# Patient Record
Sex: MALE | Race: WHITE | NOT HISPANIC OR LATINO | Employment: OTHER | URBAN - METROPOLITAN AREA
[De-identification: names, ages, dates, MRNs, and addresses within clinical notes are randomized per-mention and may not be internally consistent; named-entity substitution may affect disease eponyms.]

---

## 2018-04-09 ENCOUNTER — HOSPITAL ENCOUNTER (EMERGENCY)
Facility: HOSPITAL | Age: 63
Discharge: HOME | End: 2018-04-09
Attending: EMERGENCY MEDICINE
Payer: MEDICAID

## 2018-04-09 VITALS
DIASTOLIC BLOOD PRESSURE: 107 MMHG | HEIGHT: 70 IN | HEART RATE: 92 BPM | SYSTOLIC BLOOD PRESSURE: 162 MMHG | RESPIRATION RATE: 20 BRPM | WEIGHT: 132 LBS | OXYGEN SATURATION: 97 % | TEMPERATURE: 98 F | BODY MASS INDEX: 18.9 KG/M2

## 2018-04-09 DIAGNOSIS — I10 HYPERTENSION, UNSPECIFIED TYPE: Primary | ICD-10-CM

## 2018-04-09 PROCEDURE — 99283 EMERGENCY DEPT VISIT LOW MDM: CPT

## 2018-04-09 RX ORDER — NIFEDIPINE 10 MG/1
10 CAPSULE ORAL
Status: DISCONTINUED | OUTPATIENT
Start: 2018-04-09 | End: 2018-04-09 | Stop reason: HOSPADM

## 2018-04-09 NOTE — DISCHARGE INSTRUCTIONS
Follow up PCP as needed  Return to the ED if symptoms worsen Continue current medicines as perscribed

## 2018-04-09 NOTE — ED PROVIDER NOTES
Encounter Date: 4/9/2018       History     Chief Complaint   Patient presents with    Hypertension    Fatigue     Pt here for elavated Bp. Was seen last week x2 for the same thing by PCP and had med change. Still BP is up.           Review of patient's allergies indicates:   Allergen Reactions    Codeine      Past Medical History:   Diagnosis Date    Anxiety     Back pain     Cancer     COPD (chronic obstructive pulmonary disease)     Depression     Hypertension     MI (myocardial infarction)     Stroke     Tremor      Past Surgical History:   Procedure Laterality Date    ANGIOPLASTY      EYE SURGERY      FINGER SURGERY      POLYPECTOMY       History reviewed. No pertinent family history.  Social History   Substance Use Topics    Smoking status: Current Every Day Smoker    Smokeless tobacco: Not on file    Alcohol use Yes      Comment: occ     Review of Systems   Constitutional: Positive for fatigue.   All other systems reviewed and are negative.      Physical Exam     Initial Vitals [04/09/18 1253]   BP Pulse Resp Temp SpO2   (!) 162/107 92 20 98.3 °F (36.8 °C) 97 %      MAP       125.33         Physical Exam    Nursing note and vitals reviewed.  Constitutional: He appears well-developed and well-nourished.   HENT:   Head: Normocephalic.   Right Ear: External ear normal.   Left Ear: External ear normal.   Nose: Nose normal.   Mouth/Throat: Oropharynx is clear and moist.   Eyes: Conjunctivae and EOM are normal. Pupils are equal, round, and reactive to light.   Neck: Normal range of motion. Neck supple.   Cardiovascular: Normal rate, regular rhythm, normal heart sounds and intact distal pulses.   No murmur heard.  Pulmonary/Chest: Breath sounds normal. He has no wheezes. He has no rhonchi. He has no rales.   Abdominal: Soft. Bowel sounds are normal. He exhibits no mass. There is no tenderness.   Genitourinary: Penis normal.   Musculoskeletal: Normal range of motion.   Neurological: He is alert and  oriented to person, place, and time. He has normal strength and normal reflexes. He displays normal reflexes. No cranial nerve deficit.   Skin: Skin is warm and dry. Capillary refill takes less than 2 seconds.   Psychiatric: He has a normal mood and affect. His behavior is normal. Judgment and thought content normal.         ED Course   Procedures  Labs Reviewed - No data to display                               Clinical Impression:   The encounter diagnosis was Hypertension, unspecified type.                           Dane Carter MD  04/09/18 0144

## 2018-04-11 ENCOUNTER — OFFICE VISIT (OUTPATIENT)
Dept: SURGERY | Facility: CLINIC | Age: 63
End: 2018-04-11
Payer: MEDICAID

## 2018-04-11 VITALS
OXYGEN SATURATION: 96 % | SYSTOLIC BLOOD PRESSURE: 174 MMHG | WEIGHT: 132 LBS | TEMPERATURE: 99 F | HEART RATE: 79 BPM | BODY MASS INDEX: 18.9 KG/M2 | RESPIRATION RATE: 18 BRPM | HEIGHT: 70 IN | DIASTOLIC BLOOD PRESSURE: 104 MMHG

## 2018-04-11 DIAGNOSIS — D49.2 SKIN NEOPLASM: Primary | ICD-10-CM

## 2018-04-11 PROCEDURE — 99214 OFFICE O/P EST MOD 30 MIN: CPT | Mod: S$GLB,,, | Performed by: SURGERY

## 2018-04-11 RX ORDER — PRIMIDONE 50 MG/1
TABLET ORAL
COMMUNITY
End: 2018-05-02 | Stop reason: ALTCHOICE

## 2018-04-11 RX ORDER — CARVEDILOL 3.12 MG/1
TABLET ORAL
COMMUNITY
End: 2018-08-24 | Stop reason: SDUPTHER

## 2018-04-11 RX ORDER — SODIUM CHLORIDE 9 MG/ML
INJECTION, SOLUTION INTRAVENOUS CONTINUOUS
Status: CANCELLED | OUTPATIENT
Start: 2018-04-11

## 2018-04-11 NOTE — LETTER
April 11, 2018      Hiram Leija MD  [address] [place] [address]           [facility] - General Surgery  [address] [place] [address]  Phone: [phone]  Fax: [phone]          Patient: Ricky Cole   MR Number: [id number]   YOB: 1955   Date of Visit: 4/11/2018       Dear Dr. Hiram Leija:    Thank you for referring Ricky Cole to me for evaluation. Attached you will find relevant portions of my assessment and plan of care.    If you have questions, please do not hesitate to call me. I look forward to following Ricky Cole along with you.    Sincerely,    David Cintron MD    Enclosure  CC:  No Recipients    If you would like to receive this communication electronically, please contact [email or web address] or [phone] to request more information on [employer] Link access.    For providers and/or their staff who would like to refer a patient to [facility], please contact us through our one-stop-shop provider referral line, [facility], at [phone].    If you feel you have received this communication in error or would no longer like to receive these types of communications, please e-mail [email or web address]

## 2018-04-11 NOTE — H&P
[facility] Surgery H&P Note    Subjective:       Patient ID: Ricky Cole is a 63 y.o. male.    Chief Complaint: Follow-up (SKIN LESION)    HPI:  Ricky Cole is a 63 y.o. male with a past history of COPD, hypertension, MI, stroke, chronic pain, etc. he was previously on Plavix therapy for his heart now since stopped by Dr. Armstrong who represents today with a right upper extremity soft tissue/skin neoplasm.  Patient was previously seen by me a few weeks ago.  He was sent to Dr. Armstrong for preoperative cardiac clearance.  I personally spoke with Dr. Armstrong over the phone who cleared the patient for surgery.  This right upper extremity skin lesion has been growing for a number of weeks.  It is increasing in size.  It has rolled edges.  He wishes for wide local excision.    Past Medical History:   Diagnosis Date    Anxiety     Back pain     Cancer     COPD (chronic obstructive pulmonary disease)     Depression     Hypertension     MI (myocardial infarction)     Stroke     Tremor      Past Surgical History:   Procedure Laterality Date    ANGIOPLASTY      EYE SURGERY      FINGER SURGERY      POLYPECTOMY       History reviewed. No pertinent family history.  Social History     Social History    Marital status:      Spouse name: N/A    Number of children: N/A    Years of education: N/A     Social History Main Topics    Smoking status: Current Every Day Smoker     Types: Cigarettes    Smokeless tobacco: Never Used    Alcohol use Yes      Comment: occ    Drug use: No    Sexual activity: Not Asked     Other Topics Concern    None     Social History Narrative    None       Current Outpatient Prescriptions   Medication Sig Dispense Refill    carvedilol (COREG) 3.125 MG tablet carvedilol 3.125 mg tablet      primidone (MYSOLINE) 50 MG Tab primidone 50 mg tablet       No current facility-administered medications for this visit.      Review of patient's allergies indicates:   Allergen  "Reactions    Codeine        Review of Systems   Constitutional: Negative for appetite change, chills and fever.   HENT: Negative for congestion, dental problem and drooling.    Eyes: Negative for photophobia, discharge and itching.   Respiratory: Negative for apnea and chest tightness.    Cardiovascular: Negative for chest pain, palpitations and leg swelling.   Gastrointestinal: Negative for abdominal distention and abdominal pain.   Endocrine: Negative for cold intolerance and heat intolerance.   Genitourinary: Negative for difficulty urinating and dysuria.   Musculoskeletal: Positive for arthralgias and back pain.   Skin: Negative for color change and pallor.   Neurological: Negative for dizziness, facial asymmetry and headaches.   Hematological: Negative for adenopathy. Does not bruise/bleed easily.   Psychiatric/Behavioral: Negative for agitation, behavioral problems and confusion.       Objective:      Vitals:    04/11/18 1112   BP: (!) 174/104   BP Location: Right arm   Patient Position: Sitting   Pulse: 79   Resp: 18   Temp: 98.6 °F (37 °C)   TempSrc: Oral   SpO2: 96%   Weight: 59.9 kg (132 lb)   Height: 5' 10" (1.778 m)     Physical Exam   Constitutional: He is oriented to person, place, and time. He appears well-developed and well-nourished.   HENT:   Head: Normocephalic and atraumatic.   Eyes: EOM are normal. Pupils are equal, round, and reactive to light.   Neck: Normal range of motion. Neck supple. No thyromegaly present.   Cardiovascular: Normal rate and regular rhythm.    No murmur heard.  Pulmonary/Chest: Effort normal and breath sounds normal. No respiratory distress.   Abdominal: Soft. Bowel sounds are normal. He exhibits no distension. There is no tenderness.   Musculoskeletal: Normal range of motion. He exhibits no edema.   Neurological: He is alert and oriented to person, place, and time. No cranial nerve deficit.   Skin: Skin is warm. Capillary refill takes less than 2 seconds. No rash noted. " He is not diaphoretic. No erythema.        Psychiatric: He has a normal mood and affect.       Lab Review: We will obtain preoperatively  Diagnostics Review: No new     Assessment:       1. Skin neoplasm        Plan:   Skin neoplasm  -     Case Request Operating Room: EXCISION-WIDE LOCAL  -     Place in Outpatient; Standing  -     Vital signs; Standing  -     Insert peripheral IV; Standing  -     Verify beta-blocker dose taken within 24 hours if patient is prescribed beta-blocker; Standing  -     Height and weight; Standing  -     Intake and output; Standing  -     Verify discontinuation of antithrombotics; Standing  -     POCT glucose; Standing  -     Verify blood consent; Standing  -     Verify consent; Standing  -     Clip and Prep Right Brachial; Standing  -     Chlorhexidine (CHG) 2% Wipes; Standing  -     Hibiclens shower; Standing  -     Hibiclens shower; Standing  -     Notify Physician; Standing  -     Diet NPO; Standing  -     Early ambulation; Standing  -     Place KEVIN hose; Standing  -     Place sequential compression device; Standing  -     Basic metabolic panel; Future; Expected date: 04/11/2018  -     CBC auto differential; Future; Expected date: 04/11/2018  -     Pulse Oximetry Q4H; Standing  -     EKG 12-lead; Future    Other orders  -     0.9%  NaCl infusion; Inject into the vein continuous.  -     IP VTE LOW RISK PATIENT; Standing  -     ceFAZolin (ANCEF) 2 g in dextrose 5 % 50 mL IVPB; Inject 2 g into the vein On call Procedure (Surgery).        Medical Decision Making/Counseling:  Patient has what appears to be a squamous cell versus basal cell carcinoma of the right upper extremity near the deltoid region.  He needs wide local excision for excision as well as biopsy purposes.  This lesion has been increasing in size.  I discussed personally over the phone with the patient's cardiologist Dr. Aguirre's and he is granted the patient cardiac clearance.  Risks and benefits of the surgery were  discussed with the patient in depth today.  Counseling session approximately 20-30 minutes.  We'll proceed in the near future with wide local excision right upper extremity neoplastic skin lesion

## 2018-04-12 ENCOUNTER — HOSPITAL ENCOUNTER (OUTPATIENT)
Dept: CARDIOLOGY | Facility: HOSPITAL | Age: 63
Discharge: HOME | End: 2018-04-12
Attending: SURGERY
Payer: MEDICAID

## 2018-04-12 DIAGNOSIS — D49.2 SKIN NEOPLASM: ICD-10-CM

## 2018-04-16 ENCOUNTER — SURGERY (OUTPATIENT)
Age: 63
End: 2018-04-16

## 2018-04-16 ENCOUNTER — ANESTHESIA (OUTPATIENT)
Dept: SURGERY | Facility: HOSPITAL | Age: 63
End: 2018-04-16
Payer: MEDICAID

## 2018-04-16 ENCOUNTER — HOSPITAL ENCOUNTER (OUTPATIENT)
Facility: HOSPITAL | Age: 63
Discharge: HOME | End: 2018-04-16
Attending: SURGERY | Admitting: SURGERY
Payer: MEDICAID

## 2018-04-16 ENCOUNTER — ANESTHESIA EVENT (OUTPATIENT)
Dept: SURGERY | Facility: HOSPITAL | Age: 63
End: 2018-04-16
Payer: MEDICAID

## 2018-04-16 DIAGNOSIS — D49.2 SKIN NEOPLASM: Primary | ICD-10-CM

## 2018-04-16 PROCEDURE — 25000003 PHARM REV CODE 250: Performed by: SURGERY

## 2018-04-16 PROCEDURE — 71000015 HC POSTOP RECOV 1ST HR: Performed by: SURGERY

## 2018-04-16 PROCEDURE — 71000033 HC RECOVERY, INTIAL HOUR: Performed by: SURGERY

## 2018-04-16 PROCEDURE — 37000009 HC ANESTHESIA EA ADD 15 MINS: Performed by: SURGERY

## 2018-04-16 PROCEDURE — 63600175 PHARM REV CODE 636 W HCPCS: Performed by: NURSE ANESTHETIST, CERTIFIED REGISTERED

## 2018-04-16 PROCEDURE — 36000707: Performed by: SURGERY

## 2018-04-16 PROCEDURE — 63600175 PHARM REV CODE 636 W HCPCS: Performed by: ANESTHESIOLOGY

## 2018-04-16 PROCEDURE — D9220A PRA ANESTHESIA: Mod: ANES,,, | Performed by: ANESTHESIOLOGY

## 2018-04-16 PROCEDURE — 37000008 HC ANESTHESIA 1ST 15 MINUTES: Performed by: SURGERY

## 2018-04-16 PROCEDURE — 63600175 PHARM REV CODE 636 W HCPCS: Performed by: SURGERY

## 2018-04-16 PROCEDURE — 11606 EXC TR-EXT MAL+MARG >4 CM: CPT | Mod: ,,, | Performed by: SURGERY

## 2018-04-16 PROCEDURE — 88305 TISSUE EXAM BY PATHOLOGIST: CPT | Performed by: PATHOLOGY

## 2018-04-16 PROCEDURE — D9220A PRA ANESTHESIA: Mod: CRNA,,, | Performed by: NURSE ANESTHETIST, CERTIFIED REGISTERED

## 2018-04-16 PROCEDURE — 25000003 PHARM REV CODE 250: Performed by: ANESTHESIOLOGY

## 2018-04-16 PROCEDURE — 36000706: Performed by: SURGERY

## 2018-04-16 PROCEDURE — 88305 TISSUE EXAM BY PATHOLOGIST: CPT | Mod: 26,,, | Performed by: PATHOLOGY

## 2018-04-16 RX ORDER — CEFAZOLIN SODIUM 2 G/50ML
2 SOLUTION INTRAVENOUS
Status: COMPLETED | OUTPATIENT
Start: 2018-04-16 | End: 2018-04-16

## 2018-04-16 RX ORDER — DIPHENHYDRAMINE HYDROCHLORIDE 50 MG/ML
12.5 INJECTION INTRAMUSCULAR; INTRAVENOUS
Status: DISCONTINUED | OUTPATIENT
Start: 2018-04-16 | End: 2018-04-16 | Stop reason: HOSPADM

## 2018-04-16 RX ORDER — SODIUM CHLORIDE 9 MG/ML
INJECTION, SOLUTION INTRAVENOUS CONTINUOUS
Status: DISCONTINUED | OUTPATIENT
Start: 2018-04-16 | End: 2018-04-16

## 2018-04-16 RX ORDER — MEPERIDINE HYDROCHLORIDE 50 MG/ML
INJECTION INTRAMUSCULAR; INTRAVENOUS; SUBCUTANEOUS
Status: DISCONTINUED | OUTPATIENT
Start: 2018-04-16 | End: 2018-04-16

## 2018-04-16 RX ORDER — PROPOFOL 10 MG/ML
VIAL (ML) INTRAVENOUS
Status: DISCONTINUED | OUTPATIENT
Start: 2018-04-16 | End: 2018-04-16

## 2018-04-16 RX ORDER — OXYCODONE AND ACETAMINOPHEN 10; 325 MG/1; MG/1
1 TABLET ORAL EVERY 6 HOURS PRN
Qty: 30 TABLET | Refills: 0 | Status: SHIPPED | OUTPATIENT
Start: 2018-04-16 | End: 2018-04-26

## 2018-04-16 RX ORDER — MORPHINE SULFATE 4 MG/ML
2 INJECTION, SOLUTION INTRAMUSCULAR; INTRAVENOUS EVERY 5 MIN PRN
Status: DISCONTINUED | OUTPATIENT
Start: 2018-04-16 | End: 2018-04-16 | Stop reason: HOSPADM

## 2018-04-16 RX ORDER — FAMOTIDINE 20 MG/50ML
20 INJECTION, SOLUTION INTRAVENOUS ONCE
Status: DISCONTINUED | OUTPATIENT
Start: 2018-04-16 | End: 2018-04-16

## 2018-04-16 RX ORDER — BUPIVACAINE HYDROCHLORIDE AND EPINEPHRINE 5; 5 MG/ML; UG/ML
INJECTION, SOLUTION EPIDURAL; INTRACAUDAL; PERINEURAL
Status: DISCONTINUED | OUTPATIENT
Start: 2018-04-16 | End: 2018-04-16 | Stop reason: HOSPADM

## 2018-04-16 RX ORDER — MIDAZOLAM HYDROCHLORIDE 1 MG/ML
INJECTION, SOLUTION INTRAMUSCULAR; INTRAVENOUS
Status: DISCONTINUED | OUTPATIENT
Start: 2018-04-16 | End: 2018-04-16

## 2018-04-16 RX ORDER — KETOROLAC TROMETHAMINE 30 MG/ML
15 INJECTION, SOLUTION INTRAMUSCULAR; INTRAVENOUS ONCE
Status: DISCONTINUED | OUTPATIENT
Start: 2018-04-16 | End: 2018-04-16

## 2018-04-16 RX ORDER — SODIUM CHLORIDE, SODIUM LACTATE, POTASSIUM CHLORIDE, CALCIUM CHLORIDE 600; 310; 30; 20 MG/100ML; MG/100ML; MG/100ML; MG/100ML
INJECTION, SOLUTION INTRAVENOUS CONTINUOUS
Status: DISCONTINUED | OUTPATIENT
Start: 2018-04-16 | End: 2018-04-16

## 2018-04-16 RX ORDER — FAMOTIDINE 10 MG/ML
20 INJECTION INTRAVENOUS ONCE
Status: DISCONTINUED | OUTPATIENT
Start: 2018-04-16 | End: 2018-04-16

## 2018-04-16 RX ORDER — LIDOCAINE HYDROCHLORIDE 10 MG/ML
1 INJECTION, SOLUTION EPIDURAL; INFILTRATION; INTRACAUDAL; PERINEURAL ONCE
Status: DISCONTINUED | OUTPATIENT
Start: 2018-04-16 | End: 2018-04-16

## 2018-04-16 RX ORDER — SODIUM CHLORIDE, SODIUM LACTATE, POTASSIUM CHLORIDE, CALCIUM CHLORIDE 600; 310; 30; 20 MG/100ML; MG/100ML; MG/100ML; MG/100ML
125 INJECTION, SOLUTION INTRAVENOUS CONTINUOUS
Status: DISCONTINUED | OUTPATIENT
Start: 2018-04-16 | End: 2018-04-16 | Stop reason: HOSPADM

## 2018-04-16 RX ORDER — ONDANSETRON 2 MG/ML
4 INJECTION INTRAMUSCULAR; INTRAVENOUS DAILY PRN
Status: DISCONTINUED | OUTPATIENT
Start: 2018-04-16 | End: 2018-04-16 | Stop reason: HOSPADM

## 2018-04-16 RX ORDER — SODIUM CHLORIDE 9 MG/ML
INJECTION, SOLUTION INTRAVENOUS CONTINUOUS
Status: DISCONTINUED | OUTPATIENT
Start: 2018-04-16 | End: 2018-04-16 | Stop reason: HOSPADM

## 2018-04-16 RX ADMIN — MORPHINE SULFATE 2 MG: 4 INJECTION INTRAVENOUS at 09:04

## 2018-04-16 RX ADMIN — SODIUM CHLORIDE, POTASSIUM CHLORIDE, SODIUM LACTATE AND CALCIUM CHLORIDE 125 ML/HR: 600; 310; 30; 20 INJECTION, SOLUTION INTRAVENOUS at 09:04

## 2018-04-16 RX ADMIN — CEFAZOLIN SODIUM 2 G: 2 SOLUTION INTRAVENOUS at 07:04

## 2018-04-16 RX ADMIN — MEPERIDINE HYDROCHLORIDE 25 MG: 50 INJECTION INTRAMUSCULAR; INTRAVENOUS; SUBCUTANEOUS at 08:04

## 2018-04-16 RX ADMIN — PROPOFOL 130 MG: 10 INJECTION, EMULSION INTRAVENOUS at 07:04

## 2018-04-16 RX ADMIN — MIDAZOLAM HYDROCHLORIDE 2 MG: 1 INJECTION, SOLUTION INTRAMUSCULAR; INTRAVENOUS at 07:04

## 2018-04-16 RX ADMIN — SODIUM CHLORIDE: 9 INJECTION, SOLUTION INTRAVENOUS at 07:04

## 2018-04-16 RX ADMIN — BUPIVACAINE HYDROCHLORIDE AND EPINEPHRINE 20 ML: 5; 5 INJECTION, SOLUTION EPIDURAL; INTRACAUDAL; PERINEURAL at 08:04

## 2018-04-16 NOTE — TRANSFER OF CARE
"Anesthesia Transfer of Care Note    Patient: Ricky Cole    Procedure(s) Performed: Procedure(s) (LRB):  EXCISION-WIDE LOCAL (Right)    Patient location: PACU    Transport from OR: Transported from OR on room air with adequate spontaneous ventilation    Post pain: adequate analgesia    Post assessment: no apparent anesthetic complications and tolerated procedure well    Post vital signs: stable    Level of consciousness: awake, alert and oriented    Nausea/Vomiting: no nausea/vomiting    Complications: none    Transfer of care protocol was followed      Last vitals:   Visit Vitals  BP (!) 155/95 (BP Location: Left arm, Patient Position: Sitting)   Pulse 77   Temp 36.9 °C (98.4 °F) (Oral)   Resp 17   Ht 5' 10.5" (1.791 m)   Wt 60.8 kg (134 lb)   SpO2 100%   BMI 18.96 kg/m²     "

## 2018-04-16 NOTE — ANESTHESIA POSTPROCEDURE EVALUATION
"Anesthesia Post Evaluation    Patient: Ricky Cole    Procedure(s) Performed: Procedure(s) (LRB):  EXCISION-WIDE LOCAL NEOPLASM RIGHT SHOULDER (Right)    [facility] Anesthesia Post Op Evaluation    Visit Vitals  /75   Pulse 62   Temp 36.5 °C (97.7 °F) (Oral)   Resp 20   Ht 5' 10.5" (1.791 m)   Wt 60.8 kg (134 lb)   SpO2 99%   BMI 18.96 kg/m²       Pain/Raimn Score: Pain Assessment Performed: Yes (4/16/2018  9:08 AM)  Presence of Pain: denies (4/16/2018  8:55 AM)  Pain Rating Prior to Med Admin: 8 (4/16/2018  9:08 AM)  Pain Rating Post Med Admin: 6 (4/16/2018  9:15 AM)  Ramin Score: 10 (4/16/2018  9:15 AM)  Modified Ramin Score: 18 (4/16/2018  9:15 AM)      "

## 2018-04-16 NOTE — OR NURSING
Patient awake, alert, and oriented.  No complaints of pain or discomfort at present time.Abdomen soft and nontender; VSS, tolerating fluids without C/O N/V. Dressing to incision site clean, dry, intact. Stable for discharge home.

## 2018-04-16 NOTE — ANESTHESIA PREPROCEDURE EVALUATION
04/16/2018  Ricky Cole is a 63 y.o., male.    Anesthesia Evaluation    I have reviewed the Patient Summary Reports.    I have reviewed the Nursing Notes.   I have reviewed the Medications.     Review of Systems  Anesthesia Hx:  Neg history of prior surgery. Denies Family Hx of Anesthesia complications.   Denies Personal Hx of Anesthesia complications.   Cardiac Stent x2, last 8 years ago. No current cardiac systems.  Hx of CVA x2, years ago, no neurological resididual effects    Physical Exam  General:  Well nourished    Airway/Jaw/Neck:  AIRWAY FINDINGS: Normal      Eyes/Ears/Nose:  EYES/EARS/NOSE FINDINGS: Normal   Dental:  DENTAL FINDINGS: Normal   Chest/Lungs:  Chest/Lungs Clear    Heart/Vascular:  Heart Findings: Normal Heart murmur: negative Vascular Findings: Normal    Abdomen:  Abdomen Findings: Normal    Musculoskeletal:  Musculoskeletal Findings: Normal   Skin:  Skin Findings: Normal    Mental Status:  Mental Status Findings: Normal        Anesthesia Plan  Type of Anesthesia, risks & benefits discussed:  Anesthesia Type:  general  Patient's Preference:   Intra-op Monitoring Plan:   Intra-op Monitoring Plan Comments:   Post Op Pain Control Plan:   Post Op Pain Control Plan Comments:   Induction:   IV  Beta Blocker:  Patient is on a Beta-Blocker and has received one dose within the past 24 hours (No further documentation required).       Informed Consent: Patient understands risks and agrees with Anesthesia plan.  Questions answered. Anesthesia consent signed with patient.  ASA Score: 2     Day of Surgery Review of History & Physical:    H&P update referred to the provider.         Ready For Surgery From Anesthesia Perspective.

## 2018-04-16 NOTE — DISCHARGE INSTRUCTIONS

## 2018-04-16 NOTE — H&P (VIEW-ONLY)
[facility] Surgery H&P Note    Subjective:       Patient ID: Ricky Cole is a 63 y.o. male.    Chief Complaint: Follow-up (SKIN LESION)    HPI:  Ricky Cole is a 63 y.o. male with a past history of COPD, hypertension, MI, stroke, chronic pain, etc. he was previously on Plavix therapy for his heart now since stopped by Dr. Armstrong who represents today with a right upper extremity soft tissue/skin neoplasm.  Patient was previously seen by me a few weeks ago.  He was sent to Dr. Armstrong for preoperative cardiac clearance.  I personally spoke with Dr. Armstrong over the phone who cleared the patient for surgery.  This right upper extremity skin lesion has been growing for a number of weeks.  It is increasing in size.  It has rolled edges.  He wishes for wide local excision.    Past Medical History:   Diagnosis Date    Anxiety     Back pain     Cancer     COPD (chronic obstructive pulmonary disease)     Depression     Hypertension     MI (myocardial infarction)     Stroke     Tremor      Past Surgical History:   Procedure Laterality Date    ANGIOPLASTY      EYE SURGERY      FINGER SURGERY      POLYPECTOMY       History reviewed. No pertinent family history.  Social History     Social History    Marital status:      Spouse name: N/A    Number of children: N/A    Years of education: N/A     Social History Main Topics    Smoking status: Current Every Day Smoker     Types: Cigarettes    Smokeless tobacco: Never Used    Alcohol use Yes      Comment: occ    Drug use: No    Sexual activity: Not Asked     Other Topics Concern    None     Social History Narrative    None       Current Outpatient Prescriptions   Medication Sig Dispense Refill    carvedilol (COREG) 3.125 MG tablet carvedilol 3.125 mg tablet      primidone (MYSOLINE) 50 MG Tab primidone 50 mg tablet       No current facility-administered medications for this visit.      Review of patient's allergies indicates:   Allergen  "Reactions    Codeine        Review of Systems   Constitutional: Negative for appetite change, chills and fever.   HENT: Negative for congestion, dental problem and drooling.    Eyes: Negative for photophobia, discharge and itching.   Respiratory: Negative for apnea and chest tightness.    Cardiovascular: Negative for chest pain, palpitations and leg swelling.   Gastrointestinal: Negative for abdominal distention and abdominal pain.   Endocrine: Negative for cold intolerance and heat intolerance.   Genitourinary: Negative for difficulty urinating and dysuria.   Musculoskeletal: Positive for arthralgias and back pain.   Skin: Negative for color change and pallor.   Neurological: Negative for dizziness, facial asymmetry and headaches.   Hematological: Negative for adenopathy. Does not bruise/bleed easily.   Psychiatric/Behavioral: Negative for agitation, behavioral problems and confusion.       Objective:      Vitals:    04/11/18 1112   BP: (!) 174/104   BP Location: Right arm   Patient Position: Sitting   Pulse: 79   Resp: 18   Temp: 98.6 °F (37 °C)   TempSrc: Oral   SpO2: 96%   Weight: 59.9 kg (132 lb)   Height: 5' 10" (1.778 m)     Physical Exam   Constitutional: He is oriented to person, place, and time. He appears well-developed and well-nourished.   HENT:   Head: Normocephalic and atraumatic.   Eyes: EOM are normal. Pupils are equal, round, and reactive to light.   Neck: Normal range of motion. Neck supple. No thyromegaly present.   Cardiovascular: Normal rate and regular rhythm.    No murmur heard.  Pulmonary/Chest: Effort normal and breath sounds normal. No respiratory distress.   Abdominal: Soft. Bowel sounds are normal. He exhibits no distension. There is no tenderness.   Musculoskeletal: Normal range of motion. He exhibits no edema.   Neurological: He is alert and oriented to person, place, and time. No cranial nerve deficit.   Skin: Skin is warm. Capillary refill takes less than 2 seconds. No rash noted. " He is not diaphoretic. No erythema.        Psychiatric: He has a normal mood and affect.       Lab Review: We will obtain preoperatively  Diagnostics Review: No new     Assessment:       1. Skin neoplasm        Plan:   Skin neoplasm  -     Case Request Operating Room: EXCISION-WIDE LOCAL  -     Place in Outpatient; Standing  -     Vital signs; Standing  -     Insert peripheral IV; Standing  -     Verify beta-blocker dose taken within 24 hours if patient is prescribed beta-blocker; Standing  -     Height and weight; Standing  -     Intake and output; Standing  -     Verify discontinuation of antithrombotics; Standing  -     POCT glucose; Standing  -     Verify blood consent; Standing  -     Verify consent; Standing  -     Clip and Prep Right Brachial; Standing  -     Chlorhexidine (CHG) 2% Wipes; Standing  -     Hibiclens shower; Standing  -     Hibiclens shower; Standing  -     Notify Physician; Standing  -     Diet NPO; Standing  -     Early ambulation; Standing  -     Place KEVIN hose; Standing  -     Place sequential compression device; Standing  -     Basic metabolic panel; Future; Expected date: 04/11/2018  -     CBC auto differential; Future; Expected date: 04/11/2018  -     Pulse Oximetry Q4H; Standing  -     EKG 12-lead; Future    Other orders  -     0.9%  NaCl infusion; Inject into the vein continuous.  -     IP VTE LOW RISK PATIENT; Standing  -     ceFAZolin (ANCEF) 2 g in dextrose 5 % 50 mL IVPB; Inject 2 g into the vein On call Procedure (Surgery).        Medical Decision Making/Counseling:  Patient has what appears to be a squamous cell versus basal cell carcinoma of the right upper extremity near the deltoid region.  He needs wide local excision for excision as well as biopsy purposes.  This lesion has been increasing in size.  I discussed personally over the phone with the patient's cardiologist Dr. Aguirre's and he is granted the patient cardiac clearance.  Risks and benefits of the surgery were  discussed with the patient in depth today.  Counseling session approximately 20-30 minutes.  We'll proceed in the near future with wide local excision right upper extremity neoplastic skin lesion

## 2018-04-16 NOTE — DISCHARGE SUMMARY
Discharge Note        SUMMARY     Admit Date: 4/16/2018    Attending Physician: David Cintron MD     Discharge Physician: David Cintron MD    Discharge Date: 4/16/2018 8:42 AM      Hospital Course: Patient tolerated procedure well.     Disposition: Home or Self Care    Patient Instructions:   Current Discharge Medication List      START taking these medications    Details   oxyCODONE-acetaminophen (PERCOCET)  mg per tablet Take 1 tablet by mouth every 6 (six) hours as needed for Pain.  Qty: 30 tablet, Refills: 0         CONTINUE these medications which have NOT CHANGED    Details   carvedilol (COREG) 3.125 MG tablet carvedilol 3.125 mg tablet      primidone (MYSOLINE) 50 MG Tab primidone 50 mg tablet             Discharge Procedure Orders (must include Diet, Follow-up, Activity):    Discharge Procedure Orders (must include Diet, Follow-up, Activity)  Diet general     Activity as tolerated     Call MD for:  temperature >100.4     Call MD for:  persistent nausea and vomiting     Call MD for:  severe uncontrolled pain     Call MD for:  difficulty breathing, headache or visual disturbances     Call MD for:  redness, tenderness, or signs of infection (pain, swelling, redness, odor or green/yellow discharge around incision site)     Call MD for:  persistent dizziness or light-headedness     Remove dressing in 48 hours          Follow Up:  Follow up as scheduled.  Resume routine diet.  Activity as tolerated.    No driving day of procedure.

## 2018-04-16 NOTE — OP NOTE
[facility] - Periop Services  Operative Note     SUMMARY     Surgery Date: 4/16/2018     Surgeon(s) and Role:     * David Cintron MD - Primary    Assistant: Vince    Pre-op Diagnosis:  Skin neoplasm [D49.2]    Post-op Diagnosis:  Post-Op Diagnosis Codes:     * Skin neoplasm [D49.2]    Procedure(s) (LRB):  EXCISION-WIDE LOCAL (Right) upper extremity skin neoplasm.  Excision diameter 5cm wide by 12cm long.      Description of the findings of the procedure:  Skin neoplasm [D49.2]      Estimated Blood Loss: * No values recorded between 4/16/2018  8:07 AM and 4/16/2018  8:35 AM *         Specimens:   Specimen (12h ago through future)    Start     Ordered    04/16/18 0824  Specimen to Pathology - Surgery  Once     Comments:  Pre-op Diagnosis: Skin neoplasm [D49.2]Post-op Diagnosis: sameProcedure(s):EXCISION-WIDE LOCAL NEOPLASM RIGHT SHOULDERNumber of specimens: 1Name of specimens: 1.NEOPLASM RIGHT SHOULDER-SUTURE MARKS:SHORT SUPERIOR, LONG POSTERIOR      04/16/18 0828        Abx: Ancef 2 grams    Dictation #1  MRN:[id number]  CSN:[id number]    Report confirmation number: [id number]

## 2018-04-16 NOTE — OP NOTE
DATE OF PROCEDURE:  04/16/2018.    INDICATIONS FOR PROCEDURE:  Mr. Cole is a 63-year-old male who is a  primary care patient of Dr. Leija, who was sent to my clinic for evaluation  of right upper extremity over the deltoid muscle soft tissue neoplasm.  He  was seen preoperatively by Dr. Leo, her cardiologist for evaluation of  his cardiac comorbidities and cardiac clearance.  He obtained this Plavix  was withdrawn by Dr. Leo presented back to my clinic.  Risks and benefits  were discussed of a wide local excision of soft tissue/skin neoplasm.  The  patient wished to proceed.  They have signed informed consent.    PROCEDURE IN DETAIL:  The patient was seen in preoperative holding.  His  right upper extremity soft tissue/skin neoplasm of the deltoid was marked  by me.  He signed informed consent.  He proceeded back to the Operative  Room, placed on the table in the supine position.  General anesthesia was  introduced with the Anesthesia staff.  A timeout was conducted with all in  the Operative Room agreed to correct patient, correct procedure, correct  allergies, and correct antibiotics.  The patient was given 2 g of Ancef.   The patient's right arm was prepped and draped in the standard sterile  surgical fashion.    I then instilled 20 mL of 0.25% Marcaine with epinephrine in the patient's  operative field.  This was a local block.  I then measured the soft tissue  neoplasm or skin neoplasm and took a one cm margin on each side.  I then  made an elliptical incision around the skin neoplasm.  Skin incision was  carried down to fascia with Bovie electrocautery.  A skin neoplasm was  taken off the fascia of the deltoid muscle for a true wide local excision.   One stitch was placed superiorly.  This was a short stitch.  One stitch was  placed posteriorly.  This was a long stitch for marking.  The skin and soft  tissue was handed off as a specimen as wide local excision, skin neoplasm,  right upper extremity.  At  this point, I measured the wound cavity.  The  wound cavity was 12 cm long x 5 cm wide.  At this point, hemostasis was  achieved.  Wound was irrigated with  irrigant.  I then used 3-0 Vicryl  sutures in a simple subdermal fashion to close the subdermis back together.   I then used 3-0 nylon sutures to close the skin in a simple vertical  mattress fashion.  Dry dressings were placed over top.  The patient was  then reversed from anesthesia, transferred back to Postoperative Care Unit  in stable condition.  All counts were correct at the end of the procedure  including lap pads, instruments as well as needles.  Plan will be for  discharge home today with Percocet as the patient is allergic to codeine  and follow up with me in two weeks for pathology review.        ALEJANDRO dd: 04/16/2018 08:41:57 (CDT)   td: 04/16/2018 10:46:42 (CDT)  Doc ID #[id number]   Job ID #[id number]    CC:

## 2018-04-16 NOTE — INTERVAL H&P NOTE
The patient has been examined and the H&P has been reviewed:    I concur with the findings and no changes have occurred since H&P was written.    Surgery risks, benefits and alternative options discussed and understood by patient/family.          Active Hospital Problems    Diagnosis  POA    Skin neoplasm [D49.2]  Yes      Resolved Hospital Problems    Diagnosis Date Resolved POA   No resolved problems to display.

## 2018-04-16 NOTE — ANESTHESIA POSTPROCEDURE EVALUATION
"Anesthesia Post Evaluation    Patient: Ricky Cole    Procedure(s) Performed: Procedure(s) (LRB):  EXCISION-WIDE LOCAL NEOPLASM RIGHT SHOULDER (Right)    Final Anesthesia Type: general  Patient location during evaluation: PACU  Patient participation: Yes- Able to Participate  Level of consciousness: awake and alert  Post-procedure vital signs: reviewed and stable  Pain management: adequate  Airway patency: patent  PONV status at discharge: No PONV  Anesthetic complications: no      Cardiovascular status: blood pressure returned to baseline  Respiratory status: unassisted  Hydration status: euvolemic  Follow-up not needed.        Visit Vitals  /75   Pulse 62   Temp 36.5 °C (97.7 °F) (Oral)   Resp 20   Ht 5' 10.5" (1.791 m)   Wt 60.8 kg (134 lb)   SpO2 99%   BMI 18.96 kg/m²       Pain/Ramin Score: Pain Assessment Performed: Yes (4/16/2018  9:08 AM)  Presence of Pain: denies (4/16/2018  8:55 AM)  Pain Rating Prior to Med Admin: 8 (4/16/2018  9:08 AM)  Pain Rating Post Med Admin: 6 (4/16/2018  9:15 AM)  Ramin Score: 10 (4/16/2018  9:15 AM)  Modified Ramin Score: 18 (4/16/2018  9:15 AM)      "

## 2018-04-16 NOTE — OR NURSING
Patient awake, alert, and oriented.  No complaints of pain or discomfort at present time. NO C/O N/V. Stable for transfer to Phase II.

## 2018-04-16 NOTE — BRIEF OP NOTE
[facility] - Periop Services  Brief Operative Note     SUMMARY     Surgery Date: 4/16/2018     Surgeon(s) and Role:     * David Cintron MD - Primary    Assistant: Vince    Pre-op Diagnosis:  Skin neoplasm [D49.2]    Post-op Diagnosis:  Post-Op Diagnosis Codes:     * Skin neoplasm [D49.2]    Procedure(s) (LRB):  EXCISION-WIDE LOCAL (Right) upper extremity skin neoplasm.  Excision diameter 5cm wide by 12cm long.      Description of the findings of the procedure:  Skin neoplasm [D49.2]      Estimated Blood Loss: * No values recorded between 4/16/2018  8:07 AM and 4/16/2018  8:35 AM *         Specimens:   Specimen (12h ago through future)    Start     Ordered    04/16/18 0824  Specimen to Pathology - Surgery  Once     Comments:  Pre-op Diagnosis: Skin neoplasm [D49.2]Post-op Diagnosis: sameProcedure(s):EXCISION-WIDE LOCAL NEOPLASM RIGHT SHOULDERNumber of specimens: 1Name of specimens: 1.NEOPLASM RIGHT SHOULDER-SUTURE MARKS:SHORT SUPERIOR, LONG POSTERIOR      04/16/18 0871

## 2018-04-19 VITALS
HEART RATE: 62 BPM | TEMPERATURE: 98 F | HEIGHT: 71 IN | SYSTOLIC BLOOD PRESSURE: 115 MMHG | DIASTOLIC BLOOD PRESSURE: 75 MMHG | WEIGHT: 134 LBS | OXYGEN SATURATION: 99 % | BODY MASS INDEX: 18.76 KG/M2 | RESPIRATION RATE: 20 BRPM

## 2018-04-22 ENCOUNTER — HOSPITAL ENCOUNTER (EMERGENCY)
Facility: HOSPITAL | Age: 63
Discharge: HOME | End: 2018-04-22
Attending: INTERNAL MEDICINE
Payer: MEDICAID

## 2018-04-22 VITALS
DIASTOLIC BLOOD PRESSURE: 82 MMHG | HEIGHT: 70 IN | SYSTOLIC BLOOD PRESSURE: 146 MMHG | BODY MASS INDEX: 18.61 KG/M2 | RESPIRATION RATE: 15 BRPM | TEMPERATURE: 98 F | WEIGHT: 130 LBS | HEART RATE: 79 BPM | OXYGEN SATURATION: 95 %

## 2018-04-22 DIAGNOSIS — R25.1 TREMOR OF BOTH HANDS: ICD-10-CM

## 2018-04-22 DIAGNOSIS — J20.9 ACUTE BRONCHITIS DUE TO INFECTION: Primary | ICD-10-CM

## 2018-04-22 DIAGNOSIS — R09.1 PLEURISY: ICD-10-CM

## 2018-04-22 DIAGNOSIS — R06.02 SOB (SHORTNESS OF BREATH): ICD-10-CM

## 2018-04-22 LAB
ALBUMIN SERPL BCP-MCNC: 4.3 G/DL
ALP SERPL-CCNC: 62 U/L
ALT SERPL W/O P-5'-P-CCNC: 18 U/L
ANION GAP SERPL CALC-SCNC: 10 MMOL/L
AST SERPL-CCNC: 27 U/L
BASOPHILS # BLD AUTO: 0.05 K/UL
BASOPHILS NFR BLD: 0.6 %
BILIRUB SERPL-MCNC: 0.7 MG/DL
BUN SERPL-MCNC: 6 MG/DL
CALCIUM SERPL-MCNC: 9.5 MG/DL
CHLORIDE SERPL-SCNC: 92 MMOL/L
CO2 SERPL-SCNC: 28 MMOL/L
CREAT SERPL-MCNC: 0.6 MG/DL
DIFFERENTIAL METHOD: ABNORMAL
EOSINOPHIL # BLD AUTO: 0 K/UL
EOSINOPHIL NFR BLD: 0.4 %
ERYTHROCYTE [DISTWIDTH] IN BLOOD BY AUTOMATED COUNT: 11.5 %
EST. GFR  (AFRICAN AMERICAN): >60 ML/MIN/1.73 M^2
EST. GFR  (NON AFRICAN AMERICAN): >60 ML/MIN/1.73 M^2
GLUCOSE SERPL-MCNC: 111 MG/DL
HCT VFR BLD AUTO: 38.4 %
HGB BLD-MCNC: 14.1 G/DL
IMM GRANULOCYTES # BLD AUTO: 0.03 K/UL
IMM GRANULOCYTES NFR BLD AUTO: 0.4 %
LACTATE SERPL-SCNC: 1.1 MMOL/L
LYMPHOCYTES # BLD AUTO: 1 K/UL
LYMPHOCYTES NFR BLD: 12.6 %
MCH RBC QN AUTO: 35.2 PG
MCHC RBC AUTO-ENTMCNC: 36.7 G/DL
MCV RBC AUTO: 96 FL
MONOCYTES # BLD AUTO: 0.7 K/UL
MONOCYTES NFR BLD: 9.2 %
NEUTROPHILS # BLD AUTO: 6.2 K/UL
NEUTROPHILS NFR BLD: 76.8 %
NRBC BLD-RTO: 0 /100 WBC
PLATELET # BLD AUTO: 266 K/UL
PMV BLD AUTO: 9 FL
POTASSIUM SERPL-SCNC: 4.3 MMOL/L
PROT SERPL-MCNC: 8 G/DL
RBC # BLD AUTO: 4.01 M/UL
SODIUM SERPL-SCNC: 130 MMOL/L
WBC # BLD AUTO: 8.03 K/UL

## 2018-04-22 PROCEDURE — 99285 EMERGENCY DEPT VISIT HI MDM: CPT | Mod: 25

## 2018-04-22 PROCEDURE — 96375 TX/PRO/DX INJ NEW DRUG ADDON: CPT

## 2018-04-22 PROCEDURE — 85025 COMPLETE CBC W/AUTO DIFF WBC: CPT

## 2018-04-22 PROCEDURE — 25000003 PHARM REV CODE 250: Performed by: INTERNAL MEDICINE

## 2018-04-22 PROCEDURE — 96374 THER/PROPH/DIAG INJ IV PUSH: CPT

## 2018-04-22 PROCEDURE — 63600175 PHARM REV CODE 636 W HCPCS: Performed by: INTERNAL MEDICINE

## 2018-04-22 PROCEDURE — 83605 ASSAY OF LACTIC ACID: CPT

## 2018-04-22 PROCEDURE — 80053 COMPREHEN METABOLIC PANEL: CPT

## 2018-04-22 PROCEDURE — 25000242 PHARM REV CODE 250 ALT 637 W/ HCPCS: Performed by: INTERNAL MEDICINE

## 2018-04-22 PROCEDURE — 96365 THER/PROPH/DIAG IV INF INIT: CPT

## 2018-04-22 PROCEDURE — 94640 AIRWAY INHALATION TREATMENT: CPT

## 2018-04-22 PROCEDURE — 93005 ELECTROCARDIOGRAM TRACING: CPT

## 2018-04-22 PROCEDURE — 71046 X-RAY EXAM CHEST 2 VIEWS: CPT | Mod: 26,,, | Performed by: RADIOLOGY

## 2018-04-22 PROCEDURE — 87040 BLOOD CULTURE FOR BACTERIA: CPT

## 2018-04-22 PROCEDURE — 94760 N-INVAS EAR/PLS OXIMETRY 1: CPT

## 2018-04-22 PROCEDURE — 71046 X-RAY EXAM CHEST 2 VIEWS: CPT | Mod: TC,FY

## 2018-04-22 RX ORDER — AZITHROMYCIN 250 MG/1
500 TABLET, FILM COATED ORAL DAILY
Qty: 6 TABLET | Refills: 1 | Status: SHIPPED | OUTPATIENT
Start: 2018-04-22 | End: 2018-05-02 | Stop reason: ALTCHOICE

## 2018-04-22 RX ORDER — AZITHROMYCIN 250 MG/1
500 TABLET, FILM COATED ORAL
Status: COMPLETED | OUTPATIENT
Start: 2018-04-22 | End: 2018-04-22

## 2018-04-22 RX ORDER — METOPROLOL TARTRATE 25 MG/1
25 TABLET, FILM COATED ORAL
Status: COMPLETED | OUTPATIENT
Start: 2018-04-22 | End: 2018-04-22

## 2018-04-22 RX ORDER — METHYLPREDNISOLONE 4 MG/1
TABLET ORAL
Qty: 1 PACKAGE | Refills: 0 | Status: SHIPPED | OUTPATIENT
Start: 2018-04-22 | End: 2018-05-02 | Stop reason: ALTCHOICE

## 2018-04-22 RX ORDER — DEXAMETHASONE SODIUM PHOSPHATE 100 MG/10ML
10 INJECTION INTRAMUSCULAR; INTRAVENOUS
Status: COMPLETED | OUTPATIENT
Start: 2018-04-22 | End: 2018-04-22

## 2018-04-22 RX ORDER — IPRATROPIUM BROMIDE AND ALBUTEROL SULFATE 2.5; .5 MG/3ML; MG/3ML
3 SOLUTION RESPIRATORY (INHALATION)
Status: COMPLETED | OUTPATIENT
Start: 2018-04-22 | End: 2018-04-22

## 2018-04-22 RX ORDER — ATENOLOL 25 MG/1
25 TABLET ORAL DAILY
Qty: 30 TABLET | Refills: 0 | Status: SHIPPED | OUTPATIENT
Start: 2018-04-22 | End: 2019-07-24

## 2018-04-22 RX ADMIN — DEXAMETHASONE SODIUM PHOSPHATE 10 MG: 10 INJECTION INTRAMUSCULAR; INTRAVENOUS at 02:04

## 2018-04-22 RX ADMIN — IPRATROPIUM BROMIDE AND ALBUTEROL SULFATE 3 ML: .5; 3 SOLUTION RESPIRATORY (INHALATION) at 02:04

## 2018-04-22 RX ADMIN — AZITHROMYCIN 500 MG: 250 TABLET, FILM COATED ORAL at 01:04

## 2018-04-22 RX ADMIN — METOPROLOL TARTRATE 25 MG: 25 TABLET ORAL at 02:04

## 2018-04-22 RX ADMIN — Medication 1 G: at 01:04

## 2018-04-22 NOTE — ED PROVIDER NOTES
Encounter Date: 4/22/2018       History     Chief Complaint   Patient presents with    Chest Pain     Pt reports Chest pressure on L side that began after surgery to remove tumor from R shoulder on Monday     Complaining of respiratory cough and pleuritic chest pain starting one day after surgery for a skin cancer last Monday (6 days ago). Chills, fever, productive cough, and pleuritic right   Chest pain. No SOB. Hurts to take a deep breath. Phlegm is ggreen.           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, including shoulder     Cancer 2005    right eye    COPD (chronic obstructive pulmonary disease)     Depression     Hypertension     Inguinal hernia     left    Kidney stone     MI (myocardial infarction)     Stroke     last stroke 2015    Tremor     Ulcer      Past Surgical History:   Procedure Laterality Date    ANGIOPLASTY      cardiac stent    COLECTOMY      EYE SURGERY      FINGER SURGERY Left     POLYPECTOMY       Family History   Problem Relation Age of Onset    Heart disease Mother     Heart disease Father     Cancer Father     Dementia Brother     Heart disease Brother      Social History   Substance Use Topics    Smoking status: Current Every Day Smoker     Years: 15.00     Types: Cigarettes    Smokeless tobacco: Former User     Types: Chew    Alcohol use Yes      Comment: occasionally     Review of Systems   Constitutional: Negative for fever.   HENT: Negative for sore throat.    Respiratory: Positive for cough, shortness of breath and wheezing.    Cardiovascular: Negative for chest pain.   Gastrointestinal: Negative for nausea.   Genitourinary: Negative for dysuria.   Musculoskeletal: Negative for back pain.   Skin: Negative for rash.   Neurological: Positive for tremors (chronic). Negative for weakness.    Hematological: Does not bruise/bleed easily.   All other systems reviewed and are negative.      Physical Exam     Initial Vitals [04/22/18 1321]   BP Pulse Resp Temp SpO2   (!) 164/95 88 -- 98 °F (36.7 °C) 98 %      MAP       118         Physical Exam    Nursing note and vitals reviewed.  Constitutional: Vital signs are normal. He appears well-developed and well-nourished. He is active and cooperative.   HENT:   Head: Normocephalic and atraumatic.   Eyes: Conjunctivae and lids are normal. Lids are everted and swept, no foreign bodies found.   Neck: Trachea normal, normal range of motion and full passive range of motion without pain. Neck supple.   Cardiovascular: Normal rate, regular rhythm, S1 normal, S2 normal, normal heart sounds, intact distal pulses and normal pulses.  No extrasystoles are present.   Pulmonary/Chest: He has wheezes. He has rhonchi. He has rales.   Abdominal: Soft. Normal appearance and bowel sounds are normal.   Musculoskeletal: Normal range of motion.        Arms:  Neurological: He is alert. He has normal reflexes. GCS eye subscore is 4. GCS verbal subscore is 5. GCS motor subscore is 6.   Skin: Skin is warm, dry and intact. Capillary refill takes less than 2 seconds.   Psychiatric: He has a normal mood and affect. His speech is normal and behavior is normal. Cognition and memory are normal.         ED Course   Procedures  Labs Reviewed   COMPREHENSIVE METABOLIC PANEL - Abnormal; Notable for the following:        Result Value    Sodium 130 (*)     Chloride 92 (*)     Glucose 111 (*)     BUN, Bld 6 (*)     All other components within normal limits   CULTURE, BLOOD   CBC W/ AUTO DIFFERENTIAL   LACTIC ACID, PLASMA         Labs Reviewed   COMPREHENSIVE METABOLIC PANEL - Abnormal; Notable for the following:        Result Value    Sodium 130 (*)     Chloride 92 (*)     Glucose 111 (*)     BUN, Bld 6 (*)     All other components within normal limits   CULTURE, BLOOD   CBC W/ AUTO DIFFERENTIAL    LACTIC ACID, PLASMA       X-Rays:   Independently Interpreted Readings:   Chest X-Ray: Normal heart size. Normal vessels. There is an infiltrate in the RLL.                          Clinical Impression:   The primary encounter diagnosis was Acute bronchitis due to infection. Diagnoses of SOB (shortness of breath), Pleurisy, and Tremor of both hands were also pertinent to this visit.                           Doroteo Randall MD  04/22/18 1035

## 2018-04-27 LAB — BACTERIA BLD CULT: NORMAL

## 2018-05-01 RX ORDER — ALPRAZOLAM 1 MG/1
TABLET ORAL
COMMUNITY
Start: 2018-04-01 | End: 2018-05-01 | Stop reason: SDUPTHER

## 2018-05-01 RX ORDER — ALPRAZOLAM 1 MG/1
1 TABLET ORAL 3 TIMES DAILY PRN
Qty: 90 TABLET | Refills: 0 | Status: SHIPPED | OUTPATIENT
Start: 2018-05-01 | End: 2018-05-29 | Stop reason: SDUPTHER

## 2018-05-01 NOTE — TELEPHONE ENCOUNTER
----- Message from Tami Godwin sent at 5/1/2018  8:20 AM CDT -----  Contact: self  Type:  RX Refill Request    Who Called:  self  Refill or New Rx:  refill  RX Name and Strength:  Xanax 1 mg  How is the patient currently taking it? (ex. 1XDay):  3XDay  Is this a 30 day or 90 day RX:  30  Preferred Pharmacy with phone number:  [facility]  Local or Mail Order:[facility]  Ordering Provider:  Dr Heladio Reyes Call Back Number:  [phone]  Additional Information:  Please call patient when ready for .

## 2018-05-02 ENCOUNTER — OFFICE VISIT (OUTPATIENT)
Dept: SURGERY | Facility: CLINIC | Age: 63
End: 2018-05-02
Payer: MEDICAID

## 2018-05-02 VITALS
OXYGEN SATURATION: 96 % | DIASTOLIC BLOOD PRESSURE: 92 MMHG | RESPIRATION RATE: 18 BRPM | WEIGHT: 134 LBS | BODY MASS INDEX: 19.18 KG/M2 | TEMPERATURE: 99 F | HEIGHT: 70 IN | SYSTOLIC BLOOD PRESSURE: 146 MMHG | HEART RATE: 82 BPM

## 2018-05-02 DIAGNOSIS — K40.90 NON-RECURRENT UNILATERAL INGUINAL HERNIA WITHOUT OBSTRUCTION OR GANGRENE: ICD-10-CM

## 2018-05-02 DIAGNOSIS — Z09 POSTOP CHECK: Primary | ICD-10-CM

## 2018-05-02 PROCEDURE — 99024 POSTOP FOLLOW-UP VISIT: CPT | Mod: S$GLB,,, | Performed by: SURGERY

## 2018-05-02 RX ORDER — TIZANIDINE 4 MG/1
4 TABLET ORAL DAILY
COMMUNITY
Start: 2018-02-02 | End: 2018-11-05 | Stop reason: CLARIF

## 2018-05-02 NOTE — TELEPHONE ENCOUNTER
Spoke with pt. Advised once again that the [place] office will call him when the written prescription is ready for .  Sarai

## 2018-05-02 NOTE — TELEPHONE ENCOUNTER
----- Message from Desiree Van sent at 5/2/2018 10:02 AM CDT -----  Contact: PT  PT Ricky Cole calling to check the status of his medication. He has called in a few times and hasn't heard anything if his medicine for ALPRAZolam (XANAX) 1 MG tablet is ready. Please call back and advise.    Call back# [phone]  Thanks

## 2018-05-02 NOTE — PROGRESS NOTES
"General Surgery  [facility]  Follow-up    HPI/Follow-up exam:  Ricky Cole is a 63 y.o. 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  Since surgery the patient has done well. No signs or symptoms of infection.  Pain well controlled.    PHYSICAL EXAM:  BP (!) 146/92 (BP Location: Left arm, Patient Position: Sitting)   Pulse 82   Temp 98.5 °F (36.9 °C) (Oral)   Resp 18   Ht 5' 10" (1.778 m)   Wt 60.8 kg (134 lb)   SpO2 96%   BMI 19.23 kg/m²   Gen: Wd Wn male in NAD  Heent: Nc/At, MMM  Cv: RRR  Lung: Non-labored breathing, clear bilaterally  Abd: Soft, non-tender, non-distended, left reducible inguinal hernia  Ext: No cyanosis clubbing or edema    Pathology reviewed with patient and results given to patient. Squamous cell carcinoma negative surgical margins.    Assessment:  Ricky Cole is a 63 y.o. male s/p wide local excision right upper arm squamous cell carcinoma.    Plan/Medical Decision Making:  Patient's surgical site looks great.  Suture removal today.  Follow-up 4 weeks for evaluation of screening colonoscopy as well as evaluation for repair of the patient's left reducible inguinal hernia.    Follow-up in about 4 weeks (around 5/30/2018).        "

## 2018-05-14 ENCOUNTER — TELEPHONE (OUTPATIENT)
Dept: FAMILY MEDICINE | Facility: CLINIC | Age: 63
End: 2018-05-14

## 2018-05-14 NOTE — TELEPHONE ENCOUNTER
----- Message from Sophia Cook sent at 5/14/2018  2:38 PM CDT -----  Type:  Pharmacy Calling to Clarify an RX    Name of Caller:  Radha  Pharmacy Name:  [facility]  Prescription Name:  NA  What do they need to clarify?: Needs verbal on patient's allergies  Best Call Back Number:  [phone] extension [phone]  Additional Information:

## 2018-05-29 RX ORDER — ALPRAZOLAM 1 MG/1
1 TABLET ORAL 3 TIMES DAILY PRN
Qty: 90 TABLET | Refills: 0 | Status: SHIPPED | OUTPATIENT
Start: 2018-05-29 | End: 2018-06-14 | Stop reason: SDUPTHER

## 2018-05-29 NOTE — TELEPHONE ENCOUNTER
----- Message from Renee Haynes sent at 5/29/2018  9:56 AM CDT -----  Contact: patient   Patient calling to speak to the Nurse. He states that he is needing a refill on his ALPRAZolam (XANAX) 1 MG tablet. Please advise.   Call back   Thanks!

## 2018-05-30 ENCOUNTER — TELEPHONE (OUTPATIENT)
Dept: FAMILY MEDICINE | Facility: CLINIC | Age: 63
End: 2018-05-30

## 2018-05-30 NOTE — TELEPHONE ENCOUNTER
----- Message from Amarilis Stack sent at 5/30/2018 12:09 PM CDT -----  Contact: patient  Type:  RX Refill Request    Who Called:  Patient  Refill or New Rx:  Refill  RX Name and Strength:  ALPRAZolam (XANAX) 1 MG tablet  How is the patient currently taking it? (ex. 1XDay): Take 1 tablet (1 mg total) by mouth 3 (three) times daily as needed for Anxiety. - Oral  Is this a 30 day or 90 day RX: 90 day  Preferred Pharmacy with phone number:    [facility] - [place], [place] - [address]  [address] [facility] [address]  Phone: [phone] Fax: [phone]  Local or Mail Order: Local  Ordering Provider: Dr Hiram Leija  Best Call Back Number:   Additional Information: Calling to request a refill early before he runs out like he did last month. Please advise.

## 2018-05-30 NOTE — TELEPHONE ENCOUNTER
----- Message from Amarilis Stack sent at 5/30/2018 12:09 PM CDT -----  Contact: patient  Type:  RX Refill Request    Who Called:  Patient  Refill or New Rx:  Refill  RX Name and Strength:  ALPRAZolam (XANAX) 1 MG tablet  How is the patient currently taking it? (ex. 1XDay): Take 1 tablet (1 mg total) by mouth 3 (three) times daily as needed for Anxiety. - Oral  Is this a 30 day or 90 day RX: 90 day  Preferred Pharmacy with phone number:    [facility] - [place], [place] - [address]  [address] [facility] [address]  Phone: [phone] Fax: [phone]  Local or Mail Order: Local  Ordering Provider: Dr Hiram Leija  Best Call Back Number:   Additional Information: Calling to request a refill early before he runs out like he did last month. Please advise.

## 2018-06-14 ENCOUNTER — OFFICE VISIT (OUTPATIENT)
Dept: FAMILY MEDICINE | Facility: CLINIC | Age: 63
End: 2018-06-14
Payer: MEDICAID

## 2018-06-14 VITALS
WEIGHT: 133 LBS | DIASTOLIC BLOOD PRESSURE: 92 MMHG | HEART RATE: 96 BPM | BODY MASS INDEX: 19.04 KG/M2 | SYSTOLIC BLOOD PRESSURE: 175 MMHG | OXYGEN SATURATION: 98 % | RESPIRATION RATE: 20 BRPM | HEIGHT: 70 IN

## 2018-06-14 DIAGNOSIS — F41.1 GAD (GENERALIZED ANXIETY DISORDER): Primary | ICD-10-CM

## 2018-06-14 PROCEDURE — 99214 OFFICE O/P EST MOD 30 MIN: CPT | Mod: S$PBB,,, | Performed by: FAMILY MEDICINE

## 2018-06-14 PROCEDURE — 99213 OFFICE O/P EST LOW 20 MIN: CPT | Mod: PBBFAC,PN | Performed by: FAMILY MEDICINE

## 2018-06-14 PROCEDURE — 99999 PR PBB SHADOW E&M-EST. PATIENT-LVL III: CPT | Mod: PBBFAC,,, | Performed by: FAMILY MEDICINE

## 2018-06-14 RX ORDER — GABAPENTIN 600 MG/1
TABLET ORAL
COMMUNITY
Start: 2018-06-08 | End: 2018-08-24 | Stop reason: SDUPTHER

## 2018-06-14 RX ORDER — OXYCODONE AND ACETAMINOPHEN 10; 325 MG/1; MG/1
1 TABLET ORAL EVERY 8 HOURS PRN
Qty: 7 TABLET | Refills: 0 | Status: SHIPPED | OUTPATIENT
Start: 2018-06-14 | End: 2018-11-05 | Stop reason: CLARIF

## 2018-06-14 RX ORDER — ALPRAZOLAM 1 MG/1
1 TABLET ORAL 4 TIMES DAILY PRN
Qty: 120 TABLET | Refills: 1 | Status: SHIPPED | OUTPATIENT
Start: 2018-06-14 | End: 2018-07-26 | Stop reason: SDUPTHER

## 2018-06-14 RX ORDER — PRIMIDONE 50 MG/1
50 TABLET ORAL 4 TIMES DAILY
Qty: 120 TABLET | Refills: 11 | Status: SHIPPED | OUTPATIENT
Start: 2018-06-14 | End: 2018-07-26 | Stop reason: SDUPTHER

## 2018-06-14 RX ORDER — AMLODIPINE BESYLATE 2.5 MG/1
TABLET ORAL
COMMUNITY
Start: 2018-06-08 | End: 2018-11-05 | Stop reason: CLARIF

## 2018-06-14 RX ORDER — PRIMIDONE 50 MG/1
TABLET ORAL
COMMUNITY
Start: 2018-05-23 | End: 2018-06-14 | Stop reason: SDUPTHER

## 2018-06-14 NOTE — PROGRESS NOTES
"Subjective:       Patient ID: Ricky Cole is a 63 y.o. male.    Chief Complaint: Follow-up and Depression    CHANTELLE not completely controlled, requests increase in medicine.      Review of Systems   Constitutional: Negative for activity change, appetite change, fatigue and fever.   HENT: Negative for congestion, dental problem, ear discharge, hearing loss, postnasal drip, sinus pain, sneezing and trouble swallowing.    Eyes: Negative for photophobia and discharge.   Respiratory: Negative for apnea, cough and shortness of breath.    Cardiovascular: Negative for chest pain.   Gastrointestinal: Negative for abdominal distention, abdominal pain, blood in stool, diarrhea, nausea and vomiting.   Endocrine: Negative for cold intolerance.   Genitourinary: Negative for difficulty urinating, flank pain, frequency, hematuria and testicular pain.   Musculoskeletal: Positive for arthralgias and back pain. Negative for myalgias.   Skin: Negative for color change.   Allergic/Immunologic: Negative for environmental allergies, food allergies and immunocompromised state.   Neurological: Negative for dizziness, syncope, numbness and headaches.   Hematological: Negative for adenopathy. Does not bruise/bleed easily.   Psychiatric/Behavioral: Negative for agitation, confusion, decreased concentration, hallucinations, self-injury and sleep disturbance.   All other systems reviewed and are negative.        Reviewed family, medical, surgical, and social history.    Objective:      BP (!) 175/92 (BP Location: Left arm, Patient Position: Sitting, BP Method: Medium (Automatic))   Pulse 96   Resp 20   Ht 5' 10" (1.778 m)   Wt 60.3 kg (133 lb)   SpO2 98%   BMI 19.08 kg/m²   Physical Exam   Constitutional: He is oriented to person, place, and time. He appears well-developed and well-nourished. No distress.   HENT:   Head: Normocephalic and atraumatic.   Nose: Nose normal.   Mouth/Throat: Oropharynx is clear and moist. No oropharyngeal " exudate.   Eyes: Conjunctivae and EOM are normal. Pupils are equal, round, and reactive to light.   Neck: Normal range of motion. No thyromegaly present.   Cardiovascular: Normal rate, regular rhythm, normal heart sounds and intact distal pulses.  Exam reveals no gallop and no friction rub.    No murmur heard.  Pulmonary/Chest: Effort normal and breath sounds normal. No respiratory distress. He has no wheezes. He has no rales.   Abdominal: Soft. He exhibits no distension. There is no tenderness. There is no guarding.   Musculoskeletal: Normal range of motion. He exhibits tenderness and deformity. He exhibits no edema.   Neurological: He is alert and oriented to person, place, and time. He displays normal reflexes. No cranial nerve deficit or sensory deficit. He exhibits normal muscle tone. Coordination normal.   Skin: Skin is warm and dry. No rash noted. He is not diaphoretic. No erythema. No pallor.   Psychiatric: He has a normal mood and affect. His behavior is normal. Judgment and thought content normal.   Nursing note and vitals reviewed.      Assessment:       1. CHANTELLE (generalized anxiety disorder)        Plan:       CHANTELLE (generalized anxiety disorder)  -     ALPRAZolam (XANAX) 1 MG tablet; Take 1 tablet (1 mg total) by mouth 4 (four) times daily as needed for Anxiety.  Dispense: 120 tablet; Refill: 1    Other orders  -     primidone (MYSOLINE) 50 MG Tab; Take 1 tablet (50 mg total) by mouth 4 (four) times daily.  Dispense: 120 tablet; Refill: 11    Will increase alprazolam transiently, follow up with Dr. Leo for hypertension.        Risks, benefits, and side effects were discussed with the patient. All questions were answered to the fullest satisfaction of the patient, and pt verbalized understanding and agreement to treatment plan. Pt was to call with any new or worsening symptoms, or present to the ER.

## 2018-06-15 ENCOUNTER — HOSPITAL ENCOUNTER (EMERGENCY)
Facility: HOSPITAL | Age: 63
Discharge: HOME | End: 2018-06-15
Attending: EMERGENCY MEDICINE
Payer: MEDICAID

## 2018-06-15 VITALS
BODY MASS INDEX: 19.04 KG/M2 | DIASTOLIC BLOOD PRESSURE: 100 MMHG | HEIGHT: 70 IN | SYSTOLIC BLOOD PRESSURE: 158 MMHG | HEART RATE: 78 BPM | OXYGEN SATURATION: 97 % | RESPIRATION RATE: 16 BRPM | TEMPERATURE: 98 F | WEIGHT: 133 LBS
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
## 2018-06-15 PROCEDURE — 99283 EMERGENCY DEPT VISIT LOW MDM: CPT | Mod: 25

## 2018-06-15 PROCEDURE — 12002 RPR S/N/AX/GEN/TRNK2.6-7.5CM: CPT

## 2018-06-15 PROCEDURE — 25000003 PHARM REV CODE 250: Performed by: EMERGENCY MEDICINE

## 2018-06-15 RX ORDER — LIDOCAINE HYDROCHLORIDE 10 MG/ML
5 INJECTION, SOLUTION EPIDURAL; INFILTRATION; INTRACAUDAL; PERINEURAL
Status: COMPLETED | OUTPATIENT
Start: 2018-06-15 | End: 2018-06-15

## 2018-06-15 RX ORDER — AMOXICILLIN AND CLAVULANATE POTASSIUM 875; 125 MG/1; MG/1
1 TABLET, FILM COATED ORAL 2 TIMES DAILY
Qty: 14 TABLET | Refills: 0 | Status: SHIPPED | OUTPATIENT
Start: 2018-06-15 | End: 2019-06-10

## 2018-06-15 RX ORDER — AMOXICILLIN AND CLAVULANATE POTASSIUM 875; 125 MG/1; MG/1
1 TABLET, FILM COATED ORAL
Status: COMPLETED | OUTPATIENT
Start: 2018-06-15 | End: 2018-06-15

## 2018-06-15 RX ORDER — AMOXICILLIN AND CLAVULANATE POTASSIUM 875; 125 MG/1; MG/1
1 TABLET, FILM COATED ORAL 2 TIMES DAILY
Qty: 14 TABLET | Refills: 0 | Status: SHIPPED | OUTPATIENT
Start: 2018-06-15 | End: 2018-06-15

## 2018-06-15 RX ADMIN — AMOXICILLIN AND CLAVULANATE POTASSIUM 1 TABLET: 875; 125 TABLET, FILM COATED ORAL at 09:06

## 2018-06-15 RX ADMIN — LIDOCAINE HYDROCHLORIDE 50 MG: 10 INJECTION, SOLUTION EPIDURAL; INFILTRATION; INTRACAUDAL; PERINEURAL at 08:06

## 2018-06-16 NOTE — ED NOTES
Patient states approximately 30-45 minutes prior to arrival he received a dog bite on his left hand from his own dog. Left forefinger partially amputated many years ago. Patient reports the dog is up to date on all vaccinations and the patient himself has had a tetanus in the last 6 months. Talked with patient about policy regarding reporting dog bites to law enforcement and patient verbalizes understanding.

## 2018-06-16 NOTE — DISCHARGE INSTRUCTIONS
Sergey wound clean and dry.  Take the stitches out in 9 days.  Return to the emergency room with any satisfaction to include drainage, foul smell, swelling, worsening pain.

## 2018-06-16 NOTE — ED PROVIDER NOTES
Encounter Date: 6/15/2018       History     Chief Complaint   Patient presents with    Animal Bite     R hand     Well-developed 63-year-old male bit by his own personal dull on the left no up of his index finger.  The patient lost his finger many years ago an accident.  The dog bit down the finger.  Has a 3 cm laceration linearly across the index finger.  No deep structures involved.  The dog is well cared for.  Has its shots up-to-date.  Patient does not take blood thinners.  Typically healthy otherwise.          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, including shoulder     Cancer 2005    right eye    COPD (chronic obstructive pulmonary disease)     Depression     Hypertension     Inguinal hernia     left    Kidney stone     MI (myocardial infarction)     Stroke     last stroke 2015    Tremor     Ulcer      Past Surgical History:   Procedure Laterality Date    ANGIOPLASTY      cardiac stent    COLECTOMY      EYE SURGERY      FINGER SURGERY Left     POLYPECTOMY       Family History   Problem Relation Age of Onset    Heart disease Mother     Heart disease Father     Cancer Father     Dementia Brother     Heart disease Brother      Social History   Substance Use Topics    Smoking status: Former Smoker     Years: 15.00     Types: Cigarettes     Quit date: 6/8/2018    Smokeless tobacco: Current User     Types: Chew    Alcohol use Yes      Comment: occasionally     Review of Systems   Constitutional: Negative.    HENT: Negative.    Respiratory: Negative.    Cardiovascular: Negative.    Gastrointestinal: Negative.    Musculoskeletal: Negative.    All other systems reviewed and are negative.      Physical Exam     Initial Vitals [06/15/18 1939]   BP Pulse Resp Temp SpO2   (!) 158/100 78 16 98.4 °F (36.9 °C) 97 %      MAP        --         Physical Exam    Nursing note and vitals reviewed.  Constitutional: He appears well-developed and well-nourished.   HENT:   Head: Normocephalic.   Eyes: Pupils are equal, round, and reactive to light.   Cardiovascular: Normal rate, regular rhythm and normal heart sounds.   Pulmonary/Chest: Breath sounds normal.   Abdominal: Soft.   Neurological: He is alert and oriented to person, place, and time. He has normal strength.   Skin: Skin is warm. Capillary refill takes less than 2 seconds.   Psychiatric: He has a normal mood and affect. Thought content normal.         ED Course   Lac Repair  Date/Time: 6/15/2018 8:34 PM  Performed by: CHARLES WARNER.  Authorized by: CHARLES WARNER.   Body area: upper extremity  Location details: left hand    Anesthesia:  Local Anesthetic: lidocaine 1% without epinephrine  Patient sedated: no  Preparation: Patient was prepped and draped in the usual sterile fashion.  Irrigation solution: saline  Technique: simple  Approximation: loose  Approximation difficulty: simple  Comments: Patient tolerated procedure well 3 loosely approximated sutures considering this being a dull back with potential for infection.  Antibiotics will be initiated.        Labs Reviewed - No data to display       No orders to display        Medical Decision Making:   Differential Diagnosis:   Infection, deep structure injury,  ED Management:  Will be closed very loosely with 4 nylon.  Three interrupted sutures.  The patient will be started on Augmentin.  Follow up PCP p.r.n..  The patient has been informed on what to look for for signs of infection.                      Clinical Impression:   The encounter diagnosis was Dog bite, initial encounter.      Disposition:   Disposition: Discharged  Condition: Stable                        Charles Warner MD  06/15/18 6691

## 2018-06-16 NOTE — ED NOTES
Central dispatch called to report dog bite and spoke with Devi. She said that someone would be out to talk with the patient soon.

## 2018-07-26 DIAGNOSIS — F41.1 GAD (GENERALIZED ANXIETY DISORDER): ICD-10-CM

## 2018-07-26 RX ORDER — ALPRAZOLAM 1 MG/1
1 TABLET ORAL 4 TIMES DAILY PRN
Qty: 120 TABLET | Refills: 1 | Status: SHIPPED | OUTPATIENT
Start: 2018-07-26 | End: 2018-09-17 | Stop reason: SDUPTHER

## 2018-07-26 RX ORDER — PRIMIDONE 50 MG/1
50 TABLET ORAL 4 TIMES DAILY
Qty: 120 TABLET | Refills: 11 | Status: SHIPPED | OUTPATIENT
Start: 2018-07-26 | End: 2018-08-24 | Stop reason: SDUPTHER

## 2018-07-26 NOTE — TELEPHONE ENCOUNTER
Patient requesting refills on his medication. He just got his last refill on his xanax. Patient is scheduled 09/17/2018 for a 3 month follow up.

## 2018-08-13 ENCOUNTER — TELEPHONE (OUTPATIENT)
Dept: FAMILY MEDICINE | Facility: CLINIC | Age: 63
End: 2018-08-13

## 2018-08-13 NOTE — TELEPHONE ENCOUNTER
----- Message from Pat Kenny sent at 8/13/2018  1:43 PM CDT -----  Contact: Self  Patient needs to speak to the nurse about his ALPRAZolam (XANAX) 1 MG tablet    Please call [phone]

## 2018-08-13 NOTE — TELEPHONE ENCOUNTER
Returned pt's call.  He stated that he thought he did not have any refills left,  However, he called the pharmacy and he does.  Sarai

## 2018-08-25 RX ORDER — PRIMIDONE 50 MG/1
TABLET ORAL
Qty: 60 TABLET | Refills: 11 | Status: SHIPPED | OUTPATIENT
Start: 2018-08-25 | End: 2019-08-24 | Stop reason: SDUPTHER

## 2018-08-25 RX ORDER — CARVEDILOL 3.12 MG/1
TABLET ORAL
Qty: 180 TABLET | Refills: 11 | Status: SHIPPED | OUTPATIENT
Start: 2018-08-25 | End: 2019-01-31 | Stop reason: SDUPTHER

## 2018-08-25 RX ORDER — GABAPENTIN 600 MG/1
TABLET ORAL
Qty: 90 TABLET | Refills: 11 | Status: SHIPPED | OUTPATIENT
Start: 2018-08-25 | End: 2019-07-10

## 2018-09-17 ENCOUNTER — OFFICE VISIT (OUTPATIENT)
Dept: FAMILY MEDICINE | Facility: CLINIC | Age: 63
End: 2018-09-17
Payer: MEDICAID

## 2018-09-17 VITALS
RESPIRATION RATE: 20 BRPM | OXYGEN SATURATION: 99 % | WEIGHT: 132 LBS | DIASTOLIC BLOOD PRESSURE: 94 MMHG | BODY MASS INDEX: 18.48 KG/M2 | HEIGHT: 71 IN | SYSTOLIC BLOOD PRESSURE: 147 MMHG | HEART RATE: 80 BPM

## 2018-09-17 DIAGNOSIS — F41.1 GAD (GENERALIZED ANXIETY DISORDER): ICD-10-CM

## 2018-09-17 DIAGNOSIS — K21.9 GASTROESOPHAGEAL REFLUX DISEASE, ESOPHAGITIS PRESENCE NOT SPECIFIED: Primary | ICD-10-CM

## 2018-09-17 PROCEDURE — 99213 OFFICE O/P EST LOW 20 MIN: CPT | Mod: PBBFAC,PN | Performed by: FAMILY MEDICINE

## 2018-09-17 PROCEDURE — 99214 OFFICE O/P EST MOD 30 MIN: CPT | Mod: S$PBB,,, | Performed by: FAMILY MEDICINE

## 2018-09-17 PROCEDURE — 99999 PR PBB SHADOW E&M-EST. PATIENT-LVL III: CPT | Mod: PBBFAC,,, | Performed by: FAMILY MEDICINE

## 2018-09-17 RX ORDER — ALPRAZOLAM 1 MG/1
1 TABLET ORAL 4 TIMES DAILY PRN
Qty: 120 TABLET | Refills: 2 | Status: SHIPPED | OUTPATIENT
Start: 2018-09-17 | End: 2018-12-06 | Stop reason: SDUPTHER

## 2018-09-17 RX ORDER — PANTOPRAZOLE SODIUM 40 MG/1
40 TABLET, DELAYED RELEASE ORAL DAILY
Qty: 30 TABLET | Refills: 11 | Status: SHIPPED | OUTPATIENT
Start: 2018-09-17 | End: 2019-09-20 | Stop reason: SDUPTHER

## 2018-09-17 RX ORDER — PANTOPRAZOLE SODIUM 40 MG/1
40 TABLET, DELAYED RELEASE ORAL DAILY
Qty: 30 TABLET | Refills: 11 | Status: SHIPPED | OUTPATIENT
Start: 2018-09-17 | End: 2018-09-17 | Stop reason: SDUPTHER

## 2018-10-03 ENCOUNTER — TELEPHONE (OUTPATIENT)
Dept: FAMILY MEDICINE | Facility: CLINIC | Age: 63
End: 2018-10-03

## 2018-10-03 RX ORDER — MELOXICAM 15 MG/1
15 TABLET ORAL DAILY
Qty: 90 TABLET | Refills: 3 | Status: SHIPPED | OUTPATIENT
Start: 2018-10-03 | End: 2019-11-18 | Stop reason: SDUPTHER

## 2018-10-03 NOTE — TELEPHONE ENCOUNTER
Pt states he is having back pain, and thinks it is related to inflammation. Pt is requesting a new script for Mobic.  Please advise, thank you.     ----- Message from Aliza Hardwick sent at 10/3/2018 10:35 AM CDT -----  Call [phone] / has inflammation in his back / asking for a prescription Mobic 2 x day    [facility] - [place] - [address]  [address] [facility] [place] [address]  Phone: [phone] Fax: [phone]

## 2018-10-24 ENCOUNTER — OFFICE VISIT (OUTPATIENT)
Dept: SURGERY | Facility: CLINIC | Age: 63
End: 2018-10-24
Payer: MEDICAID

## 2018-10-24 VITALS
WEIGHT: 139 LBS | DIASTOLIC BLOOD PRESSURE: 99 MMHG | HEIGHT: 70 IN | OXYGEN SATURATION: 98 % | SYSTOLIC BLOOD PRESSURE: 176 MMHG | BODY MASS INDEX: 19.9 KG/M2 | TEMPERATURE: 99 F | HEART RATE: 72 BPM

## 2018-10-24 DIAGNOSIS — M70.31: Primary | ICD-10-CM

## 2018-10-24 PROCEDURE — 99212 OFFICE O/P EST SF 10 MIN: CPT | Mod: S$GLB,,, | Performed by: SURGERY

## 2018-10-25 DIAGNOSIS — M25.521 RIGHT ELBOW PAIN: Primary | ICD-10-CM

## 2018-10-25 NOTE — PROGRESS NOTES
"[facility]  Follow-up    Subjective:       Patient ID: Ricky Cole is a 63 y.o. male.    Chief Complaint: Consult and Lump      HPI:  Ricky Cole is a 63 y.o. male presents today as established patient for evaluation of new problem.  Patient stated that he has had a right elbow swelling for the past couple of weeks.  It has increased in size.  Associated with pain. Pain described as a 4 to 5/10.  Worse on motion and movement.  No fevers.  Patient presents today as an established patient for new problem.    Review of Systems   Constitutional: Negative for appetite change, chills and fever.   HENT: Negative for congestion, dental problem and drooling.    Eyes: Negative for photophobia, discharge and itching.   Respiratory: Negative for apnea and chest tightness.    Cardiovascular: Negative for chest pain, palpitations and leg swelling.   Gastrointestinal: Negative for abdominal distention and abdominal pain.   Endocrine: Negative for cold intolerance and heat intolerance.   Genitourinary: Negative for difficulty urinating and dysuria.   Musculoskeletal: Positive for joint swelling. Negative for arthralgias and back pain.   Skin: Negative for color change and pallor.   Neurological: Negative for dizziness, facial asymmetry and headaches.   Hematological: Negative for adenopathy. Does not bruise/bleed easily.   Psychiatric/Behavioral: Negative for agitation, behavioral problems and confusion.       Objective:      Vitals:    10/24/18 1114   BP: (!) 176/99   Pulse: 72   Temp: 98.5 °F (36.9 °C)   TempSrc: Oral   SpO2: 98%   Weight: 63 kg (139 lb)   Height: 5' 10" (1.778 m)     Physical Exam   Constitutional: He is oriented to person, place, and time. He appears well-developed and well-nourished.   HENT:   Head: Normocephalic and atraumatic.   Eyes: EOM are normal. Pupils are equal, round, and reactive to light.   Neck: Normal range of motion. Neck supple. No thyromegaly present. "   Cardiovascular: Normal rate and regular rhythm.   No murmur heard.  Pulmonary/Chest: Effort normal and breath sounds normal. No respiratory distress.   Abdominal: Soft. Bowel sounds are normal. He exhibits no distension. There is no tenderness.   Musculoskeletal: Normal range of motion. He exhibits no edema.        Arms:  Neurological: He is alert and oriented to person, place, and time. No cranial nerve deficit.   Skin: Skin is warm. Capillary refill takes less than 2 seconds. No rash noted. He is not diaphoretic. No erythema.   Psychiatric: He has a normal mood and affect.        Assessment:       1. Bicipitoradial bursitis of right elbow        Plan:   Bicipitoradial bursitis of right elbow    Other orders  -     Cancel: CBC auto differential; Future; Expected date: 10/23/2018  -     Cancel: EKG 12-lead; Future; Expected date: 01/23/2019  -     Cancel: Comprehensive metabolic panel; Future; Expected date: 10/23/2018        Medical Decision Making/Counseling:  Patient with right elbow bursitis.  Recommendations will be referral to orthopedic surgery, Dr. Cline for further management evaluation treatment.

## 2018-10-26 ENCOUNTER — HOSPITAL ENCOUNTER (OUTPATIENT)
Dept: RADIOLOGY | Facility: HOSPITAL | Age: 63
Discharge: HOME | End: 2018-10-26
Attending: ORTHOPAEDIC SURGERY
Payer: MEDICAID

## 2018-10-26 ENCOUNTER — OFFICE VISIT (OUTPATIENT)
Dept: ORTHOPEDICS | Facility: CLINIC | Age: 63
End: 2018-10-26
Payer: MEDICAID

## 2018-10-26 VITALS
SYSTOLIC BLOOD PRESSURE: 160 MMHG | HEIGHT: 70 IN | DIASTOLIC BLOOD PRESSURE: 89 MMHG | WEIGHT: 137.13 LBS | HEART RATE: 64 BPM | BODY MASS INDEX: 19.63 KG/M2

## 2018-10-26 DIAGNOSIS — M25.729 OLECRANON BONE SPUR: Primary | ICD-10-CM

## 2018-10-26 DIAGNOSIS — M70.21 OLECRANON BURSITIS OF RIGHT ELBOW: ICD-10-CM

## 2018-10-26 DIAGNOSIS — S52.021A OLECRANON FRACTURE, RIGHT, CLOSED, INITIAL ENCOUNTER: ICD-10-CM

## 2018-10-26 DIAGNOSIS — M70.21 OLECRANON BURSITIS, RIGHT ELBOW: ICD-10-CM

## 2018-10-26 DIAGNOSIS — M25.521 RIGHT ELBOW PAIN: ICD-10-CM

## 2018-10-26 DIAGNOSIS — Z01.818 PRE-OP EXAM: ICD-10-CM

## 2018-10-26 PROCEDURE — 99999 PR PBB SHADOW E&M-EST. PATIENT-LVL III: CPT | Mod: PBBFAC,,, | Performed by: ORTHOPAEDIC SURGERY

## 2018-10-26 PROCEDURE — 73070 X-RAY EXAM OF ELBOW: CPT | Mod: 26,RT,, | Performed by: RADIOLOGY

## 2018-10-26 PROCEDURE — 73070 X-RAY EXAM OF ELBOW: CPT | Mod: TC,PN,RT

## 2018-10-26 PROCEDURE — 99213 OFFICE O/P EST LOW 20 MIN: CPT | Mod: PBBFAC,25,PN | Performed by: ORTHOPAEDIC SURGERY

## 2018-10-26 PROCEDURE — 99204 OFFICE O/P NEW MOD 45 MIN: CPT | Mod: 57,S$PBB,, | Performed by: ORTHOPAEDIC SURGERY

## 2018-10-26 RX ORDER — MUPIROCIN 20 MG/G
OINTMENT TOPICAL
Status: CANCELLED | OUTPATIENT
Start: 2018-10-26

## 2018-10-26 RX ORDER — SODIUM CHLORIDE 9 MG/ML
INJECTION, SOLUTION INTRAVENOUS CONTINUOUS
Status: CANCELLED | OUTPATIENT
Start: 2018-10-26

## 2018-10-26 NOTE — H&P
Chief Complaint: Pain of the Right Elbow    Referring Provider: No referring provider defined for this encounter.    HPI:  Mr. Cole is a 63-year-old gentleman who presented today for evaluation of painful posterior swelling of his right elbow which has been present for approximately 2 weeks.  He stated, I can not stand rested on anything.  .  Resting elbow on a table or counter increases his pain.  He stated that nothing improves it. He has taken NSAIDs without help.  He has not worn a pad or splint.  He has not done physical therapy nor had injections. He denied fever or chills.    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      Past Surgical History:   Procedure Laterality Date    ANGIOPLASTY      cardiac stent    COLECTOMY      EXCISION-WIDE LOCAL NEOPLASM RIGHT SHOULDER Right 2018    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      *  *  *  *  * POLYPECTOMY  RIGHT INGUINAL HERNIA REPAIR  HEMORRHOIDECTOMY  CARDIAC CATHETERIZATION WITH STENT PLACEMENT  EGD  COLONOSCOPY         Review of patient's allergies indicates:   Allergen Reactions    Codeine Other (See Comments)     hyper       Social History     Occupational History    Retired      Tobacco Use    Smoking status: Former Smoker     Years: 15.00     Types: Cigarettes     Last attempt to quit: 2018     Years since quittin.3    Smokeless tobacco: Current User     Types: Chew    Substance and Sexual Activity    Alcohol use: Yes     Comment: occasionally    Drug use: No    Sexual activity: Not on file      Family History   Problem Relation Age of Onset    Heart disease Mother     Heart disease Father     Cancer Father     Dementia Brother     Heart disease Brother        Previous Hospitalizations:  Denied previous hospitalizations.    ROS:   Review of Systems   Constitution: Negative for decreased appetite.   HENT: Positive for hearing loss.    Eyes: Negative for blurred vision.   Cardiovascular: Negative for chest pain.   Respiratory: Negative for cough.    Endocrine: Negative for polydipsia.   Hematologic/Lymphatic: Does not bruise/bleed easily.   Skin: Negative for itching.   Musculoskeletal: Negative for stiffness.   Gastrointestinal: Negative for constipation and diarrhea.   Genitourinary: Negative for flank pain.   Neurological: Negative for dizziness and headaches.   Psychiatric/Behavioral: Positive for depression.   Allergic/Immunologic: Negative for environmental allergies.           Objective:      Physical Exam:   General: AAOx3.  No acute distress  HEENT: Normocephalic, PEARLA EOMI, Poor Dentition  Neck: Supple, No JVD  Chest: Symetric, equal excursion on inspiration  Abdomen: Soft NTND  Vascular:  Pulses intact and equal bilaterally.  Capillary refill less than 3 seconds and equal bilaterally  Neurologic:  Pinprick and soft touch intact and equal bilaterally  Integment:  No ecchymosis, no errythema  Extremity:  Elbow:  Pronation/supination equal bilaterally 90/80 degrees. Extension/flexion equal bilaterally 0/128 degrees. Mild tenderness with motion right elbow.  Stable with radial/ulna stressing both elbows.  Posterior swelling right elbow.  Tender with palpation posterior swelling right elbow.  Temperature equal with palpation posterior bilateral elbows.  Tender with palpation proximal olecranon right elbow.  Radiography:  Personally reviewed  x-rays of the right  elbow completed on 10/26/2018 which showed a fracture of an olecranon bone spur, elbow arthritis, and posterior soft tissue swelling.      Assessment:       Impression:      1. Olecranon bursitis, right elbow    2. Olecranon bone spur fracture, right elbow, closed, initial encounter          Plan:       1.  Discussed physical examination and radiographic findings with the patient. Ricky understands that he has olecranon bursitis and of bone spur which is fractured.  He understands he could be treated multiple ways with observation versus aspiration and steroid injection, or he could have it excised and the bone spur removed.  He stated that he wanted surgery to remove the bursa and bone spur.  2.  Possible complications of surgery to include bleeding, infection, scarring, nerve/blood vessel/tendon damage, need for further surgery, failed surgery, failure to improve, possible persistent pain, possible recurrence, possible arthrofibrosis, and possible recurrence were discussed with the patient. The patient was permitted to ask questions and all concerns were addressed to his satisfaction.  3.  Tentatively schedule patient for surgery for 11/06/2018.  4.  Recommend the patient purchase elbow pads to protect his elbows and decrease the tenderness.  5.  Precautions given to the patient to avoid leaning on his elbows.  6.  All postoperative medications will be given to the patient on the date of surgery.  7.  Will hold off on referral to occupational therapy in case it is needed for postoperative use.  8.  Home exercises to include elbow flexion extension exercises were discussed with the patient.  9.  Continue with NSAIDs as tolerated and allowed by PCM.  10.  Follow up approximately 10-12 days postoperatively.

## 2018-10-26 NOTE — PROGRESS NOTES
Subjective:      Patient ID: Ricky Cole is a 63 y.o. male.    Chief Complaint: Pain of the Right Elbow    Referring Provider: No referring provider defined for this encounter.    HPI:  Mr. Cole is a 63-year-old gentleman who presented today for evaluation of painful posterior swelling of his right elbow which has been present for approximately 2 weeks.  He stated, I can not stand rested on anything.  .  Resting elbow on a table or counter increases his pain.  He stated that nothing improves it. He has taken NSAIDs without help.  He has not worn a pad or splint.  He has not done physical therapy nor had injections. He denied fever or chills.    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      *  *  *  *  * POLYPECTOMY  RIGHT INGUINAL HERNIA REPAIR  HEMORRHOIDECTOMY  CARDIAC CATHETERIZATION WITH STENT PLACEMENT  EGD  COLONOSCOPY         Review of patient's allergies indicates:   Allergen Reactions    Codeine Other (See Comments)     hyper       Social History     Occupational History    Retired      Tobacco Use    Smoking status: Former Smoker     Years: 15.00     Types: Cigarettes     Last attempt to quit: 6/8/2018     Years since  quittin.3    Smokeless tobacco: Current User     Types: Chew   Substance and Sexual Activity    Alcohol use: Yes     Comment: occasionally    Drug use: No    Sexual activity: Not on file      Family History   Problem Relation Age of Onset    Heart disease Mother     Heart disease Father     Cancer Father     Dementia Brother     Heart disease Brother        Previous Hospitalizations:  Denied previous hospitalizations.    ROS:   Review of Systems   Constitution: Negative for decreased appetite.   HENT: Positive for hearing loss.    Eyes: Negative for blurred vision.   Cardiovascular: Negative for chest pain.   Respiratory: Negative for cough.    Endocrine: Negative for polydipsia.   Hematologic/Lymphatic: Does not bruise/bleed easily.   Skin: Negative for itching.   Musculoskeletal: Negative for stiffness.   Gastrointestinal: Negative for constipation and diarrhea.   Genitourinary: Negative for flank pain.   Neurological: Negative for dizziness and headaches.   Psychiatric/Behavioral: Positive for depression.   Allergic/Immunologic: Negative for environmental allergies.           Objective:      Physical Exam:   General: AAOx3.  No acute distress  HEENT: Normocephalic, PEARLA EOMI, Poor Dentition  Neck: Supple, No JVD  Chest: Symetric, equal excursion on inspiration  Abdomen: Soft NTND  Vascular:  Pulses intact and equal bilaterally.  Capillary refill less than 3 seconds and equal bilaterally  Neurologic:  Pinprick and soft touch intact and equal bilaterally  Integment:  No ecchymosis, no errythema  Extremity:  Elbow:  Pronation/supination equal bilaterally 90/80 degrees. Extension/flexion equal bilaterally 0/128 degrees. Mild tenderness with motion right elbow.  Stable with radial/ulna stressing both elbows.  Posterior swelling right elbow.  Tender with palpation posterior swelling right elbow.  Temperature equal with palpation posterior bilateral elbows.  Tender with palpation proximal olecranon right  elbow.  Radiography:  Personally reviewed  x-rays of the right elbow completed on 10/26/2018 which showed a fracture of an olecranon bone spur, elbow arthritis, and posterior soft tissue swelling.      Assessment:       Impression:      1. Olecranon bursitis, right elbow    2. Olecranon bone spur fracture, right elbow, closed, initial encounter          Plan:       1.  Discussed physical examination and radiographic findings with the patient. Ricky understands that he has olecranon bursitis and of bone spur which is fractured.  He understands he could be treated multiple ways with observation versus aspiration and steroid injection, or he could have it excised and the bone spur removed.  He stated that he wanted surgery to remove the bursa and bone spur.  2.  Possible complications of surgery to include bleeding, infection, scarring, nerve/blood vessel/tendon damage, need for further surgery, failed surgery, failure to improve, possible persistent pain, possible recurrence, possible arthrofibrosis, and possible recurrence were discussed with the patient. The patient was permitted to ask questions and all concerns were addressed to his satisfaction.  3.  Tentatively schedule patient for surgery for 11/06/2018.  4.  Recommend the patient purchase elbow pads to protect his elbows and decrease the tenderness.  5.  Precautions given to the patient to avoid leaning on his elbows.  6.  All postoperative medications will be given to the patient on the date of surgery.  7.  Will hold off on referral to occupational therapy in case it is needed for postoperative use.  8.  Home exercises to include elbow flexion extension exercises were discussed with the patient.  9.  Continue with NSAIDs as tolerated and allowed by PCM.  10.  Follow up approximately 10-12 days postoperatively.

## 2018-11-06 ENCOUNTER — ANESTHESIA EVENT (OUTPATIENT)
Dept: SURGERY | Facility: HOSPITAL | Age: 63
End: 2018-11-06
Payer: MEDICAID

## 2018-11-06 ENCOUNTER — HOSPITAL ENCOUNTER (OUTPATIENT)
Facility: HOSPITAL | Age: 63
Discharge: HOME | End: 2018-11-06
Attending: ORTHOPAEDIC SURGERY | Admitting: ORTHOPAEDIC SURGERY
Payer: MEDICAID

## 2018-11-06 ENCOUNTER — ANESTHESIA (OUTPATIENT)
Dept: SURGERY | Facility: HOSPITAL | Age: 63
End: 2018-11-06
Payer: MEDICAID

## 2018-11-06 VITALS
WEIGHT: 138 LBS | OXYGEN SATURATION: 96 % | RESPIRATION RATE: 12 BRPM | HEART RATE: 62 BPM | BODY MASS INDEX: 19.32 KG/M2 | TEMPERATURE: 98 F | HEIGHT: 71 IN | DIASTOLIC BLOOD PRESSURE: 90 MMHG | SYSTOLIC BLOOD PRESSURE: 147 MMHG

## 2018-11-06 DIAGNOSIS — M70.21 OLECRANON BURSITIS OF RIGHT ELBOW: Primary | ICD-10-CM

## 2018-11-06 DIAGNOSIS — Z01.818 PRE-OP EXAM: ICD-10-CM

## 2018-11-06 DIAGNOSIS — M25.729 OLECRANON BONE SPUR: ICD-10-CM

## 2018-11-06 DIAGNOSIS — Z01.812 PRE-PROCEDURE LAB EXAM: ICD-10-CM

## 2018-11-06 LAB
ANION GAP SERPL CALC-SCNC: 9 MMOL/L
BASOPHILS # BLD AUTO: 0.03 K/UL
BASOPHILS NFR BLD: 0.7 %
BUN SERPL-MCNC: 6 MG/DL
CALCIUM SERPL-MCNC: 8.5 MG/DL
CHLORIDE SERPL-SCNC: 93 MMOL/L
CO2 SERPL-SCNC: 27 MMOL/L
CREAT SERPL-MCNC: 0.6 MG/DL
DIFFERENTIAL METHOD: ABNORMAL
EOSINOPHIL # BLD AUTO: 0.2 K/UL
EOSINOPHIL NFR BLD: 5.6 %
ERYTHROCYTE [DISTWIDTH] IN BLOOD BY AUTOMATED COUNT: 11.7 %
EST. GFR  (AFRICAN AMERICAN): >60 ML/MIN/1.73 M^2
EST. GFR  (NON AFRICAN AMERICAN): >60 ML/MIN/1.73 M^2
GLUCOSE SERPL-MCNC: 95 MG/DL
HCT VFR BLD AUTO: 37.2 %
HGB BLD-MCNC: 13 G/DL
IMM GRANULOCYTES # BLD AUTO: 0.02 K/UL
IMM GRANULOCYTES NFR BLD AUTO: 0.5 %
INR PPP: 0.9
LYMPHOCYTES # BLD AUTO: 1 K/UL
LYMPHOCYTES NFR BLD: 24.6 %
MCH RBC QN AUTO: 33.7 PG
MCHC RBC AUTO-ENTMCNC: 34.9 G/DL
MCV RBC AUTO: 96 FL
MONOCYTES # BLD AUTO: 0.4 K/UL
MONOCYTES NFR BLD: 10.2 %
NEUTROPHILS # BLD AUTO: 2.4 K/UL
NEUTROPHILS NFR BLD: 58.4 %
NRBC BLD-RTO: 0 /100 WBC
PLATELET # BLD AUTO: 208 K/UL
PMV BLD AUTO: 9.1 FL
POTASSIUM SERPL-SCNC: 3.7 MMOL/L
PROTHROMBIN TIME: 10.7 SEC
RBC # BLD AUTO: 3.86 M/UL
SODIUM SERPL-SCNC: 129 MMOL/L
WBC # BLD AUTO: 4.11 K/UL

## 2018-11-06 PROCEDURE — 63600175 PHARM REV CODE 636 W HCPCS: Performed by: NURSE ANESTHETIST, CERTIFIED REGISTERED

## 2018-11-06 PROCEDURE — 25000003 PHARM REV CODE 250: Performed by: NURSE ANESTHETIST, CERTIFIED REGISTERED

## 2018-11-06 PROCEDURE — 36000708 HC OR TIME LEV III 1ST 15 MIN: Performed by: ORTHOPAEDIC SURGERY

## 2018-11-06 PROCEDURE — 85610 PROTHROMBIN TIME: CPT

## 2018-11-06 PROCEDURE — 71000015 HC POSTOP RECOV 1ST HR: Performed by: ORTHOPAEDIC SURGERY

## 2018-11-06 PROCEDURE — 88305 TISSUE EXAM BY PATHOLOGIST: CPT | Performed by: PATHOLOGY

## 2018-11-06 PROCEDURE — 24105 EXCISION OLECRANON BURSA: CPT | Mod: 51,RT,, | Performed by: ORTHOPAEDIC SURGERY

## 2018-11-06 PROCEDURE — 88305 TISSUE EXAM BY PATHOLOGIST: CPT | Mod: 26,,, | Performed by: PATHOLOGY

## 2018-11-06 PROCEDURE — 71000033 HC RECOVERY, INTIAL HOUR: Performed by: ORTHOPAEDIC SURGERY

## 2018-11-06 PROCEDURE — 63600175 PHARM REV CODE 636 W HCPCS: Performed by: ORTHOPAEDIC SURGERY

## 2018-11-06 PROCEDURE — 24120 EXC/CRTG B1 CST/B9 TUM RDS: CPT | Mod: 51,RT,, | Performed by: ORTHOPAEDIC SURGERY

## 2018-11-06 PROCEDURE — 63600175 PHARM REV CODE 636 W HCPCS: Performed by: ANESTHESIOLOGY

## 2018-11-06 PROCEDURE — 37000008 HC ANESTHESIA 1ST 15 MINUTES: Performed by: ORTHOPAEDIC SURGERY

## 2018-11-06 PROCEDURE — 36415 COLL VENOUS BLD VENIPUNCTURE: CPT

## 2018-11-06 PROCEDURE — 85025 COMPLETE CBC W/AUTO DIFF WBC: CPT

## 2018-11-06 PROCEDURE — 37000009 HC ANESTHESIA EA ADD 15 MINS: Performed by: ORTHOPAEDIC SURGERY

## 2018-11-06 PROCEDURE — 80048 BASIC METABOLIC PNL TOTAL CA: CPT

## 2018-11-06 PROCEDURE — C1713 ANCHOR/SCREW BN/BN,TIS/BN: HCPCS | Performed by: ORTHOPAEDIC SURGERY

## 2018-11-06 PROCEDURE — 93005 ELECTROCARDIOGRAM TRACING: CPT | Mod: 59

## 2018-11-06 PROCEDURE — D9220A PRA ANESTHESIA: Mod: CRNA,,, | Performed by: NURSE ANESTHETIST, CERTIFIED REGISTERED

## 2018-11-06 PROCEDURE — 25000003 PHARM REV CODE 250: Performed by: ORTHOPAEDIC SURGERY

## 2018-11-06 PROCEDURE — 25000003 PHARM REV CODE 250: Performed by: ANESTHESIOLOGY

## 2018-11-06 PROCEDURE — D9220A PRA ANESTHESIA: Mod: ANES,,, | Performed by: ANESTHESIOLOGY

## 2018-11-06 PROCEDURE — 36000709 HC OR TIME LEV III EA ADD 15 MIN: Performed by: ORTHOPAEDIC SURGERY

## 2018-11-06 PROCEDURE — 24342 REPAIR OF RUPTURED TENDON: CPT | Mod: RT,,, | Performed by: ORTHOPAEDIC SURGERY

## 2018-11-06 RX ORDER — SODIUM CHLORIDE 9 MG/ML
INJECTION, SOLUTION INTRAVENOUS CONTINUOUS
Status: DISCONTINUED | OUTPATIENT
Start: 2018-11-06 | End: 2018-11-06 | Stop reason: HOSPADM

## 2018-11-06 RX ORDER — ROCURONIUM BROMIDE 10 MG/ML
INJECTION, SOLUTION INTRAVENOUS
Status: DISCONTINUED | OUTPATIENT
Start: 2018-11-06 | End: 2018-11-06

## 2018-11-06 RX ORDER — ONDANSETRON 2 MG/ML
4 INJECTION INTRAMUSCULAR; INTRAVENOUS DAILY PRN
Status: DISCONTINUED | OUTPATIENT
Start: 2018-11-06 | End: 2018-11-06 | Stop reason: HOSPADM

## 2018-11-06 RX ORDER — MUPIROCIN 20 MG/G
OINTMENT TOPICAL
Status: DISCONTINUED | OUTPATIENT
Start: 2018-11-06 | End: 2018-11-06 | Stop reason: HOSPADM

## 2018-11-06 RX ORDER — LIDOCAINE HYDROCHLORIDE 10 MG/ML
1 INJECTION, SOLUTION EPIDURAL; INFILTRATION; INTRACAUDAL; PERINEURAL ONCE
Status: DISCONTINUED | OUTPATIENT
Start: 2018-11-06 | End: 2018-11-06

## 2018-11-06 RX ORDER — MIDAZOLAM HYDROCHLORIDE 1 MG/ML
INJECTION, SOLUTION INTRAMUSCULAR; INTRAVENOUS
Status: DISCONTINUED | OUTPATIENT
Start: 2018-11-06 | End: 2018-11-06

## 2018-11-06 RX ORDER — FAMOTIDINE 10 MG/ML
20 INJECTION INTRAVENOUS ONCE
Status: DISCONTINUED | OUTPATIENT
Start: 2018-11-06 | End: 2018-11-06 | Stop reason: HOSPADM

## 2018-11-06 RX ORDER — FAMOTIDINE 10 MG/ML
INJECTION INTRAVENOUS
Status: DISCONTINUED
Start: 2018-11-06 | End: 2018-11-06 | Stop reason: HOSPADM

## 2018-11-06 RX ORDER — PROPOFOL 10 MG/ML
VIAL (ML) INTRAVENOUS
Status: DISCONTINUED | OUTPATIENT
Start: 2018-11-06 | End: 2018-11-06

## 2018-11-06 RX ORDER — GLYCOPYRROLATE 0.2 MG/ML
INJECTION INTRAMUSCULAR; INTRAVENOUS
Status: DISCONTINUED | OUTPATIENT
Start: 2018-11-06 | End: 2018-11-06

## 2018-11-06 RX ORDER — CEFAZOLIN SODIUM 2 G/50ML
2 SOLUTION INTRAVENOUS
Status: COMPLETED | OUTPATIENT
Start: 2018-11-06 | End: 2018-11-06

## 2018-11-06 RX ORDER — MORPHINE SULFATE 2 MG/ML
2 INJECTION, SOLUTION INTRAMUSCULAR; INTRAVENOUS EVERY 5 MIN PRN
Status: DISCONTINUED | OUTPATIENT
Start: 2018-11-06 | End: 2018-11-06 | Stop reason: HOSPADM

## 2018-11-06 RX ORDER — SUCCINYLCHOLINE CHLORIDE 20 MG/ML
INJECTION INTRAMUSCULAR; INTRAVENOUS
Status: DISCONTINUED | OUTPATIENT
Start: 2018-11-06 | End: 2018-11-06

## 2018-11-06 RX ORDER — SODIUM CHLORIDE, SODIUM LACTATE, POTASSIUM CHLORIDE, CALCIUM CHLORIDE 600; 310; 30; 20 MG/100ML; MG/100ML; MG/100ML; MG/100ML
125 INJECTION, SOLUTION INTRAVENOUS CONTINUOUS
Status: DISCONTINUED | OUTPATIENT
Start: 2018-11-06 | End: 2018-11-06 | Stop reason: HOSPADM

## 2018-11-06 RX ORDER — SODIUM CHLORIDE, SODIUM LACTATE, POTASSIUM CHLORIDE, CALCIUM CHLORIDE 600; 310; 30; 20 MG/100ML; MG/100ML; MG/100ML; MG/100ML
INJECTION, SOLUTION INTRAVENOUS CONTINUOUS
Status: DISCONTINUED | OUTPATIENT
Start: 2018-11-06 | End: 2018-11-06 | Stop reason: HOSPADM

## 2018-11-06 RX ORDER — MEPERIDINE HYDROCHLORIDE 50 MG/ML
INJECTION INTRAMUSCULAR; INTRAVENOUS; SUBCUTANEOUS
Status: DISCONTINUED | OUTPATIENT
Start: 2018-11-06 | End: 2018-11-06

## 2018-11-06 RX ADMIN — GLYCOPYRROLATE 0.2 MG: 0.2 INJECTION INTRAMUSCULAR; INTRAVENOUS at 10:11

## 2018-11-06 RX ADMIN — MIDAZOLAM HYDROCHLORIDE 2 MG: 1 INJECTION, SOLUTION INTRAMUSCULAR; INTRAVENOUS at 10:11

## 2018-11-06 RX ADMIN — CEFAZOLIN SODIUM 2 G: 2 SOLUTION INTRAVENOUS at 10:11

## 2018-11-06 RX ADMIN — ROCURONIUM BROMIDE 20 MG: 10 INJECTION, SOLUTION INTRAVENOUS at 10:11

## 2018-11-06 RX ADMIN — PROPOFOL 200 MG: 10 INJECTION, EMULSION INTRAVENOUS at 10:11

## 2018-11-06 RX ADMIN — SODIUM CHLORIDE, POTASSIUM CHLORIDE, SODIUM LACTATE AND CALCIUM CHLORIDE: 600; 310; 30; 20 INJECTION, SOLUTION INTRAVENOUS at 10:11

## 2018-11-06 RX ADMIN — SUCCINYLCHOLINE CHLORIDE 100 MG: 20 INJECTION, SOLUTION INTRAMUSCULAR; INTRAVENOUS at 10:11

## 2018-11-06 RX ADMIN — MEPERIDINE HYDROCHLORIDE 50 MG: 50 INJECTION INTRAMUSCULAR; INTRAVENOUS; SUBCUTANEOUS at 10:11

## 2018-11-06 RX ADMIN — MUPIROCIN: 20 OINTMENT TOPICAL at 08:11

## 2018-11-06 RX ADMIN — SODIUM CHLORIDE, POTASSIUM CHLORIDE, SODIUM LACTATE AND CALCIUM CHLORIDE: 600; 310; 30; 20 INJECTION, SOLUTION INTRAVENOUS at 08:11

## 2018-11-06 RX ADMIN — MORPHINE SULFATE 2 MG: 2 INJECTION, SOLUTION INTRAMUSCULAR; INTRAVENOUS at 12:11

## 2018-11-06 NOTE — OP NOTE
[facility]  Orthopedic Surgery    11/6/2018    Ricky Cole  [id number]      PREOPERATIVE DIAGNOSIS:   1. Olecranon bone spur [M25.70]  2. Olecranon bursitis of right elbow [M70.21]    POSTOPERATIVE DIAGNOSIS:    1.  Chronic olecranon bursitis, right elbow.  2.  Olecranon bone spur fracture, right elbow.    PROCEDURE:  1.  Excision olecranon bursa, right elbow.  2.  Excision of olecranon bone spur, right elbow.  3.  Repair triceps tendon, right elbow, with one Mitek Gryphon suture anchor.  4.  Placement posterior mold plaster splint, right elbow.    SURGEON: Atul Cooper D.O.    ASSISTANT:  None.    ANESTHESIA:  General.    BLOOD LOSS:  Less than 10 cc.    TOURNIQUET:  41 min.    DRAINS: N/A.    PATHOLOGY:  Olecranon bursa and olecranon bone spur.    COMPLICATION:  None.    INDICATIONS FOR PROCEDURE:   Mr. Cole is a 63-year-old gentleman who has had painful posterior swelling of his right elbow for approximately 3 weeks.  He stated, I can not stand to rest it on anything.  .  Resting elbow on a table or counter increases his pain.  He stated that nothing improves it. He has taken NSAIDs without help.  He has not worn a pad or splint.  He has not done physical therapy nor had injections. He denied fever or chills.      He elected to proceed with surgery after complications to include bleeding, infection, scarring, nerve/blood vessel/tendon damage, need for further surgery, failed surgery, failure to improve, stiffness, skin slough, and possible amputation were discussed.  He   signed a consent.    PROCEDURE IN DETAIL:   the patient was brought to the operating room and transferred to the operating bed where all bony prominences were well padded. General anesthesia was administered by the Anesthesiology Department.  After general anesthesia was administered a tourniquet was applied to the upper part of the patient's right upper extremity.  The patient's right upper extremity was then prepped and  draped in the normal sterile fashion. After prepping and draping bony and soft tissue landmarks were palpated and incision site was drawn on the patient's elbow.  The patient's arm was then elevated, exsanguinated, and the tourniquet was inflated.       Sharp incision was made with a 15 blade followed by dissection in around and olecranon bursa.  The bursa was shelled out and freed from the investing tissue in toto and was sent to pathology for evaluation. After excision of the olecranon bursa attention was placed on a fractured olecranon bone spur.  It was freed from investing tissue with a 15 blade and then removed followed by contouring of the posterior olecranon with a rongeur and rasp.  After contouring the olecranon fluoroscopic views were taken to ensure the bone spur had been completely removed. The areas then copiously irrigated with irrigant solution. A drill was then utilized to place a drill hole in the olecranon and a Mitek Gryphon suture anchor was placed. The suture was then placed through the triceps tendon and the patient's elbow was extended and the triceps was reapproximated to the olecranon.  After reapproximation of the triceps tendon to the olecranon his elbow was placed through full motion and no gapping was noted. The area was then copiously irrigated with irrigant solution.       The tourniquet was then released and full hemostasis was ensured.  The patient's incision was then closed in layers with Vicryl suture with final plastic closure. His elbow was then dressed with Mastisol, Steri-Strips, Adaptic, sterile gauze, sterile cast padding, posterior mold plaster splint, and Ace wraps.  The patient was then awakened by anesthesia and transferred from the operating room to the recovery room in stable condition. The patient tolerated the procedure well without complication.

## 2018-11-06 NOTE — OR NURSING
1300- IV DC'D WITH JELCO INTACT. UP TO BEDSIDE TO DRESS. WITH ASSIST OF SON. MOVES FINGERS RT H AND. NAILBEDS PINK.. DISCHARGED PER W/C

## 2018-11-06 NOTE — ANESTHESIA PREPROCEDURE EVALUATION
11/06/2018  Ricky Cole is a 63 y.o., male.    Anesthesia Evaluation    I have reviewed the Patient Summary Reports.    I have reviewed the Nursing Notes.   I have reviewed the Medications.     Review of Systems  Social:  Smoker    Cardiovascular:   Hypertension Past MI CAD  CABG/stent     Pulmonary:   COPD    Neurological:   CVA   Peripheral Neuropathy    Psych:   anxiety depression          Physical Exam  General:  Well nourished    Airway/Jaw/Neck:  Airway Findings: Mouth Opening: Normal Tongue: Normal  General Airway Assessment: Adult  Mallampati: II  TM Distance: Normal, at least 6 cm  Jaw/Neck Findings:  Neck ROM: Normal ROM      Dental:  Dental Findings: Upper Dentures   Chest/Lungs:  Chest/Lungs Findings: Clear to auscultation, Normal Respiratory Rate     Heart/Vascular:  Heart Findings: Rate: Normal  Rhythm: Regular Rhythm        Mental Status:  Mental Status Findings:  Cooperative, Alert and Oriented         Anesthesia Plan  Type of Anesthesia, risks & benefits discussed:  Anesthesia Type:  general  Patient's Preference:   Intra-op Monitoring Plan: standard ASA monitors  Intra-op Monitoring Plan Comments:   Post Op Pain Control Plan: IV/PO Opioids PRN  Post Op Pain Control Plan Comments:   Induction:   IV  Beta Blocker:  Patient is not currently on a Beta-Blocker (No further documentation required).       Informed Consent: Patient understands risks and agrees with Anesthesia plan.  Questions answered. Anesthesia consent signed with patient.  ASA Score: 3     Day of Surgery Review of History & Physical: I have interviewed and examined the patient. I have reviewed the patient's H&P dated:            Ready For Surgery From Anesthesia Perspective.

## 2018-11-06 NOTE — INTERVAL H&P NOTE
The patient has been examined and the H&P has been reviewed:    I concur with the findings and no changes have occurred since H&P was written.    Anesthesia/Surgery risks, benefits and alternative options discussed and understood by patient/family.          Active Hospital Problems    Diagnosis  POA    Olecranon bursitis of right elbow [M70.21]  Yes      Resolved Hospital Problems   No resolved problems to display.

## 2018-11-06 NOTE — DISCHARGE SUMMARY
The patient was admitted through same-day surgery and brought to the operating room where an olecranon bursectomy and excision of a olecranon bone spur was completed.  For full account of surgery please see the op report.  Postoperatively the patient was transferred to recovery and after recovery he was discharged home in stable condition with instructions to follow up in 10-12 days.

## 2018-11-06 NOTE — OR NURSING
1215- SON AT SIDE. OUTPATIENT INSTRUCTIONS GIVEN AND VERBALIZED UNDERSTANDING. RX FOR NORCO AND KEFLEX GIVEN TO SON. TT/RN

## 2018-11-06 NOTE — ANESTHESIA POSTPROCEDURE EVALUATION
"Anesthesia Post Evaluation    Patient: Ricky Cole    Procedure(s) Performed: Procedure(s) (LRB):  BURSECTOMY, OLECRANON (Right)  EXCISION, BONE SPUR (Right)    Final Anesthesia Type: general  Patient location during evaluation: PACU  Patient participation: Yes- Able to Participate  Level of consciousness: awake and alert  Post-procedure vital signs: reviewed and stable  Pain management: adequate  Airway patency: patent  PONV status at discharge: No PONV  Anesthetic complications: no      Cardiovascular status: blood pressure returned to baseline  Respiratory status: unassisted  Hydration status: euvolemic  Follow-up not needed.        Visit Vitals  BP (!) 147/74   Pulse (!) 58   Temp 36.8 °C (98.2 °F)   Resp 12   Ht 5' 10.5" (1.791 m)   Wt 62.6 kg (138 lb)   SpO2 96%   BMI 19.52 kg/m²       Pain/Ramin Score: Pain Assessment Performed: Yes (11/6/2018 12:53 PM)  Presence of Pain: denies (11/6/2018 12:53 PM)  Pain Rating Prior to Med Admin: 7 (11/6/2018 12:38 PM)  Pain Rating Post Med Admin: 5 (PT STATES PAIN IS MUCH BETTER) (11/6/2018 12:53 PM)  Ramin Score: 10 (11/6/2018 12:53 PM)        "

## 2018-11-06 NOTE — OR NURSING
1200- IN PACU. ORAL AIRWAY IN . VSS.   1205-AWAKE. ORAL AIRWAY REMOVED. ELBOW ELEVATED. ICE PACK APPLIED. LT HAND W/D TO TOUCH. COLOR PINK. NAILBEDS PINK. MOVES FINGERS WITHOUT DIFFICULTY.

## 2018-11-06 NOTE — TRANSFER OF CARE
"Anesthesia Transfer of Care Note    Patient: Ricky Cole    Procedure(s) Performed: Procedure(s) (LRB):  BURSECTOMY, OLECRANON (Right)  EXCISION, BONE SPUR (Right)    Patient location: PACU    Anesthesia Type: general    Transport from OR: Transported from OR on room air with adequate spontaneous ventilation    Post pain: adequate analgesia    Post assessment: no apparent anesthetic complications and tolerated procedure well    Post vital signs: stable    Level of consciousness: awake, alert and oriented    Nausea/Vomiting: no nausea/vomiting    Complications: none    Transfer of care protocol was followed      Last vitals:   Visit Vitals  BP (!) 168/95 (BP Location: Left arm, Patient Position: Lying)   Pulse 62   Temp 36.8 °C (98.3 °F) (Oral)   Resp 18   Ht 5' 10.5" (1.791 m)   Wt 62.6 kg (138 lb)   SpO2 97%   BMI 19.52 kg/m²     "

## 2018-11-07 ENCOUNTER — TELEPHONE (OUTPATIENT)
Dept: ORTHOPEDICS | Facility: CLINIC | Age: 63
End: 2018-11-07

## 2018-11-07 NOTE — TELEPHONE ENCOUNTER
Called patient to see how he is doing post surgery on yesterday. Patient states that he is doing well. Post op appointment made and patient voiced understanding of appointment date, time, and location.

## 2018-11-16 ENCOUNTER — OFFICE VISIT (OUTPATIENT)
Dept: ORTHOPEDICS | Facility: CLINIC | Age: 63
End: 2018-11-16
Payer: MEDICAID

## 2018-11-16 VITALS
SYSTOLIC BLOOD PRESSURE: 167 MMHG | HEIGHT: 70 IN | WEIGHT: 138 LBS | DIASTOLIC BLOOD PRESSURE: 89 MMHG | HEART RATE: 77 BPM | BODY MASS INDEX: 19.76 KG/M2

## 2018-11-16 DIAGNOSIS — Z51.89 AFTERCARE: Primary | ICD-10-CM

## 2018-11-16 PROCEDURE — 99024 POSTOP FOLLOW-UP VISIT: CPT | Mod: ,,, | Performed by: ORTHOPAEDIC SURGERY

## 2018-11-16 PROCEDURE — 99999 PR PBB SHADOW E&M-EST. PATIENT-LVL III: CPT | Mod: PBBFAC,,, | Performed by: ORTHOPAEDIC SURGERY

## 2018-11-16 PROCEDURE — 99213 OFFICE O/P EST LOW 20 MIN: CPT | Mod: PBBFAC,PN | Performed by: ORTHOPAEDIC SURGERY

## 2018-11-16 NOTE — PROGRESS NOTES
Subjective:      Patient ID: Ricky Cole is a 63 y.o. male.    Chief Complaint: Elbow Pain (2 week post op bursectomy 11/6/18)    HPI: Mr. Cole returns today for his 1st t postop visit on in excision of olecranon bursa and an olecranon bone spur of his right elbow.  He stated he is doing well and his pain is well controlled.  His date of surgery 11/06/2018.    ROS:  No new diagnosis/surgery/prescriptions since last office visit on 10/26/2018.      Objective:      Physical Exam:   General: AAOx3.  No acute distress  Vascular:  Pulses intact and equal bilaterally.  Capillary refill less than 3 seconds and equal bilaterally  Neurologic:  Pinprick and soft touch intact and equal bilaterally  Integment:  Incision well approximated and healing well. Mild olecranon erythema.  Extremity:  Elbow:  Pronation/supination equal bilaterally 80/80 degrees. Extension/flexion equal bilaterally 0/128 degrees. Relatively nontender with elbow motion.  Mild swelling at the operative site.  Radiography:  No new x-ray done today.      Assessment:       Impression:  Excision olecranon bone spur/bursitis, right elbow.      Plan:       1.  Discussed physical examination, he appears to be doing well discussed with the patient and since he has some mild erythema and swelling would like him to take some antibiotics as prophylaxis against the potential infection.  He understands he does not appear to have an infection but do not want him to develop 1.  2.  Keflex 500 mg, 1 p.o. Q id, dispense 28, refill 0.  3.  Discontinue splint.  4..  May shower do not soak in a tub.  5.  In 2 weeks may return to full activities.  6.  Follow up in 1 month for final check.

## 2018-12-06 DIAGNOSIS — F41.1 GAD (GENERALIZED ANXIETY DISORDER): ICD-10-CM

## 2018-12-06 RX ORDER — ALPRAZOLAM 1 MG/1
1 TABLET ORAL 4 TIMES DAILY PRN
Qty: 120 TABLET | Refills: 2 | Status: SHIPPED | OUTPATIENT
Start: 2018-12-06 | End: 2018-12-31 | Stop reason: SDUPTHER

## 2018-12-06 NOTE — TELEPHONE ENCOUNTER
----- Message from Elba Licona sent at 12/6/2018 12:42 PM CST -----  Contact: [phone]  Patient requesting a refill on xanax.    Patient will be using   [facility] - [place], [place] - [address]  [address] [place] [address]  Phone: [phone] Fax: [phone]    Please call patient at [phone]. Thanks!

## 2018-12-31 DIAGNOSIS — F41.1 GAD (GENERALIZED ANXIETY DISORDER): ICD-10-CM

## 2018-12-31 RX ORDER — ALPRAZOLAM 1 MG/1
1 TABLET ORAL 4 TIMES DAILY PRN
Qty: 120 TABLET | Refills: 2 | Status: SHIPPED | OUTPATIENT
Start: 2018-12-31 | End: 2019-03-26 | Stop reason: SDUPTHER

## 2018-12-31 NOTE — TELEPHONE ENCOUNTER
----- Message from Jocelyne Diana sent at 12/31/2018  1:57 PM CST -----  Contact: self   patient need a refill on xanex please send to [facility], any questions please call back at [phone] (home)     [facility] - [place] - [address]  [address] [place] [address]  Phone: [phone] Fax: [phone]

## 2019-01-31 RX ORDER — CARVEDILOL 3.12 MG/1
3.12 TABLET ORAL 2 TIMES DAILY
Qty: 180 TABLET | Refills: 11 | Status: SHIPPED | OUTPATIENT
Start: 2019-01-31 | End: 2020-04-16

## 2019-02-01 ENCOUNTER — TELEPHONE (OUTPATIENT)
Dept: FAMILY MEDICINE | Facility: CLINIC | Age: 64
End: 2019-02-01

## 2019-02-01 RX ORDER — PROMETHAZINE HYDROCHLORIDE AND DEXTROMETHORPHAN HYDROBROMIDE 6.25; 15 MG/5ML; MG/5ML
5 SYRUP ORAL EVERY 6 HOURS PRN
Qty: 240 ML | Refills: 1 | Status: SHIPPED | OUTPATIENT
Start: 2019-02-01 | End: 2019-06-10

## 2019-02-01 NOTE — TELEPHONE ENCOUNTER
Attempted to reach pt to inform of medication being sent in.  I have received a message that the person is unavailable and no voicemail.  Sarai

## 2019-02-01 NOTE — TELEPHONE ENCOUNTER
Pt requesting meds sent to pharmacy for cough and chest congestion.   Pt has routine appt on Monday with you.   Please advise, thank you.       ----- Message from Amarilis Stack sent at 2/1/2019  9:20 AM CST -----  Contact: Patient  Type: Needs Medical Advice    Who Called:  Patient  Symptoms (please be specific): chest congestion/ cough  How long has patient had these symptoms:  Last week  Pharmacy name and phone #:    [facility] - [place], [place] - [address]  [address] [facility] [place] [address]  Phone: [phone] Fax: [phone]  Best Call Back Number:   Additional Information: Calling to speak with the Nurse to request something be called in for his cold. He is scheduled for an appt on Monday, 2/4/19. He didn't want to come in today. Please advise

## 2019-03-26 DIAGNOSIS — F41.1 GAD (GENERALIZED ANXIETY DISORDER): ICD-10-CM

## 2019-03-26 RX ORDER — ALPRAZOLAM 1 MG/1
1 TABLET ORAL 4 TIMES DAILY PRN
Qty: 120 TABLET | Refills: 2 | Status: SHIPPED | OUTPATIENT
Start: 2019-03-26 | End: 2019-06-20 | Stop reason: SDUPTHER

## 2019-03-26 NOTE — TELEPHONE ENCOUNTER
----- Message from Darren Bonner sent at 3/26/2019  1:11 PM CDT -----  Contact: Patient  Type:  RX Refill Request    Who Called:  Patient  Refill or New Rx:  Refill  RX Name and Strength:  ALPRAZolam (XANAX) 1 MG tablet  How is the patient currently taking it? (ex. 1XDay):  As ordered  Is this a 30 day or 90 day RX:  30  Preferred Pharmacy with phone number:    [facility] - [place], [place] - [address] [facility]  [address] [facility] [address]  Phone: [phone] Fax: [phone]  Local or Mail Order:  Local  Ordering Provider:  Dr. Heladio Reyes Call Back Number:  [phone]  Additional Information: NITA

## 2019-05-03 DIAGNOSIS — Z11.59 NEED FOR HEPATITIS C SCREENING TEST: ICD-10-CM

## 2019-06-05 ENCOUNTER — OFFICE VISIT (OUTPATIENT)
Dept: ORTHOPEDICS | Facility: CLINIC | Age: 64
End: 2019-06-05
Payer: MEDICARE

## 2019-06-05 VITALS — BODY MASS INDEX: 19.76 KG/M2 | HEIGHT: 70 IN | WEIGHT: 138 LBS

## 2019-06-05 DIAGNOSIS — M25.621 STIFFNESS OF RIGHT ELBOW JOINT: Primary | ICD-10-CM

## 2019-06-05 DIAGNOSIS — M70.21 OLECRANON BURSITIS, RIGHT ELBOW: Primary | ICD-10-CM

## 2019-06-05 PROCEDURE — 99212 OFFICE O/P EST SF 10 MIN: CPT | Mod: PBBFAC,PN | Performed by: ORTHOPAEDIC SURGERY

## 2019-06-05 PROCEDURE — 99999 PR PBB SHADOW E&M-EST. PATIENT-LVL II: ICD-10-PCS | Mod: PBBFAC,,, | Performed by: ORTHOPAEDIC SURGERY

## 2019-06-05 PROCEDURE — 99999 PR PBB SHADOW E&M-EST. PATIENT-LVL II: CPT | Mod: PBBFAC,,, | Performed by: ORTHOPAEDIC SURGERY

## 2019-06-05 PROCEDURE — 99213 PR OFFICE/OUTPT VISIT, EST, LEVL III, 20-29 MIN: ICD-10-PCS | Mod: S$PBB,,, | Performed by: ORTHOPAEDIC SURGERY

## 2019-06-05 PROCEDURE — 99213 OFFICE O/P EST LOW 20 MIN: CPT | Mod: S$PBB,,, | Performed by: ORTHOPAEDIC SURGERY

## 2019-06-05 RX ORDER — DICLOFENAC SODIUM 10 MG/G
2 GEL TOPICAL 2 TIMES DAILY
COMMUNITY
End: 2019-06-10

## 2019-06-05 NOTE — PROGRESS NOTES
Subjective:      Patient ID: Ricky Cole is a 64 y.o. male.    Chief Complaint: Elbow Pain (S/P RIGHT ELBOW BURSECTOMY, TRICEP TENDON REPAIR 11/6/18)      HPI:  returned today for re-evaluation of his right elbow.  He stated he was doing well until approximately 1 week ago when he had interruption at his elbow and then some suture came out. He has had a little bit of pain since that time. He denied fever chills.  Pushing increases his pain. He has not taken NSAIDs.  He had olecranon bursectomy completed on 11/06/2019.    ROS:  No new diagnosis/surgery/prescriptions since last office visit on 11/16/2019.    Constitution: Negative for decreased appetite.   HENT: Positive for hearing loss.    Eyes: Negative for blurred vision.   Cardiovascular: Negative for chest pain.   Respiratory: Negative for cough.    Endocrine: Negative for polydipsia.   Hematologic/Lymphatic: Does not bruise/bleed easily.   Skin: Negative for itching.   Musculoskeletal: Negative for stiffness.   Gastrointestinal: Negative for constipation and diarrhea.   Genitourinary: Negative for flank pain.   Neurological: Negative for dizziness and headaches.   Psychiatric/Behavioral: Positive for depression.   Allergic/Immunologic: Negative for environmental allergies.       Objective:      Physical Exam:   General: AAOx3.  No acute distress  Vascular:  Pulses intact and equal bilaterally.  Capillary refill less than 3 seconds and equal bilaterally  Neurologic:  Pinprick and soft touch intact and equal bilaterally  Integment:  No ecchymosis, mild olecranon erythema.  Extremity:  Elbow:  Extension/flexion equal bilaterally 0/130 degrees. Relatively no swelling of the patient's elbow.  Tender with palpation olecranon right elbow.  Good strength with resisted extension right elbow.  Pronation/supination equal bilaterally 90/90 degrees.  Radiography:  No new x-rays done today      Assessment:       Impression:  Olecranon bursectomy, right  elbow.      Plan:       1.  Discussed physical examination  findings with the patient. Ricky understands that he has had an olecranon bursectomy and appears that he is slightly reforming a new one.  He is also a little weak and a little stiff the could improve with some physical therapy.  2.  Refer to occupational therapy to start motion strengthening.  3.  Voltaren 1% gel, apply to affected area twice daily, dispense 100 g, refill 0.  4.  Take NSAIDs as tolerated allowed by PCM.  5.  Home exercises were discussed.  6.  [facility] portal was discussed with the patient and information was given.  The patient was encouraged to use the portal for future encounters.  7.  Follow up p.r.n..

## 2019-06-10 ENCOUNTER — TELEPHONE (OUTPATIENT)
Dept: FAMILY MEDICINE | Facility: CLINIC | Age: 64
End: 2019-06-10

## 2019-06-10 ENCOUNTER — OFFICE VISIT (OUTPATIENT)
Dept: FAMILY MEDICINE | Facility: CLINIC | Age: 64
End: 2019-06-10
Payer: MEDICAID

## 2019-06-10 VITALS
DIASTOLIC BLOOD PRESSURE: 90 MMHG | WEIGHT: 137 LBS | HEART RATE: 87 BPM | RESPIRATION RATE: 19 BRPM | HEIGHT: 70 IN | OXYGEN SATURATION: 98 % | BODY MASS INDEX: 19.61 KG/M2 | SYSTOLIC BLOOD PRESSURE: 139 MMHG | TEMPERATURE: 97 F

## 2019-06-10 DIAGNOSIS — M79.605 BILATERAL LEG PAIN: ICD-10-CM

## 2019-06-10 DIAGNOSIS — F41.1 GAD (GENERALIZED ANXIETY DISORDER): ICD-10-CM

## 2019-06-10 DIAGNOSIS — R41.3 SHORT-TERM MEMORY LOSS: Primary | ICD-10-CM

## 2019-06-10 DIAGNOSIS — Z81.8 FAMILY HISTORY OF DEMENTIA: ICD-10-CM

## 2019-06-10 DIAGNOSIS — R20.9 SENSATION OF COLD IN LOWER EXTREMITY: ICD-10-CM

## 2019-06-10 DIAGNOSIS — M79.604 BILATERAL LEG PAIN: ICD-10-CM

## 2019-06-10 PROCEDURE — 99213 OFFICE O/P EST LOW 20 MIN: CPT | Mod: PBBFAC,PN | Performed by: NURSE PRACTITIONER

## 2019-06-10 PROCEDURE — 99214 PR OFFICE/OUTPT VISIT, EST, LEVL IV, 30-39 MIN: ICD-10-PCS | Mod: S$PBB,,, | Performed by: NURSE PRACTITIONER

## 2019-06-10 PROCEDURE — 99214 OFFICE O/P EST MOD 30 MIN: CPT | Mod: S$PBB,,, | Performed by: NURSE PRACTITIONER

## 2019-06-10 PROCEDURE — 99999 PR PBB SHADOW E&M-EST. PATIENT-LVL III: ICD-10-PCS | Mod: PBBFAC,,, | Performed by: NURSE PRACTITIONER

## 2019-06-10 PROCEDURE — 99999 PR PBB SHADOW E&M-EST. PATIENT-LVL III: CPT | Mod: PBBFAC,,, | Performed by: NURSE PRACTITIONER

## 2019-06-10 RX ORDER — MELOXICAM 15 MG/1
15 TABLET ORAL DAILY
Qty: 90 TABLET | Refills: 3 | Status: CANCELLED | OUTPATIENT
Start: 2019-06-10

## 2019-06-10 RX ORDER — ALPRAZOLAM 1 MG/1
1 TABLET ORAL 4 TIMES DAILY PRN
Qty: 120 TABLET | Refills: 2 | Status: CANCELLED | OUTPATIENT
Start: 2019-06-10

## 2019-06-10 RX ORDER — MEMANTINE HYDROCHLORIDE 5 MG/1
5 TABLET ORAL 2 TIMES DAILY
Qty: 60 TABLET | Refills: 3 | Status: SHIPPED | OUTPATIENT
Start: 2019-06-10 | End: 2019-10-19 | Stop reason: SDUPTHER

## 2019-06-10 NOTE — PROGRESS NOTES
Subjective:       Patient ID: Ricky Cole is a 64 y.o. male.    Chief Complaint: Medication Refill; Memory Loss (short term); and Cold Extremity (both r and l leg)    Mr. Harvey Cole is a 64 year old male who presents to the clinic today for medication refill and concerns regarding short term memory loss and having cold bilateral lower extremities. Regarding medication refill, Mr. Gabriel reporst he takes xanax 1 mg 4x daily, with significant improvement in mood, anxiety, shaking, and nervousness. He reports he is compliant with medication and denies any concerns, worsening mood or anxiety and wanting to harm self or others.   Today, he reports he has been having trouble with short term memory loss. He reports his mother and father have history of dementia, and reports his brother is dying from it. Requesting evaluation by neurology. Denies long term memory issues. Able to function daily without concerns. Denies cp, sob, n/v/d.         Review of Systems   Constitutional: Negative for activity change, appetite change, fatigue and fever.   HENT: Negative for congestion, dental problem, ear discharge, hearing loss, postnasal drip, sinus pain, sneezing and trouble swallowing.    Eyes: Negative for photophobia and discharge.   Respiratory: Negative for apnea, cough and shortness of breath.    Cardiovascular: Negative for chest pain.   Gastrointestinal: Negative for abdominal distention, abdominal pain, blood in stool, diarrhea, nausea and vomiting.   Endocrine: Negative for cold intolerance.   Genitourinary: Negative for difficulty urinating, flank pain, frequency, hematuria and testicular pain.   Musculoskeletal: Positive for arthralgias and back pain. Negative for myalgias.   Skin: Negative for color change.   Allergic/Immunologic: Negative for environmental allergies, food allergies and immunocompromised state.   Neurological: Negative for dizziness, syncope, numbness and headaches.   Hematological: Negative for  "adenopathy. Does not bruise/bleed easily.   Psychiatric/Behavioral: Negative for agitation, confusion, decreased concentration, hallucinations, self-injury and sleep disturbance. The patient is nervous/anxious.    All other systems reviewed and are negative.        Reviewed family, medical, surgical, and social history.    Objective:      BP (!) 139/90 (BP Location: Left arm, Patient Position: Sitting, BP Method: Medium (Automatic))   Pulse 87   Temp 97.4 °F (36.3 °C) (Tympanic)   Resp 19   Ht 5' 10" (1.778 m)   Wt 62.1 kg (137 lb)   SpO2 98%   BMI 19.66 kg/m²   Physical Exam   Constitutional: He is oriented to person, place, and time. He appears well-developed. No distress.   HENT:   Head: Normocephalic.   Eyes: Pupils are equal, round, and reactive to light. Conjunctivae are normal.   Neck: Normal range of motion. No thyromegaly present.   Cardiovascular: Normal rate, regular rhythm, normal heart sounds and intact distal pulses. Exam reveals no gallop and no friction rub.   No murmur heard.  Pulmonary/Chest: Effort normal and breath sounds normal. No respiratory distress. He has no wheezes. He exhibits no tenderness.   Abdominal: Soft. He exhibits no distension. There is no guarding.   Lymphadenopathy:     He has no cervical adenopathy.   Neurological: He is alert and oriented to person, place, and time. He is not disoriented.   Skin: Skin is warm. Capillary refill takes less than 2 seconds. He is not diaphoretic.   Psychiatric: His speech is normal and behavior is normal. Judgment and thought content normal. His mood appears anxious. Cognition and memory are normal.       Assessment:       1. Short-term memory loss    2. CHANTELLE (generalized anxiety disorder)    3. Family history of dementia    4. Sensation of cold in lower extremity    5. Bilateral leg pain        Plan:       Short-term memory loss  -     memantine (NAMENDA) 5 MG Tab; Take 1 tablet (5 mg total) by mouth 2 (two) times daily.  Dispense: 60 " tablet; Refill: 3    CHANTELLE (generalized anxiety disorder)    Family history of dementia  -     memantine (NAMENDA) 5 MG Tab; Take 1 tablet (5 mg total) by mouth 2 (two) times daily.  Dispense: 60 tablet; Refill: 3    Sensation of cold in lower extremity  -     US Lower Extremity Veins Bilateral; Future; Expected date: 06/10/2019  -     US Lower Extremity Arteries Bilateral; Future; Expected date: 06/10/2019    Bilateral leg pain  -     US Lower Extremity Veins Bilateral; Future; Expected date: 06/10/2019  -     US Lower Extremity Arteries Bilateral; Future; Expected date: 06/10/2019          PLAN:  - Discussed with patient the plan of care  - US lower extremities to be completed  - Refer neurology evaluation   -  reviewed; Xanax cannot be refilled until 6/21/19  - Medications reviewed. Medication side effects discussed. Patient has no questions or concerns at this time. Informed patient to notify me regarding any concerns.   - Informed patient to please notify me with any questions or concerns at anytime  - Follow up ordered for 4 weeks      Risks, benefits, and side effects were discussed with the patient. All questions were answered to the fullest satisfaction of the patient, and pt verbalized understanding and agreement to treatment plan. Pt was to call with any new or worsening symptoms, or present to the ER.

## 2019-06-18 ENCOUNTER — TELEPHONE (OUTPATIENT)
Dept: FAMILY MEDICINE | Facility: CLINIC | Age: 64
End: 2019-06-18

## 2019-06-18 NOTE — TELEPHONE ENCOUNTER
----- Message from Mary Trevizo sent at 6/18/2019  3:32 PM CDT -----  Type:  RX Refill Request    Who Called: pt  Refill RX Name and Strength:   Xanax 1  mg  How is the patient currently taking it? (ex. 1XDay):4  Times  daily  Is this a 30 day or 90 day RX:90  days  Preferred Pharmacy with phone number:   [facility] - [place] - [address]  [address] [facility] [address]  Phone: [phone] Fax: [phone]  Best Call Back Number: [phone]  Additional Information:   Pt  Is out  Of   Med and   Needs them  Filled  asap  Pt  Has  Been  Asking  Several  times

## 2019-06-18 NOTE — TELEPHONE ENCOUNTER
----- Message from Juanita Israel sent at 6/18/2019  4:47 PM CDT -----  Contact: self  Patient requesting refill on ALPRAZolam (XANAX) 1 MG tablet     Patient states he has been out of above medication for 2 days per patient      Please call to advice [phone] (home) patient will like a call when refill is called in please        [facility] - [place] - [address]  [address]  [place] [address]  Phone: [phone] Fax: [phone]

## 2019-06-19 ENCOUNTER — HOSPITAL ENCOUNTER (EMERGENCY)
Facility: HOSPITAL | Age: 64
Discharge: HOME | End: 2019-06-19
Attending: EMERGENCY MEDICINE
Payer: MEDICARE

## 2019-06-19 ENCOUNTER — TELEPHONE (OUTPATIENT)
Dept: FAMILY MEDICINE | Facility: CLINIC | Age: 64
End: 2019-06-19

## 2019-06-19 VITALS
BODY MASS INDEX: 19.61 KG/M2 | TEMPERATURE: 100 F | WEIGHT: 137 LBS | HEART RATE: 80 BPM | SYSTOLIC BLOOD PRESSURE: 169 MMHG | OXYGEN SATURATION: 98 % | HEIGHT: 70 IN | DIASTOLIC BLOOD PRESSURE: 97 MMHG | RESPIRATION RATE: 16 BRPM

## 2019-06-19 DIAGNOSIS — R52 PAIN: ICD-10-CM

## 2019-06-19 DIAGNOSIS — S20.212A RIB CONTUSION, LEFT, INITIAL ENCOUNTER: ICD-10-CM

## 2019-06-19 DIAGNOSIS — M79.605 BILATERAL LOWER EXTREMITY PAIN: Primary | ICD-10-CM

## 2019-06-19 DIAGNOSIS — M79.604 BILATERAL LOWER EXTREMITY PAIN: Primary | ICD-10-CM

## 2019-06-19 PROCEDURE — 71101 X-RAY EXAM UNILAT RIBS/CHEST: CPT | Mod: 26,LT,, | Performed by: RADIOLOGY

## 2019-06-19 PROCEDURE — 99283 EMERGENCY DEPT VISIT LOW MDM: CPT | Mod: 25

## 2019-06-19 PROCEDURE — 25000003 PHARM REV CODE 250: Performed by: EMERGENCY MEDICINE

## 2019-06-19 PROCEDURE — 71101 XR RIBS MIN 3 VIEWS W/ PA CHEST LEFT: ICD-10-PCS | Mod: 26,LT,, | Performed by: RADIOLOGY

## 2019-06-19 RX ORDER — IBUPROFEN 400 MG/1
800 TABLET ORAL
Status: COMPLETED | OUTPATIENT
Start: 2019-06-19 | End: 2019-06-19

## 2019-06-19 RX ADMIN — IBUPROFEN 800 MG: 400 TABLET ORAL at 03:06

## 2019-06-19 NOTE — ED PROVIDER NOTES
"Encounter Date: 6/19/2019       History     Chief Complaint   Patient presents with    bilateral leg discomfort     Discussed with PCP and supposedly have US scheduled but pt says he has not been contacted.  Cold, numbing sensation of noman legs.     65yo male with pmh anxiety, chronic back pain with opiate dependence, COPD, CAD with MI, CVA 2017 presents to ED for evaluation of intermittent bilateral leg discomfort and numbness, for which he has been following with pcp and has ongoing workup for same. The pt states he has been discussing this issue with PCP and "supposedly has US scheduled but no one has called me and I just want to know what's going on." Additionally, he reports intermittent, aching, non-radiating left lateral rib pain - atraumatic. Denies erythema, swelling, trauma.         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, including shoulder     Cancer 2005    right eye    Colon polyps     COPD (chronic obstructive pulmonary disease)     Depression     Hypertension     Inguinal hernia     left    Kidney stone     MI (myocardial infarction)     Stroke     last stroke 2017    Tremor     Ulcer      Past Surgical History:   Procedure Laterality Date    ANGIOPLASTY      cardiac stent    BURSECTOMY, OLECRANON Right 11/6/2018    [facility]    COLECTOMY      EXCISION, BONE SPUR Right 11/6/2018    [facility]    EXCISION-WIDE LOCAL NEOPLASM RIGHT SHOULDER Right 4/16/2018    [facility]ron [facility] at [facility]    EYE SURGERY      FINGER SURGERY Left     left index finger    HERNIA REPAIR      POLYPECTOMY      REPAIR, TENDON, TRICEPS Right 11/6/2018    [facility]ul [facility]     Family History   Problem Relation Age of " Onset    Heart disease Mother     Heart disease Father     Cancer Father     Dementia Brother     Heart disease Brother      Social History     Tobacco Use    Smoking status: Current Some Day Smoker     Years: 15.00     Types: Cigarettes     Last attempt to quit: 2018     Years since quittin.0    Smokeless tobacco: Current User     Types: Chew   Substance Use Topics    Alcohol use: Yes     Comment: occasionally beer    Drug use: No     Review of Systems   Constitutional: Negative for appetite change, chills, diaphoresis, fatigue and fever.   HENT: Negative for congestion, ear pain, rhinorrhea, sinus pressure, sinus pain, sore throat and tinnitus.    Eyes: Negative for photophobia and visual disturbance.   Respiratory: Negative for cough, chest tightness, shortness of breath and wheezing.    Cardiovascular: Negative for chest pain, palpitations and leg swelling.   Gastrointestinal: Negative for abdominal pain, constipation, diarrhea, nausea and vomiting.   Endocrine: Negative for cold intolerance, heat intolerance, polydipsia, polyphagia and polyuria.   Genitourinary: Negative for decreased urine volume, difficulty urinating, dysuria, flank pain, frequency, hematuria and urgency.   Musculoskeletal: Positive for back pain. Negative for arthralgias, gait problem, joint swelling, myalgias, neck pain and neck stiffness.        Bilateral lower extremity pain   Skin: Negative for color change, pallor, rash and wound.   Allergic/Immunologic: Negative for immunocompromised state.   Neurological: Positive for numbness. Negative for dizziness, syncope, weakness, light-headedness and headaches.   Hematological: Negative for adenopathy. Does not bruise/bleed easily.   Psychiatric/Behavioral: Negative for decreased concentration, dysphoric mood and sleep disturbance. The patient is not nervous/anxious.    All other systems reviewed and are negative.      Physical Exam     Initial Vitals [19 1410]   BP Pulse  Resp Temp SpO2   (!) 169/97 80 16 99.5 °F (37.5 °C) 98 %      MAP       --         Physical Exam    Nursing note and vitals reviewed.  Constitutional: He appears well-developed and well-nourished. He is not diaphoretic. No distress.   HENT:   Head: Normocephalic and atraumatic.   Right Ear: External ear normal.   Left Ear: External ear normal.   Nose: Nose normal.   Mouth/Throat: Oropharynx is clear and moist.   Eyes: Conjunctivae are normal. Pupils are equal, round, and reactive to light. No scleral icterus.   Neck: Normal range of motion. Neck supple. No JVD present.   Cardiovascular: Normal rate, regular rhythm, normal heart sounds and intact distal pulses.   Pulses:       Femoral pulses are 2+ on the right side, and 2+ on the left side.       Popliteal pulses are 2+ on the right side, and 2+ on the left side.        Dorsalis pedis pulses are 2+ on the right side, and 2+ on the left side.        Posterior tibial pulses are 2+ on the right side, and 2+ on the left side.   Pulmonary/Chest: Breath sounds normal. No respiratory distress. He has no wheezes. He has no rhonchi. He has no rales. He exhibits no tenderness.   Abdominal: Soft. Bowel sounds are normal. He exhibits no distension. There is no tenderness. There is no rebound and no guarding.   Musculoskeletal: Normal range of motion. He exhibits no edema or tenderness.   Lymphadenopathy:     He has no cervical adenopathy.   Neurological: He is alert and oriented to person, place, and time. He has normal strength and normal reflexes. He displays normal reflexes. No cranial nerve deficit or sensory deficit. GCS score is 15. GCS eye subscore is 4. GCS verbal subscore is 5. GCS motor subscore is 6.   Skin: Skin is warm and dry. Capillary refill takes less than 2 seconds. No rash noted. No erythema. No pallor.   Psychiatric: He has a normal mood and affect. His behavior is normal. Judgment and thought content normal.         ED Course   Procedures  Labs Reviewed -  No data to display       Imaging Results          XR Ribs Min 3 views w/PA Chest Left (Final result)  Result time 06/19/19 15:50:45   Procedure changed from X-Ray Ribs 2 View Left     Final result by Francesca Schneider Jr., MD (06/19/19 15:50:45)                 Impression:      No acute abnormality demonstrated.      Electronically signed by: Francesca Schneider  Date:    06/19/2019  Time:    15:50             Narrative:    EXAMINATION:  XR RIBS MIN 3 VIEWS W/ PA CHEST LEFT    CLINICAL HISTORY:  pain;  Pain, unspecified    TECHNIQUE:  The study was done with multiple views for the left ribs.    COMPARISON:  Chest x-ray of 04/22/2018.    FINDINGS:  No evidence of fracture or other significant rib abnormality identified.  The underlying lung is completely expanded.                              X-Rays:   Independently Interpreted Readings:   Other Readings:  Left rib series - no acute fracture    Medical Decision Making:   Differential Diagnosis:   Fracture, contusion, electrolyte imbalance, PAD, PVD  ED Management:  Sp[facility] with [facility], outpatient ultrasound scheduled via their office for tomorrow at 1115 venous and 1315 arterial studies. Pt has been informed of same and has been advised to please keep the scheduled appointment by pcp.                       Clinical Impression:       ICD-10-CM ICD-9-CM   1. Bilateral lower extremity pain M79.604 729.5    M79.605    2. Pain R52 780.96   3. Rib contusion, left, initial encounter S20.212A 922.1         Disposition:   Disposition: Discharged  Condition: Stable                        Britni Saunders MD  06/24/19 5213

## 2019-06-19 NOTE — TELEPHONE ENCOUNTER
----- Message from Wesly Gentile MA sent at 6/18/2019  5:20 PM CDT -----  Contact: Pt  Pt would like to be called back regarding his med's. Please call after 12:00 noon.    Pt can be reached at [phone].      Thanks

## 2019-06-20 ENCOUNTER — HOSPITAL ENCOUNTER (OUTPATIENT)
Dept: RADIOLOGY | Facility: HOSPITAL | Age: 64
Discharge: HOME | End: 2019-06-20
Attending: NURSE PRACTITIONER
Payer: MEDICARE

## 2019-06-20 ENCOUNTER — TELEPHONE (OUTPATIENT)
Dept: FAMILY MEDICINE | Facility: CLINIC | Age: 64
End: 2019-06-20

## 2019-06-20 DIAGNOSIS — M79.605 BILATERAL LEG PAIN: ICD-10-CM

## 2019-06-20 DIAGNOSIS — M79.604 BILATERAL LEG PAIN: ICD-10-CM

## 2019-06-20 DIAGNOSIS — F41.1 GAD (GENERALIZED ANXIETY DISORDER): ICD-10-CM

## 2019-06-20 DIAGNOSIS — R20.9 SENSATION OF COLD IN LOWER EXTREMITY: ICD-10-CM

## 2019-06-20 PROCEDURE — 93970 EXTREMITY STUDY: CPT | Mod: 26,,, | Performed by: RADIOLOGY

## 2019-06-20 PROCEDURE — 93970 EXTREMITY STUDY: CPT | Mod: TC

## 2019-06-20 PROCEDURE — 93925 LOWER EXTREMITY STUDY: CPT | Mod: TC

## 2019-06-20 PROCEDURE — 93970 US LOWER EXTREMITY VEINS BILATERAL: ICD-10-PCS | Mod: 26,,, | Performed by: RADIOLOGY

## 2019-06-20 PROCEDURE — 93925 LOWER EXTREMITY STUDY: CPT | Mod: 26,,, | Performed by: RADIOLOGY

## 2019-06-20 PROCEDURE — 93925 US LOWER EXTREMITY ARTERIES BILATERAL: ICD-10-PCS | Mod: 26,,, | Performed by: RADIOLOGY

## 2019-06-20 RX ORDER — ALPRAZOLAM 1 MG/1
1 TABLET ORAL 4 TIMES DAILY PRN
Qty: 120 TABLET | Refills: 0 | Status: SHIPPED | OUTPATIENT
Start: 2019-06-20 | End: 2019-07-19 | Stop reason: SDUPTHER

## 2019-06-20 NOTE — TELEPHONE ENCOUNTER
----- Message from Violeta Gomez NP sent at 6/20/2019  4:55 PM CDT -----  Please let patient know no signs of blood clot. Thank you

## 2019-06-21 ENCOUNTER — TELEPHONE (OUTPATIENT)
Dept: FAMILY MEDICINE | Facility: CLINIC | Age: 64
End: 2019-06-21

## 2019-06-21 DIAGNOSIS — R93.6 ABNORMAL FINDINGS ON DIAGNOSTIC IMAGING OF LIMBS: ICD-10-CM

## 2019-06-21 DIAGNOSIS — I73.9 PERIPHERAL ARTERIAL DISEASE: Primary | ICD-10-CM

## 2019-06-21 DIAGNOSIS — I70.203 ATHEROSCLEROSIS OF NATIVE ARTERY OF BOTH LOWER EXTREMITIES: ICD-10-CM

## 2019-06-21 DIAGNOSIS — M79.605 PAIN IN BOTH LOWER EXTREMITIES: ICD-10-CM

## 2019-06-21 DIAGNOSIS — Z79.899 OTHER LONG TERM (CURRENT) DRUG THERAPY: ICD-10-CM

## 2019-06-21 DIAGNOSIS — R93.89 ABNORMAL FINDINGS ON DIAGNOSTIC IMAGING OF OTHER SPECIFIED BODY STRUCTURES: ICD-10-CM

## 2019-06-21 DIAGNOSIS — M79.604 PAIN IN BOTH LOWER EXTREMITIES: ICD-10-CM

## 2019-06-21 NOTE — PROGRESS NOTES
I would encourage him to stop taking mobic at this time until discussed with DR Margoth Santamaria placed

## 2019-06-21 NOTE — TELEPHONE ENCOUNTER
----- Message from Violeta Gomez NP sent at 6/21/2019  8:12 AM CDT -----  Please let Mr. Gabriel know he does not have blood clot in his leg, however.... He does have stenosis in his lower legs, meaning small blockages in his leg arteries. I am referring him to Dr Galloway because he needs cardiac evaluation regarding increased risk of coronary artery disease.  I encourage patient to stop smoking, exercise, and I have placed labs for him to have completed, fasting because his cholesterol may be elevated which will indicate medication. I would encourage 81 mg of aspirin daily until seen and evaluated by Dr Galloway. Thank you

## 2019-06-21 NOTE — PROGRESS NOTES
Please let Mr. Gabriel know he does not have blood clot in his leg, however.... He does have stenosis in his lower legs, meaning small blockages in his leg arteries. I am referring him to Dr Galloway because he needs cardiac evaluation regarding increased risk of coronary artery disease.  I encourage patient to stop smoking, exercise, and I have placed labs for him to have completed, fasting because his cholesterol may be elevated which will indicate medication. I would encourage 81 mg of aspirin daily until seen and evaluated by Dr Galloway. Thank you

## 2019-06-25 ENCOUNTER — PATIENT OUTREACH (OUTPATIENT)
Dept: ADMINISTRATIVE | Facility: HOSPITAL | Age: 64
End: 2019-06-25

## 2019-06-25 NOTE — LETTER
June 25, 2019    Ricky Cole  [address] [place] [address]             [facility]  [address]  [facility] [address]  Phone: [phone] Dear [facility] is committed to your overall health and would like to ensure that you are up to date on your recommended test and/or procedures.   Hiram Leija MD  has found that your chart shows you may be due for the following:    ANNUAL LABS  ABDOMINAL AORTA ANEURYSM SCREENING      If you have had any of the above done at another facility, please let us know so that we may obtain copies from that facility.  If you have a copy of these records, please provide a copy for us to scan into your chart.  You are welcome to request that the report be faxed to us at  ([phone]).     Otherwise, please schedule these appointments at your earliest convenience by calling [phone] or going to [email or web address].    If you have an upcoming scheduled appointment for the item above, please disregard this letter.    Sincerely,  Your [facility]  MD Luz Piedra L.P.N. Clinical Care Coordinator  [address] [facility], [place] [id number] [phone]

## 2019-07-10 ENCOUNTER — OFFICE VISIT (OUTPATIENT)
Dept: FAMILY MEDICINE | Facility: CLINIC | Age: 64
End: 2019-07-10
Payer: MEDICARE

## 2019-07-10 ENCOUNTER — TELEPHONE (OUTPATIENT)
Dept: FAMILY MEDICINE | Facility: CLINIC | Age: 64
End: 2019-07-10

## 2019-07-10 VITALS
HEIGHT: 70 IN | WEIGHT: 140 LBS | BODY MASS INDEX: 20.04 KG/M2 | OXYGEN SATURATION: 97 % | TEMPERATURE: 98 F | RESPIRATION RATE: 20 BRPM | SYSTOLIC BLOOD PRESSURE: 181 MMHG | HEART RATE: 83 BPM | DIASTOLIC BLOOD PRESSURE: 90 MMHG

## 2019-07-10 DIAGNOSIS — I77.1 ARTERIAL STENOSIS: ICD-10-CM

## 2019-07-10 DIAGNOSIS — Z79.899 CHRONIC PRESCRIPTION BENZODIAZEPINE USE: ICD-10-CM

## 2019-07-10 DIAGNOSIS — Z13.6 SCREENING FOR AAA (ABDOMINAL AORTIC ANEURYSM): Primary | ICD-10-CM

## 2019-07-10 PROCEDURE — 99213 OFFICE O/P EST LOW 20 MIN: CPT | Mod: S$PBB,,, | Performed by: NURSE PRACTITIONER

## 2019-07-10 PROCEDURE — 99214 OFFICE O/P EST MOD 30 MIN: CPT | Mod: PBBFAC,PN | Performed by: NURSE PRACTITIONER

## 2019-07-10 PROCEDURE — 99999 PR PBB SHADOW E&M-EST. PATIENT-LVL IV: CPT | Mod: PBBFAC,,, | Performed by: NURSE PRACTITIONER

## 2019-07-10 PROCEDURE — 80307 DRUG TEST PRSMV CHEM ANLYZR: CPT

## 2019-07-10 PROCEDURE — 99213 PR OFFICE/OUTPT VISIT, EST, LEVL III, 20-29 MIN: ICD-10-PCS | Mod: S$PBB,,, | Performed by: NURSE PRACTITIONER

## 2019-07-10 PROCEDURE — 99999 PR PBB SHADOW E&M-EST. PATIENT-LVL IV: ICD-10-PCS | Mod: PBBFAC,,, | Performed by: NURSE PRACTITIONER

## 2019-07-10 NOTE — PROGRESS NOTES
Subjective:       Patient ID: Ricky Cole is a 64 y.o. male.    Chief Complaint: Results and Follow-up    Mr. Harvey Cole is a 64 year old male who presents to the clinic today for follow up exam. In regards to his Xanax, he reports he takes 1 mg QID, which he reports is effective for his anxiety and tremors. He reports he is compliant, and denies any side effects. Today, he reports he is very stressed. He believes his son is on drugs, bringing multiple women to his house, and has called the  multiple times to have his son removed, which did not happen. He reports his stress is through the roof. He reports it is causing him extreme stress.   Today, he wants to discuss the results of his recent ultrasound of his legs. He does have appt with Dr Galloway upcoming.   In regards to memory, he does feel that the namenda has helped and improving. Denies any side effects, and compliant.       Review of Systems   Constitutional: Negative for activity change, appetite change, fatigue and fever.   HENT: Negative for congestion, dental problem, ear discharge, hearing loss, postnasal drip, sinus pain, sneezing and trouble swallowing.    Eyes: Negative for photophobia and discharge.   Respiratory: Negative for apnea, cough and shortness of breath.    Cardiovascular: Negative for chest pain.   Gastrointestinal: Negative for abdominal distention, abdominal pain, blood in stool, diarrhea, nausea and vomiting.   Endocrine: Negative for cold intolerance.   Genitourinary: Negative for difficulty urinating, flank pain, frequency, hematuria and testicular pain.   Musculoskeletal: Positive for arthralgias and back pain. Negative for myalgias.   Skin: Negative for color change.   Allergic/Immunologic: Negative for environmental allergies, food allergies and immunocompromised state.   Neurological: Positive for tremors. Negative for dizziness, syncope, numbness and headaches.   Hematological: Negative for adenopathy. Does not  "bruise/bleed easily.   Psychiatric/Behavioral: Negative for agitation, confusion, decreased concentration, hallucinations, self-injury and sleep disturbance. The patient is nervous/anxious.    All other systems reviewed and are negative.        Reviewed family, medical, surgical, and social history.    Objective:      BP (!) 181/90 (BP Location: Right arm, Patient Position: Sitting, BP Method: Medium (Automatic))   Pulse 83   Temp 97.6 °F (36.4 °C) (Tympanic)   Resp 20   Ht 5' 10" (1.778 m)   Wt 63.5 kg (140 lb)   SpO2 97%   BMI 20.09 kg/m²   Physical Exam   Constitutional: He is oriented to person, place, and time. He appears well-developed. No distress.   HENT:   Head: Normocephalic.   Eyes: Pupils are equal, round, and reactive to light. Conjunctivae are normal.   Neck: Normal range of motion. No thyromegaly present.   Cardiovascular: Normal rate, regular rhythm, normal heart sounds and intact distal pulses. Exam reveals no gallop and no friction rub.   No murmur heard.  Pulmonary/Chest: Effort normal and breath sounds normal. No respiratory distress. He has no wheezes. He exhibits no tenderness.   Abdominal: Soft. He exhibits no distension. There is no guarding.   Lymphadenopathy:     He has no cervical adenopathy.   Neurological: He is alert and oriented to person, place, and time. He is not disoriented.   Skin: Skin is warm. Capillary refill takes less than 2 seconds. He is not diaphoretic.   Psychiatric: His speech is normal and behavior is normal. Judgment and thought content normal. His mood appears anxious. Cognition and memory are normal.       Assessment:       1. Screening for AAA (abdominal aortic aneurysm)    2. Arterial stenosis    3. Chronic prescription benzodiazepine use        Plan:       Screening for AAA (abdominal aortic aneurysm)  -     US Abdominal Aorta; Future; Expected date: 12/26/2019    Arterial stenosis  -     Ambulatory referral to Vascular Surgery    Chronic prescription " benzodiazepine use  -     POCT Urine Drug Screen Pain Management  -     Pain Clinic Drug Screen          PLAN:  - Discussed with patient the plan of care  - Referrals discussed with patient  - Urine drug screen completed   - Medications reviewed. Medicaation side effects discussed. Patient has no questions or concerns at this time. Informed patient to notify me regarding any concerns.   - Continue monitoring BP and documenting in log book   - Informed patient to please notify me with any questions or concerns at anytime  - Follow up ordered for 2-4 weeks for BP         Risks, benefits, and side effects were discussed with the patient. All questions were answered to the fullest satisfaction of the patient, and pt verbalized understanding and agreement to treatment plan. Pt was to call with any new or worsening symptoms, or present to the ER.

## 2019-07-10 NOTE — TELEPHONE ENCOUNTER
----- Message from Violeta Gomez NP sent at 7/10/2019  4:39 PM CDT -----  I saw him today. I would like him to f/u in 2 weeks regarding BP and I was unsure if I stated that  Thank you

## 2019-07-14 LAB
6MAM UR QL: NOT DETECTED
7AMINOCLONAZEPAM UR QL: NOT DETECTED
A-OH ALPRAZ UR QL: PRESENT
ALPRAZ UR QL: NOT DETECTED
AMPHET UR QL SCN: NOT DETECTED
BARBITURATES UR QL: PRESENT
BUPRENORPHINE UR QL: NOT DETECTED
BZE UR QL: NOT DETECTED
CARBOXYTHC UR QL: NOT DETECTED
CARISOPRODOL UR QL: NOT DETECTED
CLONAZEPAM UR QL: NOT DETECTED
CODEINE UR QL: NOT DETECTED
CREAT UR-MCNC: 78.9 MG/DL (ref 20–400)
DIAZEPAM UR QL: NOT DETECTED
ETHYL GLUCURONIDE UR QL: PRESENT
FENTANYL UR QL: NOT DETECTED
HYDROCODONE UR QL: NOT DETECTED
HYDROMORPHONE UR QL: NOT DETECTED
LORAZEPAM UR QL: NOT DETECTED
MDA UR QL: NOT DETECTED
MDEA UR QL: NOT DETECTED
MDMA UR QL: NOT DETECTED
ME-PHENIDATE UR QL: NOT DETECTED
MEPERIDINE UR QL: NOT DETECTED
METHADONE UR QL: NOT DETECTED
METHAMPHET UR QL: NOT DETECTED
MIDAZOLAM UR QL SCN: NOT DETECTED
MORPHINE UR QL: NOT DETECTED
NORBUPRENORPHINE UR QL CFM: NOT DETECTED
NORDIAZEPAM UR QL: NOT DETECTED
NORFENTANYL UR QL: NOT DETECTED
NORHYDROCODONE UR QL CFM: NOT DETECTED
NOROXYCODONE UR QL CFM: NOT DETECTED
NOROXYMORPHONE: NOT DETECTED
OXAZEPAM UR QL: NOT DETECTED
OXYCODONE UR QL: NOT DETECTED
OXYMORPHONE UR QL: NOT DETECTED
PATHOLOGY STUDY: NORMAL
PCP UR QL: NOT DETECTED
PHENTERMINE UR QL: NOT DETECTED
PROPOXYPH UR QL: NOT DETECTED
SERVICE CMNT-IMP: NORMAL
TAPENTADOL UR QL SCN: NOT DETECTED
TAPENTADOL-O-SULF: NOT DETECTED
TEMAZEPAM UR QL: NOT DETECTED
TRAMADOL UR QL: NOT DETECTED
ZOLPIDEM UR QL: NOT DETECTED

## 2019-07-15 ENCOUNTER — TELEPHONE (OUTPATIENT)
Dept: FAMILY MEDICINE | Facility: CLINIC | Age: 64
End: 2019-07-15

## 2019-07-15 DIAGNOSIS — M25.521 RIGHT ELBOW PAIN: Primary | ICD-10-CM

## 2019-07-15 NOTE — PROGRESS NOTES
"Dr. eLija,  This is a patient of yours, who has seen me the last few visits. He is on xanax. His drug screen shows barbiturates, which when we reviewed prior to his drug screen, he stated " I do not take anything but what is prescribed." The primidone would not cause this would it?"

## 2019-07-15 NOTE — TELEPHONE ENCOUNTER
Returned pts call, no VM set up.  (Attempting ot notify patient of normal UDS results.)      ----- Message from Sophia Macdonald sent at 7/15/2019  2:36 PM CDT -----  Type:  Patient Returning Call    Who Called:  Patient   Who Left Message for Patient:  Vero  Does the patient know what this is regarding?: lab results  Best Call Back Number:  [phone]  Additional Information:

## 2019-07-15 NOTE — TELEPHONE ENCOUNTER
The primidone can cause this to be positive, and I would consider this a consistent urine drug screen.

## 2019-07-15 NOTE — TELEPHONE ENCOUNTER
"----- Message from Violeta Gomez NP sent at 7/15/2019  8:48 AM CDT -----  Dr. Leija,  This is a patient of yours, who has seen me the last few visits. He is on xanax. His drug screen shows barbiturates, which when we reviewed prior to his drug screen, he stated " I do not take anything but what is prescribed." The primidone would not cause this would it?  "

## 2019-07-15 NOTE — TELEPHONE ENCOUNTER
Called pt-no VM set up at this time to inform of normal UDS results.           ----- Message from Violeta Gomez NP sent at 7/15/2019  9:52 AM CDT -----  Please let Mr. Gabriel know his drug screen was consistent and f/u as ordered

## 2019-07-19 DIAGNOSIS — F41.1 GAD (GENERALIZED ANXIETY DISORDER): ICD-10-CM

## 2019-07-19 NOTE — TELEPHONE ENCOUNTER
----- Message from Sergey Quinonez sent at 7/19/2019 11:09 AM CDT -----  Type:  RX Refill Request    Who Called:  Patient  Refill or New Rx:  Refill  RX Name and Strength: ALPRAZolam (XANAX) 1 MG tablet       How is the patient currently taking it? (ex. 1XDay):  4XDay  Is this a 30 day or 90 day RX:  30  Preferred Pharmacy with phone number:    [facility] - [place], [place] - [address] [address]  Phone: [phone] Fax: [phone]      Local or Mail Order:  Local  Ordering Provider:  Patricia Reyes Call Back Number:  [phone]  Additional Information:  Patient states that he will be out by Sunday

## 2019-07-22 ENCOUNTER — PATIENT OUTREACH (OUTPATIENT)
Dept: ADMINISTRATIVE | Facility: HOSPITAL | Age: 64
End: 2019-07-22

## 2019-07-22 RX ORDER — ALPRAZOLAM 1 MG/1
1 TABLET ORAL 4 TIMES DAILY PRN
Qty: 120 TABLET | Refills: 0 | Status: SHIPPED | OUTPATIENT
Start: 2019-07-22 | End: 2019-08-05 | Stop reason: SDUPTHER

## 2019-07-24 ENCOUNTER — OFFICE VISIT (OUTPATIENT)
Dept: CARDIOLOGY | Facility: CLINIC | Age: 64
End: 2019-07-24
Payer: MEDICARE

## 2019-07-24 VITALS
BODY MASS INDEX: 19.6 KG/M2 | TEMPERATURE: 98 F | DIASTOLIC BLOOD PRESSURE: 72 MMHG | WEIGHT: 140 LBS | RESPIRATION RATE: 16 BRPM | OXYGEN SATURATION: 95 % | HEIGHT: 71 IN | HEART RATE: 82 BPM | SYSTOLIC BLOOD PRESSURE: 119 MMHG

## 2019-07-24 DIAGNOSIS — Z95.5 HISTORY OF HEART ARTERY STENT: ICD-10-CM

## 2019-07-24 DIAGNOSIS — Z86.73 HISTORY OF STROKE: ICD-10-CM

## 2019-07-24 DIAGNOSIS — I25.2 HISTORY OF MI (MYOCARDIAL INFARCTION): ICD-10-CM

## 2019-07-24 DIAGNOSIS — F17.200 SMOKER: ICD-10-CM

## 2019-07-24 DIAGNOSIS — M79.604 PAIN IN BOTH LOWER EXTREMITIES: Primary | ICD-10-CM

## 2019-07-24 DIAGNOSIS — M79.605 PAIN IN BOTH LOWER EXTREMITIES: Primary | ICD-10-CM

## 2019-07-24 DIAGNOSIS — I73.9 PAD (PERIPHERAL ARTERY DISEASE): ICD-10-CM

## 2019-07-24 DIAGNOSIS — R07.89 ATYPICAL CHEST PAIN: ICD-10-CM

## 2019-07-24 PROCEDURE — 99999 PR PBB SHADOW E&M-EST. PATIENT-LVL III: CPT | Mod: PBBFAC,,, | Performed by: INTERNAL MEDICINE

## 2019-07-24 PROCEDURE — 99999 PR PBB SHADOW E&M-EST. PATIENT-LVL III: ICD-10-PCS | Mod: PBBFAC,,, | Performed by: INTERNAL MEDICINE

## 2019-07-24 PROCEDURE — 99205 PR OFFICE/OUTPT VISIT, NEW, LEVL V, 60-74 MIN: ICD-10-PCS | Mod: S$PBB,,, | Performed by: INTERNAL MEDICINE

## 2019-07-24 PROCEDURE — 99213 OFFICE O/P EST LOW 20 MIN: CPT | Mod: PBBFAC | Performed by: INTERNAL MEDICINE

## 2019-07-24 PROCEDURE — 99205 OFFICE O/P NEW HI 60 MIN: CPT | Mod: S$PBB,,, | Performed by: INTERNAL MEDICINE

## 2019-07-24 RX ORDER — ASPIRIN 325 MG
325 TABLET ORAL DAILY
Status: ON HOLD | COMMUNITY
End: 2019-12-24 | Stop reason: SDUPTHER

## 2019-07-24 NOTE — LETTER
July 24, 2019      Violeta Gomez, NP  [address]  [place] [address]           [facility]  [address] [place] [address] [place] [address]  Phone: [phone]  Fax: [phone]          Patient: Ricky Cole   MR Number: [id number]   YOB: 1955   Date of Visit: 7/24/2019       Dear Violeta Gomez:    Thank you for referring Ricky Cole to me for evaluation. Attached you will find relevant portions of my assessment and plan of care.    If you have questions, please do not hesitate to call me. I look forward to following Ricky Cole along with you.    Sincerely,    Dale Galloway MD    Enclosure  CC:  No Recipients    If you would like to receive this communication electronically, please contact [email or web address] or [phone] to request more information on [employer] Link access.    For providers and/or their staff who would like to refer a patient to [facility], please contact us through our one-stop-shop provider referral line, [facility], at [phone].    If you feel you have received this communication in error or would no longer like to receive these types of communications, please e-mail [email or web address]

## 2019-07-24 NOTE — PATIENT INSTRUCTIONS
Recommended Mediterranean dietEating Heart-Healthy Food: Using the DASH Plan  Eating for your heart doesnt have to be hard or boring. You just need to know how to make healthier choices. The DASH eating plan has been developed to help you do just that. DASH stands for Dietary Approaches to Stop Hypertension. It is a plan that has been proven to be healthier for your heart and to lower your risk for high blood pressure. It can also help lower your risk for cancer, heart disease, osteoporosis, and diabetes.  Choosing from Each Food Group  Choose foods from each of the food groups below each day. Try to get the recommended number of servings for each food group. The serving numbers are based on a diet of 2,000 calories a day. Talk to your doctor if youre unsure about your calorie needs.  Grains   Servings: 7-8 a day  A serving is:  · 1 slice bread  · 1 ounce dry cereal  · half a cup cooked rice or pasta  Best choices: Whole grains and any grains high in fiber.  Vegetables   Servings: 4-5 a day  A serving is:  · 1 cup raw leafy vegetable  · Half a cup cooked vegetable  · Three-quarter cup vegetable juice  Best choices: Fresh or frozen vegetable prepared without too much added salt or fat.    Fruits   Servings: 4-5 a day  A serving is:  · Three-quarter cup fruit juice  · 1 medium fruit  · One-quarter cup dried fruit  · One-half cup fresh, frozen, or canned fruit  Best choices: A variety of fresh fruits of different colors. Whole fruits are a much better choice than fruit juices.  Low-fat or Fat Free Dairy   Servings: 2-3 a day  A serving is:  · 8 ounces milk  · 1 cup yogurt  · One and a half ounces cheese  Best choices: Skim or 1% milk, low-fat or fat free yogurt or buttermilk, and low-fat cheeses.       Meat, Poultry, Fish   Servings: 2 or fewer a day  A serving is:  · 3 ounces cooked meat, poultry, or fish  Best choices: Lean meats and fish. Trim away visible fat. Broil, roast, or boil instead of frying. Remove skin  from poultry before eating.  Nuts, Seeds, Beans   Servings: 4-5 a week  A serving is:  · One third cup nuts (or one and a half ounces)  · 2 tablespoons sunflower seeds  · Half a cup cooked beans  Best choices: Dry roasted nuts with no salt added, lentils, kidney beans, garbanzo beans, and whole mathew beans.    Fats and Oils   Servings: 2 a day  A serving is:  · 1 teaspoon vegetable oil  · 1 teaspoon soft margarine  · 1 tablespoon low-fat mayonnaise  · 1 teaspoon regular mayonnaise  · 2 tablespoons light salad dressing  · 1 tablespoon regular salad dressing  Best choices: Monounsaturated and polyunsaturated fats such as olive, canola, or safflower oil.  Sweets   Servings: 5 a week or fewer  A serving is:  · 1 tablespoon sugar, maple syrup, or honey  · 1 tablespoon jam or jelly  · 1 half-ounce jelly beans (about 15)  · 8 ounces lemonade  Best choices: Dried fruit can be a satisfying sweet. Choose low-fat sweets when possible. And watch your serving sizes!       Aerobic Exercise for a Healthy Heart  Exercise is a lot more than an energy booster and a stress reliever. It also strengthens your heart muscle, lowers your blood pressure and blood cholesterol, and burns calories.      Remember, some activity is better than none.     Choose an Aerobic Activity  Choose a nonstop activity that makes your heart and lungs work harder than they do when you rest or walk normally. This aerobic exercise can improve the way your heart and other muscles use oxygen. Make it fun by exercising with a friend and choosing an activity you enjoy. Here are some ideas:  · Walking  · Swimming  · Bicycling  · Stair climbing  · Dancing  · Jogging  Exercise Regularly  If you havent been exercising regularly,  get your doctors okay first. Then start slowly.  Here are some tips:  · Begin exercising 3 times a week for 5-10 minutes at a time.  · When you feel comfortable, add a few minutes each week.  · Slowly build up to exercising 3-4 times each  week for 20-40 minutes. Aim for a total of 150 or more minutes a week.  · Be sure to carry your nitroglycerin with you when you exercise.  · If you get angina when youre exercising, stop what youre doing, take your nitroglycerin, and call your doctor.  © 0867-0979 [employer], [address] [place] [address], [place] [address]. All rights reserved. This information is not intended as a substitute for professional medical care. Always follow your healthcare professional's instructions.

## 2019-07-24 NOTE — PROGRESS NOTES
Patient ID:  Ricky Cole is a 64 y.o. male who presents to [facility] for bilateral leg pains and weakness over last 3 months, severe ASCVD not on statin and lipid panel pending, atypical CP, smoker   PCP and referred by  Violeta Gomez NP with Dr. Leija  Prior cardiologist: Dr. Leo. Last seen late , planning to follow up with him  Lives alone, now son at house, Michael, non-smoker  Disabled due to COPD and CLBP, , prior     Health literacy: Low  Activities: ADL's (1.5 mile) daily/ bicycling (1) mile, no problem except constant legs pain improve with walking  Nicotine: Some day smoker, none in last week / previous 1 ppd for 4 years.   Alcohol: average 1 weekly  Illicit drugs: none  Cardiac symptoms: none  Home BP: Yes daily AVG - 145/90's   Medication compliance: Yes  Diet: regular   Caffeine: 1 cup coffee daily/ occasional Sprite   Labs: 2018: No LDL Troponin, LDL, TSH, A1C;  Sodium 129L: K+ 3.7: GFR >60; Glucose 95: WBC 4.11: Hemaglobin 13.0L  Last Echo: Never  Last stress test: Never  Cardiovascular angiogram:   EC2018 NSR, LAE, rate 63  Fundoscopic exam: 2018, no retinopathy    WM send for PAD evaluation, AAA screening pending. Have significant cardiac history, continue to smoke some and no statin use. Have very atypical symptoms with constant pains in both legs over the past 3 months, which improve with walking. Also report episode of chest pain with SOB, sharp and occurred at rest and lasted 3 days, felt some help with SL NTG but not resolved.     US arterial legs - Right lower extremity arterial system: The ankle brachial index is 1.03.   Left lower extremity arterial system ankle-brachial index is 1.08.  There are some atherosclerotic changes with probable around 50% stenosis of the right common femoral artery and right deep femoral artery.      US venous legs - No evidence of deep venous thrombosis in either lower extremity.     Violeta BISHOP  KAYLA Gomez noted       Review of Systems   Constitution: Positive for night sweats. Negative for diaphoresis, fever, malaise/fatigue and weight gain.        Neck 12.5; Waist 30.5; Hips 32   HENT: Positive for hearing loss. Negative for nosebleeds and tinnitus.         [place], deaf in left & 60% right   Eyes: Negative for visual disturbance.        Last eye exam 07/2018, no retinopathy noted  Wears corrective lens   Cardiovascular: Positive for chest pain. Negative for claudication, cyanosis, dyspnea on exertion, irregular heartbeat, leg swelling, near-syncope, orthopnea, palpitations and paroxysmal nocturnal dyspnea.        Chest pain x4 days, relieved by nitro   Respiratory: Negative.  Negative for cough, shortness of breath, sleep disturbances due to breathing, snoring and wheezing.         Ep(worth = 4  DX w/COPD in 2011  Growth in left lung in 2011, but resolved itself 4 years ago   Endocrine: Negative.  Negative for polydipsia and polyuria.   Hematologic/Lymphatic: Bruises/bleeds easily.   Skin: Positive for color change, dry skin, itching, poor wound healing and suspicious lesions. Negative for flushing and nail changes.        Rash to upper limbs, improved with MVI w/iron   Musculoskeletal: Positive for arthritis, back pain, joint pain, joint swelling, muscle cramps and stiffness. Negative for falls, gout, muscle weakness and myalgias.        Arthritis in Back & ankles  Muscle cramps in feet 2-3 x/day  Lower extremities are cold to the touch   Gastrointestinal: Positive for flatus and heartburn. Negative for hematemesis, hematochezia, melena and nausea.        1994 esophageal ulcer removal   Genitourinary: Negative.    Neurological: Positive for difficulty with concentration. Negative for disturbances in coordination, excessive daytime sleepiness, dizziness, focal weakness, headaches, light-headedness, loss of balance, numbness, vertigo and weakness.        Currently taking Namenda for short term memory loss  "  Psychiatric/Behavioral: Positive for memory loss. Negative for depression and substance abuse. The patient does not have insomnia and is not nervous/anxious.         Pt taking Namenda        Objective:    Physical Exam   Constitutional: He is oriented to person, place, and time. He appears well-developed and well-nourished.   HENT:   Head: Normocephalic.   Eyes: Pupils are equal, round, and reactive to light. Conjunctivae and EOM are normal.   Neck: Normal range of motion. Neck supple. No JVD present. No thyromegaly present.   Cardiovascular: Normal rate, regular rhythm and intact distal pulses. Exam reveals distant heart sounds. Exam reveals no gallop and no friction rub.   No murmur heard.  Pulses:       Carotid pulses are 2+ on the right side, and 2+ on the left side.       Radial pulses are 2+ on the right side, and 2+ on the left side.        Femoral pulses are 2+ on the right side, and 2+ on the left side.       Popliteal pulses are 2+ on the right side, and 2+ on the left side.        Dorsalis pedis pulses are 2+ on the right side, and 2+ on the left side.        Posterior tibial pulses are 2+ on the right side, and 2+ on the left side.   Pulmonary/Chest: Effort normal and breath sounds normal. He has no rales. He exhibits no tenderness.   Diminished breath sounds and prolong expiration.   Abdominal: Soft. Bowel sounds are normal. There is no tenderness.   Waist 30.5"   Musculoskeletal: Normal range of motion. He exhibits no edema.   Lymphadenopathy:     He has no cervical adenopathy.   Neurological: He is alert and oriented to person, place, and time.   Tremors of both UE   Skin: Skin is warm and dry. No rash noted.         Assessment:       1. Pain in both lower extremities, onset 4/2019    2. Smoker, trying to quit again, 1 cig last a week, started age 17, quit for 42 years, restarted 2018    3. History of MI (myocardial infarction) x2, 1987 and 2015    4. History of stroke x2, 2015, 2017    5. Atypical " chest pain    6. History of heart artery stent, one in 2009    7. PAD (peripheral artery disease), mnoderate         Plan:         Pain in both lower extremities, onset 4/2019  -     Ambulatory consult to Physical Medicine Rehab    Smoker, trying to quit again, 1 cig last a week, started age 17, quit for 42 years, restarted 2018    History of MI (myocardial infarction) x2, 1987 and 2015    History of stroke x2, 2015, 2017    Atypical chest pain    History of heart artery stent, one in 2009    PAD (peripheral artery disease), mnoderate    - All medical issues reviewed, continue current Rx.  - CV status stable, continue current Rx, need to consider high-intensity statin Rx, all medications reviewed, patient acknowledge good understanding.  - Recommend healthy living: stop nicotine, moderate alcohol, healthy diet and regular exercise aiming for fitness, and weight control   - Instruction for Mediterranean diet and heart healthy exercise given.  - Check home blood pressure, 2 days weekly, do 2 readings within 5 minutes in AM and PM, keep log for review.  - Highly recommend 30 minutes of exercise / activities daily, can have Sunday off, with 2-3 sessions of muscle strengthening weekly. A  would be very helpful.  - Patient will follow up with Dr. Leo    Total face-to-face time with the patient was 35 minutes and greater than 50% was spent in counseling and coordination of care. The above assessment and plan have been discussed at length. Referring physician's note reviewed. Labs and procedure over the last 6 months reviewed. Problem List updated. Asked to bring in all active medications / pills bottles with next visit.

## 2019-08-05 ENCOUNTER — OFFICE VISIT (OUTPATIENT)
Dept: FAMILY MEDICINE | Facility: CLINIC | Age: 64
End: 2019-08-05
Payer: MEDICARE

## 2019-08-05 VITALS
SYSTOLIC BLOOD PRESSURE: 130 MMHG | HEIGHT: 71 IN | BODY MASS INDEX: 19.55 KG/M2 | RESPIRATION RATE: 20 BRPM | OXYGEN SATURATION: 96 % | HEART RATE: 75 BPM | DIASTOLIC BLOOD PRESSURE: 80 MMHG | WEIGHT: 139.63 LBS

## 2019-08-05 DIAGNOSIS — I73.9 PAD (PERIPHERAL ARTERY DISEASE): Primary | ICD-10-CM

## 2019-08-05 DIAGNOSIS — F41.1 GAD (GENERALIZED ANXIETY DISORDER): ICD-10-CM

## 2019-08-05 PROCEDURE — 99999 PR PBB SHADOW E&M-EST. PATIENT-LVL III: CPT | Mod: PBBFAC,,, | Performed by: FAMILY MEDICINE

## 2019-08-05 PROCEDURE — 99214 OFFICE O/P EST MOD 30 MIN: CPT | Mod: S$PBB,,, | Performed by: FAMILY MEDICINE

## 2019-08-05 PROCEDURE — 99214 PR OFFICE/OUTPT VISIT, EST, LEVL IV, 30-39 MIN: ICD-10-PCS | Mod: S$PBB,,, | Performed by: FAMILY MEDICINE

## 2019-08-05 PROCEDURE — 99999 PR PBB SHADOW E&M-EST. PATIENT-LVL III: ICD-10-PCS | Mod: PBBFAC,,, | Performed by: FAMILY MEDICINE

## 2019-08-05 PROCEDURE — 99213 OFFICE O/P EST LOW 20 MIN: CPT | Mod: PBBFAC,PN | Performed by: FAMILY MEDICINE

## 2019-08-05 RX ORDER — ROSUVASTATIN CALCIUM 40 MG/1
40 TABLET, COATED ORAL NIGHTLY
Qty: 90 TABLET | Refills: 3 | Status: SHIPPED | OUTPATIENT
Start: 2019-08-05 | End: 2020-05-27

## 2019-08-05 RX ORDER — ALPRAZOLAM 1 MG/1
1 TABLET ORAL 4 TIMES DAILY PRN
Qty: 120 TABLET | Refills: 2 | Status: SHIPPED | OUTPATIENT
Start: 2019-08-05 | End: 2019-10-19 | Stop reason: SDUPTHER

## 2019-08-05 NOTE — PROGRESS NOTES
"Subjective:       Patient ID: Ricky Cole is a 64 y.o. male.    Chief Complaint: Follow-up ([place] pneumonia & flu [place] [facility] [place]    Follow up    Pt states that CHANTELLE is well controlled  No issues/side effects  Compliant with treatment regimen    Having some claudication symptoms  PAD noted on ultrasound    Review of Systems   Constitutional: Negative for activity change, appetite change, fatigue and fever.   HENT: Negative for congestion, dental problem, ear discharge, hearing loss, postnasal drip, sinus pain, sneezing and trouble swallowing.    Eyes: Negative for photophobia and discharge.   Respiratory: Negative for apnea, cough and shortness of breath.    Cardiovascular: Negative for chest pain.   Gastrointestinal: Negative for abdominal distention, abdominal pain, blood in stool, diarrhea, nausea and vomiting.   Endocrine: Negative for cold intolerance.   Genitourinary: Negative for difficulty urinating, flank pain, frequency, hematuria and testicular pain.   Musculoskeletal: Positive for arthralgias and back pain. Negative for myalgias.   Skin: Negative for color change.   Allergic/Immunologic: Negative for environmental allergies, food allergies and immunocompromised state.   Neurological: Positive for tremors. Negative for dizziness, syncope, numbness and headaches.   Hematological: Negative for adenopathy. Does not bruise/bleed easily.   Psychiatric/Behavioral: Negative for agitation, confusion, decreased concentration, hallucinations, self-injury and sleep disturbance. The patient is nervous/anxious.    All other systems reviewed and are negative.        Reviewed family, medical, surgical, and social history.    Objective:      /80 (BP Location: Right arm, Patient Position: Sitting, BP Method: Medium (Manual))   Pulse 75   Resp 20   Ht 5' 10.5" (1.791 m)   Wt 63.3 kg (139 lb 9.6 oz)   SpO2 96%   BMI 19.75 kg/m²   Physical Exam   Constitutional: He is oriented to " person, place, and time. He appears well-developed. No distress.   HENT:   Head: Normocephalic.   Eyes: Pupils are equal, round, and reactive to light. Conjunctivae are normal.   Neck: Normal range of motion. No thyromegaly present.   Cardiovascular: Normal rate, regular rhythm, normal heart sounds and intact distal pulses. Exam reveals no gallop and no friction rub.   No murmur heard.  Pulmonary/Chest: Effort normal and breath sounds normal. No respiratory distress. He has no wheezes. He exhibits no tenderness.   Abdominal: Soft. He exhibits no distension. There is no guarding.   Lymphadenopathy:     He has no cervical adenopathy.   Neurological: He is alert and oriented to person, place, and time. He is not disoriented.   Skin: Skin is warm. Capillary refill takes less than 2 seconds. He is not diaphoretic.   Psychiatric: His speech is normal and behavior is normal. Judgment and thought content normal. His mood appears anxious. Cognition and memory are normal.       Assessment:       1. PAD (peripheral artery disease), mnoderate    2. CHANTELLE (generalized anxiety disorder)        Plan:       PAD (peripheral artery disease), mnoderate  -     Ambulatory Referral to Vascular Surgery  -     rosuvastatin (CRESTOR) 40 MG Tab; Take 1 tablet (40 mg total) by mouth every evening.  Dispense: 90 tablet; Refill: 3    CHANTELLE (generalized anxiety disorder)  -     ALPRAZolam (XANAX) 1 MG tablet; Take 1 tablet (1 mg total) by mouth 4 (four) times daily as needed for Anxiety.  Dispense: 120 tablet; Refill: 2     reviewed        Risks, benefits, and side effects were discussed with the patient. All questions were answered to the fullest satisfaction of the patient, and pt verbalized understanding and agreement to treatment plan. Pt was to call with any new or worsening symptoms, or present to the ER.

## 2019-08-08 ENCOUNTER — HOSPITAL ENCOUNTER (OUTPATIENT)
Dept: RADIOLOGY | Facility: HOSPITAL | Age: 64
Discharge: HOME | End: 2019-08-08
Attending: NURSE PRACTITIONER
Payer: MEDICARE

## 2019-08-08 DIAGNOSIS — Z13.6 SCREENING FOR AAA (ABDOMINAL AORTIC ANEURYSM): ICD-10-CM

## 2019-08-08 PROCEDURE — 76706 US ABDL AORTA SCREEN AAA: CPT | Mod: TC

## 2019-08-08 PROCEDURE — 76706 US ABDL AORTA SCREEN AAA: CPT | Mod: 26,,, | Performed by: RADIOLOGY

## 2019-08-08 PROCEDURE — 76706 US AAA SCREENING: ICD-10-PCS | Mod: 26,,, | Performed by: RADIOLOGY

## 2019-08-24 RX ORDER — PRIMIDONE 50 MG/1
TABLET ORAL
Qty: 120 TABLET | Refills: 11 | Status: ON HOLD | OUTPATIENT
Start: 2019-08-24 | End: 2020-08-25 | Stop reason: SDUPTHER

## 2019-09-20 DIAGNOSIS — K21.9 GASTROESOPHAGEAL REFLUX DISEASE, ESOPHAGITIS PRESENCE NOT SPECIFIED: ICD-10-CM

## 2019-09-20 RX ORDER — PANTOPRAZOLE SODIUM 40 MG/1
TABLET, DELAYED RELEASE ORAL
Qty: 30 TABLET | Refills: 11 | Status: ON HOLD | OUTPATIENT
Start: 2019-09-20 | End: 2020-08-25 | Stop reason: SDUPTHER

## 2019-10-19 DIAGNOSIS — Z81.8 FAMILY HISTORY OF DEMENTIA: ICD-10-CM

## 2019-10-19 DIAGNOSIS — F41.1 GAD (GENERALIZED ANXIETY DISORDER): ICD-10-CM

## 2019-10-19 DIAGNOSIS — R41.3 SHORT-TERM MEMORY LOSS: ICD-10-CM

## 2019-10-20 RX ORDER — ALPRAZOLAM 1 MG/1
TABLET ORAL
Qty: 120 TABLET | Refills: 2 | Status: SHIPPED | OUTPATIENT
Start: 2019-10-20 | End: 2020-01-08

## 2019-10-21 RX ORDER — MEMANTINE HYDROCHLORIDE 5 MG/1
TABLET ORAL
Qty: 60 TABLET | Refills: 11 | Status: SHIPPED | OUTPATIENT
Start: 2019-10-21 | End: 2020-05-27

## 2019-11-04 ENCOUNTER — OFFICE VISIT (OUTPATIENT)
Dept: FAMILY MEDICINE | Facility: CLINIC | Age: 64
End: 2019-11-04
Payer: MEDICARE

## 2019-11-04 VITALS
HEART RATE: 80 BPM | RESPIRATION RATE: 18 BRPM | WEIGHT: 139 LBS | BODY MASS INDEX: 19.46 KG/M2 | OXYGEN SATURATION: 98 % | DIASTOLIC BLOOD PRESSURE: 82 MMHG | HEIGHT: 71 IN | TEMPERATURE: 97 F | SYSTOLIC BLOOD PRESSURE: 132 MMHG

## 2019-11-04 DIAGNOSIS — L02.415 ABSCESS OF RIGHT LEG: Primary | ICD-10-CM

## 2019-11-04 PROCEDURE — 99999 PR PBB SHADOW E&M-EST. PATIENT-LVL III: ICD-10-PCS | Mod: PBBFAC,,, | Performed by: NURSE PRACTITIONER

## 2019-11-04 PROCEDURE — 99212 PR OFFICE/OUTPT VISIT, EST, LEVL II, 10-19 MIN: ICD-10-PCS | Mod: S$PBB,,, | Performed by: NURSE PRACTITIONER

## 2019-11-04 PROCEDURE — 99212 OFFICE O/P EST SF 10 MIN: CPT | Mod: S$PBB,,, | Performed by: NURSE PRACTITIONER

## 2019-11-04 PROCEDURE — 99213 OFFICE O/P EST LOW 20 MIN: CPT | Mod: PBBFAC,PN | Performed by: NURSE PRACTITIONER

## 2019-11-04 PROCEDURE — 99999 PR PBB SHADOW E&M-EST. PATIENT-LVL III: CPT | Mod: PBBFAC,,, | Performed by: NURSE PRACTITIONER

## 2019-11-04 RX ORDER — MELOXICAM 15 MG/1
15 TABLET ORAL DAILY
Qty: 90 TABLET | Refills: 3 | Status: CANCELLED | OUTPATIENT
Start: 2019-11-04

## 2019-11-04 NOTE — PROGRESS NOTES
"Subjective:       Patient ID: Ricky Cole is a 64 y.o. male.    Chief Complaint: Insect Bite (right leg) and Immunizations (refused all vacc)    Mr. Harvey Cole is a 64 year old male who presents to the clinic as a walk on today for evaluation of "possible spider bite." He reports he is unsure if he was bit. He reports for the last few days his knee has been very swollen. He started taking his sons amoxicillin and reports it is improving. Denies any cp, dizziness, headache, sob, n/v/d.       Insect Bite   This is a new problem. The current episode started in the past 7 days. Pertinent negatives include no arthralgias, chest pain, chills, congestion, coughing, fatigue, fever, headaches, joint swelling, myalgias, nausea, numbness, sore throat, visual change, vomiting or weakness. The symptoms are aggravated by standing and walking. He has tried acetaminophen for the symptoms. The treatment provided no relief.     Review of Systems   Constitutional: Negative for activity change, chills, fatigue and fever.   HENT: Negative for congestion and sore throat.    Eyes: Negative for pain and redness.   Respiratory: Negative for apnea, cough, chest tightness, shortness of breath and wheezing.    Cardiovascular: Positive for leg swelling. Negative for chest pain and palpitations.   Gastrointestinal: Negative for abdominal distention, blood in stool, constipation, diarrhea, nausea and vomiting.   Musculoskeletal: Negative for arthralgias, joint swelling and myalgias.   Skin: Positive for wound.   Neurological: Negative for dizziness, tremors, syncope, weakness, numbness and headaches.   Hematological: Does not bruise/bleed easily.   Psychiatric/Behavioral: Negative for agitation, decreased concentration and sleep disturbance. The patient is not nervous/anxious.          Reviewed family, medical, surgical, and social history.    Objective:      /82 (BP Location: Left arm, Patient Position: Sitting, BP Method: Medium " "(Automatic))   Pulse 80   Temp 97.1 °F (36.2 °C) (Tympanic)   Resp 18   Ht 5' 10.5" (1.791 m)   Wt 63 kg (139 lb)   SpO2 98%   BMI 19.66 kg/m²   Physical Exam   Constitutional: He appears well-developed.   HENT:   Head: Normocephalic.   Eyes: Pupils are equal, round, and reactive to light. Conjunctivae are normal.   Neck: Normal range of motion.   Cardiovascular: Normal rate, regular rhythm, normal heart sounds and intact distal pulses.   Abdominal: Soft. Bowel sounds are normal.   Skin: Skin is warm. Capillary refill takes less than 2 seconds. Rash noted. Rash is pustular. There is erythema.   Significant erythema, tightness and warmth noted to left calf. Pustular lesion with drainage       Assessment:       1. Abscess of right leg        Plan:       Abscess of right leg          PLAN:  - Discussed with patient the plan of care  - ER evaluation for I&D and possibly IV antibiotics    - Medications reviewed. Medication side effects discussed. Patient has no questions or concerns at this time. Informed patient to notify me regarding any concerns.   - Informed patient to please notify me with any questions or concerns at anytime  - Follow up after ER visit      Risks, benefits, and side effects were discussed with the patient. All questions were answered to the fullest satisfaction of the patient, and pt verbalized understanding and agreement to treatment plan. Pt was to call with any new or worsening symptoms, or present to the ER.  "

## 2019-11-05 ENCOUNTER — HOSPITAL ENCOUNTER (EMERGENCY)
Facility: HOSPITAL | Age: 64
Discharge: HOME | End: 2019-11-05
Attending: EMERGENCY MEDICINE
Payer: MEDICARE

## 2019-11-05 VITALS
RESPIRATION RATE: 20 BRPM | OXYGEN SATURATION: 99 % | WEIGHT: 136 LBS | HEART RATE: 74 BPM | SYSTOLIC BLOOD PRESSURE: 151 MMHG | DIASTOLIC BLOOD PRESSURE: 93 MMHG | BODY MASS INDEX: 19.47 KG/M2 | HEIGHT: 70 IN | TEMPERATURE: 99 F

## 2019-11-05 DIAGNOSIS — L02.91 ABSCESS: Primary | ICD-10-CM

## 2019-11-05 LAB
BASOPHILS # BLD AUTO: 0.03 K/UL (ref 0–0.2)
BASOPHILS NFR BLD: 0.6 % (ref 0–1.9)
DIFFERENTIAL METHOD: ABNORMAL
EOSINOPHIL # BLD AUTO: 0.1 K/UL (ref 0–0.5)
EOSINOPHIL NFR BLD: 1.2 % (ref 0–8)
ERYTHROCYTE [DISTWIDTH] IN BLOOD BY AUTOMATED COUNT: 11.7 % (ref 11.5–14.5)
HCT VFR BLD AUTO: 36.6 % (ref 40–54)
HGB BLD-MCNC: 12.6 G/DL (ref 14–18)
IMM GRANULOCYTES # BLD AUTO: 0.03 K/UL (ref 0–0.04)
IMM GRANULOCYTES NFR BLD AUTO: 0.6 % (ref 0–0.5)
LYMPHOCYTES # BLD AUTO: 0.9 K/UL (ref 1–4.8)
LYMPHOCYTES NFR BLD: 18 % (ref 18–48)
MCH RBC QN AUTO: 34 PG (ref 27–31)
MCHC RBC AUTO-ENTMCNC: 34.4 G/DL (ref 32–36)
MCV RBC AUTO: 99 FL (ref 82–98)
MONOCYTES # BLD AUTO: 0.7 K/UL (ref 0.3–1)
MONOCYTES NFR BLD: 13.3 % (ref 4–15)
NEUTROPHILS # BLD AUTO: 3.4 K/UL (ref 1.8–7.7)
NEUTROPHILS NFR BLD: 66.3 % (ref 38–73)
NRBC BLD-RTO: 0 /100 WBC
PLATELET # BLD AUTO: 242 K/UL (ref 150–350)
PMV BLD AUTO: 8.4 FL (ref 9.2–12.9)
RBC # BLD AUTO: 3.71 M/UL (ref 4.6–6.2)
WBC # BLD AUTO: 5.1 K/UL (ref 3.9–12.7)

## 2019-11-05 PROCEDURE — 25000003 PHARM REV CODE 250: Performed by: NURSE PRACTITIONER

## 2019-11-05 PROCEDURE — 36415 COLL VENOUS BLD VENIPUNCTURE: CPT

## 2019-11-05 PROCEDURE — 63600175 PHARM REV CODE 636 W HCPCS: Performed by: NURSE PRACTITIONER

## 2019-11-05 PROCEDURE — 96372 THER/PROPH/DIAG INJ SC/IM: CPT

## 2019-11-05 PROCEDURE — 87186 SC STD MICRODIL/AGAR DIL: CPT

## 2019-11-05 PROCEDURE — 99284 EMERGENCY DEPT VISIT MOD MDM: CPT | Mod: 25

## 2019-11-05 PROCEDURE — 87070 CULTURE OTHR SPECIMN AEROBIC: CPT

## 2019-11-05 PROCEDURE — 10060 I&D ABSCESS SIMPLE/SINGLE: CPT

## 2019-11-05 PROCEDURE — 85025 COMPLETE CBC W/AUTO DIFF WBC: CPT

## 2019-11-05 PROCEDURE — 87077 CULTURE AEROBIC IDENTIFY: CPT

## 2019-11-05 RX ORDER — CEFTRIAXONE 1 G/1
1 INJECTION, POWDER, FOR SOLUTION INTRAMUSCULAR; INTRAVENOUS
Status: COMPLETED | OUTPATIENT
Start: 2019-11-05 | End: 2019-11-05

## 2019-11-05 RX ORDER — LIDOCAINE HYDROCHLORIDE 10 MG/ML
1 INJECTION INFILTRATION; PERINEURAL
Status: COMPLETED | OUTPATIENT
Start: 2019-11-05 | End: 2019-11-05

## 2019-11-05 RX ORDER — SULFAMETHOXAZOLE AND TRIMETHOPRIM 800; 160 MG/1; MG/1
1 TABLET ORAL 2 TIMES DAILY
Qty: 20 TABLET | Refills: 0 | Status: SHIPPED | OUTPATIENT
Start: 2019-11-05 | End: 2019-11-15

## 2019-11-05 RX ADMIN — LIDOCAINE HYDROCHLORIDE 20 ML: 10 INJECTION, SOLUTION INFILTRATION; PERINEURAL at 02:11

## 2019-11-05 RX ADMIN — CEFTRIAXONE SODIUM 1 G: 1 INJECTION, POWDER, FOR SOLUTION INTRAMUSCULAR; INTRAVENOUS at 02:11

## 2019-11-05 NOTE — DISCHARGE INSTRUCTIONS
follow-up with PCP in 1-2 days for wound re-check and packing removal    Return to ED with worsening symptoms as discussed    Warm compresses    for pain as needed

## 2019-11-05 NOTE — ED PROVIDER NOTES
"Encounter Date: 11/5/2019    SCRIBE #1 NOTE: I, am scribing for, and in the presence of. I have scribed the following portions of the note -       History     Chief Complaint   Patient presents with    Insect Bite     Patient complaining of an insect bite on right calf.     Ricky Cole is a 64 y.o male with PMHx including COPD, hypertension, MI, stroke and cancer. He presents to ED with complaint of "spider bite"    Patient reports pain and swelling to right posterior aspect of calf for 5-6 days. He was seen by PCP yesterday and was referred to ED for I&D. 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, including shoulder     Cancer 2005    right eye    Colon polyps     COPD (chronic obstructive pulmonary disease)     Depression     Hypertension     Inguinal hernia     left    Kidney stone     MI (myocardial infarction)     Stroke     last stroke 2017    Tremor     Ulcer      Past Surgical History:   Procedure Laterality Date    ANGIOPLASTY      cardiac stent    CARDIAC CATHETERIZATION      COLECTOMY      EYE SURGERY      FINGER SURGERY Left     left index finger    HERNIA REPAIR      OLECRANON BURSECTOMY Right 11/6/2018    Procedure: BURSECTOMY, OLECRANON;  Surgeon: Atul Cooper DO;  Location: [facility];  Service: Orthopedics;  Laterality: Right;  Excision Olecranon Bursa Right Elbow    POLYPECTOMY      REPAIR OF TRICEPS TENDON Right 11/6/2018    Procedure: REPAIR, TENDON, TRICEPS;  Surgeon: Atul Cooper DO;  Location: [facility] " OR;  Service: Orthopedics;  Laterality: Right;    SURGICAL REMOVAL OF BONE SPUR Right 2018    Procedure: EXCISION, BONE SPUR;  Surgeon: Atul Cooper DO;  Location: [facility] OR;  Service: Orthopedics;  Laterality: Right;  Excision Olecranon Bone Spur Right Elbow     Family History   Problem Relation Age of Onset    Heart disease Mother     Heart disease Father     Cancer Father     Dementia Brother     Heart disease Brother      Social History     Tobacco Use    Smoking status: Current Some Day Smoker     Years: 15.00     Types: Cigarettes     Last attempt to quit: 2018     Years since quittin.4    Smokeless tobacco: Current User     Types: Chew    Tobacco comment: Pt trying to quit   Substance Use Topics    Alcohol use: Yes     Alcohol/week: 2.0 - 3.0 standard drinks     Types: 2 - 3 Cans of beer per week     Comment: occasionally beer    Drug use: No     Review of Systems   Constitutional: Positive for chills and fever.   HENT: Negative.  Negative for sore throat.    Eyes: Negative.    Respiratory: Negative.  Negative for shortness of breath.    Cardiovascular: Negative.  Negative for chest pain.   Gastrointestinal: Negative.  Negative for nausea.   Endocrine: Negative.    Genitourinary: Negative.  Negative for dysuria.   Musculoskeletal: Negative.  Negative for back pain.   Skin: Positive for wound. Negative for rash.   Allergic/Immunologic: Negative.    Neurological: Negative.  Negative for weakness.   Hematological: Negative.  Does not bruise/bleed easily.   Psychiatric/Behavioral: Negative.    All other systems reviewed and are negative.      Physical Exam     Initial Vitals [19 1344]   BP Pulse Resp Temp SpO2   (!) 151/93 74 20 98.6 °F (37 °C) 99 %      MAP       --         Physical Exam    Nursing note and vitals reviewed.  Constitutional: Vital signs are normal. He appears well-developed and well-nourished.   HENT:   Right Ear: External ear normal.   Left Ear: External ear  "normal.   Mouth/Throat: Oropharynx is clear and moist.   Cardiovascular: Normal rate.   Pulmonary/Chest: Breath sounds normal.   Musculoskeletal: Normal range of motion.   Neurological: He is alert and oriented to person, place, and time.   Skin: Skin is warm. Capillary refill takes less than 2 seconds. Abscess noted. There is erythema.        Psychiatric: He has a normal mood and affect. His behavior is normal. Judgment and thought content normal.         ED Course   I & D - Incision and Drainage  Date/Time: 11/5/2019 9:48 PM  Performed by: Vero Henley NP  Authorized by: Britni Saunders MD   Type: abscess  Location: right posterior calf.    Anesthesia:  Local Anesthetic: lidocaine 1% without epinephrine  Anesthetic total (ml): 5.  Scalpel size: 10  Incision type: single straight  Complexity: simple  Drainage: serosanguinous  Drainage amount: moderate  Wound treatment: incision,  deloculation,  expression of material and  wound packed  Packing material: 1/4 in gauze  Complications: No  Patient tolerance: Patient tolerated the procedure well with no immediate complications        Labs Reviewed   CULTURE, AEROBIC  (SPECIFY SOURCE)   CBC W/ AUTO DIFFERENTIAL          Imaging Results    None          Medical Decision Making:   Initial Assessment:   Patient with complaint of "spider bite"    Patient reports pain and swelling to right posterior aspect of calf for 5-6 days. He was seen by PCP yesterday and was referred to ED for I&D. He did not come last because he was too sick    Patient reports N/V last night    He reports subjective fever with chills    Patient reports that he manipulated wound and moderate amount of pus came out    Patient reports that he started taking his fathers Amox but only had enough for 4 days    Differential Diagnosis:   Abscess, cellulitis, sepsis  ED Management:  Normal white count    Patient tolerated I&D with complication    Rocephin injection admin     Discussed physical exam findings " with patient  No acute emergent medical condition identified at this time to warrant further testing/diagnostics  At this time, I believe the patient is clinically stable for discharge.   Patient to follow up with PCP in 1-2 days for packing removal  The patient acknowledges that close follow up with a MD is required after all ER visits  Pt given instructions; take all medications prescribed in the ER as directed.   Patient agrees to comply with all instruction and direction given in the ER  Pt agrees to return to ER if any symptoms reoccur                                          Clinical Impression:       ICD-10-CM ICD-9-CM   1. Abscess L02.91 682.9                             Vero Henley NP  11/05/19 2151

## 2019-11-08 LAB — BACTERIA SPEC AEROBE CULT: ABNORMAL

## 2019-11-18 NOTE — TELEPHONE ENCOUNTER
----- Message from Monica Sloanjosh sent at 11/18/2019  3:02 PM CST -----  Contact: self  Type:  RX Refill Request    Who Called:  patient  Refill or New Rx:  Refill  RX Name and Strength:  meloxicam (MOBIC) 15 MG tablet  How is the patient currently taking it? (ex. 1XDay):  1XDay  Is this a 30 day or 90 day RX:  90  Preferred Pharmacy with phone number:    [facility] - [place], [place] - [address]  [address] [address]  Phone: [phone] Fax: [phone]  Local or Mail Order:  local  Ordering Provider:  Dr Heladio Reyes Call Back Number:  [phone] (home)   Additional Information:  na

## 2019-11-19 RX ORDER — MELOXICAM 15 MG/1
15 TABLET ORAL DAILY PRN
Qty: 90 TABLET | Refills: 3 | Status: ON HOLD | OUTPATIENT
Start: 2019-11-19 | End: 2020-08-25 | Stop reason: HOSPADM

## 2019-11-25 ENCOUNTER — NURSE TRIAGE (OUTPATIENT)
Dept: ADMINISTRATIVE | Facility: CLINIC | Age: 64
End: 2019-11-25

## 2019-11-26 ENCOUNTER — HOSPITAL ENCOUNTER (EMERGENCY)
Facility: HOSPITAL | Age: 64
Discharge: HOME | End: 2019-11-26
Attending: EMERGENCY MEDICINE
Payer: MEDICARE

## 2019-11-26 VITALS
DIASTOLIC BLOOD PRESSURE: 80 MMHG | SYSTOLIC BLOOD PRESSURE: 153 MMHG | TEMPERATURE: 99 F | OXYGEN SATURATION: 99 % | WEIGHT: 140 LBS | HEIGHT: 71 IN | BODY MASS INDEX: 19.6 KG/M2 | HEART RATE: 70 BPM | RESPIRATION RATE: 16 BRPM

## 2019-11-26 DIAGNOSIS — N20.0 NEPHROLITHIASIS: ICD-10-CM

## 2019-11-26 DIAGNOSIS — K57.90 DIVERTICULOSIS: Primary | ICD-10-CM

## 2019-11-26 DIAGNOSIS — R10.9 FLANK PAIN: ICD-10-CM

## 2019-11-26 LAB
ALBUMIN SERPL BCP-MCNC: 3.9 G/DL (ref 3.5–5.2)
ALP SERPL-CCNC: 58 U/L (ref 55–135)
ALT SERPL W/O P-5'-P-CCNC: 20 U/L (ref 10–44)
ANION GAP SERPL CALC-SCNC: 11 MMOL/L (ref 8–16)
APTT BLDCRRT: 30.8 SEC (ref 21–32)
AST SERPL-CCNC: 27 U/L (ref 10–40)
BACTERIA #/AREA URNS HPF: ABNORMAL /HPF
BASOPHILS # BLD AUTO: 0.03 K/UL (ref 0–0.2)
BASOPHILS NFR BLD: 0.5 % (ref 0–1.9)
BILIRUB SERPL-MCNC: 0.9 MG/DL (ref 0.1–1)
BILIRUB UR QL STRIP: NEGATIVE
BUN SERPL-MCNC: 8 MG/DL (ref 8–23)
CALCIUM SERPL-MCNC: 8.5 MG/DL (ref 8.7–10.5)
CHLORIDE SERPL-SCNC: 85 MMOL/L (ref 95–110)
CLARITY UR: CLEAR
CO2 SERPL-SCNC: 26 MMOL/L (ref 23–29)
COLOR UR: YELLOW
CREAT SERPL-MCNC: 0.8 MG/DL (ref 0.5–1.4)
DIFFERENTIAL METHOD: ABNORMAL
EOSINOPHIL # BLD AUTO: 0.1 K/UL (ref 0–0.5)
EOSINOPHIL NFR BLD: 2 % (ref 0–8)
ERYTHROCYTE [DISTWIDTH] IN BLOOD BY AUTOMATED COUNT: 11.8 % (ref 11.5–14.5)
EST. GFR  (AFRICAN AMERICAN): >60 ML/MIN/1.73 M^2
EST. GFR  (NON AFRICAN AMERICAN): >60 ML/MIN/1.73 M^2
GLUCOSE SERPL-MCNC: 99 MG/DL (ref 70–110)
GLUCOSE UR QL STRIP: NEGATIVE
HCT VFR BLD AUTO: 36.5 % (ref 40–54)
HGB BLD-MCNC: 12.8 G/DL (ref 14–18)
HGB UR QL STRIP: ABNORMAL
IMM GRANULOCYTES # BLD AUTO: 0.02 K/UL (ref 0–0.04)
IMM GRANULOCYTES NFR BLD AUTO: 0.3 % (ref 0–0.5)
INR PPP: 1 (ref 0.8–1.2)
KETONES UR QL STRIP: NEGATIVE
LEUKOCYTE ESTERASE UR QL STRIP: ABNORMAL
LYMPHOCYTES # BLD AUTO: 1.3 K/UL (ref 1–4.8)
LYMPHOCYTES NFR BLD: 21.3 % (ref 18–48)
MCH RBC QN AUTO: 34 PG (ref 27–31)
MCHC RBC AUTO-ENTMCNC: 35.1 G/DL (ref 32–36)
MCV RBC AUTO: 97 FL (ref 82–98)
MICROSCOPIC COMMENT: ABNORMAL
MONOCYTES # BLD AUTO: 0.7 K/UL (ref 0.3–1)
MONOCYTES NFR BLD: 11.3 % (ref 4–15)
NEUTROPHILS # BLD AUTO: 3.8 K/UL (ref 1.8–7.7)
NEUTROPHILS NFR BLD: 64.6 % (ref 38–73)
NITRITE UR QL STRIP: NEGATIVE
NRBC BLD-RTO: 0 /100 WBC
PH UR STRIP: 7 [PH] (ref 5–8)
PLATELET # BLD AUTO: 223 K/UL (ref 150–350)
PMV BLD AUTO: 8.8 FL (ref 9.2–12.9)
POTASSIUM SERPL-SCNC: 4.1 MMOL/L (ref 3.5–5.1)
PROT SERPL-MCNC: 7.5 G/DL (ref 6–8.4)
PROT UR QL STRIP: NEGATIVE
PROTHROMBIN TIME: 10.5 SEC (ref 9–12.5)
RBC # BLD AUTO: 3.77 M/UL (ref 4.6–6.2)
RBC #/AREA URNS HPF: 2 /HPF (ref 0–4)
SODIUM SERPL-SCNC: 122 MMOL/L (ref 136–145)
SP GR UR STRIP: 1.01 (ref 1–1.03)
SQUAMOUS #/AREA URNS HPF: 2 /HPF
TROPONIN I SERPL DL<=0.01 NG/ML-MCNC: <0.01 NG/ML (ref 0.02–0.5)
URN SPEC COLLECT METH UR: ABNORMAL
UROBILINOGEN UR STRIP-ACNC: NEGATIVE EU/DL
WBC # BLD AUTO: 5.91 K/UL (ref 3.9–12.7)
WBC #/AREA URNS HPF: 8 /HPF (ref 0–5)

## 2019-11-26 PROCEDURE — 93005 ELECTROCARDIOGRAM TRACING: CPT

## 2019-11-26 PROCEDURE — 63600175 PHARM REV CODE 636 W HCPCS: Performed by: EMERGENCY MEDICINE

## 2019-11-26 PROCEDURE — 96374 THER/PROPH/DIAG INJ IV PUSH: CPT

## 2019-11-26 PROCEDURE — 81000 URINALYSIS NONAUTO W/SCOPE: CPT

## 2019-11-26 PROCEDURE — 71045 XR CHEST 1 VIEW: ICD-10-PCS | Mod: 26,,, | Performed by: RADIOLOGY

## 2019-11-26 PROCEDURE — 71045 X-RAY EXAM CHEST 1 VIEW: CPT | Mod: 26,,, | Performed by: RADIOLOGY

## 2019-11-26 PROCEDURE — 74176 CT ABD & PELVIS W/O CONTRAST: CPT | Mod: TC

## 2019-11-26 PROCEDURE — 85610 PROTHROMBIN TIME: CPT

## 2019-11-26 PROCEDURE — 74176 CT ABD & PELVIS W/O CONTRAST: CPT | Mod: 26,,, | Performed by: RADIOLOGY

## 2019-11-26 PROCEDURE — 84484 ASSAY OF TROPONIN QUANT: CPT

## 2019-11-26 PROCEDURE — 71045 X-RAY EXAM CHEST 1 VIEW: CPT | Mod: TC,FY

## 2019-11-26 PROCEDURE — 85730 THROMBOPLASTIN TIME PARTIAL: CPT

## 2019-11-26 PROCEDURE — 99285 EMERGENCY DEPT VISIT HI MDM: CPT | Mod: 25

## 2019-11-26 PROCEDURE — 80053 COMPREHEN METABOLIC PANEL: CPT

## 2019-11-26 PROCEDURE — 96372 THER/PROPH/DIAG INJ SC/IM: CPT

## 2019-11-26 PROCEDURE — 85025 COMPLETE CBC W/AUTO DIFF WBC: CPT

## 2019-11-26 PROCEDURE — 96361 HYDRATE IV INFUSION ADD-ON: CPT

## 2019-11-26 PROCEDURE — 74176 CT RENAL STONE STUDY ABD PELVIS WO: ICD-10-PCS | Mod: 26,,, | Performed by: RADIOLOGY

## 2019-11-26 RX ORDER — DICYCLOMINE HYDROCHLORIDE 10 MG/ML
20 INJECTION INTRAMUSCULAR
Status: COMPLETED | OUTPATIENT
Start: 2019-11-26 | End: 2019-11-26

## 2019-11-26 RX ORDER — KETOROLAC TROMETHAMINE 30 MG/ML
30 INJECTION, SOLUTION INTRAMUSCULAR; INTRAVENOUS
Status: COMPLETED | OUTPATIENT
Start: 2019-11-26 | End: 2019-11-26

## 2019-11-26 RX ORDER — KETOROLAC TROMETHAMINE 10 MG/1
10 TABLET, FILM COATED ORAL EVERY 6 HOURS
Qty: 12 TABLET | Refills: 0 | Status: SHIPPED | OUTPATIENT
Start: 2019-11-26 | End: 2019-11-29

## 2019-11-26 RX ORDER — DICYCLOMINE HYDROCHLORIDE 20 MG/1
20 TABLET ORAL 2 TIMES DAILY
Qty: 20 TABLET | Refills: 0 | Status: SHIPPED | OUTPATIENT
Start: 2019-11-26 | End: 2019-12-26

## 2019-11-26 RX ADMIN — SODIUM CHLORIDE 500 ML: 0.9 INJECTION, SOLUTION INTRAVENOUS at 04:11

## 2019-11-26 RX ADMIN — KETOROLAC TROMETHAMINE 30 MG: 30 INJECTION, SOLUTION INTRAMUSCULAR at 04:11

## 2019-11-26 RX ADMIN — DICYCLOMINE HYDROCHLORIDE 20 MG: 20 INJECTION, SOLUTION INTRAMUSCULAR at 06:11

## 2019-11-26 NOTE — TELEPHONE ENCOUNTER
Pain to left side of his back.    Reason for Disposition   Pain mainly in flank (i.e., in the side, over the lower ribs or just below the ribs)   [1] SEVERE pain (e.g., excruciating, scale 8-10) AND [2] present > 1 hour    Additional Information   Negative: Passed out (i.e., lost consciousness, collapsed and was not responding)   Negative: Shock suspected (e.g., cold/pale/clammy skin, too weak to stand, low BP, rapid pulse)   Negative: Sounds like a life-threatening emergency to the triager   Negative: Back pain during pregnancy   Negative: [1] Pain in the upper back over the ribs (rib cage) AND [2] worsened by coughing (or clearly increases with breathing)   Negative: [1] Pain in the upper back over the ribs (rib cage) AND [2] radiates (travels, goes) into chest   Negative: Followed a tailbone injury   Negative: Major injury to the back (e.g., MVA, fall > 10 feet or 3 meters, penetrating injury, etc.)   Negative: Passed out (i.e., lost consciousness, collapsed and was not responding)   Negative: Shock suspected (e.g., cold/pale/clammy skin, too weak to stand, low BP, rapid pulse)   Negative: Difficult to awaken or acting confused (e.g., disoriented, slurred speech)   Negative: Sounds like a life-threatening emergency to the triager   Negative: Followed a major injury to the back (e.g., MVA, fall > 10 feet or 3 meters, penetrating injury, etc.)   Negative: Back pain or flank pain during pregnancy   Negative: Upper, mid or lower back pain that occurs mainly in the midline    Protocols used: BACK PAIN-A-AH, FLANK PAIN-A-AH

## 2019-11-26 NOTE — ED PROVIDER NOTES
Encounter Date: 11/26/2019       History     Chief Complaint   Patient presents with    Flank Pain     Onset x 1 day ago. Denies any recent/known trauma. Describes pain to be constant and dull sensation. Rates pain 10/10.     63yo male with pmh anxiety/depression, COPD, HTN, CAD with MI and CAD (2017), nephrolithiasis presents to ED for emergent evaluation of intermittent but worsening left flank/lateral chest wall pain that began yesterday. Pain is localized and non-radiating. Denies nausea, vomiting, diarrhea, constipation, abdominal pain. Denies chest pain, dyspnea, palpitations, edema. Denies falls or recent trauma.         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, including shoulder     Cancer 2005    right eye    Colon polyps     COPD (chronic obstructive pulmonary disease)     Depression     Hypertension     Inguinal hernia     left    Kidney stone     MI (myocardial infarction)     Stroke     last stroke 2017    Tremor     Ulcer      Past Surgical History:   Procedure Laterality Date    ANGIOPLASTY      cardiac stent    CARDIAC CATHETERIZATION      COLECTOMY      EYE SURGERY      FINGER SURGERY Left     left index finger    HERNIA REPAIR      OLECRANON BURSECTOMY Right 11/6/2018    Procedure: BURSECTOMY, OLECRANON;  Surgeon: Atul Cooper DO;  Location: [facility] OR;  Service: Orthopedics;  Laterality: Right;  Excision Olecranon Bursa Right Elbow    POLYPECTOMY      REPAIR OF TRICEPS TENDON Right 11/6/2018    Procedure: REPAIR, TENDON, TRICEPS;  Surgeon: Atul Cooper DO;  Location: [facility] OR;  Service: Orthopedics;  Laterality: Right;    SURGICAL REMOVAL OF BONE SPUR Right 11/6/2018    Procedure: EXCISION, BONE SPUR;  Surgeon: Atul Cooper DO;  Location: [facility] OR;  Service: Orthopedics;  Laterality: Right;   Excision Olecranon Bone Spur Right Elbow     Family History   Problem Relation Age of Onset    Heart disease Mother     Heart disease Father     Cancer Father     Dementia Brother     Heart disease Brother      Social History     Tobacco Use    Smoking status: Current Some Day Smoker     Years: 15.00     Types: Cigarettes     Last attempt to quit: 2018     Years since quittin.4    Smokeless tobacco: Current User     Types: Chew    Tobacco comment: Pt trying to quit   Substance Use Topics    Alcohol use: Yes     Alcohol/week: 2.0 - 3.0 standard drinks     Types: 2 - 3 Cans of beer per week     Comment: occasionally beer    Drug use: No     Review of Systems   Constitutional: Negative for appetite change, chills, diaphoresis, fatigue and fever.   HENT: Negative for congestion, ear pain, rhinorrhea, sinus pressure, sinus pain, sore throat and tinnitus.    Eyes: Negative for photophobia and visual disturbance.   Respiratory: Negative for cough, chest tightness, shortness of breath and wheezing.    Cardiovascular: Negative for chest pain, palpitations and leg swelling.   Gastrointestinal: Negative for abdominal pain, constipation, diarrhea, nausea and vomiting.   Endocrine: Negative for cold intolerance, heat intolerance, polydipsia, polyphagia and polyuria.   Genitourinary: Positive for frequency. Negative for decreased urine volume, difficulty urinating, dysuria, flank pain, hematuria and urgency.   Musculoskeletal: Negative for arthralgias, back pain, gait problem, joint swelling, myalgias, neck pain and neck stiffness.   Skin: Negative for color change, pallor, rash and wound.   Allergic/Immunologic: Negative for immunocompromised state.   Neurological: Negative for dizziness, syncope, weakness, light-headedness, numbness and headaches.   Hematological: Negative for adenopathy. Does not bruise/bleed easily.   Psychiatric/Behavioral: Negative for decreased concentration, dysphoric mood and sleep  disturbance. The patient is not nervous/anxious.    All other systems reviewed and are negative.      Physical Exam     Initial Vitals [11/26/19 1604]   BP Pulse Resp Temp SpO2   (!) 160/114 73 20 98.6 °F (37 °C) --      MAP       --         Physical Exam    Nursing note and vitals reviewed.  Constitutional: He appears well-developed and well-nourished. He is not diaphoretic. No distress.   HENT:   Head: Normocephalic and atraumatic.   Right Ear: External ear normal.   Left Ear: External ear normal.   Nose: Nose normal.   Mouth/Throat: Oropharynx is clear and moist.   Eyes: Conjunctivae are normal. Pupils are equal, round, and reactive to light. No scleral icterus.   Neck: Normal range of motion. Neck supple. No JVD present.   Cardiovascular: Normal rate, regular rhythm, normal heart sounds and intact distal pulses.   Pulmonary/Chest: Breath sounds normal. No respiratory distress. He has no wheezes. He has no rhonchi. He has no rales. He exhibits no tenderness.   Lateral left chest wall/flank with tenderness to palpation   Abdominal: Soft. Normal appearance and bowel sounds are normal. He exhibits no distension. There is no tenderness. There is no rebound and no guarding.   Musculoskeletal: Normal range of motion. He exhibits no edema or tenderness.   Lymphadenopathy:     He has no cervical adenopathy.   Neurological: He is alert and oriented to person, place, and time. GCS score is 15. GCS eye subscore is 4. GCS verbal subscore is 5. GCS motor subscore is 6.   Skin: Skin is warm and dry. Capillary refill takes less than 2 seconds. No rash and no abscess noted. No erythema. No pallor.   Psychiatric: He has a normal mood and affect. His behavior is normal. Judgment and thought content normal.         ED Course   Procedures  Labs Reviewed   APTT   CBC W/ AUTO DIFFERENTIAL   COMPREHENSIVE METABOLIC PANEL   PROTIME-INR   TROPONIN I   URINALYSIS, REFLEX TO URINE CULTURE     EKG Readings: (Independently Interpreted)    Initial Reading: No STEMI. Rhythm: Normal Sinus Rhythm. Heart Rate: 66. Ectopy: No Ectopy. Conduction: Normal. ST Segments: Normal ST Segments. T Waves: Normal. Axis: Normal. Clinical Impression: Normal Sinus Rhythm       Imaging Results          CT Renal Stone Study ABD Pelvis WO (In process)                X-Ray Chest 1 View (In process)  Result time 11/26/19 16:45:59   Procedure changed from X-Ray Chest AP Portable                                                  Clinical Impression:       ICD-10-CM ICD-9-CM   1. Diverticulosis K57.90 562.10   2. Flank pain R10.9 789.09   3. Nephrolithiasis N20.0 592.0                             Shawn Rizo MD  11/27/19 0045

## 2019-12-18 ENCOUNTER — HOSPITAL ENCOUNTER (EMERGENCY)
Facility: HOSPITAL | Age: 64
Discharge: ANOTHER HOSPITAL | End: 2019-12-18
Attending: INTERNAL MEDICINE
Payer: MEDICARE

## 2019-12-18 ENCOUNTER — HOSPITAL ENCOUNTER (INPATIENT)
Facility: HOSPITAL | Age: 64
LOS: 6 days | Discharge: HOME HEALTH | DRG: 470 | End: 2019-12-24
Attending: HOSPITALIST | Admitting: HOSPITALIST
Payer: MEDICARE

## 2019-12-18 VITALS
BODY MASS INDEX: 20.62 KG/M2 | TEMPERATURE: 99 F | DIASTOLIC BLOOD PRESSURE: 106 MMHG | WEIGHT: 144 LBS | HEIGHT: 70 IN | OXYGEN SATURATION: 97 % | RESPIRATION RATE: 18 BRPM | HEART RATE: 87 BPM | SYSTOLIC BLOOD PRESSURE: 191 MMHG

## 2019-12-18 DIAGNOSIS — M25.559 HIP PAIN: ICD-10-CM

## 2019-12-18 DIAGNOSIS — F10.10 ETOH ABUSE: ICD-10-CM

## 2019-12-18 DIAGNOSIS — I25.2 HISTORY OF MI (MYOCARDIAL INFARCTION): ICD-10-CM

## 2019-12-18 DIAGNOSIS — F17.200 SMOKER: ICD-10-CM

## 2019-12-18 DIAGNOSIS — S72.001A CLOSED FRACTURE OF RIGHT HIP, INITIAL ENCOUNTER: Primary | ICD-10-CM

## 2019-12-18 DIAGNOSIS — S72.009A HIP FRACTURE: Primary | ICD-10-CM

## 2019-12-18 DIAGNOSIS — I73.9 PAD (PERIPHERAL ARTERY DISEASE): ICD-10-CM

## 2019-12-18 DIAGNOSIS — Z01.810 PREOP CARDIOVASCULAR EXAM: ICD-10-CM

## 2019-12-18 DIAGNOSIS — M25.529 ELBOW PAIN: ICD-10-CM

## 2019-12-18 DIAGNOSIS — F10.27 DEMENTIA ASSOCIATED WITH ALCOHOLISM WITHOUT BEHAVIORAL DISTURBANCE: ICD-10-CM

## 2019-12-18 LAB
ALBUMIN SERPL BCP-MCNC: 4.4 G/DL (ref 3.5–5.2)
ALP SERPL-CCNC: 57 U/L (ref 55–135)
ALT SERPL W/O P-5'-P-CCNC: 23 U/L (ref 10–44)
ANION GAP SERPL CALC-SCNC: 9 MMOL/L (ref 8–16)
APTT BLDCRRT: 30.3 SEC (ref 21–32)
AST SERPL-CCNC: 30 U/L (ref 10–40)
BASOPHILS # BLD AUTO: 0.03 K/UL (ref 0–0.2)
BASOPHILS NFR BLD: 0.6 % (ref 0–1.9)
BILIRUB SERPL-MCNC: 0.6 MG/DL (ref 0.1–1)
BUN SERPL-MCNC: 6 MG/DL (ref 8–23)
CALCIUM SERPL-MCNC: 8.6 MG/DL (ref 8.7–10.5)
CHLORIDE SERPL-SCNC: 89 MMOL/L (ref 95–110)
CO2 SERPL-SCNC: 29 MMOL/L (ref 23–29)
CREAT SERPL-MCNC: 0.7 MG/DL (ref 0.5–1.4)
DIFFERENTIAL METHOD: ABNORMAL
EOSINOPHIL # BLD AUTO: 0.2 K/UL (ref 0–0.5)
EOSINOPHIL NFR BLD: 3.2 % (ref 0–8)
ERYTHROCYTE [DISTWIDTH] IN BLOOD BY AUTOMATED COUNT: 11.7 % (ref 11.5–14.5)
EST. GFR  (AFRICAN AMERICAN): >60 ML/MIN/1.73 M^2
EST. GFR  (NON AFRICAN AMERICAN): >60 ML/MIN/1.73 M^2
ETHANOL SERPL-MCNC: 109 MG/DL
GLUCOSE SERPL-MCNC: 107 MG/DL (ref 70–110)
HCT VFR BLD AUTO: 38.7 % (ref 40–54)
HGB BLD-MCNC: 13.4 G/DL (ref 14–18)
IMM GRANULOCYTES # BLD AUTO: 0.02 K/UL (ref 0–0.04)
IMM GRANULOCYTES NFR BLD AUTO: 0.4 % (ref 0–0.5)
INR PPP: 0.9 (ref 0.8–1.2)
LYMPHOCYTES # BLD AUTO: 1.7 K/UL (ref 1–4.8)
LYMPHOCYTES NFR BLD: 35.2 % (ref 18–48)
MCH RBC QN AUTO: 33.9 PG (ref 27–31)
MCHC RBC AUTO-ENTMCNC: 34.6 G/DL (ref 32–36)
MCV RBC AUTO: 98 FL (ref 82–98)
MONOCYTES # BLD AUTO: 0.5 K/UL (ref 0.3–1)
MONOCYTES NFR BLD: 9.5 % (ref 4–15)
NEUTROPHILS # BLD AUTO: 2.4 K/UL (ref 1.8–7.7)
NEUTROPHILS NFR BLD: 51.1 % (ref 38–73)
NRBC BLD-RTO: 0 /100 WBC
PLATELET # BLD AUTO: 241 K/UL (ref 150–350)
PMV BLD AUTO: 9 FL (ref 9.2–12.9)
POTASSIUM SERPL-SCNC: 3.9 MMOL/L (ref 3.5–5.1)
PROT SERPL-MCNC: 8.2 G/DL (ref 6–8.4)
PROTHROMBIN TIME: 10.1 SEC (ref 9–12.5)
RBC # BLD AUTO: 3.95 M/UL (ref 4.6–6.2)
SODIUM SERPL-SCNC: 127 MMOL/L (ref 136–145)
WBC # BLD AUTO: 4.72 K/UL (ref 3.9–12.7)

## 2019-12-18 PROCEDURE — 73080 X-RAY EXAM OF ELBOW: CPT | Mod: 26,RT,, | Performed by: RADIOLOGY

## 2019-12-18 PROCEDURE — 96374 THER/PROPH/DIAG INJ IV PUSH: CPT

## 2019-12-18 PROCEDURE — 93005 ELECTROCARDIOGRAM TRACING: CPT

## 2019-12-18 PROCEDURE — 73502 X-RAY EXAM HIP UNI 2-3 VIEWS: CPT | Mod: TC,FY,RT

## 2019-12-18 PROCEDURE — 71045 X-RAY EXAM CHEST 1 VIEW: CPT | Mod: TC,FY

## 2019-12-18 PROCEDURE — 73502 XR PELVIS 3 VIEW INC HIP 2 VIEW RIGHT: ICD-10-PCS | Mod: 26,RT,, | Performed by: RADIOLOGY

## 2019-12-18 PROCEDURE — 85730 THROMBOPLASTIN TIME PARTIAL: CPT

## 2019-12-18 PROCEDURE — 96376 TX/PRO/DX INJ SAME DRUG ADON: CPT

## 2019-12-18 PROCEDURE — 12000002 HC ACUTE/MED SURGE SEMI-PRIVATE ROOM

## 2019-12-18 PROCEDURE — 73560 X-RAY EXAM OF KNEE 1 OR 2: CPT | Mod: 26,RT,, | Performed by: RADIOLOGY

## 2019-12-18 PROCEDURE — 63600175 PHARM REV CODE 636 W HCPCS: Performed by: INTERNAL MEDICINE

## 2019-12-18 PROCEDURE — 73080 X-RAY EXAM OF ELBOW: CPT | Mod: TC,FY,RT

## 2019-12-18 PROCEDURE — 25000003 PHARM REV CODE 250: Performed by: HOSPITALIST

## 2019-12-18 PROCEDURE — 73560 X-RAY EXAM OF KNEE 1 OR 2: CPT | Mod: TC,FY,RT

## 2019-12-18 PROCEDURE — 99285 EMERGENCY DEPT VISIT HI MDM: CPT | Mod: 25

## 2019-12-18 PROCEDURE — 73560 XR KNEE 1 OR 2 VIEW RIGHT: ICD-10-PCS | Mod: 26,RT,, | Performed by: RADIOLOGY

## 2019-12-18 PROCEDURE — 71045 X-RAY EXAM CHEST 1 VIEW: CPT | Mod: 26,,, | Performed by: RADIOLOGY

## 2019-12-18 PROCEDURE — 73502 X-RAY EXAM HIP UNI 2-3 VIEWS: CPT | Mod: 26,RT,, | Performed by: RADIOLOGY

## 2019-12-18 PROCEDURE — 63600175 PHARM REV CODE 636 W HCPCS: Performed by: HOSPITALIST

## 2019-12-18 PROCEDURE — 80320 DRUG SCREEN QUANTALCOHOLS: CPT

## 2019-12-18 PROCEDURE — 94761 N-INVAS EAR/PLS OXIMETRY MLT: CPT

## 2019-12-18 PROCEDURE — 27000221 HC OXYGEN, UP TO 24 HOURS

## 2019-12-18 PROCEDURE — 25000003 PHARM REV CODE 250: Performed by: INTERNAL MEDICINE

## 2019-12-18 PROCEDURE — 85025 COMPLETE CBC W/AUTO DIFF WBC: CPT

## 2019-12-18 PROCEDURE — 73080 XR ELBOW COMPLETE 3 VIEW RIGHT: ICD-10-PCS | Mod: 26,RT,, | Performed by: RADIOLOGY

## 2019-12-18 PROCEDURE — 71045 XR CHEST AP PORTABLE: ICD-10-PCS | Mod: 26,,, | Performed by: RADIOLOGY

## 2019-12-18 PROCEDURE — 85610 PROTHROMBIN TIME: CPT

## 2019-12-18 PROCEDURE — 80053 COMPREHEN METABOLIC PANEL: CPT

## 2019-12-18 RX ORDER — HYDROCODONE BITARTRATE AND ACETAMINOPHEN 10; 325 MG/1; MG/1
1 TABLET ORAL EVERY 6 HOURS PRN
Status: DISCONTINUED | OUTPATIENT
Start: 2019-12-18 | End: 2019-12-19

## 2019-12-18 RX ORDER — GLUCAGON 1 MG
1 KIT INJECTION
Status: DISCONTINUED | OUTPATIENT
Start: 2019-12-18 | End: 2019-12-24 | Stop reason: HOSPADM

## 2019-12-18 RX ORDER — TALC
9 POWDER (GRAM) TOPICAL NIGHTLY PRN
Status: DISCONTINUED | OUTPATIENT
Start: 2019-12-18 | End: 2019-12-24 | Stop reason: HOSPADM

## 2019-12-18 RX ORDER — FOLIC ACID 1 MG/1
1 TABLET ORAL DAILY
Status: DISCONTINUED | OUTPATIENT
Start: 2019-12-18 | End: 2019-12-24 | Stop reason: HOSPADM

## 2019-12-18 RX ORDER — CELECOXIB 100 MG/1
200 CAPSULE ORAL 2 TIMES DAILY
Status: DISCONTINUED | OUTPATIENT
Start: 2019-12-18 | End: 2019-12-23

## 2019-12-18 RX ORDER — ROSUVASTATIN CALCIUM 20 MG/1
40 TABLET, COATED ORAL NIGHTLY
Status: DISCONTINUED | OUTPATIENT
Start: 2019-12-18 | End: 2019-12-24 | Stop reason: HOSPADM

## 2019-12-18 RX ORDER — HYDROCODONE BITARTRATE AND ACETAMINOPHEN 5; 325 MG/1; MG/1
1 TABLET ORAL EVERY 6 HOURS PRN
Status: DISCONTINUED | OUTPATIENT
Start: 2019-12-18 | End: 2019-12-19

## 2019-12-18 RX ORDER — PANTOPRAZOLE SODIUM 40 MG/1
40 TABLET, DELAYED RELEASE ORAL DAILY
Status: DISCONTINUED | OUTPATIENT
Start: 2019-12-18 | End: 2019-12-24 | Stop reason: HOSPADM

## 2019-12-18 RX ORDER — CARVEDILOL 3.12 MG/1
3.12 TABLET ORAL 2 TIMES DAILY
Status: DISCONTINUED | OUTPATIENT
Start: 2019-12-18 | End: 2019-12-22

## 2019-12-18 RX ORDER — THIAMINE HCL 100 MG
100 TABLET ORAL DAILY
Status: DISCONTINUED | OUTPATIENT
Start: 2019-12-18 | End: 2019-12-24 | Stop reason: HOSPADM

## 2019-12-18 RX ORDER — LORAZEPAM 1 MG/1
2 TABLET ORAL EVERY 4 HOURS PRN
Status: DISCONTINUED | OUTPATIENT
Start: 2019-12-18 | End: 2019-12-22

## 2019-12-18 RX ORDER — IBUPROFEN 200 MG
24 TABLET ORAL
Status: DISCONTINUED | OUTPATIENT
Start: 2019-12-18 | End: 2019-12-24 | Stop reason: HOSPADM

## 2019-12-18 RX ORDER — HYDROMORPHONE HYDROCHLORIDE 2 MG/ML
2 INJECTION, SOLUTION INTRAMUSCULAR; INTRAVENOUS; SUBCUTANEOUS
Status: COMPLETED | OUTPATIENT
Start: 2019-12-18 | End: 2019-12-18

## 2019-12-18 RX ORDER — ENOXAPARIN SODIUM 100 MG/ML
40 INJECTION SUBCUTANEOUS EVERY 24 HOURS
Status: DISCONTINUED | OUTPATIENT
Start: 2019-12-18 | End: 2019-12-24 | Stop reason: HOSPADM

## 2019-12-18 RX ORDER — ASPIRIN 325 MG
325 TABLET ORAL DAILY
Status: DISCONTINUED | OUTPATIENT
Start: 2019-12-18 | End: 2019-12-24 | Stop reason: HOSPADM

## 2019-12-18 RX ORDER — CARVEDILOL 3.12 MG/1
3.12 TABLET ORAL 2 TIMES DAILY
Status: DISCONTINUED | OUTPATIENT
Start: 2019-12-18 | End: 2019-12-18 | Stop reason: HOSPADM

## 2019-12-18 RX ORDER — HYDROMORPHONE HYDROCHLORIDE 2 MG/ML
1 INJECTION, SOLUTION INTRAMUSCULAR; INTRAVENOUS; SUBCUTANEOUS
Status: COMPLETED | OUTPATIENT
Start: 2019-12-18 | End: 2019-12-18

## 2019-12-18 RX ORDER — LANOLIN ALCOHOL/MO/W.PET/CERES
800 CREAM (GRAM) TOPICAL
Status: DISCONTINUED | OUTPATIENT
Start: 2019-12-18 | End: 2019-12-24 | Stop reason: HOSPADM

## 2019-12-18 RX ORDER — AMOXICILLIN 250 MG
1 CAPSULE ORAL 2 TIMES DAILY
Status: DISCONTINUED | OUTPATIENT
Start: 2019-12-18 | End: 2019-12-24 | Stop reason: HOSPADM

## 2019-12-18 RX ORDER — ALPRAZOLAM 1 MG/1
1 TABLET ORAL 4 TIMES DAILY PRN
Status: DISCONTINUED | OUTPATIENT
Start: 2019-12-18 | End: 2019-12-21

## 2019-12-18 RX ORDER — MEMANTINE HYDROCHLORIDE 5 MG/1
5 TABLET ORAL 2 TIMES DAILY
Status: DISCONTINUED | OUTPATIENT
Start: 2019-12-18 | End: 2019-12-22

## 2019-12-18 RX ORDER — SODIUM CHLORIDE 9 MG/ML
INJECTION, SOLUTION INTRAVENOUS CONTINUOUS
Status: DISCONTINUED | OUTPATIENT
Start: 2019-12-18 | End: 2019-12-20

## 2019-12-18 RX ORDER — ONDANSETRON 2 MG/ML
8 INJECTION INTRAMUSCULAR; INTRAVENOUS EVERY 8 HOURS PRN
Status: DISCONTINUED | OUTPATIENT
Start: 2019-12-18 | End: 2019-12-24 | Stop reason: HOSPADM

## 2019-12-18 RX ORDER — ACETAMINOPHEN 500 MG
1000 TABLET ORAL EVERY 6 HOURS PRN
Status: DISCONTINUED | OUTPATIENT
Start: 2019-12-18 | End: 2019-12-24 | Stop reason: HOSPADM

## 2019-12-18 RX ORDER — DICYCLOMINE HYDROCHLORIDE 20 MG/1
20 TABLET ORAL 2 TIMES DAILY
Status: DISCONTINUED | OUTPATIENT
Start: 2019-12-18 | End: 2019-12-22

## 2019-12-18 RX ORDER — POTASSIUM CHLORIDE 20 MEQ/15ML
60 SOLUTION ORAL
Status: DISCONTINUED | OUTPATIENT
Start: 2019-12-18 | End: 2019-12-24 | Stop reason: HOSPADM

## 2019-12-18 RX ORDER — IBUPROFEN 200 MG
16 TABLET ORAL
Status: DISCONTINUED | OUTPATIENT
Start: 2019-12-18 | End: 2019-12-24 | Stop reason: HOSPADM

## 2019-12-18 RX ORDER — SODIUM CHLORIDE 0.9 % (FLUSH) 0.9 %
10 SYRINGE (ML) INJECTION
Status: DISCONTINUED | OUTPATIENT
Start: 2019-12-18 | End: 2019-12-24 | Stop reason: HOSPADM

## 2019-12-18 RX ORDER — POTASSIUM CHLORIDE 20 MEQ/15ML
40 SOLUTION ORAL
Status: DISCONTINUED | OUTPATIENT
Start: 2019-12-18 | End: 2019-12-24 | Stop reason: HOSPADM

## 2019-12-18 RX ORDER — MELOXICAM 7.5 MG/1
15 TABLET ORAL DAILY PRN
Status: DISCONTINUED | OUTPATIENT
Start: 2019-12-18 | End: 2019-12-18

## 2019-12-18 RX ADMIN — THERA TABS 1 TABLET: TAB at 02:12

## 2019-12-18 RX ADMIN — SODIUM CHLORIDE: 0.9 INJECTION, SOLUTION INTRAVENOUS at 02:12

## 2019-12-18 RX ADMIN — ALPRAZOLAM 1 MG: 1 TABLET ORAL at 02:12

## 2019-12-18 RX ADMIN — MEMANTINE HYDROCHLORIDE 5 MG: 5 TABLET ORAL at 09:12

## 2019-12-18 RX ADMIN — ENOXAPARIN SODIUM 40 MG: 100 INJECTION SUBCUTANEOUS at 04:12

## 2019-12-18 RX ADMIN — HYDROCODONE BITARTRATE AND ACETAMINOPHEN 1 TABLET: 10; 325 TABLET ORAL at 04:12

## 2019-12-18 RX ADMIN — CELECOXIB 200 MG: 100 CAPSULE ORAL at 04:12

## 2019-12-18 RX ADMIN — PANTOPRAZOLE SODIUM 40 MG: 40 TABLET, DELAYED RELEASE ORAL at 02:12

## 2019-12-18 RX ADMIN — ROSUVASTATIN CALCIUM 40 MG: 20 TABLET, FILM COATED ORAL at 09:12

## 2019-12-18 RX ADMIN — HYDROCODONE BITARTRATE AND ACETAMINOPHEN 1 TABLET: 10; 325 TABLET ORAL at 10:12

## 2019-12-18 RX ADMIN — HYDROMORPHONE HYDROCHLORIDE 2 MG: 2 INJECTION, SOLUTION INTRAMUSCULAR; INTRAVENOUS; SUBCUTANEOUS at 07:12

## 2019-12-18 RX ADMIN — CARVEDILOL 3.12 MG: 3.12 TABLET, FILM COATED ORAL at 09:12

## 2019-12-18 RX ADMIN — CARVEDILOL 3.12 MG: 3.12 TABLET, FILM COATED ORAL at 10:12

## 2019-12-18 RX ADMIN — SENNOSIDES AND DOCUSATE SODIUM 1 TABLET: 8.6; 5 TABLET ORAL at 09:12

## 2019-12-18 RX ADMIN — HYDROMORPHONE HYDROCHLORIDE 1 MG: 2 INJECTION, SOLUTION INTRAMUSCULAR; INTRAVENOUS; SUBCUTANEOUS at 06:12

## 2019-12-18 RX ADMIN — DICYCLOMINE HYDROCHLORIDE 20 MG: 20 TABLET ORAL at 09:12

## 2019-12-18 RX ADMIN — Medication 100 MG: at 02:12

## 2019-12-18 RX ADMIN — HYDROMORPHONE HYDROCHLORIDE 1 MG: 2 INJECTION, SOLUTION INTRAMUSCULAR; INTRAVENOUS; SUBCUTANEOUS at 11:12

## 2019-12-18 RX ADMIN — ONDANSETRON 8 MG: 2 INJECTION, SOLUTION INTRAMUSCULAR; INTRAVENOUS at 02:12

## 2019-12-18 RX ADMIN — FOLIC ACID 1 MG: 1 TABLET ORAL at 02:12

## 2019-12-18 NOTE — ED PROVIDER NOTES
Encounter Date: 12/18/2019       History     Chief Complaint   Patient presents with    Hip Pain     Patient comes to the ED after a fall at home, complaining of pain in his right hip causing him to be unable stand, pain in his right elbow but with full range of motion.  And pain in his right knee from the fall.  His pants complaint is lateral aspect of the right hip.  On palpation of the hip is no tenderness anteriorly only over the trochanter.  His elbow is severely a brace and contused but has full range of motion. Knees unable to be examined due to pain in the hip.  Does have abrasions on the lateral aspect of the right knee.  He has no pain in any other area did not his chest or his head.    Patient has a history of alcoholism admits to having his last drink around 12 midnight.        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, including shoulder     Cancer 2005    right eye    Colon polyps     COPD (chronic obstructive pulmonary disease)     Depression     Hypertension     Inguinal hernia     left    Kidney stone     MI (myocardial infarction)     Stroke     last stroke 2017    Tremor     Ulcer      Past Surgical History:   Procedure Laterality Date    ANGIOPLASTY      cardiac stent    CARDIAC CATHETERIZATION      COLECTOMY      EYE SURGERY      FINGER SURGERY Left     left index finger    HERNIA REPAIR      OLECRANON BURSECTOMY Right 11/6/2018    Procedure: BURSECTOMY, OLECRANON;  Surgeon: Atul Cooper DO;  Location: [facility] OR;  Service: Orthopedics;  Laterality: Right;  Excision Olecranon Bursa Right Elbow    POLYPECTOMY      REPAIR OF TRICEPS TENDON Right 11/6/2018    Procedure: REPAIR, TENDON, TRICEPS;  Surgeon: Atul Cooper DO;  Location: [facility] OR;  Service: Orthopedics;  Laterality: Right;    SURGICAL  REMOVAL OF BONE SPUR Right 2018    Procedure: EXCISION, BONE SPUR;  Surgeon: Atul Cooper DO;  Location: [facility] OR;  Service: Orthopedics;  Laterality: Right;  Excision Olecranon Bone Spur Right Elbow     Family History   Problem Relation Age of Onset    Heart disease Mother     Heart disease Father     Cancer Father     Dementia Brother     Heart disease Brother      Social History     Tobacco Use    Smoking status: Current Some Day Smoker     Years: 15.00     Types: Cigarettes     Last attempt to quit: 2018     Years since quittin.5    Smokeless tobacco: Current User     Types: Chew    Tobacco comment: Pt trying to quit   Substance Use Topics    Alcohol use: Yes     Alcohol/week: 2.0 - 3.0 standard drinks     Types: 2 - 3 Cans of beer per week     Comment: occasionally beer    Drug use: No     Review of Systems   Constitutional: Negative for fever.   HENT: Negative for sore throat.    Respiratory: Negative for shortness of breath.    Cardiovascular: Negative for chest pain.   Gastrointestinal: Negative for nausea.   Genitourinary: Negative for dysuria.   Musculoskeletal: Negative for back pain.   Skin: Negative for rash.   Neurological: Negative for weakness.   Hematological: Does not bruise/bleed easily.   All other systems reviewed and are negative.      Physical Exam     Initial Vitals   BP Pulse Resp Temp SpO2   19 0626 19 0626 19 0556 19 0556 19 0626   (!) 165/93 82 17 98.2 °F (36.8 °C) 95 %      MAP       --                Physical Exam    Nursing note and vitals reviewed.  Constitutional: Vital signs are normal. He appears well-developed and well-nourished. He is active and cooperative.   HENT:   Head: Normocephalic and atraumatic.   Eyes: Conjunctivae and lids are normal. Lids are everted and swept, no foreign bodies found.   Neck: Trachea normal, normal range of motion and full passive range of motion without pain. Neck supple.   Cardiovascular:  Normal rate, regular rhythm, S1 normal, S2 normal, normal heart sounds, intact distal pulses and normal pulses.  No extrasystoles are present.    Abdominal: Soft. Normal appearance and bowel sounds are normal.   Musculoskeletal: Normal range of motion.   Deformity noted of the right hip area over the trochanter with an abrasion, no tenderness of the anterior aspect of the hip.  Abrasions over the right knee and the right elbow with full range of motion of the elbow.   Neurological: He is alert. He has normal reflexes. GCS eye subscore is 4. GCS verbal subscore is 5. GCS motor subscore is 6.   Skin: Skin is warm, dry and intact. Capillary refill takes less than 2 seconds.   Psychiatric: He has a normal mood and affect. His speech is normal and behavior is normal. Cognition and memory are normal.         ED Course   Procedures  Labs Reviewed   CBC W/ AUTO DIFFERENTIAL - Abnormal; Notable for the following components:       Result Value    RBC 3.95 (*)     Hemoglobin 13.4 (*)     Hematocrit 38.7 (*)     Mean Corpuscular Hemoglobin 33.9 (*)     MPV 9.0 (*)     All other components within normal limits   COMPREHENSIVE METABOLIC PANEL - Abnormal; Notable for the following components:    Sodium 127 (*)     Chloride 89 (*)     BUN, Bld 6 (*)     Calcium 8.6 (*)     All other components within normal limits   APTT   PROTIME-INR     EKG Readings: (Independently Interpreted)   Initial Reading: No STEMI. Rhythm: Normal Sinus Rhythm. Ectopy: No Ectopy. Conduction: Normal. ST Segments: Normal ST Segments. T Waves: Normal. Axis: Normal. Clinical Impression: Normal Sinus Rhythm       Imaging Results          X-Ray Chest AP Portable (Final result)  Result time 12/18/19 08:04:12    Final result by Westley Brooke MD (12/18/19 08:04:12)                 Impression:      No acute abnormality.      Electronically signed by: Westley Brooke  Date:    12/18/2019  Time:    08:04             Narrative:    EXAMINATION:  XR CHEST AP  PORTABLE    CLINICAL HISTORY:  Preop;.    TECHNIQUE:  Single frontal portable view of the chest was performed.    COMPARISON:  11/26/2019.    FINDINGS:  Support devices: None    The lungs are clear, with normal appearance of pulmonary vasculature and no pleural effusion or pneumothorax.    The cardiac silhouette is normal in size. The hilar and mediastinal contours are unremarkable.    Bones are intact.                               X-Ray Elbow Complete Right (Final result)  Result time 12/18/19 08:03:51    Final result by Westley Brooke MD (12/18/19 08:03:51)                 Impression:      1. Mild degenerative osteoarthrosis.  2. Findings suspicious for a mild olecranon bursitis.      Electronically signed by: Westley Brooke  Date:    12/18/2019  Time:    08:03             Narrative:    EXAMINATION:  XR ELBOW COMPLETE 3 VIEW RIGHT    CLINICAL HISTORY:  . Pain in unspecified elbow    TECHNIQUE:  AP, lateral, and oblique views of the right elbow were performed.    COMPARISON:  10/26/2018.    FINDINGS:  No acute fracture or dislocation.  No significant soft tissue swelling.    The joint spaces are preserved.  Mild degenerative osteoarthrosis with small osteophyte formation.  Persistent prominent osteophyte of the lateral epicondyle.    No significant joint effusion.  Mild soft tissue swelling overlying the olecranon may represent an olecranon bursitis.                               X-Ray Knee 1 or 2 View Right (Final result)  Result time 12/18/19 08:21:16    Final result by Kar Bonner MD (12/18/19 08:21:16)                 Narrative:    EXAMINATION:  XR KNEE 1 OR 2 VIEW RIGHT    CLINICAL HISTORY:  Knee pain;    TECHNIQUE:  AP and lateral views of the right knee were performed.    COMPARISON:  None    FINDINGS:  There is no evidence of fracture subluxation about the right knee.  Vascular calcifications are noted.  The AP view is somewhat obliqued.      Electronically signed by: Kar Bonner  MD  Date:    12/18/2019  Time:    08:21                              X-Ray Pelvis 3 view inc Hip 2 view Right (Final result)  Result time 12/18/19 08:00:39   Procedure changed from X-Ray Hip 2 View Right     Final result by Westley Brooke MD (12/18/19 08:00:39)                 Impression:      Mildly displaced mildly impacted oblique subcapital fracture of the right femoral neck.    This report was flagged in Epic as abnormal.      Electronically signed by: Westley Brooke  Date:    12/18/2019  Time:    08:00             Narrative:    EXAMINATION:  XR PELVIS 3 VIEW INC HIP 2 VIEW RIGHT    CLINICAL HISTORY:  pain ;  Pain in unspecified hip    TECHNIQUE:  AP pelvis and two views of the right hip.    COMPARISON:  CT 11/26/2019.    FINDINGS:  There is a mildly displaced mildly impacted oblique subcapital fracture of the right femoral neck.  There is mild foreshortening of the right femoral neck.  There is mild adjacent soft tissue swelling.    Femoral head is normally positioned within the native acetabulum.  Mild femoroacetabular degenerative osteoarthrosis with mild joint space narrowing and small osteophyte formation.    Pubic symphysis is intact.  SI joints are intact.  Bilateral pubic rami are intact.                                 Medical Decision Making:   Clinical Tests:   Lab Tests: Ordered and Reviewed  The following lab test(s) were unremarkable: CBC, CMP, PT and PTT       <> Summary of Lab: Labs unremarkable other than a sodium of 127.  Mild anemia.  Nothing acute  Radiological Study: Ordered and Reviewed  Medical Tests: Ordered and Reviewed  ED Management:  X-ray shows a neck fracture of the right hip.  EKG is normal.    Patient's pain was controlled with Dilaudid.  Arrangements are made to transfer the patient for surgery to [facility]                                 Clinical Impression:       ICD-10-CM ICD-9-CM   1. Closed fracture of right hip, initial encounter S72.001A 820.8   2. Hip pain  M25.559 719.45   3. Elbow pain M25.529 719.42   4. Preop cardiovascular exam Z01.810 V72.81                             Doroteo Radnall MD  12/18/19 1049

## 2019-12-18 NOTE — ED TRIAGE NOTES
Patient to ED via POV by family member. ED staff to parking lot to assist patient onto stretcher, patient unable to bear weight or assist in transferring out of car. Brought into ED on stretcher through ambulance bay.    Patient reports he walked outside because he thought someone was standing in the yard when he tripped and fell, hurting his right hip and right elbow. Unable to bear weight or move right hip without excruciating pain. Pain to right elbow, able to complete full range of motion with skin tear noted.    Patient AAOx4. Denies loss of consciousness, denies hitting his head.

## 2019-12-18 NOTE — PLAN OF CARE
Outside Transfer Acceptance Note     Patients name:      Transferring Physician or Mid-Level provider/Clinic giving report: Dr Doroteo Randall at [facility] ED       Accepting Physician for admission to hospital: Taran Infante MD        Date of acceptance:  12/18/2019       Reason for transfer:  Orthopedic Surgery     Report from Physician/Mid-Level Provider:    HPI:  64-year-old male with ongoing alcohol abuse presents to [facility] ED after a fall during the night while intoxicated resulting in right femoral neck fracture.  He is a habitual daily drinker but referring physician received patient as a handoff and does not know how much he normally imbibes.  He has since sobered up in the emergency department but demonstrates no signs of acute withdrawal at this time.  There is no orthopedic surgery coverage at [facility] so transfer was requested to [facility] for this service, and case was discussed with Dr. Patel who will see this patient upon arrival.    VS: P 85, R 18, /84, 95% RA    Labs: Na 127, EtOH 109    Radiographs: XR pelvis w/ subcapital R femoral neck fx    To Do List upon arrival:   consult Dr. Patel for hemiarthroplasty, preop cardiovascular risk stratification, monitor for alcohol withdrawal and treat per CIWA protocol, IV fluids for hyponatremia likely secondary to alcohol intoxication, continue beta blocker perioperatively and ensure patient gets dose prior to surgery if he did not get a.m. dose already (requested ED physician give this)

## 2019-12-19 ENCOUNTER — ANESTHESIA EVENT (OUTPATIENT)
Dept: SURGERY | Facility: HOSPITAL | Age: 64
DRG: 470 | End: 2019-12-19
Payer: MEDICARE

## 2019-12-19 ENCOUNTER — TELEPHONE (OUTPATIENT)
Dept: ORTHOPEDICS | Facility: CLINIC | Age: 64
End: 2019-12-19

## 2019-12-19 LAB
ALBUMIN SERPL BCP-MCNC: 3.4 G/DL (ref 3.5–5.2)
ALP SERPL-CCNC: 62 U/L (ref 55–135)
ALT SERPL W/O P-5'-P-CCNC: 18 U/L (ref 10–44)
ANION GAP SERPL CALC-SCNC: 8 MMOL/L (ref 8–16)
AST SERPL-CCNC: 21 U/L (ref 10–40)
BASOPHILS # BLD AUTO: 0.02 K/UL (ref 0–0.2)
BASOPHILS NFR BLD: 0.3 % (ref 0–1.9)
BILIRUB SERPL-MCNC: 0.9 MG/DL (ref 0.1–1)
BUN SERPL-MCNC: 7 MG/DL (ref 8–23)
CALCIUM SERPL-MCNC: 8.6 MG/DL (ref 8.7–10.5)
CHLORIDE SERPL-SCNC: 94 MMOL/L (ref 95–110)
CO2 SERPL-SCNC: 28 MMOL/L (ref 23–29)
CREAT SERPL-MCNC: 0.8 MG/DL (ref 0.5–1.4)
DIFFERENTIAL METHOD: ABNORMAL
EOSINOPHIL # BLD AUTO: 0.1 K/UL (ref 0–0.5)
EOSINOPHIL NFR BLD: 1.3 % (ref 0–8)
ERYTHROCYTE [DISTWIDTH] IN BLOOD BY AUTOMATED COUNT: 11.8 % (ref 11.5–14.5)
EST. GFR  (AFRICAN AMERICAN): >60 ML/MIN/1.73 M^2
EST. GFR  (NON AFRICAN AMERICAN): >60 ML/MIN/1.73 M^2
GLUCOSE SERPL-MCNC: 121 MG/DL (ref 70–110)
HCT VFR BLD AUTO: 37.4 % (ref 40–54)
HGB BLD-MCNC: 12.8 G/DL (ref 14–18)
IMM GRANULOCYTES # BLD AUTO: 0.02 K/UL (ref 0–0.04)
LYMPHOCYTES # BLD AUTO: 0.6 K/UL (ref 1–4.8)
LYMPHOCYTES NFR BLD: 10.2 % (ref 18–48)
MAGNESIUM SERPL-MCNC: 1.8 MG/DL (ref 1.6–2.6)
MCH RBC QN AUTO: 33.8 PG (ref 27–31)
MCHC RBC AUTO-ENTMCNC: 34.2 G/DL (ref 32–36)
MCV RBC AUTO: 99 FL (ref 82–98)
MONOCYTES # BLD AUTO: 0.5 K/UL (ref 0.3–1)
MONOCYTES NFR BLD: 8.4 % (ref 4–15)
NEUTROPHILS # BLD AUTO: 5 K/UL (ref 1.8–7.7)
NEUTROPHILS NFR BLD: 79.5 % (ref 38–73)
NRBC BLD-RTO: 0 /100 WBC
PHOSPHATE SERPL-MCNC: 3.2 MG/DL (ref 2.7–4.5)
PLATELET # BLD AUTO: 180 K/UL (ref 150–350)
PMV BLD AUTO: 8.9 FL (ref 9.2–12.9)
POTASSIUM SERPL-SCNC: 4.2 MMOL/L (ref 3.5–5.1)
PROT SERPL-MCNC: 7 G/DL (ref 6–8.4)
RBC # BLD AUTO: 3.79 M/UL (ref 4.6–6.2)
SODIUM SERPL-SCNC: 130 MMOL/L (ref 136–145)
WBC # BLD AUTO: 6.28 K/UL (ref 3.9–12.7)

## 2019-12-19 PROCEDURE — 85025 COMPLETE CBC W/AUTO DIFF WBC: CPT

## 2019-12-19 PROCEDURE — 12000002 HC ACUTE/MED SURGE SEMI-PRIVATE ROOM

## 2019-12-19 PROCEDURE — 84100 ASSAY OF PHOSPHORUS: CPT

## 2019-12-19 PROCEDURE — 27000221 HC OXYGEN, UP TO 24 HOURS

## 2019-12-19 PROCEDURE — 83735 ASSAY OF MAGNESIUM: CPT

## 2019-12-19 PROCEDURE — 63600175 PHARM REV CODE 636 W HCPCS: Performed by: HOSPITALIST

## 2019-12-19 PROCEDURE — 80053 COMPREHEN METABOLIC PANEL: CPT

## 2019-12-19 PROCEDURE — 94761 N-INVAS EAR/PLS OXIMETRY MLT: CPT

## 2019-12-19 PROCEDURE — 25000003 PHARM REV CODE 250: Performed by: INTERNAL MEDICINE

## 2019-12-19 PROCEDURE — 25000003 PHARM REV CODE 250: Performed by: HOSPITALIST

## 2019-12-19 PROCEDURE — 36415 COLL VENOUS BLD VENIPUNCTURE: CPT

## 2019-12-19 RX ORDER — CHLORDIAZEPOXIDE HYDROCHLORIDE 5 MG/1
5 CAPSULE, GELATIN COATED ORAL EVERY 8 HOURS
Status: DISCONTINUED | OUTPATIENT
Start: 2019-12-19 | End: 2019-12-22

## 2019-12-19 RX ORDER — HYDROCODONE BITARTRATE AND ACETAMINOPHEN 10; 325 MG/1; MG/1
1 TABLET ORAL EVERY 4 HOURS PRN
Status: DISCONTINUED | OUTPATIENT
Start: 2019-12-19 | End: 2019-12-23

## 2019-12-19 RX ORDER — HYDROCODONE BITARTRATE AND ACETAMINOPHEN 5; 325 MG/1; MG/1
1 TABLET ORAL EVERY 4 HOURS PRN
Status: DISCONTINUED | OUTPATIENT
Start: 2019-12-19 | End: 2019-12-23

## 2019-12-19 RX ADMIN — FOLIC ACID 1 MG: 1 TABLET ORAL at 08:12

## 2019-12-19 RX ADMIN — HYDROCODONE BITARTRATE AND ACETAMINOPHEN 1 TABLET: 10; 325 TABLET ORAL at 05:12

## 2019-12-19 RX ADMIN — HYDROCODONE BITARTRATE AND ACETAMINOPHEN 1 TABLET: 10; 325 TABLET ORAL at 10:12

## 2019-12-19 RX ADMIN — CHLORDIAZEPOXIDE HYDROCHLORIDE 5 MG: 5 CAPSULE ORAL at 02:12

## 2019-12-19 RX ADMIN — ENOXAPARIN SODIUM 40 MG: 100 INJECTION SUBCUTANEOUS at 05:12

## 2019-12-19 RX ADMIN — HYDROCODONE BITARTRATE AND ACETAMINOPHEN 1 TABLET: 10; 325 TABLET ORAL at 06:12

## 2019-12-19 RX ADMIN — THERA TABS 1 TABLET: TAB at 08:12

## 2019-12-19 RX ADMIN — ALPRAZOLAM 1 MG: 1 TABLET ORAL at 08:12

## 2019-12-19 RX ADMIN — CARVEDILOL 3.12 MG: 3.12 TABLET, FILM COATED ORAL at 08:12

## 2019-12-19 RX ADMIN — ALPRAZOLAM 1 MG: 1 TABLET ORAL at 09:12

## 2019-12-19 RX ADMIN — ROSUVASTATIN CALCIUM 40 MG: 20 TABLET, FILM COATED ORAL at 09:12

## 2019-12-19 RX ADMIN — DICYCLOMINE HYDROCHLORIDE 20 MG: 20 TABLET ORAL at 08:12

## 2019-12-19 RX ADMIN — SODIUM CHLORIDE: 0.9 INJECTION, SOLUTION INTRAVENOUS at 09:12

## 2019-12-19 RX ADMIN — DICYCLOMINE HYDROCHLORIDE 20 MG: 20 TABLET ORAL at 09:12

## 2019-12-19 RX ADMIN — MEMANTINE HYDROCHLORIDE 5 MG: 5 TABLET ORAL at 08:12

## 2019-12-19 RX ADMIN — CARVEDILOL 3.12 MG: 3.12 TABLET, FILM COATED ORAL at 09:12

## 2019-12-19 RX ADMIN — SENNOSIDES AND DOCUSATE SODIUM 1 TABLET: 8.6; 5 TABLET ORAL at 08:12

## 2019-12-19 RX ADMIN — HYDROCODONE BITARTRATE AND ACETAMINOPHEN 1 TABLET: 10; 325 TABLET ORAL at 02:12

## 2019-12-19 RX ADMIN — CELECOXIB 200 MG: 100 CAPSULE ORAL at 09:12

## 2019-12-19 RX ADMIN — PANTOPRAZOLE SODIUM 40 MG: 40 TABLET, DELAYED RELEASE ORAL at 08:12

## 2019-12-19 RX ADMIN — CHLORDIAZEPOXIDE HYDROCHLORIDE 5 MG: 5 CAPSULE ORAL at 09:12

## 2019-12-19 RX ADMIN — Medication 100 MG: at 08:12

## 2019-12-19 RX ADMIN — SENNOSIDES AND DOCUSATE SODIUM 1 TABLET: 8.6; 5 TABLET ORAL at 09:12

## 2019-12-19 RX ADMIN — MEMANTINE HYDROCHLORIDE 5 MG: 5 TABLET ORAL at 09:12

## 2019-12-19 RX ADMIN — CELECOXIB 200 MG: 100 CAPSULE ORAL at 08:12

## 2019-12-19 NOTE — PLAN OF CARE
12/19/19 0821   Patient Assessment/Suction   Level of Consciousness (AVPU) alert   Respiratory Effort Normal;Unlabored   PRE-TX-O2   O2 Device (Oxygen Therapy) nasal cannula   $ Is the patient on Low Flow Oxygen? Yes   Flow (L/min) 4  (decreased Pt to 2lpm will re-check sats)   SpO2 95 %   Pulse Oximetry Type Intermittent   $ Pulse Oximetry - Multiple Charge Pulse Oximetry - Multiple

## 2019-12-19 NOTE — NURSING
Pt remains NPO at this time for possible surgical repair. Placed call to Dr. Patel and left message with his answer service this am requesting a return call to clarify surgical scheduling date. Awaiting return call.

## 2019-12-19 NOTE — SUBJECTIVE & OBJECTIVE
Interval History:  Patient is scheduled for right femoral neck fracture repair surgery tomorrow.  Complaining of hip pain at the fracture site.  Appears anxious and somewhat tremulous.  Patient denies any chest pain, shortness of breath or any fever.    Review of Systems   Constitutional: Positive for activity change and fatigue. Negative for fever.   HENT: Negative for ear discharge, facial swelling and sore throat.    Eyes: Negative for photophobia, pain and visual disturbance.   Respiratory: Negative for apnea, cough and shortness of breath.    Cardiovascular: Negative for chest pain and leg swelling.   Gastrointestinal: Negative for abdominal pain and blood in stool.   Endocrine: Negative for cold intolerance and heat intolerance.   Musculoskeletal: Positive for gait problem and joint swelling. Negative for back pain.   Skin: Negative for pallor and rash.   Neurological: Negative for speech difficulty and headaches.   Psychiatric/Behavioral: Positive for dysphoric mood. Negative for confusion, hallucinations and suicidal ideas.   All other systems reviewed and are negative.    Objective:     Vital Signs (Most Recent):  Temp: 98.9 °F (37.2 °C) (12/19/19 0732)  Pulse: 75 (12/19/19 0732)  Resp: 20 (12/19/19 0732)  BP: (!) 175/85 (12/19/19 0732)  SpO2: (!) 92 % (12/19/19 0838) Vital Signs (24h Range):  Temp:  [97.4 °F (36.3 °C)-98.9 °F (37.2 °C)] 98.9 °F (37.2 °C)  Pulse:  [72-87] 75  Resp:  [16-20] 20  SpO2:  [87 %-99 %] 92 %  BP: (150-199)/() 175/85     Weight: 63.8 kg (140 lb 10.5 oz)  Body mass index is 20.18 kg/m².    Intake/Output Summary (Last 24 hours) at 12/19/2019 1108  Last data filed at 12/19/2019 0800  Gross per 24 hour   Intake 1696.25 ml   Output 250 ml   Net 1446.25 ml      Physical Exam   Constitutional: He is oriented to person, place, and time. No distress.   Thin, chronically ill-appearing  male who appears older than stated age.  Anxious and tremulous   HENT:   Head:  Normocephalic.   Mouth/Throat: Oropharynx is clear and moist.   Eyes: Conjunctivae are normal. Right eye exhibits no discharge. Left eye exhibits no discharge. No scleral icterus.   Pupils pinpoint   Neck: No JVD present.   Cardiovascular: Normal rate, regular rhythm, normal heart sounds and intact distal pulses. Exam reveals no friction rub.   No murmur heard.  Pulmonary/Chest: Effort normal and breath sounds normal. No respiratory distress. He has no wheezes.   Abdominal: Soft. Bowel sounds are normal. He exhibits no distension. There is no tenderness.   Musculoskeletal: Normal range of motion. He exhibits no edema.   Noted right lower extremity with obvious deformity and tenderness to palpation over the hip area.   Lymphadenopathy:     He has no cervical adenopathy.   Neurological: He is alert and oriented to person, place, and time. He has normal reflexes.   Skin: No rash noted. He is not diaphoretic. No erythema.   Psychiatric: He has a normal mood and affect. His behavior is normal.   Nursing note and vitals reviewed.      Significant Labs:   CBC:   Recent Labs   Lab 12/18/19  0618 12/19/19  0453   WBC 4.72 6.28   HGB 13.4* 12.8*   HCT 38.7* 37.4*    180     CMP:   Recent Labs   Lab 12/18/19  0618 12/19/19  0453   * 130*   K 3.9 4.2   CL 89* 94*   CO2 29 28    121*   BUN 6* 7*   CREATININE 0.7 0.8   CALCIUM 8.6* 8.6*   PROT 8.2 7.0   ALBUMIN 4.4 3.4*   BILITOT 0.6 0.9   ALKPHOS 57 62   AST 30 21   ALT 23 18   ANIONGAP 9 8   EGFRNONAA >60.0 >60       Significant Imaging:  Right hip x-ray:   Right femoral neck fracture with mild impaction.  Lumbar spine degenerative disc disease noted

## 2019-12-19 NOTE — CARE UPDATE
12/19/19 0837   PRE-TX-O2   O2 Device (Oxygen Therapy) nasal cannula   $ Is the patient on Low Flow Oxygen? Yes   Flow (L/min) 2  (increased to 3lpm with sats increasing to 92%)   SpO2 (!) 90 %   Pulse Oximetry Type Intermittent   $ Pulse Oximetry - Multiple Charge Pulse Oximetry - Multiple

## 2019-12-19 NOTE — PLAN OF CARE
Plan of care reviewed with pt,verbalized understanding. NPO per order. Pain was managed by PRN medication and repositioning. Hourly and Q2h rounds completed on this pt throughout shift. Call light kept within reach, bed in locked and lowest position, side rails up x2.

## 2019-12-19 NOTE — ASSESSMENT & PLAN NOTE
Patient with right-sided femoral neck fracture.  Orthopedic surgery has been consulted and will undergo open reduction/internal fixation as soon as possible.  Patient is medically cleared for this procedure.  Will consult with Physical therapy and Occupational therapy postoperatively.

## 2019-12-19 NOTE — PLAN OF CARE
Aware of patient status  Will assume orthopedic care  Awaiting medical optimization/clearance for surgery  Will proceed to the OR for RIGHT TYRONE  Provisionally scheduled for tomorrow  Will examine and consent patient today  NPO at midnight  Pain control per primary team  Place joseph   Bed rest    [facility]

## 2019-12-19 NOTE — PLAN OF CARE
Pt is a [place] resident and states he quit smoking 2 months ago.  Pt educated on smoking cessation.  Pt shows interest in continued support.  Information about the [place] smoking cessation program left with Pt.

## 2019-12-19 NOTE — CONSULTS
[facility]  Orthopedics  Consult Note    Patient Name: Ricky Cole  MRN: [id number]  Admission Date: 12/18/2019  Hospital Length of Stay: 1 days  Attending Provider: Zahira Cortes MD  Primary Care Provider: Hiram Leija MD    Patient information was obtained from patient, past medical records and ER records.     Inpatient consult to Orthopedics  Consult performed by: Kody Patel MD  Consult ordered by: Mele Mario MD        Subjective:     Principal Problem:Hip fracture    Chief Complaint: No chief complaint on file.       HPI: Patient presents with right hip pain s/p fall from standing height.  Experienced immediate inability to bear weight on the extremity.  Transported to the ED where workup demonstrated a right hip fracture.  Admitted for medical optimization and orthopedic intervention.  This is a closed injury.        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, including shoulder     Cancer 2005    right eye    Colon polyps     COPD (chronic obstructive pulmonary disease)     Depression     Hypertension     Inguinal hernia     left    Kidney stone     MI (myocardial infarction)     Stroke     last stroke 2017    Tremor     Ulcer        Past Surgical History:   Procedure Laterality Date    ANGIOPLASTY      cardiac stent    CARDIAC CATHETERIZATION      COLECTOMY      EYE SURGERY      FINGER SURGERY Left     left index finger    HERNIA REPAIR      OLECRANON BURSECTOMY Right 11/6/2018    Procedure: BURSECTOMY, OLECRANON;  Surgeon: Atul Cooper DO;  Location: [facility] OR;  Service: Orthopedics;  Laterality: Right;  Excision Olecranon Bursa Right Elbow    POLYPECTOMY      REPAIR OF TRICEPS TENDON Right 11/6/2018    Procedure: REPAIR, TENDON, TRICEPS;  Surgeon: Atul Cooper DO;  Location: [facility] OR;  Service: Orthopedics;  Laterality: Right;    SURGICAL REMOVAL OF BONE SPUR Right 11/6/2018     Procedure: EXCISION, BONE SPUR;  Surgeon: Atul Cooper DO;  Location: [facility] OR;  Service: Orthopedics;  Laterality: Right;  Excision Olecranon Bone Spur Right Elbow       Review of patient's allergies indicates:   Allergen Reactions    Codeine Other (See Comments)     hyper    Nsaids (non-steroidal anti-inflammatory drug) Other (See Comments)     Says eats holes in his stomach       Current Facility-Administered Medications   Medication    0.9%  NaCl infusion    acetaminophen tablet 1,000 mg    ALPRAZolam tablet 1 mg    aspirin tablet 325 mg    carvedilol tablet 3.125 mg    celecoxib capsule 200 mg    dextrose 50% injection 12.5 g    dextrose 50% injection 25 g    dicyclomine tablet 20 mg    enoxaparin injection 40 mg    folic acid tablet 1 mg    glucagon (human recombinant) injection 1 mg    glucose chewable tablet 16 g    glucose chewable tablet 24 g    HYDROcodone-acetaminophen  mg per tablet 1 tablet    HYDROcodone-acetaminophen 5-325 mg per tablet 1 tablet    LORazepam tablet 2 mg    magnesium oxide tablet 800 mg    magnesium oxide tablet 800 mg    melatonin tablet 9 mg    memantine tablet 5 mg    multivitamin tablet    ondansetron injection 8 mg    pantoprazole EC tablet 40 mg    potassium chloride 10% oral solution 40 mEq    potassium chloride 10% oral solution 60 mEq    rosuvastatin tablet 40 mg    senna-docusate 8.6-50 mg per tablet 1 tablet    sodium chloride 0.9% flush 10 mL    thiamine tablet 100 mg     Family History     Problem Relation (Age of Onset)    Cancer Father    Dementia Brother    Heart disease Mother, Father, Brother        Tobacco Use    Smoking status: Current Some Day Smoker     Years: 15.00     Types: Cigarettes     Last attempt to quit: 2018     Years since quittin.5    Smokeless tobacco: Current User     Types: Chew    Tobacco comment: Pt trying to quit   Substance and Sexual Activity    Alcohol use: Yes     Alcohol/week: 2.0 -  "3.0 standard drinks     Types: 2 - 3 Cans of beer per week     Comment: occasionally beer    Drug use: No    Sexual activity: Not Currently     ROS  Objective:     Vital Signs (Most Recent):  Temp: 98.4 °F (36.9 °C) (12/19/19 1155)  Pulse: 71 (12/19/19 1155)  Resp: 17 (12/19/19 1155)  BP: (!) 149/83 (12/19/19 1155)  SpO2: 95 % (12/19/19 1155) Vital Signs (24h Range):  Temp:  [97.8 °F (36.6 °C)-98.9 °F (37.2 °C)] 98.4 °F (36.9 °C)  Pulse:  [71-80] 71  Resp:  [17-20] 17  SpO2:  [87 %-99 %] 95 %  BP: (149-199)/(80-93) 149/83     Weight: 63.8 kg (140 lb 10.5 oz)  Height: 5' 10" (177.8 cm)  Body mass index is 20.18 kg/m².      Intake/Output Summary (Last 24 hours) at 12/19/2019 1337  Last data filed at 12/19/2019 0800  Gross per 24 hour   Intake 1696.25 ml   Output 250 ml   Net 1446.25 ml       Ortho/SPM Exam    Significant Labs: All pertinent labs within the past 24 hours have been reviewed.    Significant Imaging: I have reviewed all pertinent imaging results/findings.      Radiographs of the Right hip  demonstrate displaced femoral neck fracture    Assessment/Plan:     Active Diagnoses:    Diagnosis Date Noted POA    PRINCIPAL PROBLEM:  Hip fracture [S72.009A] 12/18/2019 Yes    ETOH abuse [F10.10] 12/18/2019 Yes    Dementia associated with alcoholism without behavioral disturbance [F10.27] 12/18/2019 Yes    History of MI (myocardial infarction) x2, 1987 and 2015 [I25.2] 07/24/2019 Not Applicable    History of heart artery stent, one in 2009 [Z95.5] 07/24/2019 Not Applicable    PAD (peripheral artery disease), mnoderate [I73.9] 07/24/2019 Yes    Smoker, trying to quit again, 1 cig last a week, started age 17, quit for 42 years, restarted 2018 [F17.200] 07/24/2019 Yes      Problems Resolved During this Admission:       Will assume orthopedic care  Awaiting medical optimization/clearance for surgery  Will proceed to the OR for RIGHT TYRONE  Provisionally scheduled for tomorrow @ 2pm  Consent procured from " patient  NPO at midnight  Pain control per primary team  Bed rest    [facility]

## 2019-12-19 NOTE — ASSESSMENT & PLAN NOTE
Patient states that he drinks approximately 3-5 beers on the day of presentation.  Denies daily drinking.  States that he drank because he was depressed , and that this does not happen every day.  Continue monitoring of CIWA scores for withdrawal and Ativan as needed.  Start Librium 5 mg every 8 hr.

## 2019-12-19 NOTE — ASSESSMENT & PLAN NOTE
Smoking cessation counseling performed. Dangers of cigarette smoking were reviewed with patient in detail and patient was encouraged to quit. Nicotine replacement options were discussed for > 3 minutes.

## 2019-12-19 NOTE — TELEPHONE ENCOUNTER
Called the hospital floor. Spoke with nurse and they noted that a case request had been placed for surgery tomorrow. They will notify the patient of expected surgery. No further issues were discussed.

## 2019-12-19 NOTE — H&P
[facility] Medicine  History & Physical    Patient Name: Ricky Cole  MRN: [id number]  Admission Date: 12/18/2019  Attending Physician: Mele Mario MD  Primary Care Provider: Hiram Leija MD         Patient information was obtained from patient and past medical records.     Subjective:     Principal Problem:Hip fracture    Chief Complaint: No chief complaint on file.       HPI: Patient is a 64-year-old  male with a past medical history significant for binge drinking of alcohol, COPD, hypertension, stroke with alcohol/vascular dementia and depression who presents the hospital after becoming intoxicated and falling.  Subsequent to the patient's fall, he developed a femoral neck fracture and presented to the emergency department outside hospital.  He was found to be mildly intoxicated, hyponatremic and was transferred to [facility] for orthopedics consult and hip fracture repair.  Patient denies any chest pain, shortness of breath, nausea, vomiting, diarrhea, fevers.  He appears to have pain in the affected extremity, but otherwise denies pain or neurologic symptoms.    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, including shoulder     Cancer 2005    right eye    Colon polyps     COPD (chronic obstructive pulmonary disease)     Depression     Hypertension     Inguinal hernia     left    Kidney stone     MI (myocardial infarction)     Stroke     last stroke 2017    Tremor     Ulcer        Past Surgical History:   Procedure Laterality Date    ANGIOPLASTY      cardiac stent    CARDIAC CATHETERIZATION      COLECTOMY      EYE SURGERY      FINGER SURGERY Left     left index finger    HERNIA REPAIR      OLECRANON BURSECTOMY Right 11/6/2018    Procedure: BURSECTOMY, OLECRANON;  Surgeon: Atul Cooper DO;  Location: [facility] OR;  Service: Orthopedics;  Laterality: Right;  Excision Olecranon  Bursa Right Elbow    POLYPECTOMY      REPAIR OF TRICEPS TENDON Right 11/6/2018    Procedure: REPAIR, TENDON, TRICEPS;  Surgeon: Atul Cooper DO;  Location: [facility] OR;  Service: Orthopedics;  Laterality: Right;    SURGICAL REMOVAL OF BONE SPUR Right 11/6/2018    Procedure: EXCISION, BONE SPUR;  Surgeon: Atul Cooper DO;  Location: [facility] OR;  Service: Orthopedics;  Laterality: Right;  Excision Olecranon Bone Spur Right Elbow       Review of patient's allergies indicates:   Allergen Reactions    Codeine Other (See Comments)     hyper       Current Facility-Administered Medications on File Prior to Encounter   Medication    [COMPLETED] hydromorphone (PF) injection 1 mg    [COMPLETED] hydromorphone (PF) injection 2 mg    [COMPLETED] hydromorphone (PF) injection 2 mg    [DISCONTINUED] carvedilol tablet 3.125 mg     Current Outpatient Medications on File Prior to Encounter   Medication Sig    ALPRAZolam (XANAX) 1 MG tablet TAKE 1 TABLET 4 TIMES A DAY AS NEEDED FOR ANXIETY    aspirin 325 MG tablet Take 325 mg by mouth once daily.    carvedilol (COREG) 3.125 MG tablet Take 1 tablet (3.125 mg total) by mouth 2 (two) times daily.    dicyclomine (BENTYL) 20 mg tablet Take 1 tablet (20 mg total) by mouth 2 (two) times daily.    meloxicam (MOBIC) 15 MG tablet Take 1 tablet (15 mg total) by mouth daily as needed.    memantine (NAMENDA) 5 MG Tab TAKE 1 TABLET 2 TIMES A DAY (WITH MORNING AND EVENING MEAL) FOR MEMORY    pantoprazole (PROTONIX) 40 MG tablet TAKE 1 TABLET IN THE MORNING (30 MINUTES BEFORE BREAKFAST) FOR STOMACH    primidone (MYSOLINE) 50 MG Tab TAKE 1 TABLET 4 TIMES A DAY AS DIRECTED    rosuvastatin (CRESTOR) 40 MG Tab Take 1 tablet (40 mg total) by mouth every evening.     Family History     Problem Relation (Age of Onset)    Cancer Father    Dementia Brother    Heart disease Mother, Father, Brother        Tobacco Use    Smoking status: Current Some Day Smoker     Years: 15.00     Types:  Cigarettes     Last attempt to quit: 2018     Years since quittin.5    Smokeless tobacco: Current User     Types: Chew    Tobacco comment: Pt trying to quit   Substance and Sexual Activity    Alcohol use: Yes     Alcohol/week: 2.0 - 3.0 standard drinks     Types: 2 - 3 Cans of beer per week     Comment: occasionally beer    Drug use: No    Sexual activity: Not Currently     Review of Systems   Constitutional: Positive for activity change and fatigue. Negative for fever.   HENT: Negative for ear discharge, facial swelling and sore throat.    Eyes: Negative for photophobia, pain and visual disturbance.   Respiratory: Negative for apnea, cough and shortness of breath.    Cardiovascular: Negative for chest pain and leg swelling.   Gastrointestinal: Negative for abdominal pain and blood in stool.   Endocrine: Negative for cold intolerance and heat intolerance.   Musculoskeletal: Positive for gait problem and joint swelling. Negative for back pain.   Skin: Negative for pallor and rash.   Neurological: Negative for speech difficulty and headaches.   Psychiatric/Behavioral: Positive for dysphoric mood. Negative for confusion, hallucinations and suicidal ideas.   All other systems reviewed and are negative.    Objective:     Vital Signs (Most Recent):  Temp: 98.3 °F (36.8 °C) (19)  Pulse: 72 (19)  Resp: 17 (19)  BP: (!) 150/84 (19)  SpO2: 95 % (19) Vital Signs (24h Range):  Temp:  [97.4 °F (36.3 °C)-98.7 °F (37.1 °C)] 98.3 °F (36.8 °C)  Pulse:  [72-94] 72  Resp:  [16-20] 17  SpO2:  [87 %-98 %] 95 %  BP: (150-194)/() 150/84     Weight: 63.8 kg (140 lb 10.5 oz)  Body mass index is 20.18 kg/m².    Physical Exam   Constitutional: He is oriented to person, place, and time. No distress.   Thin, chronically ill-appearing  male who appears older than stated age.   HENT:   Head: Normocephalic.   Mouth/Throat: Oropharynx is clear and moist.   Eyes:  Conjunctivae are normal. Right eye exhibits no discharge. Left eye exhibits no discharge. No scleral icterus.   Pupils pinpoint   Neck: No JVD present.   Cardiovascular: Normal rate, regular rhythm, normal heart sounds and intact distal pulses. Exam reveals no friction rub.   No murmur heard.  Pulmonary/Chest: Effort normal and breath sounds normal. No respiratory distress. He has no wheezes.   Abdominal: Soft. Bowel sounds are normal. He exhibits no distension. There is no tenderness.   Musculoskeletal: Normal range of motion. He exhibits no edema.   Noted right lower extremity with obvious deformity and tenderness to palpation over the hip area.   Lymphadenopathy:     He has no cervical adenopathy.   Neurological: He is alert and oriented to person, place, and time. He has normal reflexes.   Skin: No rash noted. He is not diaphoretic. No erythema.   Psychiatric: He has a normal mood and affect. His behavior is normal.   Nursing note and vitals reviewed.          Significant Labs: All pertinent labs within the past 24 hours have been reviewed.    Significant Imaging: I have reviewed all pertinent imaging results/findings within the past 24 hours.    Assessment/Plan:     * Hip fracture  Patient with right-sided femoral neck fracture.  Orthopedic surgery has been consulted and will undergo open reduction/internal fixation as soon as possible.  Patient is medically cleared for this procedure.  Will consult with Physical therapy and Occupational therapy postoperatively.      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 Continue home dementia meds and non-pharmacologic interventions to prevent delirium (No VS between 11PM-5AM, activity during day, opening blinds, providing glasses/hearing aids, and up in chair during daytime). Use PRN anti-psychotics to prevent behavior of self harm during sundowning, and avoid narcotics and benzos unless absolutely necessary. PRN  anti-psychotics prescribed to avoid self harm behaviors.        ETOH abuse  Patient states that he drinks approximately 3-5 beers yesterday.  Denies daily drinking.  States that he drank because he was depressed , and that this does not happen every day.  Continue monitoring of CIWA scores for withdrawal and Ativan as needed.      PAD (peripheral artery disease), mnoderate  Continue treatment for CAD.  Patient high risk surgical candidate.      History of heart artery stent, one in 2009  Treatment for CAD noted above      History of MI (myocardial infarction) x2, 1987 and 2015  Patient with known CAD s/p stent placement, which is controlled Will continue ASA and Statin and monitor for S/Sx of angina/ACS. Continue to monitor on telemetry.         Smoker, trying to quit again, 1 cig last a week, started age 17, quit for 42 years, restarted 2018  Dangers of cigarette smoking were reviewed with patient in detail for 10 minutes and patient was encouraged to quit. Nicotine replacement options were discussed.          VTE Risk Mitigation (From admission, onward)         Ordered     enoxaparin injection 40 mg  Daily      12/18/19 1351     IP VTE HIGH RISK PATIENT  Once      12/18/19 1351                   Mele Mario MD  Department of Hospital Medicine   [facility]

## 2019-12-19 NOTE — ASSESSMENT & PLAN NOTE
Patient states that he drinks approximately 3-5 beers yesterday.  Denies daily drinking.  States that he drank because he was depressed , and that this does not happen every day.  Continue monitoring of CIWA scores for withdrawal and Ativan as needed.

## 2019-12-19 NOTE — PLAN OF CARE
12/19/19 0838   PRE-TX-O2   O2 Device (Oxygen Therapy) nasal cannula   $ Is the patient on Low Flow Oxygen? Yes   Flow (L/min) 3   SpO2 (!) 92 %   Pulse Oximetry Type Intermittent   $ Pulse Oximetry - Multiple Charge Pulse Oximetry - Multiple

## 2019-12-19 NOTE — HPI
Per Dr. Mario:    Patient is a 64-year-old  male with a past medical history significant for binge drinking of alcohol, COPD, hypertension, stroke with alcohol/vascular dementia and depression who presents the hospital after becoming intoxicated and falling.  Subsequent to the patient's fall, he developed a femoral neck fracture and presented to the emergency department outside hospital.  He was found to be mildly intoxicated, hyponatremic and was transferred to [facility] for orthopedics consult and hip fracture repair.  Patient denies any chest pain, shortness of breath, nausea, vomiting, diarrhea, fevers.  He appears to have pain in the affected extremity, but otherwise denies pain or neurologic symptoms.

## 2019-12-19 NOTE — PROGRESS NOTES
[facility] Medicine  Progress Note    Patient Name: Ricky Cole  MRN: [id number]  Patient Class: IP- Inpatient   Admission Date: 12/18/2019  Length of Stay: 1 days  Attending Physician: Zahira Cortes MD  Primary Care Provider: Hiram Leija MD        Subjective:     Principal Problem:Hip fracture        HPI:  Patient is a 64-year-old  male with a past medical history significant for binge drinking of alcohol, COPD, hypertension, stroke with alcohol/vascular dementia and depression who presents the hospital after becoming intoxicated and falling.  Subsequent to the patient's fall, he developed a femoral neck fracture and presented to the emergency department outside hospital.  He was found to be mildly intoxicated, hyponatremic and was transferred to [facility] for orthopedics consult and hip fracture repair.  Patient denies any chest pain, shortness of breath, nausea, vomiting, diarrhea, fevers.  He appears to have pain in the affected extremity, but otherwise denies pain or neurologic symptoms.    Overview/Hospital Course:  No notes on file    Interval History:  Patient is scheduled for right femoral neck fracture repair surgery tomorrow.  Complaining of hip pain at the fracture site.  Appears anxious and somewhat tremulous.  Patient denies any chest pain, shortness of breath or any fever.    Review of Systems   Constitutional: Positive for activity change and fatigue. Negative for fever.   HENT: Negative for ear discharge, facial swelling and sore throat.    Eyes: Negative for photophobia, pain and visual disturbance.   Respiratory: Negative for apnea, cough and shortness of breath.    Cardiovascular: Negative for chest pain and leg swelling.   Gastrointestinal: Negative for abdominal pain and blood in stool.   Endocrine: Negative for cold intolerance and heat intolerance.   Musculoskeletal: Positive for gait problem and joint swelling. Negative for back pain.    Skin: Negative for pallor and rash.   Neurological: Negative for speech difficulty and headaches.   Psychiatric/Behavioral: Positive for dysphoric mood. Negative for confusion, hallucinations and suicidal ideas.   All other systems reviewed and are negative.    Objective:     Vital Signs (Most Recent):  Temp: 98.9 °F (37.2 °C) (12/19/19 0732)  Pulse: 75 (12/19/19 0732)  Resp: 20 (12/19/19 0732)  BP: (!) 175/85 (12/19/19 0732)  SpO2: (!) 92 % (12/19/19 0838) Vital Signs (24h Range):  Temp:  [97.4 °F (36.3 °C)-98.9 °F (37.2 °C)] 98.9 °F (37.2 °C)  Pulse:  [72-87] 75  Resp:  [16-20] 20  SpO2:  [87 %-99 %] 92 %  BP: (150-199)/() 175/85     Weight: 63.8 kg (140 lb 10.5 oz)  Body mass index is 20.18 kg/m².    Intake/Output Summary (Last 24 hours) at 12/19/2019 1108  Last data filed at 12/19/2019 0800  Gross per 24 hour   Intake 1696.25 ml   Output 250 ml   Net 1446.25 ml      Physical Exam   Constitutional: He is oriented to person, place, and time. No distress.   Thin, chronically ill-appearing  male who appears older than stated age.  Anxious and tremulous   HENT:   Head: Normocephalic.   Mouth/Throat: Oropharynx is clear and moist.   Eyes: Conjunctivae are normal. Right eye exhibits no discharge. Left eye exhibits no discharge. No scleral icterus.   Pupils pinpoint   Neck: No JVD present.   Cardiovascular: Normal rate, regular rhythm, normal heart sounds and intact distal pulses. Exam reveals no friction rub.   No murmur heard.  Pulmonary/Chest: Effort normal and breath sounds normal. No respiratory distress. He has no wheezes.   Abdominal: Soft. Bowel sounds are normal. He exhibits no distension. There is no tenderness.   Musculoskeletal: Normal range of motion. He exhibits no edema.   Noted right lower extremity with obvious deformity and tenderness to palpation over the hip area.   Lymphadenopathy:     He has no cervical adenopathy.   Neurological: He is alert and oriented to person, place, and  time. He has normal reflexes.   Skin: No rash noted. He is not diaphoretic. No erythema.   Psychiatric: He has a normal mood and affect. His behavior is normal.   Nursing note and vitals reviewed.      Significant Labs:   CBC:   Recent Labs   Lab 12/18/19 0618 12/19/19 0453   WBC 4.72 6.28   HGB 13.4* 12.8*   HCT 38.7* 37.4*    180     CMP:   Recent Labs   Lab 12/18/19 0618 12/19/19 0453   * 130*   K 3.9 4.2   CL 89* 94*   CO2 29 28    121*   BUN 6* 7*   CREATININE 0.7 0.8   CALCIUM 8.6* 8.6*   PROT 8.2 7.0   ALBUMIN 4.4 3.4*   BILITOT 0.6 0.9   ALKPHOS 57 62   AST 30 21   ALT 23 18   ANIONGAP 9 8   EGFRNONAA >60.0 >60       Significant Imaging:  Right hip x-ray:   Right femoral neck fracture with mild impaction.  Lumbar spine degenerative disc disease noted      Assessment/Plan:      * Hip fracture  Patient with right-sided femoral neck fracture.  Patient is awaiting hip surgery fracture repair surgery, follow Orthopedic recommendations.  Surgery is planned for tomorrow.  Patient will be NPO after midnight.    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 Continue home dementia meds and non-pharmacologic interventions to prevent delirium (No VS between 11PM-5AM, activity during day, opening blinds, providing glasses/hearing aids, and up in chair during daytime). Use PRN anti-psychotics to prevent behavior of self harm during sundowning, and avoid narcotics and benzos unless absolutely necessary. PRN anti-psychotics prescribed to avoid self harm behaviors.        ETOH abuse  Patient states that he drinks approximately 3-5 beers on the day of presentation.  Denies daily drinking.  States that he drank because he was depressed , and that this does not happen every day.  Continue monitoring of CIWA scores for withdrawal and Ativan as needed.  Start Librium 5 mg every 8 hr.      PAD (peripheral artery disease), mnoderate  Continue  treatment for CAD.  Patient high risk surgical candidate.      History of heart artery stent, one in 2009  Treatment for CAD noted above      History of MI (myocardial infarction) x2, 1987 and 2015  Patient with known CAD s/p stent placement, which is controlled Will continue ASA and Statin and monitor for S/Sx of angina/ACS. Continue to monitor on telemetry.         Smoker, trying to quit again, 1 cig last a week, started age 17, quit for 42 years, restarted 2018  Smoking cessation counseling performed. Dangers of cigarette smoking were reviewed with patient in detail and patient was encouraged to quit. Nicotine replacement options were discussed for > 3 minutes.          VTE Risk Mitigation (From admission, onward)         Ordered     enoxaparin injection 40 mg  Daily      12/18/19 1351     IP VTE HIGH RISK PATIENT  Once      12/18/19 1351                      Zahira Cortes MD  Department of Hospital Medicine   [facility]

## 2019-12-19 NOTE — SUBJECTIVE & OBJECTIVE
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, including shoulder     Cancer 2005    right eye    Colon polyps     COPD (chronic obstructive pulmonary disease)     Depression     Hypertension     Inguinal hernia     left    Kidney stone     MI (myocardial infarction)     Stroke     last stroke 2017    Tremor     Ulcer        Past Surgical History:   Procedure Laterality Date    ANGIOPLASTY      cardiac stent    CARDIAC CATHETERIZATION      COLECTOMY      EYE SURGERY      FINGER SURGERY Left     left index finger    HERNIA REPAIR      OLECRANON BURSECTOMY Right 11/6/2018    Procedure: BURSECTOMY, OLECRANON;  Surgeon: Atul Cooper DO;  Location: [facility] OR;  Service: Orthopedics;  Laterality: Right;  Excision Olecranon Bursa Right Elbow    POLYPECTOMY      REPAIR OF TRICEPS TENDON Right 11/6/2018    Procedure: REPAIR, TENDON, TRICEPS;  Surgeon: Atul Cooper DO;  Location: [facility] OR;  Service: Orthopedics;  Laterality: Right;    SURGICAL REMOVAL OF BONE SPUR Right 11/6/2018    Procedure: EXCISION, BONE SPUR;  Surgeon: Atul Cooper DO;  Location: [facility] OR;  Service: Orthopedics;  Laterality: Right;  Excision Olecranon Bone Spur Right Elbow       Review of patient's allergies indicates:   Allergen Reactions    Codeine Other (See Comments)     hyper       Current Facility-Administered Medications on File Prior to Encounter   Medication    [COMPLETED] hydromorphone (PF) injection 1 mg    [COMPLETED] hydromorphone (PF) injection 2 mg    [COMPLETED] hydromorphone (PF) injection 2 mg    [DISCONTINUED] carvedilol tablet 3.125 mg     Current Outpatient Medications on File Prior to Encounter   Medication Sig    ALPRAZolam (XANAX) 1 MG tablet TAKE 1 TABLET 4 TIMES A DAY AS NEEDED FOR ANXIETY    aspirin 325 MG tablet Take 325 mg by mouth once daily.    carvedilol (COREG) 3.125 MG tablet Take 1 tablet (3.125 mg total) by mouth 2 (two)  times daily.    dicyclomine (BENTYL) 20 mg tablet Take 1 tablet (20 mg total) by mouth 2 (two) times daily.    meloxicam (MOBIC) 15 MG tablet Take 1 tablet (15 mg total) by mouth daily as needed.    memantine (NAMENDA) 5 MG Tab TAKE 1 TABLET 2 TIMES A DAY (WITH MORNING AND EVENING MEAL) FOR MEMORY    pantoprazole (PROTONIX) 40 MG tablet TAKE 1 TABLET IN THE MORNING (30 MINUTES BEFORE BREAKFAST) FOR STOMACH    primidone (MYSOLINE) 50 MG Tab TAKE 1 TABLET 4 TIMES A DAY AS DIRECTED    rosuvastatin (CRESTOR) 40 MG Tab Take 1 tablet (40 mg total) by mouth every evening.     Family History     Problem Relation (Age of Onset)    Cancer Father    Dementia Brother    Heart disease Mother, Father, Brother        Tobacco Use    Smoking status: Current Some Day Smoker     Years: 15.00     Types: Cigarettes     Last attempt to quit: 2018     Years since quittin.5    Smokeless tobacco: Current User     Types: Chew    Tobacco comment: Pt trying to quit   Substance and Sexual Activity    Alcohol use: Yes     Alcohol/week: 2.0 - 3.0 standard drinks     Types: 2 - 3 Cans of beer per week     Comment: occasionally beer    Drug use: No    Sexual activity: Not Currently     Review of Systems   Constitutional: Positive for activity change and fatigue. Negative for fever.   HENT: Negative for ear discharge, facial swelling and sore throat.    Eyes: Negative for photophobia, pain and visual disturbance.   Respiratory: Negative for apnea, cough and shortness of breath.    Cardiovascular: Negative for chest pain and leg swelling.   Gastrointestinal: Negative for abdominal pain and blood in stool.   Endocrine: Negative for cold intolerance and heat intolerance.   Musculoskeletal: Positive for gait problem and joint swelling. Negative for back pain.   Skin: Negative for pallor and rash.   Neurological: Negative for speech difficulty and headaches.   Psychiatric/Behavioral: Positive for dysphoric mood. Negative for  confusion, hallucinations and suicidal ideas.   All other systems reviewed and are negative.    Objective:     Vital Signs (Most Recent):  Temp: 98.3 °F (36.8 °C) (12/18/19 1941)  Pulse: 72 (12/18/19 1941)  Resp: 17 (12/18/19 1941)  BP: (!) 150/84 (12/18/19 1941)  SpO2: 95 % (12/18/19 1941) Vital Signs (24h Range):  Temp:  [97.4 °F (36.3 °C)-98.7 °F (37.1 °C)] 98.3 °F (36.8 °C)  Pulse:  [72-94] 72  Resp:  [16-20] 17  SpO2:  [87 %-98 %] 95 %  BP: (150-194)/() 150/84     Weight: 63.8 kg (140 lb 10.5 oz)  Body mass index is 20.18 kg/m².    Physical Exam   Constitutional: He is oriented to person, place, and time. No distress.   Thin, chronically ill-appearing  male who appears older than stated age.   HENT:   Head: Normocephalic.   Mouth/Throat: Oropharynx is clear and moist.   Eyes: Conjunctivae are normal. Right eye exhibits no discharge. Left eye exhibits no discharge. No scleral icterus.   Pupils pinpoint   Neck: No JVD present.   Cardiovascular: Normal rate, regular rhythm, normal heart sounds and intact distal pulses. Exam reveals no friction rub.   No murmur heard.  Pulmonary/Chest: Effort normal and breath sounds normal. No respiratory distress. He has no wheezes.   Abdominal: Soft. Bowel sounds are normal. He exhibits no distension. There is no tenderness.   Musculoskeletal: Normal range of motion. He exhibits no edema.   Noted right lower extremity with obvious deformity and tenderness to palpation over the hip area.   Lymphadenopathy:     He has no cervical adenopathy.   Neurological: He is alert and oriented to person, place, and time. He has normal reflexes.   Skin: No rash noted. He is not diaphoretic. No erythema.   Psychiatric: He has a normal mood and affect. His behavior is normal.   Nursing note and vitals reviewed.          Significant Labs: All pertinent labs within the past 24 hours have been reviewed.    Significant Imaging: I have reviewed all pertinent imaging results/findings  within the past 24 hours.

## 2019-12-19 NOTE — PLAN OF CARE
Cm completed the assessment at pt's bedside. Pt informed me that his son lives with him.  He's independent in care.  PCP is Dr. Leija.  Insurance verified as Medicare and [place] Medicaid.  Pt denies diabetes, dialysis and coumadin. Disposition:  TBD.  Pt will have surgery on Friday. Pending PT/OT evaluation.  Pt can possibly benefit with Rehab or HH on discharge.        12/19/19 1045   Discharge Assessment   Assessment Type Discharge Planning Assessment   Confirmed/corrected address and phone number on facesheet? Yes   Assessment information obtained from? Patient   Prior to hospitilization cognitive status: Alert/Oriented   Prior to hospitalization functional status: Independent   Current cognitive status: Alert/Oriented   Current Functional Status: Needs Assistance;Assistive Equipment   Facility Arrived From: home   Lives With child(jackson), adult  (son stays with pt)   Able to Return to Prior Arrangements no  (TBD)   Is patient able to care for self after discharge? No   Who are your caregiver(s) and their phone number(s)? son-Micheal - [phone]/ relative - Kaitlin Rebolledo - [phone]   Patient's perception of discharge disposition home or selfcare   Readmission Within the Last 30 Days no previous admission in last 30 days   Patient currently being followed by outpatient case management? No   Patient currently receives any other outside agency services? No   Equipment Currently Used at Home none   Do you have any problems affording any of your prescribed medications? No   Is the patient taking medications as prescribed? yes   Does the patient have transportation home? Yes   Transportation Anticipated family or friend will provide   Dialysis Name and Scheduled days na   Does the patient receive services at the Coumadin Clinic? No   Discharge Plan A Rehab   Discharge Plan B Home Health;Home with family   Patient/Family in Agreement with Plan yes

## 2019-12-19 NOTE — TELEPHONE ENCOUNTER
----- Message from Ruma Hager sent at 12/19/2019  8:34 AM CST -----  Contact: Amy, RN - [facility]  Need to speak to Dr Patel       Contact info  [phone]

## 2019-12-19 NOTE — ASSESSMENT & PLAN NOTE
Patient with known CAD s/p stent placement, which is controlled Will continue ASA and Statin and monitor for S/Sx of angina/ACS. Continue to monitor on telemetry.

## 2019-12-19 NOTE — ASSESSMENT & PLAN NOTE
Patient with right-sided femoral neck fracture.  Patient is awaiting hip surgery fracture repair surgery, follow Orthopedic recommendations.  Surgery is planned for tomorrow.  Patient will be NPO after midnight.

## 2019-12-20 ENCOUNTER — ANESTHESIA (OUTPATIENT)
Dept: SURGERY | Facility: HOSPITAL | Age: 64
DRG: 470 | End: 2019-12-20
Payer: MEDICARE

## 2019-12-20 LAB
ALBUMIN SERPL BCP-MCNC: 3.1 G/DL (ref 3.5–5.2)
ALP SERPL-CCNC: 53 U/L (ref 55–135)
ALT SERPL W/O P-5'-P-CCNC: 15 U/L (ref 10–44)
ANION GAP SERPL CALC-SCNC: 7 MMOL/L (ref 8–16)
AST SERPL-CCNC: 18 U/L (ref 10–40)
BASOPHILS # BLD AUTO: 0.02 K/UL (ref 0–0.2)
BASOPHILS NFR BLD: 0.4 % (ref 0–1.9)
BILIRUB SERPL-MCNC: 0.7 MG/DL (ref 0.1–1)
BUN SERPL-MCNC: 6 MG/DL (ref 8–23)
CALCIUM SERPL-MCNC: 8.2 MG/DL (ref 8.7–10.5)
CHLORIDE SERPL-SCNC: 96 MMOL/L (ref 95–110)
CO2 SERPL-SCNC: 28 MMOL/L (ref 23–29)
CREAT SERPL-MCNC: 0.7 MG/DL (ref 0.5–1.4)
DIFFERENTIAL METHOD: ABNORMAL
EOSINOPHIL # BLD AUTO: 0.2 K/UL (ref 0–0.5)
EOSINOPHIL NFR BLD: 3 % (ref 0–8)
ERYTHROCYTE [DISTWIDTH] IN BLOOD BY AUTOMATED COUNT: 11.8 % (ref 11.5–14.5)
EST. GFR  (AFRICAN AMERICAN): >60 ML/MIN/1.73 M^2
EST. GFR  (NON AFRICAN AMERICAN): >60 ML/MIN/1.73 M^2
GLUCOSE SERPL-MCNC: 98 MG/DL (ref 70–110)
HCT VFR BLD AUTO: 35 % (ref 40–54)
HGB BLD-MCNC: 11.8 G/DL (ref 14–18)
IMM GRANULOCYTES # BLD AUTO: 0.01 K/UL (ref 0–0.04)
LYMPHOCYTES # BLD AUTO: 1 K/UL (ref 1–4.8)
LYMPHOCYTES NFR BLD: 18 % (ref 18–48)
MAGNESIUM SERPL-MCNC: 1.9 MG/DL (ref 1.6–2.6)
MCH RBC QN AUTO: 33.9 PG (ref 27–31)
MCHC RBC AUTO-ENTMCNC: 33.7 G/DL (ref 32–36)
MCV RBC AUTO: 101 FL (ref 82–98)
MONOCYTES # BLD AUTO: 0.7 K/UL (ref 0.3–1)
MONOCYTES NFR BLD: 12.2 % (ref 4–15)
NEUTROPHILS # BLD AUTO: 3.5 K/UL (ref 1.8–7.7)
NEUTROPHILS NFR BLD: 66.2 % (ref 38–73)
NRBC BLD-RTO: 0 /100 WBC
PHOSPHATE SERPL-MCNC: 2.7 MG/DL (ref 2.7–4.5)
PLATELET # BLD AUTO: 164 K/UL (ref 150–350)
PMV BLD AUTO: 9.3 FL (ref 9.2–12.9)
POTASSIUM SERPL-SCNC: 4 MMOL/L (ref 3.5–5.1)
PROT SERPL-MCNC: 6.5 G/DL (ref 6–8.4)
RBC # BLD AUTO: 3.48 M/UL (ref 4.6–6.2)
SODIUM SERPL-SCNC: 131 MMOL/L (ref 136–145)
WBC # BLD AUTO: 5.34 K/UL (ref 3.9–12.7)

## 2019-12-20 PROCEDURE — D9220A PRA ANESTHESIA: Mod: CRNA,,, | Performed by: NURSE ANESTHETIST, CERTIFIED REGISTERED

## 2019-12-20 PROCEDURE — 36415 COLL VENOUS BLD VENIPUNCTURE: CPT

## 2019-12-20 PROCEDURE — 64450 NJX AA&/STRD OTHER PN/BRANCH: CPT | Mod: 59,RT,, | Performed by: ANESTHESIOLOGY

## 2019-12-20 PROCEDURE — 80053 COMPREHEN METABOLIC PANEL: CPT

## 2019-12-20 PROCEDURE — 25000003 PHARM REV CODE 250: Performed by: ANESTHESIOLOGY

## 2019-12-20 PROCEDURE — 63600175 PHARM REV CODE 636 W HCPCS: Performed by: NURSE ANESTHETIST, CERTIFIED REGISTERED

## 2019-12-20 PROCEDURE — 37000008 HC ANESTHESIA 1ST 15 MINUTES: Performed by: ORTHOPAEDIC SURGERY

## 2019-12-20 PROCEDURE — 83735 ASSAY OF MAGNESIUM: CPT

## 2019-12-20 PROCEDURE — 63600175 PHARM REV CODE 636 W HCPCS: Performed by: HOSPITALIST

## 2019-12-20 PROCEDURE — 71000039 HC RECOVERY, EACH ADD'L HOUR: Performed by: ORTHOPAEDIC SURGERY

## 2019-12-20 PROCEDURE — D9220A PRA ANESTHESIA: ICD-10-PCS | Mod: ANES,,, | Performed by: ANESTHESIOLOGY

## 2019-12-20 PROCEDURE — 25000003 PHARM REV CODE 250: Performed by: INTERNAL MEDICINE

## 2019-12-20 PROCEDURE — D9220A PRA ANESTHESIA: Mod: ANES,,, | Performed by: ANESTHESIOLOGY

## 2019-12-20 PROCEDURE — 64450 PR NERVE BLOCK INJ, ANES/STEROID, OTHER PERIPHERAL: ICD-10-PCS | Mod: 59,RT,, | Performed by: ANESTHESIOLOGY

## 2019-12-20 PROCEDURE — C1776 JOINT DEVICE (IMPLANTABLE): HCPCS | Performed by: ORTHOPAEDIC SURGERY

## 2019-12-20 PROCEDURE — 25000003 PHARM REV CODE 250: Performed by: NURSE ANESTHETIST, CERTIFIED REGISTERED

## 2019-12-20 PROCEDURE — 99900103 DSU ONLY-NO CHARGE-INITIAL HR (STAT): Performed by: ORTHOPAEDIC SURGERY

## 2019-12-20 PROCEDURE — 36000710: Performed by: ORTHOPAEDIC SURGERY

## 2019-12-20 PROCEDURE — 63600175 PHARM REV CODE 636 W HCPCS: Performed by: ORTHOPAEDIC SURGERY

## 2019-12-20 PROCEDURE — 27000221 HC OXYGEN, UP TO 24 HOURS

## 2019-12-20 PROCEDURE — 36000711: Performed by: ORTHOPAEDIC SURGERY

## 2019-12-20 PROCEDURE — 71000033 HC RECOVERY, INTIAL HOUR: Performed by: ORTHOPAEDIC SURGERY

## 2019-12-20 PROCEDURE — C1713 ANCHOR/SCREW BN/BN,TIS/BN: HCPCS | Performed by: ORTHOPAEDIC SURGERY

## 2019-12-20 PROCEDURE — D9220A PRA ANESTHESIA: ICD-10-PCS | Mod: CRNA,,, | Performed by: NURSE ANESTHETIST, CERTIFIED REGISTERED

## 2019-12-20 PROCEDURE — 76942 ECHO GUIDE FOR BIOPSY: CPT | Mod: 26,,, | Performed by: ANESTHESIOLOGY

## 2019-12-20 PROCEDURE — 64447 NJX AA&/STRD FEMORAL NRV IMG: CPT | Performed by: ANESTHESIOLOGY

## 2019-12-20 PROCEDURE — 27200750 HC INSULATED NEEDLE/ STIMUPLEX: Performed by: ANESTHESIOLOGY

## 2019-12-20 PROCEDURE — 25000003 PHARM REV CODE 250: Performed by: ORTHOPAEDIC SURGERY

## 2019-12-20 PROCEDURE — 84100 ASSAY OF PHOSPHORUS: CPT

## 2019-12-20 PROCEDURE — 76942 PR U/S GUIDANCE FOR NEEDLE GUIDANCE: ICD-10-PCS | Mod: 26,,, | Performed by: ANESTHESIOLOGY

## 2019-12-20 PROCEDURE — 94761 N-INVAS EAR/PLS OXIMETRY MLT: CPT

## 2019-12-20 PROCEDURE — 37000009 HC ANESTHESIA EA ADD 15 MINS: Performed by: ORTHOPAEDIC SURGERY

## 2019-12-20 PROCEDURE — 63600175 PHARM REV CODE 636 W HCPCS

## 2019-12-20 PROCEDURE — 85025 COMPLETE CBC W/AUTO DIFF WBC: CPT

## 2019-12-20 PROCEDURE — 12000002 HC ACUTE/MED SURGE SEMI-PRIVATE ROOM

## 2019-12-20 PROCEDURE — 27201423 OPTIME MED/SURG SUP & DEVICES STERILE SUPPLY: Performed by: ORTHOPAEDIC SURGERY

## 2019-12-20 PROCEDURE — 25000003 PHARM REV CODE 250: Performed by: HOSPITALIST

## 2019-12-20 DEVICE — LINER ACET SZ E 42MM COCR: Type: IMPLANTABLE DEVICE | Site: HIP | Status: FUNCTIONAL

## 2019-12-20 DEVICE — SCREW TRIDENT II LP HEX 6.5X25: Type: IMPLANTABLE DEVICE | Site: HIP | Status: FUNCTIONAL

## 2019-12-20 DEVICE — STEM FEM SZ6 132DEG 35MM: Type: IMPLANTABLE DEVICE | Site: HIP | Status: FUNCTIONAL

## 2019-12-20 DEVICE — INSERT ADM X3 28MM CUP OD 48MM: Type: IMPLANTABLE DEVICE | Site: HIP | Status: FUNCTIONAL

## 2019-12-20 DEVICE — IMPLANTABLE DEVICE: Type: IMPLANTABLE DEVICE | Site: HIP | Status: FUNCTIONAL

## 2019-12-20 DEVICE — SHELL TRIDENT II ACET E 54MM: Type: IMPLANTABLE DEVICE | Site: HIP | Status: FUNCTIONAL

## 2019-12-20 RX ORDER — NEOSTIGMINE METHYLSULFATE 1 MG/ML
INJECTION, SOLUTION INTRAVENOUS
Status: DISCONTINUED | OUTPATIENT
Start: 2019-12-20 | End: 2019-12-20

## 2019-12-20 RX ORDER — TRANEXAMIC ACID 100 MG/ML
INJECTION, SOLUTION INTRAVENOUS
Status: DISCONTINUED | OUTPATIENT
Start: 2019-12-20 | End: 2019-12-20 | Stop reason: HOSPADM

## 2019-12-20 RX ORDER — ACETAMINOPHEN 10 MG/ML
1000 INJECTION, SOLUTION INTRAVENOUS EVERY 8 HOURS
Status: COMPLETED | OUTPATIENT
Start: 2019-12-20 | End: 2019-12-21

## 2019-12-20 RX ORDER — SODIUM CHLORIDE, SODIUM LACTATE, POTASSIUM CHLORIDE, CALCIUM CHLORIDE 600; 310; 30; 20 MG/100ML; MG/100ML; MG/100ML; MG/100ML
125 INJECTION, SOLUTION INTRAVENOUS CONTINUOUS
Status: DISCONTINUED | OUTPATIENT
Start: 2019-12-20 | End: 2019-12-20

## 2019-12-20 RX ORDER — TRANEXAMIC ACID 100 MG/ML
INJECTION, SOLUTION INTRAVENOUS
Status: DISCONTINUED | OUTPATIENT
Start: 2019-12-20 | End: 2019-12-20

## 2019-12-20 RX ORDER — SODIUM CHLORIDE 9 MG/ML
INJECTION, SOLUTION INTRAVENOUS CONTINUOUS
Status: DISCONTINUED | OUTPATIENT
Start: 2019-12-20 | End: 2019-12-20

## 2019-12-20 RX ORDER — SODIUM CHLORIDE 0.9 % (FLUSH) 0.9 %
5 SYRINGE (ML) INJECTION
Status: DISCONTINUED | OUTPATIENT
Start: 2019-12-20 | End: 2019-12-24 | Stop reason: HOSPADM

## 2019-12-20 RX ORDER — LIDOCAINE HYDROCHLORIDE 10 MG/ML
1 INJECTION, SOLUTION EPIDURAL; INFILTRATION; INTRACAUDAL; PERINEURAL ONCE
Status: DISCONTINUED | OUTPATIENT
Start: 2019-12-20 | End: 2019-12-20 | Stop reason: HOSPADM

## 2019-12-20 RX ORDER — SODIUM CHLORIDE 9 MG/ML
INJECTION, SOLUTION INTRAVENOUS CONTINUOUS
Status: DISCONTINUED | OUTPATIENT
Start: 2019-12-20 | End: 2019-12-22

## 2019-12-20 RX ORDER — DEXAMETHASONE SODIUM PHOSPHATE 4 MG/ML
INJECTION, SOLUTION INTRA-ARTICULAR; INTRALESIONAL; INTRAMUSCULAR; INTRAVENOUS; SOFT TISSUE
Status: DISCONTINUED | OUTPATIENT
Start: 2019-12-20 | End: 2019-12-20

## 2019-12-20 RX ORDER — BUPIVACAINE HYDROCHLORIDE AND EPINEPHRINE 2.5; 5 MG/ML; UG/ML
INJECTION, SOLUTION EPIDURAL; INFILTRATION; INTRACAUDAL; PERINEURAL
Status: DISCONTINUED | OUTPATIENT
Start: 2019-12-20 | End: 2019-12-20

## 2019-12-20 RX ORDER — FENTANYL CITRATE 50 UG/ML
INJECTION, SOLUTION INTRAMUSCULAR; INTRAVENOUS
Status: DISCONTINUED | OUTPATIENT
Start: 2019-12-20 | End: 2019-12-20

## 2019-12-20 RX ORDER — HYDROMORPHONE HYDROCHLORIDE 2 MG/ML
0.2 INJECTION, SOLUTION INTRAMUSCULAR; INTRAVENOUS; SUBCUTANEOUS EVERY 5 MIN PRN
Status: DISCONTINUED | OUTPATIENT
Start: 2019-12-20 | End: 2019-12-20 | Stop reason: HOSPADM

## 2019-12-20 RX ORDER — GLYCOPYRROLATE 0.2 MG/ML
INJECTION INTRAMUSCULAR; INTRAVENOUS
Status: DISCONTINUED | OUTPATIENT
Start: 2019-12-20 | End: 2019-12-20

## 2019-12-20 RX ORDER — PROPOFOL 10 MG/ML
VIAL (ML) INTRAVENOUS
Status: DISCONTINUED | OUTPATIENT
Start: 2019-12-20 | End: 2019-12-20

## 2019-12-20 RX ORDER — CEFAZOLIN SODIUM 2 G/50ML
2 SOLUTION INTRAVENOUS
Status: DISCONTINUED | OUTPATIENT
Start: 2019-12-20 | End: 2019-12-20 | Stop reason: ALTCHOICE

## 2019-12-20 RX ORDER — SUCCINYLCHOLINE CHLORIDE 20 MG/ML
INJECTION INTRAMUSCULAR; INTRAVENOUS
Status: DISCONTINUED | OUTPATIENT
Start: 2019-12-20 | End: 2019-12-20

## 2019-12-20 RX ORDER — LIDOCAINE HCL/PF 100 MG/5ML
SYRINGE (ML) INTRAVENOUS
Status: DISCONTINUED | OUTPATIENT
Start: 2019-12-20 | End: 2019-12-20

## 2019-12-20 RX ORDER — MIDAZOLAM HYDROCHLORIDE 1 MG/ML
INJECTION, SOLUTION INTRAMUSCULAR; INTRAVENOUS
Status: DISCONTINUED | OUTPATIENT
Start: 2019-12-20 | End: 2019-12-20

## 2019-12-20 RX ORDER — PHENYLEPHRINE HYDROCHLORIDE 10 MG/ML
INJECTION INTRAVENOUS
Status: DISCONTINUED | OUTPATIENT
Start: 2019-12-20 | End: 2019-12-20

## 2019-12-20 RX ORDER — ACETAMINOPHEN 10 MG/ML
INJECTION, SOLUTION INTRAVENOUS
Status: DISCONTINUED | OUTPATIENT
Start: 2019-12-20 | End: 2019-12-20

## 2019-12-20 RX ORDER — CEFAZOLIN SODIUM 1 G/3ML
INJECTION, POWDER, FOR SOLUTION INTRAMUSCULAR; INTRAVENOUS
Status: DISCONTINUED | OUTPATIENT
Start: 2019-12-20 | End: 2019-12-20

## 2019-12-20 RX ORDER — CEFAZOLIN SODIUM 2 G/50ML
2 SOLUTION INTRAVENOUS
Status: DISCONTINUED | OUTPATIENT
Start: 2019-12-20 | End: 2019-12-21

## 2019-12-20 RX ORDER — ROCURONIUM BROMIDE 10 MG/ML
INJECTION, SOLUTION INTRAVENOUS
Status: DISCONTINUED | OUTPATIENT
Start: 2019-12-20 | End: 2019-12-20

## 2019-12-20 RX ORDER — ONDANSETRON 2 MG/ML
4 INJECTION INTRAMUSCULAR; INTRAVENOUS ONCE AS NEEDED
Status: DISCONTINUED | OUTPATIENT
Start: 2019-12-20 | End: 2019-12-20 | Stop reason: HOSPADM

## 2019-12-20 RX ORDER — OXYCODONE HYDROCHLORIDE 5 MG/1
5 TABLET ORAL ONCE AS NEEDED
Status: DISCONTINUED | OUTPATIENT
Start: 2019-12-20 | End: 2019-12-20 | Stop reason: HOSPADM

## 2019-12-20 RX ADMIN — ONDANSETRON 4 MG: 2 INJECTION, SOLUTION INTRAMUSCULAR; INTRAVENOUS at 02:12

## 2019-12-20 RX ADMIN — DICYCLOMINE HYDROCHLORIDE 20 MG: 20 TABLET ORAL at 10:12

## 2019-12-20 RX ADMIN — CHLORDIAZEPOXIDE HYDROCHLORIDE 5 MG: 5 CAPSULE ORAL at 01:12

## 2019-12-20 RX ADMIN — SODIUM CHLORIDE, SODIUM GLUCONATE, SODIUM ACETATE, POTASSIUM CHLORIDE, MAGNESIUM CHLORIDE, SODIUM PHOSPHATE, DIBASIC, AND POTASSIUM PHOSPHATE: .53; .5; .37; .037; .03; .012; .00082 INJECTION, SOLUTION INTRAVENOUS at 03:12

## 2019-12-20 RX ADMIN — CELECOXIB 200 MG: 100 CAPSULE ORAL at 08:12

## 2019-12-20 RX ADMIN — PHENYLEPHRINE HYDROCHLORIDE 200 MCG: 10 INJECTION INTRAVENOUS at 03:12

## 2019-12-20 RX ADMIN — CEFAZOLIN SODIUM 2 G: 2 SOLUTION INTRAVENOUS at 10:12

## 2019-12-20 RX ADMIN — CARVEDILOL 3.12 MG: 3.12 TABLET, FILM COATED ORAL at 10:12

## 2019-12-20 RX ADMIN — CHLORDIAZEPOXIDE HYDROCHLORIDE 5 MG: 5 CAPSULE ORAL at 10:12

## 2019-12-20 RX ADMIN — SENNOSIDES AND DOCUSATE SODIUM 1 TABLET: 8.6; 5 TABLET ORAL at 08:12

## 2019-12-20 RX ADMIN — FOLIC ACID 1 MG: 1 TABLET ORAL at 08:12

## 2019-12-20 RX ADMIN — ASPIRIN 325 MG ORAL TABLET 325 MG: 325 PILL ORAL at 08:12

## 2019-12-20 RX ADMIN — ROCURONIUM BROMIDE 40 MG: 10 INJECTION, SOLUTION INTRAVENOUS at 02:12

## 2019-12-20 RX ADMIN — PROPOFOL 150 MG: 10 INJECTION, EMULSION INTRAVENOUS at 02:12

## 2019-12-20 RX ADMIN — SODIUM CHLORIDE: 0.9 INJECTION, SOLUTION INTRAVENOUS at 05:12

## 2019-12-20 RX ADMIN — NEOSTIGMINE METHYLSULFATE 3 MG: 1 INJECTION INTRAVENOUS at 03:12

## 2019-12-20 RX ADMIN — CARVEDILOL 3.12 MG: 3.12 TABLET, FILM COATED ORAL at 08:12

## 2019-12-20 RX ADMIN — FENTANYL CITRATE 50 MCG: 50 INJECTION, SOLUTION INTRAMUSCULAR; INTRAVENOUS at 02:12

## 2019-12-20 RX ADMIN — ALPRAZOLAM 1 MG: 1 TABLET ORAL at 01:12

## 2019-12-20 RX ADMIN — FENTANYL CITRATE 50 MCG: 50 INJECTION, SOLUTION INTRAMUSCULAR; INTRAVENOUS at 03:12

## 2019-12-20 RX ADMIN — ENOXAPARIN SODIUM 40 MG: 100 INJECTION SUBCUTANEOUS at 11:12

## 2019-12-20 RX ADMIN — MIDAZOLAM 2 MG: 1 INJECTION INTRAMUSCULAR; INTRAVENOUS at 02:12

## 2019-12-20 RX ADMIN — SUCCINYLCHOLINE CHLORIDE 140 MG: 20 INJECTION, SOLUTION INTRAMUSCULAR; INTRAVENOUS at 02:12

## 2019-12-20 RX ADMIN — BUPIVACAINE HYDROCHLORIDE AND EPINEPHRINE 50 ML: 2.5; 5 INJECTION, SOLUTION EPIDURAL; INFILTRATION; INTRACAUDAL; PERINEURAL at 05:12

## 2019-12-20 RX ADMIN — SODIUM CHLORIDE, SODIUM GLUCONATE, SODIUM ACETATE, POTASSIUM CHLORIDE, MAGNESIUM CHLORIDE, SODIUM PHOSPHATE, DIBASIC, AND POTASSIUM PHOSPHATE: .53; .5; .37; .037; .03; .012; .00082 INJECTION, SOLUTION INTRAVENOUS at 01:12

## 2019-12-20 RX ADMIN — SENNOSIDES AND DOCUSATE SODIUM 1 TABLET: 8.6; 5 TABLET ORAL at 10:12

## 2019-12-20 RX ADMIN — HYDROCODONE BITARTRATE AND ACETAMINOPHEN 1 TABLET: 10; 325 TABLET ORAL at 12:12

## 2019-12-20 RX ADMIN — ROCURONIUM BROMIDE 10 MG: 10 INJECTION, SOLUTION INTRAVENOUS at 02:12

## 2019-12-20 RX ADMIN — ACETAMINOPHEN 1000 MG: 10 INJECTION, SOLUTION INTRAVENOUS at 02:12

## 2019-12-20 RX ADMIN — MEMANTINE HYDROCHLORIDE 5 MG: 5 TABLET ORAL at 08:12

## 2019-12-20 RX ADMIN — Medication 100 MG: at 08:12

## 2019-12-20 RX ADMIN — LIDOCAINE HYDROCHLORIDE 100 MG: 20 INJECTION, SOLUTION INTRAVENOUS at 02:12

## 2019-12-20 RX ADMIN — ROSUVASTATIN CALCIUM 40 MG: 20 TABLET, FILM COATED ORAL at 10:12

## 2019-12-20 RX ADMIN — HYDROCODONE BITARTRATE AND ACETAMINOPHEN 1 TABLET: 10; 325 TABLET ORAL at 10:12

## 2019-12-20 RX ADMIN — DEXAMETHASONE SODIUM PHOSPHATE 8 MG: 4 INJECTION, SOLUTION INTRAMUSCULAR; INTRAVENOUS at 02:12

## 2019-12-20 RX ADMIN — VANCOMYCIN HYDROCHLORIDE 1500 MG: 1.5 INJECTION, POWDER, LYOPHILIZED, FOR SOLUTION INTRAVENOUS at 02:12

## 2019-12-20 RX ADMIN — HYDROCODONE BITARTRATE AND ACETAMINOPHEN 1 TABLET: 10; 325 TABLET ORAL at 04:12

## 2019-12-20 RX ADMIN — HYDROCODONE BITARTRATE AND ACETAMINOPHEN 1 TABLET: 10; 325 TABLET ORAL at 08:12

## 2019-12-20 RX ADMIN — DICYCLOMINE HYDROCHLORIDE 20 MG: 20 TABLET ORAL at 08:12

## 2019-12-20 RX ADMIN — SODIUM CHLORIDE, SODIUM GLUCONATE, SODIUM ACETATE, POTASSIUM CHLORIDE, MAGNESIUM CHLORIDE, SODIUM PHOSPHATE, DIBASIC, AND POTASSIUM PHOSPHATE: .53; .5; .37; .037; .03; .012; .00082 INJECTION, SOLUTION INTRAVENOUS at 02:12

## 2019-12-20 RX ADMIN — GLYCOPYRROLATE 0.4 MG: 0.2 INJECTION, SOLUTION INTRAMUSCULAR; INTRAVENOUS at 03:12

## 2019-12-20 RX ADMIN — GLYCOPYRROLATE 0.2 MG: 0.2 INJECTION, SOLUTION INTRAMUSCULAR; INTRAVENOUS at 02:12

## 2019-12-20 RX ADMIN — CEFAZOLIN 2 G: 1 INJECTION, POWDER, FOR SOLUTION INTRAVENOUS at 02:12

## 2019-12-20 RX ADMIN — CELECOXIB 200 MG: 100 CAPSULE ORAL at 10:12

## 2019-12-20 RX ADMIN — THERA TABS 1 TABLET: TAB at 08:12

## 2019-12-20 RX ADMIN — PANTOPRAZOLE SODIUM 40 MG: 40 TABLET, DELAYED RELEASE ORAL at 08:12

## 2019-12-20 RX ADMIN — ACETAMINOPHEN 1000 MG: 10 INJECTION, SOLUTION INTRAVENOUS at 10:12

## 2019-12-20 RX ADMIN — TRANEXAMIC ACID 6500 MG: 100 INJECTION, SOLUTION INTRAVENOUS at 02:12

## 2019-12-20 NOTE — PLAN OF CARE
Patient AAO, VSS. Pt verbalized understanding of POC. Has been NPO after Midnight for surgery this am. PIV with fluids infusing without difficulty. Abrasion to right elbow bandaged without drainage. Cardiac monitoring ongoing. Scrape on knee open to air. Purposeful hourly/q2hr rounding done during shift to promote patient safety. Patient free from falls and injury during shift.  Bed in lowest position, brakes locked, and call light within reach.  Will continue to monitor.

## 2019-12-20 NOTE — ASSESSMENT & PLAN NOTE
Patient with right-sided femoral neck fracture.  Patient is awaiting hip surgery fracture repair surgery, follow Orthopedic recommendations.  Surgery is planned for today.  Presently NPO.

## 2019-12-20 NOTE — PLAN OF CARE
12/20/19 0943   PRE-TX-O2   O2 Device (Oxygen Therapy) nasal cannula   $ Is the patient on Low Flow Oxygen? Yes   Flow (L/min) 3   Oxygen Concentration (%) 36   SpO2 96 %   Pulse Oximetry Type Intermittent   $ Pulse Oximetry - Multiple Charge Pulse Oximetry - Multiple   Ready to Wean/Extubation Screen   FIO2<=50 (chart decimal) 0.36

## 2019-12-20 NOTE — ANESTHESIA PROCEDURE NOTES
Peripheral Block    Patient location during procedure: pre-op   Block not for primary anesthetic.  Reason for block: at surgeon's request and post-op pain management   Post-op Pain Location: right hip   Start time: 12/20/2019 1:40 PM  Timeout: 12/20/2019 1:40 PM   End time: 12/20/2019 1:50 PM    Staffing  Authorizing Provider: Kamaljit Enriquez MD  Performing Provider: Kamaljit Enriquez MD    Preanesthetic Checklist  Completed: patient identified, site marked, surgical consent, pre-op evaluation, timeout performed, IV checked, risks and benefits discussed and monitors and equipment checked  Peripheral Block  Patient position: supine  Prep: ChloraPrep  Patient monitoring: heart rate, cardiac monitor, continuous pulse ox, continuous capnometry and frequent blood pressure checks  Block type: fascia iliaca  Laterality: right  Injection technique: single shot  Needle  Needle type: Stimuplex   Needle gauge: 22 G  Needle length: 4 in  Needle localization: ultrasound guidance   -ultrasound image captured on disc.  Assessment  Injection assessment: negative aspiration  Paresthesia pain: none  Heart rate change: no  Slow fractionated injection: yes

## 2019-12-20 NOTE — PROGRESS NOTES
[facility] Hospital Medicine  Progress Note    Patient Name: Ricky Cole  MRN: [id number]  Patient Class: IP- Inpatient   Admission Date: 12/18/2019  Length of Stay: 2 days  Attending Physician: Zahira Cortes MD  Primary Care Provider: Hiram Leija MD        Subjective:     Principal Problem:Hip fracture    HPI:  Patient is a 64-year-old  male with a past medical history significant for binge drinking of alcohol, COPD, hypertension, stroke with alcohol/vascular dementia and depression who presents the hospital after becoming intoxicated and falling.  Subsequent to the patient's fall, he developed a femoral neck fracture and presented to the emergency department outside hospital.  He was found to be mildly intoxicated, hyponatremic and was transferred to [facility] for orthopedics consult and hip fracture repair.  Patient denies any chest pain, shortness of breath, nausea, vomiting, diarrhea, fevers.  He appears to have pain in the affected extremity, but otherwise denies pain or neurologic symptoms.    Overview/Hospital Course:  No notes on file    Interval History:  Patient is scheduled for right femoral neck fracture repair surgery today.  Complaining of hip pain at the fracture site.  Appears anxious and somewhat tremulous.  Patient denies any chest pain, shortness of breath or any fever.    Review of Systems   Constitutional: Positive for activity change and fatigue. Negative for fever.   HENT: Negative for ear discharge, facial swelling and sore throat.    Eyes: Negative for photophobia, pain and visual disturbance.   Respiratory: Negative for apnea, cough and shortness of breath.    Cardiovascular: Negative for chest pain and leg swelling.   Gastrointestinal: Negative for abdominal pain and blood in stool.   Endocrine: Negative for cold intolerance and heat intolerance.   Musculoskeletal: Positive for gait problem and joint swelling. Negative for back pain.   Skin:  Negative for pallor and rash.   Neurological: Negative for speech difficulty and headaches.   Psychiatric/Behavioral: Positive for dysphoric mood. Negative for confusion, hallucinations and suicidal ideas.   All other systems reviewed and are negative.    Objective:     Vital Signs (Most Recent):  Temp: 97.5 °F (36.4 °C) (12/20/19 0748)  Pulse: 68 (12/20/19 0748)  Resp: 18 (12/20/19 0748)  BP: (!) 162/75 (12/20/19 0748)  SpO2: 97 % (12/20/19 0748) Vital Signs (24h Range):  Temp:  [97 °F (36.1 °C)-98.4 °F (36.9 °C)] 97.5 °F (36.4 °C)  Pulse:  [68-84] 68  Resp:  [17-20] 18  SpO2:  [93 %-97 %] 97 %  BP: (137-189)/(67-89) 162/75     Weight: 64.6 kg (142 lb 6.7 oz)  Body mass index is 20.43 kg/m².    Intake/Output Summary (Last 24 hours) at 12/20/2019 0841  Last data filed at 12/20/2019 0800  Gross per 24 hour   Intake 3130 ml   Output 2350 ml   Net 780 ml      Physical Exam   Constitutional: He is oriented to person, place, and time. No distress.   Thin, chronically ill-appearing  male who appears older than stated age.  Anxious and tremulous   HENT:   Head: Normocephalic.   Mouth/Throat: Oropharynx is clear and moist.   Eyes: Conjunctivae are normal. Right eye exhibits no discharge. Left eye exhibits no discharge. No scleral icterus.   Pupils pinpoint   Neck: No JVD present.   Cardiovascular: Normal rate, regular rhythm, normal heart sounds and intact distal pulses. Exam reveals no friction rub.   No murmur heard.  Pulmonary/Chest: Effort normal and breath sounds normal. No respiratory distress. He has no wheezes.   Abdominal: Soft. Bowel sounds are normal. He exhibits no distension. There is no tenderness.   Musculoskeletal: Normal range of motion. He exhibits no edema.   Noted right lower extremity with obvious deformity and tenderness to palpation over the hip area.   Lymphadenopathy:     He has no cervical adenopathy.   Neurological: He is alert and oriented to person, place, and time. He has normal  reflexes.   Skin: No rash noted. He is not diaphoretic. No erythema.   Psychiatric: He has a normal mood and affect. His behavior is normal.   Nursing note and vitals reviewed.      Significant Labs:   CBC:   Recent Labs   Lab 12/19/19 0453 12/20/19 0448   WBC 6.28 5.34   HGB 12.8* 11.8*   HCT 37.4* 35.0*    164     CMP:   Recent Labs   Lab 12/19/19 0453 12/20/19 0448   * 131*   K 4.2 4.0   CL 94* 96   CO2 28 28   * 98   BUN 7* 6*   CREATININE 0.8 0.7   CALCIUM 8.6* 8.2*   PROT 7.0 6.5   ALBUMIN 3.4* 3.1*   BILITOT 0.9 0.7   ALKPHOS 62 53*   AST 21 18   ALT 18 15   ANIONGAP 8 7*   EGFRNONAA >60 >60       Significant Imaging:  Right hip x-ray:   Right femoral neck fracture with mild impaction.  Lumbar spine degenerative disc disease noted      Assessment/Plan:      * Hip fracture  Patient with right-sided femoral neck fracture.  Patient is awaiting hip surgery fracture repair surgery, follow Orthopedic recommendations.  Surgery is planned for today.  Presently NPO.    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 Continue home dementia meds and non-pharmacologic interventions to prevent delirium (No VS between 11PM-5AM, activity during day, opening blinds, providing glasses/hearing aids, and up in chair during daytime). Use PRN anti-psychotics to prevent behavior of self harm during sundowning, and avoid narcotics and benzos unless absolutely necessary. PRN anti-psychotics prescribed to avoid self harm behaviors.        ETOH abuse  Patient states that he drinks approximately 3-5 beers on the day of presentation.  Denies daily drinking.  States that he drank because he was depressed , and that this does not happen every day.  Continue monitoring of CIWA scores for withdrawal and Ativan as needed.  Start Librium 5 mg every 8 hr.      PAD (peripheral artery disease), mnoderate  Continue treatment for CAD.  Patient high risk surgical  candidate.      History of heart artery stent, one in 2009  Treatment for CAD noted above      History of MI (myocardial infarction) x2, 1987 and 2015  Patient with known CAD s/p stent placement, which is controlled Will continue ASA and Statin and monitor for S/Sx of angina/ACS. Continue to monitor on telemetry.         Smoker, trying to quit again, 1 cig last a week, started age 17, quit for 42 years, restarted 2018  Smoking cessation counseling performed. Dangers of cigarette smoking were reviewed with patient in detail and patient was encouraged to quit. Nicotine replacement options were discussed for > 3 minutes.          VTE Risk Mitigation (From admission, onward)         Ordered     enoxaparin injection 40 mg  Daily      12/18/19 1351     IP VTE HIGH RISK PATIENT  Once      12/18/19 1351              Zahira Cortes MD  Department of Hospital Medicine   [facility]

## 2019-12-20 NOTE — ANESTHESIA PROCEDURE NOTES
Intubation  Performed by: Westley Paige CRNA  Authorized by: Kamaljit Enriquez MD     Intubation:     Induction:  Intravenous    Intubated:  Postinduction    Mask Ventilation:  Easy mask    Attempts:  1    Attempted By:  CRNA    Method of Intubation:  Direct    Blade:  Van 2    Laryngeal View Grade: Grade I - full view of chords      Difficult Airway Encountered?: No      Complications:  None    Airway Device:  Oral endotracheal tube    Airway Device Size:  7.5    Style/Cuff Inflation:  Cuffed (inflated to minimal occlusive pressure)    Inflation Amount (mL):  3    Tube secured:  22    Secured at:  The lips    Placement Verified By:  Capnometry    Complicating Factors:  Small mouth and large/floppy epiglottis    Findings Post-Intubation:  BS equal bilateral

## 2019-12-20 NOTE — TRANSFER OF CARE
"Anesthesia Transfer of Care Note    Patient: Ricky Cole    Procedure(s) Performed: Procedure(s) (LRB):  ARTHROPLASTY, HIP, [place], pegboard, 1st assist (Right)    Patient location: PACU    Anesthesia Type: general    Transport from OR: Transported from OR on 2-3 L/min O2 by NC with adequate spontaneous ventilation    Post pain: adequate analgesia    Post assessment: no apparent anesthetic complications    Post vital signs: stable    Level of consciousness: awake    Nausea/Vomiting: no nausea/vomiting    Complications: none    Transfer of care protocol was followed      Last vitals:   Visit Vitals  BP (!) 176/78   Pulse 73   Temp 37.2 °C (99 °F) (Tympanic)   Resp 16   Ht 5' 10" (1.778 m)   Wt 64.4 kg (142 lb)   SpO2 (!) 93%   BMI 20.37 kg/m²     "

## 2019-12-20 NOTE — PLAN OF CARE
Dr Enriquez released from pacu vs wnl skin w+d pain controlled  Sleepy arouses then falls back to sleep  from meds no nausea no emesis back to room 324 via bed + pedal pulse and knee immoblizer on  pedipulse to r foot pillow between legs hob less than 30 degrees

## 2019-12-20 NOTE — ANESTHESIA POSTPROCEDURE EVALUATION
Anesthesia Post Evaluation    Patient: Ricky Cole    Procedure(s) Performed: Procedure(s) (LRB):  ARTHROPLASTY, HIP, [place], pegboard, 1st assist (Right)    Final Anesthesia Type: general    Patient location during evaluation: PACU  Patient participation: Yes- Able to Participate  Level of consciousness: awake and alert  Post-procedure vital signs: reviewed and stable  Pain management: adequate  Airway patency: patent    PONV status at discharge: No PONV  Anesthetic complications: no      Cardiovascular status: hemodynamically stable  Respiratory status: unassisted and room air  Hydration status: euvolemic  Follow-up not needed.          Vitals Value Taken Time   /74 12/20/2019  4:59 PM   Temp 36.7 °C (98.1 °F) 12/20/2019  4:15 PM   Pulse 68 12/20/2019  5:06 PM   Resp 35 12/20/2019  5:06 PM   SpO2 91 % 12/20/2019  5:06 PM   Vitals shown include unvalidated device data.      No case tracking events are documented in the log.      Pain/Ramin Score: Pain Rating Prior to Med Admin: 9 (12/20/2019 12:22 PM)  Pain Rating Post Med Admin: 6 (12/20/2019  9:43 AM)  Ramin Score: 8 (12/20/2019  4:45 PM)

## 2019-12-20 NOTE — PLAN OF CARE
Pre op assessment  Site marked right upper leg/consents signed anesthesia and surgical  Pt talking about his son and how fell off the doorstep in the front of the house in the morning.

## 2019-12-20 NOTE — SUBJECTIVE & OBJECTIVE
Interval History:  Patient is scheduled for right femoral neck fracture repair surgery today.  Complaining of hip pain at the fracture site.  Appears anxious and somewhat tremulous.  Patient denies any chest pain, shortness of breath or any fever.    Review of Systems   Constitutional: Positive for activity change and fatigue. Negative for fever.   HENT: Negative for ear discharge, facial swelling and sore throat.    Eyes: Negative for photophobia, pain and visual disturbance.   Respiratory: Negative for apnea, cough and shortness of breath.    Cardiovascular: Negative for chest pain and leg swelling.   Gastrointestinal: Negative for abdominal pain and blood in stool.   Endocrine: Negative for cold intolerance and heat intolerance.   Musculoskeletal: Positive for gait problem and joint swelling. Negative for back pain.   Skin: Negative for pallor and rash.   Neurological: Negative for speech difficulty and headaches.   Psychiatric/Behavioral: Positive for dysphoric mood. Negative for confusion, hallucinations and suicidal ideas.   All other systems reviewed and are negative.    Objective:     Vital Signs (Most Recent):  Temp: 97.5 °F (36.4 °C) (12/20/19 0748)  Pulse: 68 (12/20/19 0748)  Resp: 18 (12/20/19 0748)  BP: (!) 162/75 (12/20/19 0748)  SpO2: 97 % (12/20/19 0748) Vital Signs (24h Range):  Temp:  [97 °F (36.1 °C)-98.4 °F (36.9 °C)] 97.5 °F (36.4 °C)  Pulse:  [68-84] 68  Resp:  [17-20] 18  SpO2:  [93 %-97 %] 97 %  BP: (137-189)/(67-89) 162/75     Weight: 64.6 kg (142 lb 6.7 oz)  Body mass index is 20.43 kg/m².    Intake/Output Summary (Last 24 hours) at 12/20/2019 0841  Last data filed at 12/20/2019 0800  Gross per 24 hour   Intake 3130 ml   Output 2350 ml   Net 780 ml      Physical Exam   Constitutional: He is oriented to person, place, and time. No distress.   Thin, chronically ill-appearing  male who appears older than stated age.  Anxious and tremulous   HENT:   Head: Normocephalic.   Mouth/Throat:  Oropharynx is clear and moist.   Eyes: Conjunctivae are normal. Right eye exhibits no discharge. Left eye exhibits no discharge. No scleral icterus.   Pupils pinpoint   Neck: No JVD present.   Cardiovascular: Normal rate, regular rhythm, normal heart sounds and intact distal pulses. Exam reveals no friction rub.   No murmur heard.  Pulmonary/Chest: Effort normal and breath sounds normal. No respiratory distress. He has no wheezes.   Abdominal: Soft. Bowel sounds are normal. He exhibits no distension. There is no tenderness.   Musculoskeletal: Normal range of motion. He exhibits no edema.   Noted right lower extremity with obvious deformity and tenderness to palpation over the hip area.   Lymphadenopathy:     He has no cervical adenopathy.   Neurological: He is alert and oriented to person, place, and time. He has normal reflexes.   Skin: No rash noted. He is not diaphoretic. No erythema.   Psychiatric: He has a normal mood and affect. His behavior is normal.   Nursing note and vitals reviewed.      Significant Labs:   CBC:   Recent Labs   Lab 12/19/19  0453 12/20/19  0448   WBC 6.28 5.34   HGB 12.8* 11.8*   HCT 37.4* 35.0*    164     CMP:   Recent Labs   Lab 12/19/19  0453 12/20/19  0448   * 131*   K 4.2 4.0   CL 94* 96   CO2 28 28   * 98   BUN 7* 6*   CREATININE 0.8 0.7   CALCIUM 8.6* 8.2*   PROT 7.0 6.5   ALBUMIN 3.4* 3.1*   BILITOT 0.9 0.7   ALKPHOS 62 53*   AST 21 18   ALT 18 15   ANIONGAP 8 7*   EGFRNONAA >60 >60       Significant Imaging:  Right hip x-ray:   Right femoral neck fracture with mild impaction.  Lumbar spine degenerative disc disease noted

## 2019-12-20 NOTE — OP NOTE
ORTHOPEDIC PROCEDURE NOTE    Date of surgery: 12/20/2019  Patient: Ricky Cole    Medical record number: [id number]    Surgeon: Kody Patel M.D.   First assist: Mary Grace Hardwick   Preoperative diagnosis: Hip fracture [S72.009A]  Postoperative diagnosis: Hip fracture [S72.009A]  Procedure performed: right total hip arthroplasty (CPT 92396)  Complications: none  Estimated blood loss: 200cc  Anesthesia: spinal  Implants: Lindy Accolade II femoral stem size 6, [place] Tritanium acetabular cup size 54mm, Lindy dual mobility and a 25mm [place] acetabular screw    History of Present Illness: Patient presented with RIGHT hip pain.  Appropriate workup found that the patient suffered from hip fracture.  Nonoperative management was employed, yet the symptoms from the osteoarthritic hip remained refractory.  Recommendation of a total hip arthroplasty was given to the patient.  All risk/benefits of the procedure were explained, and all questions were answered. Consents were obtained in clinic, and the patient was scheduled for surgery.      Procedure in Detail: The patient arrived to the preoperative surgery center for evaluation and preparation for surgery.  Consents were verified, and the patient was marked by the operating surgeon.  The patient was then transported to the operative theatre where anesthesia was administered by the Anesthesia Department.  The patient was placed in the lateral decubitus position on a peg board with all superficial neurovascular structures well-padded.  The operative lower extremity and hip was then sterilely prepped and draped in the normal fashion.  A timeout was taken to verify the proper patient, proper operative limb, appropriate procedure to be performed and the administration of preoperative antibiotics.  All agreed, and we proceeded.     A 20cm linear incision was made from the base of the greater trochanter extending proximally and distally.  Subcutaneous tissue was sharply  dissected down to the gluteal fascia and fascia gunjan.  Any subcutaneous bleeding was stopped using electrocautery.  The gluteal fascia and fascia gunjan were then incised along the line of the incision, and the gluteus kamar was split along the line of its fibers.  The abductor musculature was identified and retracted anteriorly.  The piriformis tendon was identified and tagged with #1 Ethibond for later repair.  This was then reflected from its insertion on the greater trochanter.  The conjoined tendon of the obturator internus and Gemelli tendons were also secured with #1 Ethibond and released.  A capsulotomy was then made along the base of the femoral neck.  The capsule was secured with two figure-of-eight sutures of #1 Vicryl for lateral repair.     The hip fracture was identified.  The proximal femoral osteotomy was made approximately 14mm proximal to the lesser trochanter and the femoral head removed with a corkscrew.  Acetabular retractors were placed, and the labrum and any osteophytes were removed.  Reamers were then used to ream medially to the inner table of the acetabulum and sequentially increased to ream in approximately 40 degrees of abduction and 20 degrees of anteversion.  Excellent concentric bleeding bone was noted after a 54mm reamer, and the 54mm trial had excellent fit.  A 54mm porous coated component was then impacted in approximately 40 degrees of abduction and 20 degrees of anteversion.  Screw fixation was deemed necessary, and a single screw was placed in the posterior-superior quadrant.  The final liner was placed, and attention was then given to femoral preparation.      A box chisel was used to gain access to the femoral canal followed by the lateralizing broach.  Sequential broaching found that a size 6 broach had jack proximal fixation.  A -0.25mm  trial head was applied and satisfactory leg lengths and excellent stability were noted throughout the range of motion.  At this time,  all trial components were removed, and the wound was thoroughly irrigated with normal saline.  The femoral stem was impacted and found to be vertically and rotationally stable.  The femoral head was then impacted onto the femoral stem.  The hip was reduced and satisfactory leg lengths were noted.  There was excellent stability throughout the range of motion with no impingement.     The wound was once again thoroughly irrigated with pulse lavage of normal saline.  Two holes were drilled in the greater trochanter using a 2.5mm drill bit, and the capsule and external rotators were each tied over the bony bridge for posterior capsular repair.  The gluteal fascia and fascia gunjan were closed with interrupted figure-of-eight sutures of #1 Vicryl.  Using an 18gauge needle on a large syringe, 50ccs of tranexamic acid was injected into the dead space of the hip joint.  There was no reverse flow into the wound verifying appropriate closure of the joint.  The subcutaneous tissue was closed with interrupted inverted stitches of 3-0 Vicryl.  The skin was closed with a subcuticular 4-0 Monocryl and Dermabond Prineo tape.  The wound was dressed with and Aquacel dressing.  The operative limb was placed in a knee immobilizer, and the patient was transferred from the operative table to the hospital bed.  Patient was then transported into the Post Anesthesia Care Unit in stable condition.      Plan for the Patient:  Patient will be admitted to the floor for immediate mobilization with physical therapy upon recovery from the anesthetic agents.  Patient will abide by posterior hip precautions, and the knee immobilizer will be removed in the morning.      Kody Patel M.D.  [facility]

## 2019-12-20 NOTE — ANESTHESIA PREPROCEDURE EVALUATION
12/20/2019  Ricky Cole is a 64 y.o., male.    Anesthesia Evaluation    I have reviewed the Patient Summary Reports.    I have reviewed the Nursing Notes.   I have reviewed the Medications.     Review of Systems  Anesthesia Hx:  No problems with previous Anesthesia    Social:  Smoker, Alcohol Use H/o ETOH abuse    Hematology/Oncology:         -- Cancer in past history:   EENT/Dental:EENT/Dental Normal   Cardiovascular:   Hypertension Past MI CABG/stent     Pulmonary:   COPD    Renal/:   Chronic Renal Disease renal calculi    Neurological:   CVA  Dementia    Psych:   Psychiatric History depression          Physical Exam  General:  Well nourished    Airway/Jaw/Neck:  Airway Findings: Mouth Opening: Normal Tongue: Normal  General Airway Assessment: Adult  Mallampati: II        Eyes/Ears/Nose:  EYES/EARS/NOSE FINDINGS: Normal   Dental:  No upper teeth. Some lower. None loose per patient   Chest/Lungs:  Chest/Lungs Findings: Clear to auscultation, Normal Respiratory Rate     Heart/Vascular:  Heart Findings: Rate: Normal  Rhythm: Regular Rhythm        Mental Status:  Mental Status Findings:  Cooperative, Alert and Oriented         Anesthesia Plan  Type of Anesthesia, risks & benefits discussed:  Anesthesia Type:  general  Patient's Preference:   Intra-op Monitoring Plan: standard ASA monitors  Intra-op Monitoring Plan Comments:   Post Op Pain Control Plan: IV/PO Opioids PRN, multimodal analgesia and peripheral nerve block  Post Op Pain Control Plan Comments:   Induction:   IV  Beta Blocker:  Patient is not currently on a Beta-Blocker (No further documentation required).       Informed Consent: Patient understands risks and agrees with Anesthesia plan.  Questions answered. Anesthesia consent signed with patient.  ASA Score: 3     Day of Surgery Review of History & Physical: I have interviewed and examined the  patient. I have reviewed the patient's H&P dated:    H&P update referred to the surgeon.         Ready For Surgery From Anesthesia Perspective.

## 2019-12-21 PROBLEM — R25.1 TREMOR: Status: ACTIVE | Noted: 2019-12-21

## 2019-12-21 PROBLEM — I10 ESSENTIAL HYPERTENSION: Status: ACTIVE | Noted: 2019-12-21

## 2019-12-21 LAB
ALBUMIN SERPL BCP-MCNC: 2.7 G/DL (ref 3.5–5.2)
ALP SERPL-CCNC: 48 U/L (ref 55–135)
ALT SERPL W/O P-5'-P-CCNC: 17 U/L (ref 10–44)
ANION GAP SERPL CALC-SCNC: 6 MMOL/L (ref 8–16)
AST SERPL-CCNC: 29 U/L (ref 10–40)
BASOPHILS # BLD AUTO: 0.02 K/UL (ref 0–0.2)
BASOPHILS NFR BLD: 0.3 % (ref 0–1.9)
BILIRUB SERPL-MCNC: 0.3 MG/DL (ref 0.1–1)
BUN SERPL-MCNC: 15 MG/DL (ref 8–23)
CALCIUM SERPL-MCNC: 7.8 MG/DL (ref 8.7–10.5)
CHLORIDE SERPL-SCNC: 97 MMOL/L (ref 95–110)
CO2 SERPL-SCNC: 27 MMOL/L (ref 23–29)
CREAT SERPL-MCNC: 0.9 MG/DL (ref 0.5–1.4)
DIFFERENTIAL METHOD: ABNORMAL
EOSINOPHIL # BLD AUTO: 0.2 K/UL (ref 0–0.5)
EOSINOPHIL NFR BLD: 1.9 % (ref 0–8)
ERYTHROCYTE [DISTWIDTH] IN BLOOD BY AUTOMATED COUNT: 11.7 % (ref 11.5–14.5)
EST. GFR  (AFRICAN AMERICAN): >60 ML/MIN/1.73 M^2
EST. GFR  (NON AFRICAN AMERICAN): >60 ML/MIN/1.73 M^2
GLUCOSE SERPL-MCNC: 208 MG/DL (ref 70–110)
HCT VFR BLD AUTO: 32.2 % (ref 40–54)
HGB BLD-MCNC: 10.9 G/DL (ref 14–18)
IMM GRANULOCYTES # BLD AUTO: 0.02 K/UL (ref 0–0.04)
LYMPHOCYTES # BLD AUTO: 0.6 K/UL (ref 1–4.8)
LYMPHOCYTES NFR BLD: 7 % (ref 18–48)
MAGNESIUM SERPL-MCNC: 1.9 MG/DL (ref 1.6–2.6)
MCH RBC QN AUTO: 34 PG (ref 27–31)
MCHC RBC AUTO-ENTMCNC: 33.9 G/DL (ref 32–36)
MCV RBC AUTO: 100 FL (ref 82–98)
MONOCYTES # BLD AUTO: 0.6 K/UL (ref 0.3–1)
MONOCYTES NFR BLD: 7.3 % (ref 4–15)
NEUTROPHILS # BLD AUTO: 6.5 K/UL (ref 1.8–7.7)
NEUTROPHILS NFR BLD: 83.2 % (ref 38–73)
NRBC BLD-RTO: 0 /100 WBC
PHOSPHATE SERPL-MCNC: 3 MG/DL (ref 2.7–4.5)
PLATELET # BLD AUTO: 183 K/UL (ref 150–350)
PMV BLD AUTO: 9 FL (ref 9.2–12.9)
POTASSIUM SERPL-SCNC: 4.4 MMOL/L (ref 3.5–5.1)
PROT SERPL-MCNC: 5.9 G/DL (ref 6–8.4)
RBC # BLD AUTO: 3.21 M/UL (ref 4.6–6.2)
SODIUM SERPL-SCNC: 130 MMOL/L (ref 136–145)
WBC # BLD AUTO: 7.83 K/UL (ref 3.9–12.7)

## 2019-12-21 PROCEDURE — 63600175 PHARM REV CODE 636 W HCPCS: Performed by: HOSPITALIST

## 2019-12-21 PROCEDURE — 63600175 PHARM REV CODE 636 W HCPCS: Performed by: INTERNAL MEDICINE

## 2019-12-21 PROCEDURE — 25000003 PHARM REV CODE 250: Performed by: ORTHOPAEDIC SURGERY

## 2019-12-21 PROCEDURE — 83735 ASSAY OF MAGNESIUM: CPT

## 2019-12-21 PROCEDURE — 12000002 HC ACUTE/MED SURGE SEMI-PRIVATE ROOM

## 2019-12-21 PROCEDURE — G8978 MOBILITY CURRENT STATUS: HCPCS | Mod: CN

## 2019-12-21 PROCEDURE — 97161 PT EVAL LOW COMPLEX 20 MIN: CPT

## 2019-12-21 PROCEDURE — 25000003 PHARM REV CODE 250: Performed by: INTERNAL MEDICINE

## 2019-12-21 PROCEDURE — 97165 OT EVAL LOW COMPLEX 30 MIN: CPT

## 2019-12-21 PROCEDURE — 97535 SELF CARE MNGMENT TRAINING: CPT

## 2019-12-21 PROCEDURE — 25000003 PHARM REV CODE 250: Performed by: HOSPITALIST

## 2019-12-21 PROCEDURE — 85025 COMPLETE CBC W/AUTO DIFF WBC: CPT

## 2019-12-21 PROCEDURE — 80053 COMPREHEN METABOLIC PANEL: CPT

## 2019-12-21 PROCEDURE — G8979 MOBILITY GOAL STATUS: HCPCS | Mod: CI

## 2019-12-21 PROCEDURE — 84100 ASSAY OF PHOSPHORUS: CPT

## 2019-12-21 PROCEDURE — 94761 N-INVAS EAR/PLS OXIMETRY MLT: CPT

## 2019-12-21 PROCEDURE — 36415 COLL VENOUS BLD VENIPUNCTURE: CPT

## 2019-12-21 PROCEDURE — 63600175 PHARM REV CODE 636 W HCPCS: Performed by: ORTHOPAEDIC SURGERY

## 2019-12-21 RX ORDER — PRIMIDONE 50 MG/1
50 TABLET ORAL 4 TIMES DAILY
Status: DISCONTINUED | OUTPATIENT
Start: 2019-12-21 | End: 2019-12-24 | Stop reason: HOSPADM

## 2019-12-21 RX ORDER — CEFAZOLIN SODIUM 2 G/50ML
2 SOLUTION INTRAVENOUS
Status: COMPLETED | OUTPATIENT
Start: 2019-12-21 | End: 2019-12-21

## 2019-12-21 RX ORDER — CARVEDILOL 3.12 MG/1
3.12 TABLET ORAL 2 TIMES DAILY
Status: DISCONTINUED | OUTPATIENT
Start: 2019-12-21 | End: 2019-12-21 | Stop reason: SDUPTHER

## 2019-12-21 RX ORDER — ALPRAZOLAM 1 MG/1
1 TABLET ORAL 3 TIMES DAILY PRN
Status: DISCONTINUED | OUTPATIENT
Start: 2019-12-21 | End: 2019-12-21

## 2019-12-21 RX ADMIN — LORAZEPAM 2 MG: 1 TABLET ORAL at 08:12

## 2019-12-21 RX ADMIN — DICYCLOMINE HYDROCHLORIDE 20 MG: 20 TABLET ORAL at 08:12

## 2019-12-21 RX ADMIN — ASPIRIN 325 MG ORAL TABLET 325 MG: 325 PILL ORAL at 08:12

## 2019-12-21 RX ADMIN — FOLIC ACID 1 MG: 1 TABLET ORAL at 08:12

## 2019-12-21 RX ADMIN — CHLORDIAZEPOXIDE HYDROCHLORIDE 5 MG: 5 CAPSULE ORAL at 01:12

## 2019-12-21 RX ADMIN — MEMANTINE HYDROCHLORIDE 5 MG: 5 TABLET ORAL at 08:12

## 2019-12-21 RX ADMIN — CELECOXIB 200 MG: 100 CAPSULE ORAL at 08:12

## 2019-12-21 RX ADMIN — ROSUVASTATIN CALCIUM 40 MG: 20 TABLET, FILM COATED ORAL at 08:12

## 2019-12-21 RX ADMIN — CARVEDILOL 3.12 MG: 3.12 TABLET, FILM COATED ORAL at 08:12

## 2019-12-21 RX ADMIN — ENOXAPARIN SODIUM 40 MG: 100 INJECTION SUBCUTANEOUS at 05:12

## 2019-12-21 RX ADMIN — THERA TABS 1 TABLET: TAB at 08:12

## 2019-12-21 RX ADMIN — Medication 100 MG: at 08:12

## 2019-12-21 RX ADMIN — CHLORDIAZEPOXIDE HYDROCHLORIDE 5 MG: 5 CAPSULE ORAL at 06:12

## 2019-12-21 RX ADMIN — PANTOPRAZOLE SODIUM 40 MG: 40 TABLET, DELAYED RELEASE ORAL at 08:12

## 2019-12-21 RX ADMIN — SODIUM CHLORIDE: 0.9 INJECTION, SOLUTION INTRAVENOUS at 08:12

## 2019-12-21 RX ADMIN — ACETAMINOPHEN 1000 MG: 10 INJECTION, SOLUTION INTRAVENOUS at 01:12

## 2019-12-21 RX ADMIN — PRIMIDONE 50 MG: 50 TABLET ORAL at 08:12

## 2019-12-21 RX ADMIN — HYDROCODONE BITARTRATE AND ACETAMINOPHEN 1 TABLET: 10; 325 TABLET ORAL at 02:12

## 2019-12-21 RX ADMIN — CEFAZOLIN SODIUM 2 G: 2 SOLUTION INTRAVENOUS at 09:12

## 2019-12-21 RX ADMIN — HYDROCODONE BITARTRATE AND ACETAMINOPHEN 1 TABLET: 10; 325 TABLET ORAL at 08:12

## 2019-12-21 RX ADMIN — ACETAMINOPHEN 1000 MG: 10 INJECTION, SOLUTION INTRAVENOUS at 05:12

## 2019-12-21 RX ADMIN — CEFAZOLIN SODIUM 2 G: 2 SOLUTION INTRAVENOUS at 04:12

## 2019-12-21 RX ADMIN — SENNOSIDES AND DOCUSATE SODIUM 1 TABLET: 8.6; 5 TABLET ORAL at 08:12

## 2019-12-21 RX ADMIN — HYDROCODONE BITARTRATE AND ACETAMINOPHEN 1 TABLET: 10; 325 TABLET ORAL at 05:12

## 2019-12-21 RX ADMIN — CHLORDIAZEPOXIDE HYDROCHLORIDE 5 MG: 5 CAPSULE ORAL at 10:12

## 2019-12-21 RX ADMIN — HYDROCODONE BITARTRATE AND ACETAMINOPHEN 1 TABLET: 5; 325 TABLET ORAL at 08:12

## 2019-12-21 RX ADMIN — SODIUM CHLORIDE: 0.9 INJECTION, SOLUTION INTRAVENOUS at 10:12

## 2019-12-21 NOTE — PLAN OF CARE
Pt is  AAOX4. POC reviewed with pt. Pt verbalized understanding. VSS. Remains afebrile. IVF infusing per order. IV abx infused per order. Pain controlled with PRN medications. Arthroplasty performed. Pt tolerated well. Remains free of injury. Bed low, breaks locked, call light in reach. Will monitor.

## 2019-12-21 NOTE — PLAN OF CARE
12/21/19 1628   PRE-TX-O2   SpO2 96 %   Pulse Oximetry Type Intermittent   $ Pulse Oximetry - Multiple Charge Pulse Oximetry - Multiple

## 2019-12-21 NOTE — PLAN OF CARE
Problem: Occupational Therapy Goal  Goal: Occupational Therapy Goal  Description  Goals to be met by: 1/4/2020     Patient will increase functional independence with ADLs by performing:    LE Dressing with Modified [place] and Assistive Devices as needed.  Grooming while standing at sink with Modified [place].  Toileting from toilet with Modified [place] for hygiene and clothing management.   Supine to sit with Modified [place].  Toilet transfer to toilet with Modified [place].     Outcome: Ongoing, Progressing

## 2019-12-21 NOTE — PT/OT/SLP EVAL
Physical Therapy Evaluation    Patient Name:  Ricky Cole   MRN:  [id number]    Recommendations:     Discharge Recommendations:  home, outpatient PT   Discharge Equipment Recommendations: walker, rolling   Barriers to discharge: None    Assessment:     Ricky Cole is a 64 y.o. male admitted with a medical diagnosis of Hip fracture.  He presents with the following impairments/functional limitations:  weakness, impaired functional mobilty, gait instability, impaired balance, impaired skin, pain, decreased lower extremity function, orthopedic precautions  .    Rehab Prognosis: Good; patient would benefit from acute skilled PT services to address these deficits and reach maximum level of function.    Recent Surgery: Procedure(s) (LRB):  ARTHROPLASTY, HIP, [place], pegboard, 1st assist (Right) 1 Day Post-Op    Plan:     During this hospitalization, patient to be seen BID to address the identified rehab impairments via gait training, therapeutic activities, therapeutic exercises and progress toward the following goals:    · Plan of Care Expires:  12/31/19    Subjective     Chief Complaint: pain  Patient/Family Comments/goals: agrees to work with PT despite initial reluctance due to R foot numb, pain, and weakness  Pain/Comfort:  · Pain Rating 1: 6/10  · Location - Side 1: Right  · Location 1: hip  · Pain Addressed 1: Pre-medicate for activity, Reposition, Distraction, Cessation of Activity  · Pain Rating Post-Intervention 1: 6/10    Living Environment:  Trailer with 3 steps to enter with rail  Prior to admission, patients level of function was independent.  Equipment used at home: none.  DME owned (not currently used): none.  Upon discharge, patient will have assistance from son.    Objective:     Communicated with nurse (Arabella) prior to session.  Patient found supine with peripheral IV, bed alarm, knee immobilizer, joseph catheter  upon PT entry to room.    General Precautions: Standard, fall   Orthopedic  Precautions:RLE weight bearing as tolerated, RLE posterior precautions   Braces: Knee immobilizer     Exams:  · RLE ROM: hip N/T due to precautions; knee N/T due to knee immobilizer; ankle WFL PROM (also arom post rx)  · RLE Strength: HIP 2-/5, KNEE N/T; ankle 3-/5 post rx  · LLE ROM: WFL  · LLE Strength: WFL    Functional Mobility:  · Bed Mobility:     · Rolling Left:  moderate assistance  · Supine to Sit: moderate assistance  · Sit to Supine: moderate assistance  · Transfers:     · Sit to Stand:  minimum assistance and moderate assistance with rolling walker and from mod height bed  · Gait: 20' with RW with min assist and cues      Therapeutic Activities and Exercises:   instructed perform frequent ankle DF/PF (other limited due to knee immobilizer)  Instructed patient in posterior hip precautions, including demonstration.    AM-PAC 6 CLICK MOBILITY  Total Score:14     Patient left HOB elevated with all lines intact, call button in reach and bed alarm on.    GOALS:   Multidisciplinary Problems     Physical Therapy Goals        Problem: Physical Therapy Goal    Goal Priority Disciplines Outcome Goal Variances Interventions   Physical Therapy Goal     PT, PT/OT Ongoing, Progressing     Description:  Goals to be met by:      Patient will increase functional independence with mobility by performin). Supine to sit with Modified [place]  2). Sit to supine with Modified [place]  3). Sit to stand transfer with Modified [place]  4). Bed to chair transfer with Modified [place] using Rolling Walker  5). Gait  x  > 150 feet with Modified [place] using Rolling Walker.    6) Comprehends posterior hip precautions.                    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, including shoulder     Cancer 2005    right eye    Colon polyps     COPD (chronic obstructive pulmonary disease)     Depression      Hypertension     Inguinal hernia     left    Kidney stone     MI (myocardial infarction)     Stroke     last stroke 2017    Tremor     Ulcer        Past Surgical History:   Procedure Laterality Date    ANGIOPLASTY      cardiac stent    CARDIAC CATHETERIZATION      COLECTOMY      EYE SURGERY      FINGER SURGERY Left     left index finger    HERNIA REPAIR      OLECRANON BURSECTOMY Right 11/6/2018    Procedure: BURSECTOMY, OLECRANON;  Surgeon: Atul Cooper DO;  Location: [facility] OR;  Service: Orthopedics;  Laterality: Right;  Excision Olecranon Bursa Right Elbow    POLYPECTOMY      REPAIR OF TRICEPS TENDON Right 11/6/2018    Procedure: REPAIR, TENDON, TRICEPS;  Surgeon: Atul Cooper DO;  Location: [facility] OR;  Service: Orthopedics;  Laterality: Right;    SURGICAL REMOVAL OF BONE SPUR Right 11/6/2018    Procedure: EXCISION, BONE SPUR;  Surgeon: Atul Cooper DO;  Location: [facility] OR;  Service: Orthopedics;  Laterality: Right;  Excision Olecranon Bone Spur Right Elbow       Time Tracking:     PT Received On: 12/21/19  PT Start Time: 0855     PT Stop Time: 0925  PT Total Time (min): 30 min     Billable Minutes: Evaluation 30      Maury Motta, PT  12/21/2019

## 2019-12-21 NOTE — PT/OT/SLP EVAL
"Occupational Therapy   Evaluation    Name: Ricky Cole  MRN: [id number]  Admitting Diagnosis:  Hip fracture 1 Day Post-Op    Recommendations:     Discharge Recommendations: home health OT, home health PT, nursing facility, skilled  Discharge Equipment Recommendations:  walker, rolling, hip kit, bedside commode  Barriers to discharge:  Decreased caregiver support    Assessment:     Ricky Cole is a 64 y.o. male with a medical diagnosis of Hip fracture.  He presents with decreased motivation with participation in OOB self care activities 2/2 complaints of nausea and R LE pain. Pt can perform grooming tasks at bed level with set up of items prior to task participation. However, pt would agreeable education with adaptive equipment for LB dressing and review of movement restrictions with R hip.  Performance deficits affecting function: weakness, impaired endurance, impaired self care skills, decreased lower extremity function, decreased coordination, impaired functional mobilty, pain, decreased ROM, orthopedic precautions.      Rehab Prognosis: Good; patient would benefit from acute skilled OT services to address these deficits and reach maximum level of function.       Plan:     Patient to be seen 4 x/week to address the above listed problems via self-care/home management, therapeutic activities, therapeutic exercises  · Plan of Care Expires: 01/04/20  · Plan of Care Reviewed with: patient    Subjective     Chief Complaint: nausea  Patient/Family Comments/goals: "I feel so sick right now. I'm not used to pain medicine."    Occupational Profile:  Living Environment: Pt lives in a trailer with 3 BERTA and HR's.  Previous level of function: Pt was independent with ADLs and mobility.  Roles and Routines: Pt frequently does yard work.   Equipment Used at Home:  none  Assistance upon Discharge: Pt will have limited assistance at home as pt stated his son is very unreliable.     Pain/Comfort:  · Pain Rating 1: " 6/10  · Location - Side 1: Right  · Location - Orientation 1: generalized  · Location 1: hip  · Pain Addressed 1: Distraction, Reposition  · Pain Rating Post-Intervention 1: 6/10    Patients cultural, spiritual, Zoroastrianism conflicts given the current situation:      Objective:     Communicated with: nurse Bell prior to session.  Patient found HOB elevated with knee immobilizer, joseph catheter, peripheral IV, bed alarm upon OT entry to room.    General Precautions: Standard, fall   Orthopedic Precautions:RLE weight bearing as tolerated, RLE posterior precautions   Braces: Knee immobilizer     Occupational Performance:    Bed Mobility:    · Not assessed; pt refused 2/2 nausea and R hip pain; See PT notes     Functional Mobility/Transfers:  · Not assessed; pt refused 2/2 nausea and R hip pain; See PT notes     Activities of Daily Living:  · Grooming: supervision at bed level with set up of items  · Lower Body Dressing: maximal assistance to don/doff socks while supine at HOB.  · Toileting: dependence with joseph cath in place    Cognitive/Visual Perceptual:  Cognitive/Psychosocial Skills:     -       Oriented to: x4   -       Follows Commands/attention:Follows multistep  commands  -       Communication: clear/fluent  -       Mood/Affect/Coping skills/emotional control: Appropriate to situation  Visual/Perceptual:      -Intact     Physical Exam:  Postural examination/scapula alignment:    -       Rounded shoulders  -       Forward head  Upper Extremity Range of Motion:     -       Right Upper Extremity: WFL  -       Left Upper Extremity: WFL  Upper Extremity Strength:    -       Right Upper Extremity: WFL  -       Left Upper Extremity: WFL   Strength:    -       Right Upper Extremity: WFL  -       Left Upper Extremity: WFL  Fine Motor Coordination:    -       Intact;   Gross motor coordination:   WFL in B UE    AMPAC 6 Click ADL:  AMPAC Total Score: 17    Treatment & Education:  OT educated pt on posterior hip  precautions  OT ed pt on use of adaptive equipment for LB dressing & safe item retrieval with reacher with demonstration provided.  OT ed pt on OT role & POC as well as discharge recommendations.    Education:    Patient left HOB elevated with all lines intact, call button in reach and bed alarm on    GOALS:   Multidisciplinary Problems     Occupational Therapy Goals        Problem: Occupational Therapy Goal    Goal Priority Disciplines Outcome Interventions   Occupational Therapy Goal     OT, PT/OT Ongoing, Progressing    Description:  Goals to be met by: 1/4/2020     Patient will increase functional independence with ADLs by performing:    LE Dressing with Modified [place] and Assistive Devices as needed.  Grooming while standing at sink with Modified [place].  Toileting from toilet with Modified [place] for hygiene and clothing management.   Supine to sit with Modified [place].  Toilet transfer to toilet with Modified [place].                      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, including shoulder     Cancer 2005    right eye    Colon polyps     COPD (chronic obstructive pulmonary disease)     Depression     Hypertension     Inguinal hernia     left    Kidney stone     MI (myocardial infarction)     Stroke     last stroke 2017    Tremor     Ulcer        Past Surgical History:   Procedure Laterality Date    ANGIOPLASTY      cardiac stent    CARDIAC CATHETERIZATION      COLECTOMY      EYE SURGERY      FINGER SURGERY Left     left index finger    HERNIA REPAIR      OLECRANON BURSECTOMY Right 11/6/2018    Procedure: BURSECTOMY, OLECRANON;  Surgeon: Atul Cooper DO;  Location: [facility] OR;  Service: Orthopedics;  Laterality: Right;  Excision Olecranon Bursa Right Elbow    POLYPECTOMY      REPAIR OF TRICEPS TENDON Right 11/6/2018    Procedure: REPAIR, TENDON, TRICEPS;  Surgeon: Atul OATES  DO Allison;  Location: [facility] OR;  Service: Orthopedics;  Laterality: Right;    SURGICAL REMOVAL OF BONE SPUR Right 11/6/2018    Procedure: EXCISION, BONE SPUR;  Surgeon: Atul Cooper DO;  Location: [facility] OR;  Service: Orthopedics;  Laterality: Right;  Excision Olecranon Bone Spur Right Elbow       Time Tracking:     OT Date of Treatment: 12/21/19  OT Start Time: 1435  OT Stop Time: 1459  OT Total Time (min): 24 min    Billable Minutes:Evaluation 10  Self Care/Home Management 14    Arnaldo Fields OT  12/21/2019

## 2019-12-21 NOTE — PLAN OF CARE
Problem: Physical Therapy Goal  Goal: Physical Therapy Goal  Description  Goals to be met by:      Patient will increase functional independence with mobility by performin). Supine to sit with Modified [place]  2). Sit to supine with Modified [place]  3). Sit to stand transfer with Modified [place]  4). Bed to chair transfer with Modified [place] using Rolling Walker  5). Gait  x  > 150 feet with Modified [place] using Rolling Walker.      Outcome: Ongoing, Progressing

## 2019-12-21 NOTE — PLAN OF CARE
Patient AAO, VSS. Pt verbalized understanding of POC. Ice and immobilizer in use to left hip. SCDs intact. PIV infusing without difficulty. Almonte draining well. Purposeful hourly/q2hr rounding done during shift to promote patient safety. Patient free from falls and injury during shift.  Bed in lowest position, brakes locked, and call light within reach.  Will continue to monitor.

## 2019-12-21 NOTE — PLAN OF CARE
Pt is  AAOX4. POC reviewed with pt. Pt verbalized understanding. VSS. Remains afebrile. IVF infusing per order. IV abx infused per order. Pain controlled with PRN medications. Pt worked with therapy today, pt states numbness is subsiding and sensation is slowly returning to right extremity. Remains free of injury. Bed low, breaks locked, call light in reach. Will monitor.

## 2019-12-22 PROBLEM — F17.200 SMOKER: Status: RESOLVED | Noted: 2019-07-24 | Resolved: 2019-12-22

## 2019-12-22 PROBLEM — M79.604 PAIN IN BOTH LOWER EXTREMITIES: Status: RESOLVED | Noted: 2019-07-24 | Resolved: 2019-12-22

## 2019-12-22 PROBLEM — M70.21 OLECRANON BURSITIS OF RIGHT ELBOW: Status: RESOLVED | Noted: 2018-11-06 | Resolved: 2019-12-22

## 2019-12-22 PROBLEM — S52.021A OLECRANON FRACTURE, RIGHT, CLOSED, INITIAL ENCOUNTER: Status: RESOLVED | Noted: 2018-10-26 | Resolved: 2019-12-22

## 2019-12-22 PROBLEM — M79.605 PAIN IN BOTH LOWER EXTREMITIES: Status: RESOLVED | Noted: 2019-07-24 | Resolved: 2019-12-22

## 2019-12-22 PROBLEM — R07.89 ATYPICAL CHEST PAIN: Status: RESOLVED | Noted: 2019-07-24 | Resolved: 2019-12-22

## 2019-12-22 PROBLEM — M70.21 OLECRANON BURSITIS, RIGHT ELBOW: Status: RESOLVED | Noted: 2018-10-26 | Resolved: 2019-12-22

## 2019-12-22 LAB
ALBUMIN SERPL BCP-MCNC: 2.8 G/DL (ref 3.5–5.2)
ALP SERPL-CCNC: 49 U/L (ref 55–135)
ALT SERPL W/O P-5'-P-CCNC: 16 U/L (ref 10–44)
ANION GAP SERPL CALC-SCNC: 6 MMOL/L (ref 8–16)
AST SERPL-CCNC: 40 U/L (ref 10–40)
BASOPHILS # BLD AUTO: 0.03 K/UL (ref 0–0.2)
BASOPHILS NFR BLD: 0.5 % (ref 0–1.9)
BILIRUB SERPL-MCNC: 0.5 MG/DL (ref 0.1–1)
BUN SERPL-MCNC: 7 MG/DL (ref 8–23)
CALCIUM SERPL-MCNC: 8.1 MG/DL (ref 8.7–10.5)
CHLORIDE SERPL-SCNC: 101 MMOL/L (ref 95–110)
CO2 SERPL-SCNC: 30 MMOL/L (ref 23–29)
CREAT SERPL-MCNC: 0.7 MG/DL (ref 0.5–1.4)
DIFFERENTIAL METHOD: ABNORMAL
EOSINOPHIL # BLD AUTO: 0.1 K/UL (ref 0–0.5)
EOSINOPHIL NFR BLD: 1.8 % (ref 0–8)
ERYTHROCYTE [DISTWIDTH] IN BLOOD BY AUTOMATED COUNT: 11.9 % (ref 11.5–14.5)
EST. GFR  (AFRICAN AMERICAN): >60 ML/MIN/1.73 M^2
EST. GFR  (NON AFRICAN AMERICAN): >60 ML/MIN/1.73 M^2
GLUCOSE SERPL-MCNC: 103 MG/DL (ref 70–110)
HCT VFR BLD AUTO: 29.9 % (ref 40–54)
HGB BLD-MCNC: 10.1 G/DL (ref 14–18)
IMM GRANULOCYTES # BLD AUTO: 0.04 K/UL (ref 0–0.04)
LYMPHOCYTES # BLD AUTO: 1 K/UL (ref 1–4.8)
LYMPHOCYTES NFR BLD: 16.4 % (ref 18–48)
MAGNESIUM SERPL-MCNC: 1.8 MG/DL (ref 1.6–2.6)
MCH RBC QN AUTO: 33.9 PG (ref 27–31)
MCHC RBC AUTO-ENTMCNC: 33.8 G/DL (ref 32–36)
MCV RBC AUTO: 100 FL (ref 82–98)
MONOCYTES # BLD AUTO: 0.9 K/UL (ref 0.3–1)
MONOCYTES NFR BLD: 13.5 % (ref 4–15)
NEUTROPHILS # BLD AUTO: 4.2 K/UL (ref 1.8–7.7)
NEUTROPHILS NFR BLD: 67.2 % (ref 38–73)
NRBC BLD-RTO: 0 /100 WBC
PHOSPHATE SERPL-MCNC: 2.6 MG/DL (ref 2.7–4.5)
PLATELET # BLD AUTO: 189 K/UL (ref 150–350)
PMV BLD AUTO: 9.4 FL (ref 9.2–12.9)
POTASSIUM SERPL-SCNC: 3.8 MMOL/L (ref 3.5–5.1)
PROT SERPL-MCNC: 6.1 G/DL (ref 6–8.4)
RBC # BLD AUTO: 2.98 M/UL (ref 4.6–6.2)
SODIUM SERPL-SCNC: 137 MMOL/L (ref 136–145)
WBC # BLD AUTO: 6.28 K/UL (ref 3.9–12.7)

## 2019-12-22 PROCEDURE — 83735 ASSAY OF MAGNESIUM: CPT

## 2019-12-22 PROCEDURE — 63600175 PHARM REV CODE 636 W HCPCS: Performed by: PHYSICIAN ASSISTANT

## 2019-12-22 PROCEDURE — 63600175 PHARM REV CODE 636 W HCPCS: Performed by: INTERNAL MEDICINE

## 2019-12-22 PROCEDURE — 63600175 PHARM REV CODE 636 W HCPCS: Performed by: HOSPITALIST

## 2019-12-22 PROCEDURE — 25000003 PHARM REV CODE 250: Performed by: INTERNAL MEDICINE

## 2019-12-22 PROCEDURE — 12000002 HC ACUTE/MED SURGE SEMI-PRIVATE ROOM

## 2019-12-22 PROCEDURE — 25000003 PHARM REV CODE 250: Performed by: HOSPITALIST

## 2019-12-22 PROCEDURE — 97530 THERAPEUTIC ACTIVITIES: CPT

## 2019-12-22 PROCEDURE — 80053 COMPREHEN METABOLIC PANEL: CPT

## 2019-12-22 PROCEDURE — 94761 N-INVAS EAR/PLS OXIMETRY MLT: CPT

## 2019-12-22 PROCEDURE — 85025 COMPLETE CBC W/AUTO DIFF WBC: CPT

## 2019-12-22 PROCEDURE — 36415 COLL VENOUS BLD VENIPUNCTURE: CPT

## 2019-12-22 PROCEDURE — 25000003 PHARM REV CODE 250: Performed by: ORTHOPAEDIC SURGERY

## 2019-12-22 PROCEDURE — 97116 GAIT TRAINING THERAPY: CPT

## 2019-12-22 PROCEDURE — 84100 ASSAY OF PHOSPHORUS: CPT

## 2019-12-22 RX ORDER — ALPRAZOLAM 1 MG/1
1 TABLET ORAL 3 TIMES DAILY
Status: DISCONTINUED | OUTPATIENT
Start: 2019-12-22 | End: 2019-12-22

## 2019-12-22 RX ORDER — ALPRAZOLAM 0.25 MG/1
0.5 TABLET ORAL 3 TIMES DAILY
Status: DISCONTINUED | OUTPATIENT
Start: 2019-12-22 | End: 2019-12-24 | Stop reason: HOSPADM

## 2019-12-22 RX ORDER — HYDRALAZINE HYDROCHLORIDE 20 MG/ML
10 INJECTION INTRAMUSCULAR; INTRAVENOUS EVERY 6 HOURS PRN
Status: DISCONTINUED | OUTPATIENT
Start: 2019-12-22 | End: 2019-12-24 | Stop reason: HOSPADM

## 2019-12-22 RX ORDER — HYDRALAZINE HYDROCHLORIDE 20 MG/ML
5 INJECTION INTRAMUSCULAR; INTRAVENOUS ONCE
Status: COMPLETED | OUTPATIENT
Start: 2019-12-22 | End: 2019-12-22

## 2019-12-22 RX ORDER — CARVEDILOL 6.25 MG/1
6.25 TABLET ORAL 2 TIMES DAILY
Status: DISCONTINUED | OUTPATIENT
Start: 2019-12-22 | End: 2019-12-24 | Stop reason: HOSPADM

## 2019-12-22 RX ORDER — DIPHENHYDRAMINE HCL 25 MG
25 CAPSULE ORAL ONCE
Status: DISCONTINUED | OUTPATIENT
Start: 2019-12-23 | End: 2019-12-24 | Stop reason: HOSPADM

## 2019-12-22 RX ADMIN — PRIMIDONE 50 MG: 50 TABLET ORAL at 02:12

## 2019-12-22 RX ADMIN — CELECOXIB 200 MG: 100 CAPSULE ORAL at 09:12

## 2019-12-22 RX ADMIN — ROSUVASTATIN CALCIUM 40 MG: 20 TABLET, FILM COATED ORAL at 09:12

## 2019-12-22 RX ADMIN — CARVEDILOL 3.12 MG: 3.12 TABLET, FILM COATED ORAL at 09:12

## 2019-12-22 RX ADMIN — HYDRALAZINE HYDROCHLORIDE 10 MG: 20 INJECTION INTRAMUSCULAR; INTRAVENOUS at 02:12

## 2019-12-22 RX ADMIN — ALPRAZOLAM 0.5 MG: 0.25 TABLET ORAL at 09:12

## 2019-12-22 RX ADMIN — MEMANTINE HYDROCHLORIDE 5 MG: 5 TABLET ORAL at 09:12

## 2019-12-22 RX ADMIN — SENNOSIDES AND DOCUSATE SODIUM 1 TABLET: 8.6; 5 TABLET ORAL at 09:12

## 2019-12-22 RX ADMIN — ENOXAPARIN SODIUM 40 MG: 100 INJECTION SUBCUTANEOUS at 06:12

## 2019-12-22 RX ADMIN — Medication 100 MG: at 09:12

## 2019-12-22 RX ADMIN — ASPIRIN 325 MG ORAL TABLET 325 MG: 325 PILL ORAL at 09:12

## 2019-12-22 RX ADMIN — CHLORDIAZEPOXIDE HYDROCHLORIDE 5 MG: 5 CAPSULE ORAL at 06:12

## 2019-12-22 RX ADMIN — PRIMIDONE 50 MG: 50 TABLET ORAL at 09:12

## 2019-12-22 RX ADMIN — FOLIC ACID 1 MG: 1 TABLET ORAL at 09:12

## 2019-12-22 RX ADMIN — CARVEDILOL 6.25 MG: 6.25 TABLET, FILM COATED ORAL at 09:12

## 2019-12-22 RX ADMIN — HYDRALAZINE HYDROCHLORIDE 5 MG: 20 INJECTION INTRAMUSCULAR; INTRAVENOUS at 06:12

## 2019-12-22 RX ADMIN — PANTOPRAZOLE SODIUM 40 MG: 40 TABLET, DELAYED RELEASE ORAL at 09:12

## 2019-12-22 RX ADMIN — DICYCLOMINE HYDROCHLORIDE 20 MG: 20 TABLET ORAL at 09:12

## 2019-12-22 RX ADMIN — PRIMIDONE 50 MG: 50 TABLET ORAL at 06:12

## 2019-12-22 RX ADMIN — THERA TABS 1 TABLET: TAB at 09:12

## 2019-12-22 NOTE — SUBJECTIVE & OBJECTIVE
Interval History:   -pod 2 right TYRONE (PT recommending home health now).   -BP increased throughout the day. PRN hydralazine ordered overnight.   -I discontinued Librium (no further tremors after primidone and coreg resumed), Namenda (does not take at home), and Ativan.   -I resumed home Xanax regimen as to avoid withdrawal. He has not taken any doses as he has been sleepy. No fevers/chills.   -Increased coreg to 6.25 mg and d/c Bentyl.     Patient updated on all test results and plan of care. All questions answers to patient's satisfaction.       Review of Systems   Constitutional: Negative for chills and fever.   HENT: Negative for congestion.    Eyes: Negative for visual disturbance.   Respiratory: Negative for cough, shortness of breath and wheezing.    Cardiovascular: Negative for chest pain and palpitations.   Gastrointestinal: Negative for abdominal pain and nausea.   Endocrine: Negative for cold intolerance and heat intolerance.   Genitourinary: Negative for dysuria.   Musculoskeletal: Positive for arthralgias (right leg). Negative for back pain and myalgias.   Skin: Negative for rash.   Allergic/Immunologic: Negative for environmental allergies.   Neurological: Positive for weakness (right leg) and numbness (right leg). Negative for dizziness, tremors (resolved with home meds), syncope and headaches.   Hematological: Does not bruise/bleed easily.   Psychiatric/Behavioral: Positive for dysphoric mood. Negative for sleep disturbance.     Objective:     Vital Signs (Most Recent):  Temp: 98.3 °F (36.8 °C) (12/22/19 1533)  Pulse: 80 (12/22/19 1533)  Resp: 20 (12/22/19 1533)  BP: (!) 172/82 (12/22/19 1533)  SpO2: 95 % (12/22/19 1533) Vital Signs (24h Range):  Temp:  [97.5 °F (36.4 °C)-98.3 °F (36.8 °C)] 98.3 °F (36.8 °C)  Pulse:  [75-84] 80  Resp:  [16-20] 20  SpO2:  [92 %-95 %] 95 %  BP: (172-199)/(82-97) 172/82     Weight: 64.4 kg (142 lb)  Body mass index is 20.37 kg/m².    Intake/Output Summary (Last 24 hours)  at 12/22/2019 1705  Last data filed at 12/22/2019 1629  Gross per 24 hour   Intake 240 ml   Output 5425 ml   Net -5185 ml      Physical Exam   Constitutional: He is oriented to person, place, and time. He appears well-developed and well-nourished. No distress.   Resting in bed. Sleepy, but arousable   HENT:   Head: Normocephalic.   Mouth/Throat: Oropharynx is clear and moist.   Eyes: Conjunctivae are normal.   Cardiovascular: Normal rate, regular rhythm, normal heart sounds and intact distal pulses.   No murmur heard.  Pulmonary/Chest: Effort normal and breath sounds normal.   Abdominal: Soft. Bowel sounds are normal.   Genitourinary:   Genitourinary Comments: Almonte in place   Musculoskeletal: He exhibits no tenderness.   Right leg in cast.    Lymphadenopathy:     He has no cervical adenopathy.   Neurological: He is alert and oriented to person, place, and time. No sensory deficit.   No tremor today   Skin: Skin is warm and dry. Capillary refill takes 2 to 3 seconds. He is not diaphoretic.   Psychiatric: He has a normal mood and affect. His behavior is normal.   Nursing note and vitals reviewed.      Significant Labs:   Recent Lab Results       12/22/19  0441        Albumin 2.8     Alkaline Phosphatase 49     ALT 16     Anion Gap 6     AST 40     Baso # 0.03     Basophil% 0.5     BILIRUBIN TOTAL 0.5  Comment:  For infants and newborns, interpretation of results should be based  on gestational age, weight and in agreement with clinical  observations.  Premature Infant recommended reference ranges:  Up to 24 hours.............<8.0 mg/dL  Up to 48 hours............<12.0 mg/dL  3-5 days..................<15.0 mg/dL  6-29 days.................<15.0 mg/dL       BUN, Bld 7     Calcium 8.1     Chloride 101     CO2 30     Creatinine 0.7     Differential Method Automated     eGFR if  >60     eGFR if non  >60  Comment:  Calculation used to obtain the estimated glomerular filtration  rate (eGFR)  is the CKD-EPI equation.        Eos # 0.1     Eosinophil% 1.8     Glucose 103     Gran # (ANC) 4.2     Gran% 67.2     Hematocrit 29.9     Hemoglobin 10.1     Immature Grans (Abs) 0.04  Comment:  Mild elevation in immature granulocytes is non specific and   can be seen in a variety of conditions including stress response,   acute inflammation, trauma and pregnancy. Correlation with other   laboratory and clinical findings is essential.       Lymph # 1.0     Lymph% 16.4     Magnesium 1.8     MCH 33.9     MCHC 33.8          Mono # 0.9     Mono% 13.5     MPV 9.4     nRBC 0     Phosphorus 2.6     Platelets 189     Potassium 3.8     PROTEIN TOTAL 6.1     RBC 2.98     RDW 11.9     Sodium 137     WBC 6.28           Significant Imaging: I have reviewed all pertinent imaging results/findings within the past 24 hours.

## 2019-12-22 NOTE — NURSING
Patient BP elevated throughout night. Last BP was 199/97. NP on call contacted and ordered hydralazine prn.

## 2019-12-22 NOTE — ASSESSMENT & PLAN NOTE
Patient states that he drinks approximately 3-5 beers on the day of presentation.  Denies daily drinking.  States that he drank because he was depressed , and that this does not happen every day.  Continue monitoring of CIWA scores for withdrawal and Ativan as needed.  Advised xanax changed to ativan

## 2019-12-22 NOTE — ASSESSMENT & PLAN NOTE
"Resume home coreg    Vitals:    12/21/19 0747 12/21/19 1137 12/21/19 1616 12/21/19 1628   BP: 131/70 (!) 146/72 (!) 176/84    BP Location:   Left arm    Patient Position: Lying  Lying    Pulse: 68 77 78    Resp: 18 18 20    Temp: 96.3 °F (35.7 °C) 97.3 °F (36.3 °C) 98.1 °F (36.7 °C)    TempSrc: Oral  Oral    SpO2: 96% (!) 93% 97% 96%   Weight:  64.4 kg (142 lb)     Height:  5' 10" (1.778 m)           "

## 2019-12-22 NOTE — PLAN OF CARE
Patient AAO, VSS. BP running high. NP on call informed and currently awaiting a response. Pain well managed with current pain management plan.  PRN medications utilized. Dressing to incision intact and immobilizer in place. Abrasion to right elbow cleansed and gauze and tape in place. IVF infusing as ordered. Purposeful hourly/q2hr rounding done during shift to promote patient safety. Patient free from falls and injury during shift. Will continue to monitor.

## 2019-12-22 NOTE — SUBJECTIVE & OBJECTIVE
Interval History:   -pod 1 right loan  -tremulous on exam.  Resuming home Coreg and primidone this evening.  Discussed with patient that Xanax and Ativan were ordered.  He is on Xanax at home scheduled, but Ativan was started for withdrawal prophylaxis.  Advised that we will keep Ativan 1 at same schedule dosing and comparison to his Xanax.  He reports that he is depressed, but denies any suicidal ideation.  He is working with physical therapy.  Almonte catheter in place.  Tolerating diet.    Patient updated on all test results and plan of care. All questions answers to patient's satisfaction.       Review of Systems   Constitutional: Negative for chills and fever.   HENT: Negative for congestion.    Eyes: Negative for visual disturbance.   Respiratory: Negative for cough, shortness of breath and wheezing.    Cardiovascular: Negative for chest pain and palpitations.   Gastrointestinal: Negative for abdominal pain and nausea.   Endocrine: Negative for cold intolerance and heat intolerance.   Genitourinary: Negative for dysuria.   Musculoskeletal: Positive for arthralgias (right leg). Negative for back pain and myalgias.   Skin: Negative for rash.   Allergic/Immunologic: Negative for environmental allergies.   Neurological: Positive for tremors, weakness (right leg) and numbness (right leg). Negative for dizziness, syncope and headaches.   Hematological: Does not bruise/bleed easily.   Psychiatric/Behavioral: Positive for dysphoric mood. Negative for sleep disturbance.     Objective:     Vital Signs (Most Recent):  Temp: 98.1 °F (36.7 °C) (12/21/19 1616)  Pulse: 78 (12/21/19 1616)  Resp: 20 (12/21/19 1616)  BP: (!) 176/84 (12/21/19 1616)  SpO2: 96 % (12/21/19 1628) Vital Signs (24h Range):  Temp:  [96.3 °F (35.7 °C)-99.2 °F (37.3 °C)] 98.1 °F (36.7 °C)  Pulse:  [] 78  Resp:  [18-20] 20  SpO2:  [93 %-97 %] 96 %  BP: (117-176)/(64-84) 176/84     Weight: 64.4 kg (142 lb)  Body mass index is 20.37  kg/m².    Intake/Output Summary (Last 24 hours) at 12/21/2019 1853  Last data filed at 12/21/2019 1600  Gross per 24 hour   Intake 3520 ml   Output 2325 ml   Net 1195 ml      Physical Exam   Constitutional: He is oriented to person, place, and time. He appears well-developed and well-nourished. No distress.   HENT:   Head: Normocephalic.   Mouth/Throat: Oropharynx is clear and moist.   Eyes: Conjunctivae are normal.   Cardiovascular: Normal rate, regular rhythm, normal heart sounds and intact distal pulses.   No murmur heard.  Pulmonary/Chest: Effort normal and breath sounds normal.   Abdominal: Soft. Bowel sounds are normal.   Genitourinary:   Genitourinary Comments: Almonte in place   Musculoskeletal: He exhibits no tenderness.   Right leg in cast.    Lymphadenopathy:     He has no cervical adenopathy.   Neurological: He is alert and oriented to person, place, and time. No sensory deficit.   Resting tremor   Skin: Skin is warm and dry. Capillary refill takes 2 to 3 seconds. He is not diaphoretic.   Psychiatric: He has a normal mood and affect. His behavior is normal.   Nursing note and vitals reviewed.      Significant Labs:   Recent Lab Results       12/21/19  0440        Albumin 2.7     Alkaline Phosphatase 48     ALT 17     Anion Gap 6     AST 29     Baso # 0.02     Basophil% 0.3     BILIRUBIN TOTAL 0.3  Comment:  For infants and newborns, interpretation of results should be based  on gestational age, weight and in agreement with clinical  observations.  Premature Infant recommended reference ranges:  Up to 24 hours.............<8.0 mg/dL  Up to 48 hours............<12.0 mg/dL  3-5 days..................<15.0 mg/dL  6-29 days.................<15.0 mg/dL       BUN, Bld 15     Calcium 7.8     Chloride 97     CO2 27     Creatinine 0.9     Differential Method Automated     eGFR if  >60     eGFR if non  >60  Comment:  Calculation used to obtain the estimated glomerular filtration  rate  (eGFR) is the CKD-EPI equation.        Eos # 0.2     Eosinophil% 1.9     Glucose 208     Gran # (ANC) 6.5     Gran% 83.2     Hematocrit 32.2     Hemoglobin 10.9     Immature Grans (Abs) 0.02  Comment:  Mild elevation in immature granulocytes is non specific and   can be seen in a variety of conditions including stress response,   acute inflammation, trauma and pregnancy. Correlation with other   laboratory and clinical findings is essential.       Lymph # 0.6     Lymph% 7.0     Magnesium 1.9     MCH 34.0     MCHC 33.9          Mono # 0.6     Mono% 7.3     MPV 9.0     nRBC 0     Phosphorus 3.0     Platelets 183     Potassium 4.4     PROTEIN TOTAL 5.9     RBC 3.21     RDW 11.7     Sodium 130     WBC 7.83           Significant Imaging: I have reviewed all pertinent imaging results/findings within the past 24 hours.

## 2019-12-22 NOTE — PT/OT/SLP PROGRESS
Physical Therapy Treatment    Patient Name:  Ricky Cole   MRN:  [id number]    Recommendations:     Discharge Recommendations:  home health PT   Discharge Equipment Recommendations: walker, rolling   Barriers to discharge: None    Assessment:     Ricky Cole is a 64 y.o. male admitted with a medical diagnosis of Hip fracture.  He presents with the following impairments/functional limitations:  weakness, orthopedic precautions, gait instability, impaired balance, impaired functional mobilty, decreased lower extremity function, decreased coordination. Pt agreeable to therapy and tolerated session well. Pt unable to recall posterior precautions at beginning of treatment and was educated. Pt t/f to sitting EOB with Min A and sat for ~ 5 min due to light headedness. When resolved pt performed sit<>stand with SBA, and gait of 20' CGA with RW and IV pole.     Rehab Prognosis: Good; patient would benefit from acute skilled PT services to address these deficits and reach maximum level of function.    Recent Surgery: Procedure(s) (LRB):  ARTHROPLASTY, HIP, [place], pegboard, 1st assist (Right) 2 Days Post-Op    Plan:     During this hospitalization, patient to be seen BID to address the identified rehab impairments via gait training, therapeutic activities, therapeutic exercises and progress toward the following goals:    · Plan of Care Expires:  12/31/19    Subjective     Chief Complaint: Getting his medicine correct.   Patient/Family Comments/goals: To get stronger and go home.   Pain/Comfort:  · Pain Rating 1: 4/10  · Location - Side 1: Right  · Location 1: hip  · Pain Addressed 1: Pre-medicate for activity, Reposition      Objective:     Communicated with nurse Bell prior to session.  Patient found HOB elevated with knee immobilizer, peripheral IV upon PT entry to room.     General Precautions: Standard, fall   Orthopedic Precautions:RLE weight bearing as tolerated, RLE posterior precautions   Braces: Knee  immobilizer     Functional Mobility:  · Bed Mobility:     · Rolling Left:  stand by assistance  · Rolling Right: stand by assistance  · Scooting: contact guard assistance  · Supine to Sit: minimum assistance  · Sit to Supine: minimum assistance  · Transfers:     · Sit to Stand:  stand by assistance with rolling walker  · Gait: 20' CGA with RW and IV pole. Knee immobilizer left on during gait. No LOB.       AM-PAC 6 CLICK MOBILITY          Therapeutic Activities and Exercises:   Pt declined ambulating in hallway or sitting in chair after performing gait.   Pt educated on importance of sitting up in chair and exercises ( APs, hip abd, glut squeezes)  Pt able to recall 2/3 posterior precautions following end of session.     Patient left HOB elevated with all lines intact, call button in reach, bed alarm on and nurse Arabella notified..    GOALS:   Multidisciplinary Problems     Physical Therapy Goals        Problem: Physical Therapy Goal    Goal Priority Disciplines Outcome Goal Variances Interventions   Physical Therapy Goal     PT, PT/OT Ongoing, Progressing     Description:  Goals to be met by:      Patient will increase functional independence with mobility by performin). Supine to sit with Modified [place]  2). Sit to supine with Modified [place]  3). Sit to stand transfer with Modified [place]  4). Bed to chair transfer with Modified [place] using Rolling Walker  5). Gait  x  > 150 feet with Modified [place] using Rolling Walker.    6) Comprehends posterior hip precautions.                    Time Tracking:     PT Received On: 19  PT Start Time: 948     PT Stop Time: 1015  PT Total Time (min): 27 min     Billable Minutes: Gait Training 8 minutes and Therapeutic Activity 19 minutes    Treatment Type: Treatment  PT/PTA: PTA     PTA Visit Number: 1     Guerita Greenberg, PTA  2019

## 2019-12-22 NOTE — ASSESSMENT & PLAN NOTE
Resume home coreg and increase to 6.25 mg BID  PRN hydralazine   D/c IVF    Vitals:    12/22/19 0902 12/22/19 1241 12/22/19 1411 12/22/19 1533   BP:  (!) 183/88 (!) 188/92 (!) 172/82   BP Location:  Left arm  Left arm   Patient Position:  Lying  Lying   Pulse:  77 75 80   Resp:  20  20   Temp:  98 °F (36.7 °C)  98.3 °F (36.8 °C)   TempSrc:  Oral  Oral   SpO2: 95% (!) 92%  95%   Weight:       Height:

## 2019-12-22 NOTE — PLAN OF CARE
Patient progressing towards PT goals with improved tolerance to OOB activity while maintaining posterior precautions.

## 2019-12-22 NOTE — ASSESSMENT & PLAN NOTE
Patient with right-sided femoral neck fracture.    Right TYRONE 12/20  POD1 reports pain, but working with pt. Plan likely for snf

## 2019-12-22 NOTE — ASSESSMENT & PLAN NOTE
Patient states that he drinks approximately 3-5 beers on the day of presentation.  Denies daily drinking.  States that he drank because he was depressed , and that this does not happen every day.  Continue monitoring of CIWA scores for withdrawal. Home scheduled xanax resumed.

## 2019-12-22 NOTE — PLAN OF CARE
12/22/19 0902   PRE-TX-O2   O2 Device (Oxygen Therapy) room air   SpO2 95 %   Pulse Oximetry Type Intermittent   $ Pulse Oximetry - Multiple Charge Pulse Oximetry - Multiple

## 2019-12-22 NOTE — PLAN OF CARE
Pt is  AAOX4. POC reviewed with pt. Pt verbalized understanding. VSS. Remains afebrile. IVF infusing per order. IV abx infused per order. Medications reviewed with pt. Pt believes he is receiving a medication that makes him very tired and hallucinate. Discussed medications with MD, some medications have been discontinued. Pain controlled with PRN medications. Remains free of injury. Bed low, breaks locked, call light in reach. Will continue to monitor closely.

## 2019-12-22 NOTE — PT/OT/SLP PROGRESS
"Occupational Therapy      Patient Name:  Ricky Cole   MRN:  [id number]    Patient not seen today secondary to increased fatigue.  "Im worn out." . Will follow-up tomorrow.    JARROD Kruger  12/22/2019  "

## 2019-12-22 NOTE — PROGRESS NOTES
[facility] Medicine  Progress Note    Patient Name: Ricky Cole  MRN: [id number]  Patient Class: IP- Inpatient   Admission Date: 12/18/2019  Length of Stay: 3 days  Attending Physician: Luz Taylor MD  Primary Care Provider: Hiram Leija MD        Subjective:     Principal Problem:Hip fracture        HPI:  Per Dr. Mario:    Patient is a 64-year-old  male with a past medical history significant for binge drinking of alcohol, COPD, hypertension, stroke with alcohol/vascular dementia and depression who presents the hospital after becoming intoxicated and falling.  Subsequent to the patient's fall, he developed a femoral neck fracture and presented to the emergency department outside hospital.  He was found to be mildly intoxicated, hyponatremic and was transferred to [facility] for orthopedics consult and hip fracture repair.  Patient denies any chest pain, shortness of breath, nausea, vomiting, diarrhea, fevers.  He appears to have pain in the affected extremity, but otherwise denies pain or neurologic symptoms.    Overview/Hospital Course:  Underwent right TYRONE on 12/20/2019.  Working with physical therapy.  Resuming home medications.  Advised Xanax changed to Ativan to avoid withdrawal as well as half life.     Interval History:   -pod 1 right tyrone  -tremulous on exam.  Resuming home Coreg and primidone this evening.  Discussed with patient that Xanax and Ativan were ordered.  He is on Xanax at home scheduled, but Ativan was started for withdrawal prophylaxis.  Advised that we will keep Ativan 1 at same schedule dosing and comparison to his Xanax.  He reports that he is depressed, but denies any suicidal ideation.  He is working with physical therapy.  Almonte catheter in place.  Tolerating diet.    Patient updated on all test results and plan of care. All questions answers to patient's satisfaction.       Review of Systems   Constitutional: Negative for chills  and fever.   HENT: Negative for congestion.    Eyes: Negative for visual disturbance.   Respiratory: Negative for cough, shortness of breath and wheezing.    Cardiovascular: Negative for chest pain and palpitations.   Gastrointestinal: Negative for abdominal pain and nausea.   Endocrine: Negative for cold intolerance and heat intolerance.   Genitourinary: Negative for dysuria.   Musculoskeletal: Positive for arthralgias (right leg). Negative for back pain and myalgias.   Skin: Negative for rash.   Allergic/Immunologic: Negative for environmental allergies.   Neurological: Positive for tremors, weakness (right leg) and numbness (right leg). Negative for dizziness, syncope and headaches.   Hematological: Does not bruise/bleed easily.   Psychiatric/Behavioral: Positive for dysphoric mood. Negative for sleep disturbance.     Objective:     Vital Signs (Most Recent):  Temp: 98.1 °F (36.7 °C) (12/21/19 1616)  Pulse: 78 (12/21/19 1616)  Resp: 20 (12/21/19 1616)  BP: (!) 176/84 (12/21/19 1616)  SpO2: 96 % (12/21/19 1628) Vital Signs (24h Range):  Temp:  [96.3 °F (35.7 °C)-99.2 °F (37.3 °C)] 98.1 °F (36.7 °C)  Pulse:  [] 78  Resp:  [18-20] 20  SpO2:  [93 %-97 %] 96 %  BP: (117-176)/(64-84) 176/84     Weight: 64.4 kg (142 lb)  Body mass index is 20.37 kg/m².    Intake/Output Summary (Last 24 hours) at 12/21/2019 1853  Last data filed at 12/21/2019 1600  Gross per 24 hour   Intake 3520 ml   Output 2325 ml   Net 1195 ml      Physical Exam   Constitutional: He is oriented to person, place, and time. He appears well-developed and well-nourished. No distress.   HENT:   Head: Normocephalic.   Mouth/Throat: Oropharynx is clear and moist.   Eyes: Conjunctivae are normal.   Cardiovascular: Normal rate, regular rhythm, normal heart sounds and intact distal pulses.   No murmur heard.  Pulmonary/Chest: Effort normal and breath sounds normal.   Abdominal: Soft. Bowel sounds are normal.   Genitourinary:   Genitourinary Comments:  Almonte in place   Musculoskeletal: He exhibits no tenderness.   Right leg in cast.    Lymphadenopathy:     He has no cervical adenopathy.   Neurological: He is alert and oriented to person, place, and time. No sensory deficit.   Resting tremor   Skin: Skin is warm and dry. Capillary refill takes 2 to 3 seconds. He is not diaphoretic.   Psychiatric: He has a normal mood and affect. His behavior is normal.   Nursing note and vitals reviewed.      Significant Labs:   Recent Lab Results       12/21/19  0440        Albumin 2.7     Alkaline Phosphatase 48     ALT 17     Anion Gap 6     AST 29     Baso # 0.02     Basophil% 0.3     BILIRUBIN TOTAL 0.3  Comment:  For infants and newborns, interpretation of results should be based  on gestational age, weight and in agreement with clinical  observations.  Premature Infant recommended reference ranges:  Up to 24 hours.............<8.0 mg/dL  Up to 48 hours............<12.0 mg/dL  3-5 days..................<15.0 mg/dL  6-29 days.................<15.0 mg/dL       BUN, Bld 15     Calcium 7.8     Chloride 97     CO2 27     Creatinine 0.9     Differential Method Automated     eGFR if  >60     eGFR if non  >60  Comment:  Calculation used to obtain the estimated glomerular filtration  rate (eGFR) is the CKD-EPI equation.        Eos # 0.2     Eosinophil% 1.9     Glucose 208     Gran # (ANC) 6.5     Gran% 83.2     Hematocrit 32.2     Hemoglobin 10.9     Immature Grans (Abs) 0.02  Comment:  Mild elevation in immature granulocytes is non specific and   can be seen in a variety of conditions including stress response,   acute inflammation, trauma and pregnancy. Correlation with other   laboratory and clinical findings is essential.       Lymph # 0.6     Lymph% 7.0     Magnesium 1.9     MCH 34.0     MCHC 33.9          Mono # 0.6     Mono% 7.3     MPV 9.0     nRBC 0     Phosphorus 3.0     Platelets 183     Potassium 4.4     PROTEIN TOTAL 5.9     RBC 3.21  "    RDW 11.7     Sodium 130     WBC 7.83           Significant Imaging: I have reviewed all pertinent imaging results/findings within the past 24 hours.      Assessment/Plan:      * Hip fracture  Patient with right-sided femoral neck fracture.    Right TYRONE 12/20  POD1 reports pain, but working with pt. Plan likely for snf    Essential hypertension  Resume home coreg    Vitals:    12/21/19 0747 12/21/19 1137 12/21/19 1616 12/21/19 1628   BP: 131/70 (!) 146/72 (!) 176/84    BP Location:   Left arm    Patient Position: Lying  Lying    Pulse: 68 77 78    Resp: 18 18 20    Temp: 96.3 °F (35.7 °C) 97.3 °F (36.3 °C) 98.1 °F (36.7 °C)    TempSrc: Oral  Oral    SpO2: 96% (!) 93% 97% 96%   Weight:  64.4 kg (142 lb)     Height:  5' 10" (1.778 m)             Tremor  Continue home primidone      Dementia associated with alcoholism without behavioral disturbance  Dementia is controlled currently. Continue home dementia meds and non-pharmacologic interventions to prevent delirium (No VS between 11PM-5AM, activity during day, opening blinds, providing glasses/hearing aids, and up in chair during daytime). Use PRN anti-psychotics to prevent behavior of self harm during sundowning, and avoid narcotics and benzos unless absolutely necessary. PRN anti-psychotics prescribed to avoid self harm behaviors.        ETOH abuse  Patient states that he drinks approximately 3-5 beers on the day of presentation.  Denies daily drinking.  States that he drank because he was depressed , and that this does not happen every day.  Continue monitoring of CIWA scores for withdrawal and Ativan as needed.  Advised xanax changed to ativan      PAD (peripheral artery disease), mnoderate  Continue treatment for CAD.  Patient high risk surgical candidate.      History of heart artery stent, one in 2009  Treatment for CAD noted above      History of MI (myocardial infarction) x2, 1987 and 2015  Patient with known CAD s/p stent placement, which is controlled " Will continue ASA and Statin and monitor for S/Sx of angina/ACS. Continue to monitor on telemetry.         Smoker, trying to quit again, 1 cig last a week, started age 17, quit for 42 years, restarted 2018  Smoking cessation counseling performed. Dangers of cigarette smoking were reviewed with patient in detail and patient was encouraged to quit. Nicotine replacement options were discussed for > 3 minutes.          VTE Risk Mitigation (From admission, onward)         Ordered     enoxaparin injection 40 mg  Daily      12/18/19 1351     IP VTE HIGH RISK PATIENT  Once      12/18/19 1351                      Luz Taylor MD  Department of Hospital Medicine   [facility]

## 2019-12-22 NOTE — HOSPITAL COURSE
Underwent right TYRONE on 12/20/2019.  Working with physical therapy.  Resuming home medications.  Xanax changed to Ativan to avoid withdrawal as well as half life. He then began to develop elevated BP. Given PRN hydralazine & resumed home xanax scheduled to avoid withdrawl. Has been sleepy throughout the day. Discontinued librium, ativan, and namenda (he does not take at home). Participating with PT.

## 2019-12-22 NOTE — ASSESSMENT & PLAN NOTE
Patient with right-sided femoral neck fracture.    Right TYRONE 12/20  POD2 reports pain, but working with pt. Plan now per PT is home health

## 2019-12-22 NOTE — PROGRESS NOTES
[facility] Medicine  Progress Note    Patient Name: Ricky Cole  MRN: [id number]  Patient Class: IP- Inpatient   Admission Date: 12/18/2019  Length of Stay: 4 days  Attending Physician: Luz Taylor MD  Primary Care Provider: Hiram Leija MD        Subjective:     Principal Problem:Hip fracture        HPI:  Per Dr. Mario:    Patient is a 64-year-old  male with a past medical history significant for binge drinking of alcohol, COPD, hypertension, stroke with alcohol/vascular dementia and depression who presents the hospital after becoming intoxicated and falling.  Subsequent to the patient's fall, he developed a femoral neck fracture and presented to the emergency department outside hospital.  He was found to be mildly intoxicated, hyponatremic and was transferred to [facility] for orthopedics consult and hip fracture repair.  Patient denies any chest pain, shortness of breath, nausea, vomiting, diarrhea, fevers.  He appears to have pain in the affected extremity, but otherwise denies pain or neurologic symptoms.    Overview/Hospital Course:  Underwent right TYRONE on 12/20/2019.  Working with physical therapy.  Resuming home medications.  Xanax changed to Ativan to avoid withdrawal as well as half life. He then began to develop elevated BP. Given PRN hydralazine & resumed home xanax scheduled to avoid withdrawl. Has been sleepy throughout the day. Discontinued librium, ativan, and namenda (he does not take at home). Participating with PT.    Interval History:   -pod 2 right TYRONE (PT recommending home health now).   -BP increased throughout the day. PRN hydralazine ordered overnight.   -I discontinued Librium (no further tremors after primidone and coreg resumed), Namenda (does not take at home), and Ativan.   -I resumed home Xanax regimen as to avoid withdrawal. He has not taken any doses as he has been sleepy. No fevers/chills.   -Increased coreg to 6.25 mg and  d/c Skye.     Patient updated on all test results and plan of care. All questions answers to patient's satisfaction.       Review of Systems   Constitutional: Negative for chills and fever.   HENT: Negative for congestion.    Eyes: Negative for visual disturbance.   Respiratory: Negative for cough, shortness of breath and wheezing.    Cardiovascular: Negative for chest pain and palpitations.   Gastrointestinal: Negative for abdominal pain and nausea.   Endocrine: Negative for cold intolerance and heat intolerance.   Genitourinary: Negative for dysuria.   Musculoskeletal: Positive for arthralgias (right leg). Negative for back pain and myalgias.   Skin: Negative for rash.   Allergic/Immunologic: Negative for environmental allergies.   Neurological: Positive for weakness (right leg) and numbness (right leg). Negative for dizziness, tremors (resolved with home meds), syncope and headaches.   Hematological: Does not bruise/bleed easily.   Psychiatric/Behavioral: Positive for dysphoric mood. Negative for sleep disturbance.     Objective:     Vital Signs (Most Recent):  Temp: 98.3 °F (36.8 °C) (12/22/19 1533)  Pulse: 80 (12/22/19 1533)  Resp: 20 (12/22/19 1533)  BP: (!) 172/82 (12/22/19 1533)  SpO2: 95 % (12/22/19 1533) Vital Signs (24h Range):  Temp:  [97.5 °F (36.4 °C)-98.3 °F (36.8 °C)] 98.3 °F (36.8 °C)  Pulse:  [75-84] 80  Resp:  [16-20] 20  SpO2:  [92 %-95 %] 95 %  BP: (172-199)/(82-97) 172/82     Weight: 64.4 kg (142 lb)  Body mass index is 20.37 kg/m².    Intake/Output Summary (Last 24 hours) at 12/22/2019 1705  Last data filed at 12/22/2019 1629  Gross per 24 hour   Intake 240 ml   Output 5425 ml   Net -5185 ml      Physical Exam   Constitutional: He is oriented to person, place, and time. He appears well-developed and well-nourished. No distress.   Resting in bed. Sleepy, but arousable   HENT:   Head: Normocephalic.   Mouth/Throat: Oropharynx is clear and moist.   Eyes: Conjunctivae are normal.    Cardiovascular: Normal rate, regular rhythm, normal heart sounds and intact distal pulses.   No murmur heard.  Pulmonary/Chest: Effort normal and breath sounds normal.   Abdominal: Soft. Bowel sounds are normal.   Genitourinary:   Genitourinary Comments: Almonte in place   Musculoskeletal: He exhibits no tenderness.   Right leg in cast.    Lymphadenopathy:     He has no cervical adenopathy.   Neurological: He is alert and oriented to person, place, and time. No sensory deficit.   No tremor today   Skin: Skin is warm and dry. Capillary refill takes 2 to 3 seconds. He is not diaphoretic.   Psychiatric: He has a normal mood and affect. His behavior is normal.   Nursing note and vitals reviewed.      Significant Labs:   Recent Lab Results       12/22/19  0441        Albumin 2.8     Alkaline Phosphatase 49     ALT 16     Anion Gap 6     AST 40     Baso # 0.03     Basophil% 0.5     BILIRUBIN TOTAL 0.5  Comment:  For infants and newborns, interpretation of results should be based  on gestational age, weight and in agreement with clinical  observations.  Premature Infant recommended reference ranges:  Up to 24 hours.............<8.0 mg/dL  Up to 48 hours............<12.0 mg/dL  3-5 days..................<15.0 mg/dL  6-29 days.................<15.0 mg/dL       BUN, Bld 7     Calcium 8.1     Chloride 101     CO2 30     Creatinine 0.7     Differential Method Automated     eGFR if  >60     eGFR if non  >60  Comment:  Calculation used to obtain the estimated glomerular filtration  rate (eGFR) is the CKD-EPI equation.        Eos # 0.1     Eosinophil% 1.8     Glucose 103     Gran # (ANC) 4.2     Gran% 67.2     Hematocrit 29.9     Hemoglobin 10.1     Immature Grans (Abs) 0.04  Comment:  Mild elevation in immature granulocytes is non specific and   can be seen in a variety of conditions including stress response,   acute inflammation, trauma and pregnancy. Correlation with other   laboratory and  clinical findings is essential.       Lymph # 1.0     Lymph% 16.4     Magnesium 1.8     MCH 33.9     MCHC 33.8          Mono # 0.9     Mono% 13.5     MPV 9.4     nRBC 0     Phosphorus 2.6     Platelets 189     Potassium 3.8     PROTEIN TOTAL 6.1     RBC 2.98     RDW 11.9     Sodium 137     WBC 6.28           Significant Imaging: I have reviewed all pertinent imaging results/findings within the past 24 hours.      Assessment/Plan:      * Hip fracture  Patient with right-sided femoral neck fracture.    Right TYRONE 12/20  POD2 reports pain, but working with pt. Plan now per PT is home health    Essential hypertension  Resume home coreg and increase to 6.25 mg BID  PRN hydralazine   D/c IVF    Vitals:    12/22/19 0902 12/22/19 1241 12/22/19 1411 12/22/19 1533   BP:  (!) 183/88 (!) 188/92 (!) 172/82   BP Location:  Left arm  Left arm   Patient Position:  Lying  Lying   Pulse:  77 75 80   Resp:  20  20   Temp:  98 °F (36.7 °C)  98.3 °F (36.8 °C)   TempSrc:  Oral  Oral   SpO2: 95% (!) 92%  95%   Weight:       Height:               Tremor  Continue home primidone & coreg      Dementia associated with alcoholism without behavioral disturbance  Dementia is controlled currently. Continue home dementia meds and non-pharmacologic interventions to prevent delirium (No VS between 11PM-5AM, activity during day, opening blinds, providing glasses/hearing aids, and up in chair during daytime). Use PRN anti-psychotics to prevent behavior of self harm during sundowning, and avoid narcotics and benzos unless absolutely necessary. PRN anti-psychotics prescribed to avoid self harm behaviors.    Does not take home namenda      ETOH abuse  Patient states that he drinks approximately 3-5 beers on the day of presentation.  Denies daily drinking.  States that he drank because he was depressed , and that this does not happen every day.  Continue monitoring of CIWA scores for withdrawal. Home scheduled xanax resumed.    PAD (peripheral  artery disease), mnoderate  Continue treatment for CAD.  Patient high risk surgical candidate.      History of heart artery stent, one in 2009  Treatment for CAD noted above      History of MI (myocardial infarction) x2, 1987 and 2015  Patient with known CAD s/p stent placement, which is controlled Will continue ASA and Statin and monitor for S/Sx of angina/ACS. Continue to monitor on telemetry.           VTE Risk Mitigation (From admission, onward)         Ordered     enoxaparin injection 40 mg  Daily      12/18/19 1351     IP VTE HIGH RISK PATIENT  Once      12/18/19 1351                      Luz Taylor MD  Department of Hospital Medicine   [facility]

## 2019-12-23 LAB
ALBUMIN SERPL BCP-MCNC: 2.9 G/DL (ref 3.5–5.2)
ALP SERPL-CCNC: 83 U/L (ref 55–135)
ALT SERPL W/O P-5'-P-CCNC: 34 U/L (ref 10–44)
ANION GAP SERPL CALC-SCNC: 11 MMOL/L (ref 8–16)
AST SERPL-CCNC: 61 U/L (ref 10–40)
BASOPHILS # BLD AUTO: 0.03 K/UL (ref 0–0.2)
BASOPHILS NFR BLD: 0.4 % (ref 0–1.9)
BILIRUB SERPL-MCNC: 0.6 MG/DL (ref 0.1–1)
BUN SERPL-MCNC: 6 MG/DL (ref 8–23)
CALCIUM SERPL-MCNC: 8.9 MG/DL (ref 8.7–10.5)
CHLORIDE SERPL-SCNC: 94 MMOL/L (ref 95–110)
CO2 SERPL-SCNC: 27 MMOL/L (ref 23–29)
CREAT SERPL-MCNC: 0.7 MG/DL (ref 0.5–1.4)
DIFFERENTIAL METHOD: ABNORMAL
EOSINOPHIL # BLD AUTO: 0.1 K/UL (ref 0–0.5)
EOSINOPHIL NFR BLD: 1.3 % (ref 0–8)
ERYTHROCYTE [DISTWIDTH] IN BLOOD BY AUTOMATED COUNT: 11.7 % (ref 11.5–14.5)
EST. GFR  (AFRICAN AMERICAN): >60 ML/MIN/1.73 M^2
EST. GFR  (NON AFRICAN AMERICAN): >60 ML/MIN/1.73 M^2
GLUCOSE SERPL-MCNC: 102 MG/DL (ref 70–110)
HCT VFR BLD AUTO: 32.2 % (ref 40–54)
HGB BLD-MCNC: 11.1 G/DL (ref 14–18)
IMM GRANULOCYTES # BLD AUTO: 0.04 K/UL (ref 0–0.04)
LYMPHOCYTES # BLD AUTO: 0.9 K/UL (ref 1–4.8)
LYMPHOCYTES NFR BLD: 12.4 % (ref 18–48)
MAGNESIUM SERPL-MCNC: 1.7 MG/DL (ref 1.6–2.6)
MCH RBC QN AUTO: 34 PG (ref 27–31)
MCHC RBC AUTO-ENTMCNC: 34.5 G/DL (ref 32–36)
MCV RBC AUTO: 99 FL (ref 82–98)
MONOCYTES # BLD AUTO: 0.9 K/UL (ref 0.3–1)
MONOCYTES NFR BLD: 13.5 % (ref 4–15)
NEUTROPHILS # BLD AUTO: 5 K/UL (ref 1.8–7.7)
NEUTROPHILS NFR BLD: 71.8 % (ref 38–73)
NRBC BLD-RTO: 0 /100 WBC
PHOSPHATE SERPL-MCNC: 3.6 MG/DL (ref 2.7–4.5)
PLATELET # BLD AUTO: 200 K/UL (ref 150–350)
PMV BLD AUTO: 9.1 FL (ref 9.2–12.9)
POTASSIUM SERPL-SCNC: 3.6 MMOL/L (ref 3.5–5.1)
PROT SERPL-MCNC: 6.8 G/DL (ref 6–8.4)
RBC # BLD AUTO: 3.26 M/UL (ref 4.6–6.2)
SODIUM SERPL-SCNC: 132 MMOL/L (ref 136–145)
WBC # BLD AUTO: 6.91 K/UL (ref 3.9–12.7)

## 2019-12-23 PROCEDURE — 84100 ASSAY OF PHOSPHORUS: CPT

## 2019-12-23 PROCEDURE — 97530 THERAPEUTIC ACTIVITIES: CPT

## 2019-12-23 PROCEDURE — 97116 GAIT TRAINING THERAPY: CPT

## 2019-12-23 PROCEDURE — 97165 OT EVAL LOW COMPLEX 30 MIN: CPT

## 2019-12-23 PROCEDURE — 63600175 PHARM REV CODE 636 W HCPCS: Performed by: INTERNAL MEDICINE

## 2019-12-23 PROCEDURE — 25000003 PHARM REV CODE 250: Performed by: INTERNAL MEDICINE

## 2019-12-23 PROCEDURE — 80053 COMPREHEN METABOLIC PANEL: CPT

## 2019-12-23 PROCEDURE — 83735 ASSAY OF MAGNESIUM: CPT

## 2019-12-23 PROCEDURE — 36415 COLL VENOUS BLD VENIPUNCTURE: CPT

## 2019-12-23 PROCEDURE — 94761 N-INVAS EAR/PLS OXIMETRY MLT: CPT

## 2019-12-23 PROCEDURE — 63600175 PHARM REV CODE 636 W HCPCS: Performed by: HOSPITALIST

## 2019-12-23 PROCEDURE — 25000003 PHARM REV CODE 250: Performed by: HOSPITALIST

## 2019-12-23 PROCEDURE — 12000002 HC ACUTE/MED SURGE SEMI-PRIVATE ROOM

## 2019-12-23 PROCEDURE — 85025 COMPLETE CBC W/AUTO DIFF WBC: CPT

## 2019-12-23 RX ORDER — TRAMADOL HYDROCHLORIDE 50 MG/1
50 TABLET ORAL EVERY 8 HOURS PRN
Status: DISCONTINUED | OUTPATIENT
Start: 2019-12-23 | End: 2019-12-24 | Stop reason: HOSPADM

## 2019-12-23 RX ADMIN — PRIMIDONE 50 MG: 50 TABLET ORAL at 05:12

## 2019-12-23 RX ADMIN — THERA TABS 1 TABLET: TAB at 09:12

## 2019-12-23 RX ADMIN — PANTOPRAZOLE SODIUM 40 MG: 40 TABLET, DELAYED RELEASE ORAL at 09:12

## 2019-12-23 RX ADMIN — ALPRAZOLAM 0.5 MG: 0.25 TABLET ORAL at 09:12

## 2019-12-23 RX ADMIN — ALPRAZOLAM 0.5 MG: 0.25 TABLET ORAL at 02:12

## 2019-12-23 RX ADMIN — ROSUVASTATIN CALCIUM 40 MG: 20 TABLET, FILM COATED ORAL at 08:12

## 2019-12-23 RX ADMIN — ENOXAPARIN SODIUM 40 MG: 100 INJECTION SUBCUTANEOUS at 05:12

## 2019-12-23 RX ADMIN — HYDRALAZINE HYDROCHLORIDE 10 MG: 20 INJECTION INTRAMUSCULAR; INTRAVENOUS at 03:12

## 2019-12-23 RX ADMIN — CARVEDILOL 6.25 MG: 6.25 TABLET, FILM COATED ORAL at 09:12

## 2019-12-23 RX ADMIN — SENNOSIDES AND DOCUSATE SODIUM 1 TABLET: 8.6; 5 TABLET ORAL at 09:12

## 2019-12-23 RX ADMIN — PRIMIDONE 50 MG: 50 TABLET ORAL at 02:12

## 2019-12-23 RX ADMIN — TRAMADOL HYDROCHLORIDE 50 MG: 50 TABLET ORAL at 08:12

## 2019-12-23 RX ADMIN — Medication 100 MG: at 09:12

## 2019-12-23 RX ADMIN — PRIMIDONE 50 MG: 50 TABLET ORAL at 09:12

## 2019-12-23 RX ADMIN — CARVEDILOL 6.25 MG: 6.25 TABLET, FILM COATED ORAL at 08:12

## 2019-12-23 RX ADMIN — PRIMIDONE 50 MG: 50 TABLET ORAL at 08:12

## 2019-12-23 RX ADMIN — ASPIRIN 325 MG ORAL TABLET 325 MG: 325 PILL ORAL at 09:12

## 2019-12-23 NOTE — PLAN OF CARE
Patient progressing towards PT goals with tolerance to OOB activity maintaining posterior hip precautions.

## 2019-12-23 NOTE — PLAN OF CARE
Plan of care reviewed, patient verbalized understanding. AAOx4. Up ab prieto with assist and walker. PIV CDI. VS addressed. Abrasion to L elbow with dressing CDI. Dressing to R hip CDI, immobilizer in tact. PT. Tele monitored. Multiple BM this shift. Voiding freely. Remain afebrile throughout shift. Benadryl ointment applied PRN to rash on body per orders. Moderate pain control with PRN meds. Bed in lowest position, SRX2, brakes locked, call light within reach. Patient remains free from fall and injury. Will continue to monitor.

## 2019-12-23 NOTE — PT/OT/SLP PROGRESS
Occupational Therapy   Treatment    Name: Ricky Cole  MRN: [id number]  Admitting Diagnosis:  Hip fracture  3 Days Post-Op    Recommendations:     Discharge Recommendations: rehabilitation facility, other (see comments)(versus SNF)  Discharge Equipment Recommendations:  bedside commode, walker, rolling, hip kit, other (see comments)(may use bedside commode as shower chair as pt has shower chair)  Barriers to discharge:  Decreased caregiver support, Other (Comment)(per OT eval, pt reports that his son is unreliable)    Assessment:     Ricky Cole is a 64 y.o. male with a medical diagnosis of Hip fracture.  He presents with increased fatigue and refusing any OOB due to fatigue; however, agreeable to OTR education/instruction. Patient will require reinforcement of all education/instruction. Performance deficits affecting function are weakness, impaired endurance, impaired self care skills, impaired functional mobilty, gait instability, decreased ROM, decreased lower extremity function, orthopedic precautions.     Rehab Prognosis:  Fair; patient would benefit from acute skilled OT services to address these deficits and reach maximum level of function.       Plan:     Patient to be seen 4 x/week to address the above listed problems via self-care/home management, therapeutic activities, therapeutic exercises  · Plan of Care Expires: 01/04/20  · Plan of Care Reviewed with: patient    Subjective     Pain/Comfort:  · Pain Rating 1: (No pain reported but does complain of fatigue)  · Pain Rating Post-Intervention 1: 0/10    Objective:     Communicated with: Nurse prior to session.  Patient found HOB elevated with bed alarm, knee immobilizer, telemetry upon OT entry to room.    General Precautions: Standard, fall   Orthopedic Precautions:RLE weight bearing as tolerated, RLE posterior precautions   Braces: Knee immobilizer     Occupational Performance:     Functional Mobility/Transfers:  · Patient refusing as he has  already been up and seen PT twice today  · Functional Mobility: Pt refusing as he has already been up and seen PT twice today    Activities of Daily Living:  · Grooming: stand by assistance to Supervision from bed level      [facility] 6 Click ADL: 18    Treatment & Education:  - At first upon OT inquiry, pt denies knowing posterior hip precautions; however, with verbal cues, pt able to identify 2/3 precautions (no adduction and no leaning forward past 90*); OTR providing reinforcement of posterior hip precautions with functional examples and demonstration; OTR also providing education regarding necessity and benefits regarding use of DME (especially bedside commode and RW as pt reports that he primarily used a rollator prior to admit); pt will require reinforcement of all education/instruction     Patient left HOB elevated with all lines intact, call button in reach, bed alarm on and nurse notifiedEducation:      GOALS:   Multidisciplinary Problems     Occupational Therapy Goals        Problem: Occupational Therapy Goal    Goal Priority Disciplines Outcome Interventions   Occupational Therapy Goal     OT, PT/OT Ongoing, Not Progressing    Description:  Goals to be met by: 1/4/2020     Patient will increase functional independence with ADLs by performing:    LE Dressing with Modified [place] and Assistive Devices as needed.  Grooming while standing at sink with Modified [place].  Toileting from toilet with Modified [place] for hygiene and clothing management.   Supine to sit with Modified [place].  Toilet transfer to toilet with Modified [place].                      Time Tracking:     OT Date of Treatment: 12/23/19  OT Start Time: 1330  OT Stop Time: 1341  OT Total Time (min): 11 min    Billable Minutes:Therapeutic Activity 11    CESARIO Gauthier LOTR  12/23/2019

## 2019-12-23 NOTE — ASSESSMENT & PLAN NOTE
Status post right total hip arthroplasty-December 20, 2019  POD-3, physical therapist recommending skilled nursing facility placement.  Patient is in agreement.  Discussed with  and . Time spent in care of the patient, counseling and coordination of care (Greater than 50% spent in direct face to face contact): 35 min.

## 2019-12-23 NOTE — SUBJECTIVE & OBJECTIVE
Interval History:  Status post right total hip arthroplasty-POD # 3.  Right hip pain is stable.  Patient is slow to work with physical therapy.  Patient noted to have elevated blood pressure this morning.  Coreg dose was increased yesterday 6.125 mg twice daily. Patient denies any chest pain, shortness of breath or any fever.    Review of Systems   Constitutional: Positive for activity change and fatigue. Negative for fever.   HENT: Negative for ear discharge, facial swelling and sore throat.    Eyes: Negative for photophobia, pain and visual disturbance.   Respiratory: Negative for apnea, cough and shortness of breath.    Cardiovascular: Negative for chest pain and leg swelling.   Gastrointestinal: Negative for abdominal pain and blood in stool.   Endocrine: Negative for cold intolerance and heat intolerance.   Musculoskeletal: Positive for gait problem and joint swelling. Negative for back pain.   Skin: Negative for pallor and rash.   Neurological: Negative for speech difficulty and headaches.   Psychiatric/Behavioral: Positive for dysphoric mood. Negative for confusion, hallucinations and suicidal ideas.   All other systems reviewed and are negative.    Objective:     Vital Signs (Most Recent):  Temp: 97.6 °F (36.4 °C) (12/22/19 2000)  Pulse: 82 (12/22/19 2000)  Resp: 16 (12/22/19 2000)  BP: (!) 184/89 (12/22/19 2000)  SpO2: 96 % (12/22/19 2000) Vital Signs (24h Range):  Temp:  [97.6 °F (36.4 °C)-98.3 °F (36.8 °C)] 97.6 °F (36.4 °C)  Pulse:  [75-82] 82  Resp:  [16-20] 16  SpO2:  [92 %-96 %] 96 %  BP: (172-188)/(82-92) 184/89     Weight: 64.4 kg (142 lb)  Body mass index is 20.37 kg/m².    Intake/Output Summary (Last 24 hours) at 12/23/2019 0744  Last data filed at 12/23/2019 0624  Gross per 24 hour   Intake 600 ml   Output 2475 ml   Net -1875 ml      Physical Exam   Constitutional: He is oriented to person, place, and time. No distress.   Thin, chronically ill-appearing  male who appears older than  stated age.  Anxious and tremulous   HENT:   Head: Normocephalic.   Mouth/Throat: Oropharynx is clear and moist.   Eyes: Conjunctivae are normal. Right eye exhibits no discharge. Left eye exhibits no discharge. No scleral icterus.   Pupils pinpoint   Neck: No JVD present.   Cardiovascular: Normal rate, regular rhythm, normal heart sounds and intact distal pulses. Exam reveals no friction rub.   No murmur heard.  Pulmonary/Chest: Effort normal and breath sounds normal. No respiratory distress. He has no wheezes.   Abdominal: Soft. Bowel sounds are normal. He exhibits no distension. There is no tenderness.   Musculoskeletal: Normal range of motion. He exhibits no edema.   Right hip dressing clean dry and intact.   Lymphadenopathy:     He has no cervical adenopathy.   Neurological: He is alert and oriented to person, place, and time. He has normal reflexes.   Skin: No rash noted. He is not diaphoretic. No erythema.   Psychiatric: He has a normal mood and affect. His behavior is normal.   Nursing note and vitals reviewed.      Significant Labs:   CBC:   Recent Labs   Lab 12/22/19 0441 12/23/19  0428   WBC 6.28 6.91   HGB 10.1* 11.1*   HCT 29.9* 32.2*    200     CMP:   Recent Labs   Lab 12/22/19 0441 12/23/19  0428    132*   K 3.8 3.6    94*   CO2 30* 27    102   BUN 7* 6*   CREATININE 0.7 0.7   CALCIUM 8.1* 8.9   PROT 6.1 6.8   ALBUMIN 2.8* 2.9*   BILITOT 0.5 0.6   ALKPHOS 49* 83   AST 40 61*   ALT 16 34   ANIONGAP 6* 11   EGFRNONAA >60 >60       Significant Imaging:  Right hip x-ray:   Right femoral neck fracture with mild impaction.  Lumbar spine degenerative disc disease noted.    Right hip x-ray: Status post right hip replacement.

## 2019-12-23 NOTE — PLAN OF CARE
12/23/19 0800   Patient Assessment/Suction   Level of Consciousness (AVPU) alert   Respiratory Effort Normal;Unlabored   PRE-TX-O2   O2 Device (Oxygen Therapy) room air   SpO2 (!) 94 %   Pulse Oximetry Type Intermittent   $ Pulse Oximetry - Multiple Charge Pulse Oximetry - Multiple

## 2019-12-23 NOTE — PT/OT/SLP PROGRESS
Physical Therapy Treatment    Patient Name:  Ricky Cole   MRN:  [id number]    Recommendations:     Discharge Recommendations:  rehabilitation facility   Discharge Equipment Recommendations: walker, rolling   Barriers to discharge: None    Assessment:     Ricky Cole is a 64 y.o. male admitted with a medical diagnosis of Hip fracture.  He presents with the following impairments/functional limitations:  weakness, gait instability, impaired endurance, impaired functional mobilty, impaired self care skills, impaired balance, decreased coordination, decreased ROM, pain, orthopedic precautions. Pt agreeable to therapy and tolerated session well. Pt educated on posterior hip precautions, Pt t/f to EOB with CGA for RLE, sit<>stand SBA, and gait of 10' x 2 to restroom for + bowel movement.     Rehab Prognosis: Good; patient would benefit from acute skilled PT services to address these deficits and reach maximum level of function.    Recent Surgery: Procedure(s) (LRB):  ARTHROPLASTY, HIP, Lindy, pegboard, 1st assist (Right) 3 Days Post-Op    Plan:     During this hospitalization, patient to be seen BID to address the identified rehab impairments via gait training, therapeutic activities, therapeutic exercises and progress toward the following goals:    · Plan of Care Expires:  12/31/19    Subjective     Chief Complaint: Medication mix up and knee immobilizer.   Patient/Family Comments/goals: Inpatient rehab to get stronger.   Pain/Comfort:  · Pain Rating 1: 5/10  · Location - Side 1: Right  · Location 1: hip      Objective:     Communicated with nurse Grover prior to session.  Patient found HOB elevated with knee immobilizer, telemetry upon PT entry to room.     General Precautions: Standard, fall   Orthopedic Precautions:RLE weight bearing as tolerated, RLE posterior precautions   Braces: Knee immobilizer     Functional Mobility:  · Bed Mobility:     · Rolling Left:  stand by assistance  · Rolling Right: stand by  assistance  · Scooting: contact guard assistance  · Supine to Sit: stand by assistance  · Sit to Supine: contact guard assistance  · Transfers:     · Sit to Stand:  stand by assistance with rolling walker  · Toilet Transfer: minimum assistance with  rolling walker  using  Step Transfer  · Gait: 10' x 2 CGA with RW. RLE WBAT. R hip post. precuations. No LOB and maintained precautions.       AM-PAC 6 CLICK MOBILITY          Therapeutic Activities and Exercises:   Pt educated on precautions and exercises to complete in bed.   Pt declined sitting in chair or ambulation in hallway. Pt agreed to sitting in chair for lunch and ambulating in buck for second session today.    Discussed inpatient rehab with pt and pt agreeable.     Patient left HOB elevated with all lines intact, call button in reach and nurse Reilly notified..    GOALS:   Multidisciplinary Problems     Physical Therapy Goals        Problem: Physical Therapy Goal    Goal Priority Disciplines Outcome Goal Variances Interventions   Physical Therapy Goal     PT, PT/OT Ongoing, Progressing     Description:  Goals to be met by:      Patient will increase functional independence with mobility by performin). Supine to sit with Modified [place]  2). Sit to supine with Modified [place]  3). Sit to stand transfer with Modified [place]  4). Bed to chair transfer with Modified [place] using Rolling Walker  5). Gait  x  > 150 feet with Modified [place] using Rolling Walker.    6) Comprehends posterior hip precautions.                    Time Tracking:     PT Received On: 19  PT Start Time: 921     PT Stop Time: 948  PT Total Time (min): 27 min     Billable Minutes: Gait Training 8 min and Therapeutic Exercise 19 minutes    Treatment Type: Treatment  PT/PTA: PTA     PTA Visit Number: 2     Guerita Greenberg, PTA  2019

## 2019-12-23 NOTE — PROGRESS NOTES
[facility] Medicine  Progress Note    Patient Name: Ricky Cole  MRN: [id number]  Patient Class: IP- Inpatient   Admission Date: 12/18/2019  Length of Stay: 5 days  Attending Physician: Zahira Cortes MD  Primary Care Provider: Hiram Leija MD        Subjective:     Principal Problem:Hip fracture        HPI:  Per Dr. Mario:    Patient is a 64-year-old  male with a past medical history significant for binge drinking of alcohol, COPD, hypertension, stroke with alcohol/vascular dementia and depression who presents the hospital after becoming intoxicated and falling.  Subsequent to the patient's fall, he developed a femoral neck fracture and presented to the emergency department outside hospital.  He was found to be mildly intoxicated, hyponatremic and was transferred to [facility] for orthopedics consult and hip fracture repair.  Patient denies any chest pain, shortness of breath, nausea, vomiting, diarrhea, fevers.  He appears to have pain in the affected extremity, but otherwise denies pain or neurologic symptoms.    Overview/Hospital Course:  Underwent right TYRONE on 12/20/2019.  Working with physical therapy.  Resuming home medications.  Xanax changed to Ativan to avoid withdrawal as well as half life. He then began to develop elevated BP. Given PRN hydralazine & resumed home xanax scheduled to avoid withdrawl. Has been sleepy throughout the day. Discontinued librium, ativan, and namenda (he does not take at home). Participating with PT.    Interval History:  Status post right total hip arthroplasty-POD # 3.  Right hip pain is stable.  Patient is slow to work with physical therapy.  Patient noted to have elevated blood pressure this morning.  Coreg dose was increased yesterday 6.125 mg twice daily. Patient denies any chest pain, shortness of breath or any fever.    Review of Systems   Constitutional: Positive for activity change and fatigue. Negative for fever.    HENT: Negative for ear discharge, facial swelling and sore throat.    Eyes: Negative for photophobia, pain and visual disturbance.   Respiratory: Negative for apnea, cough and shortness of breath.    Cardiovascular: Negative for chest pain and leg swelling.   Gastrointestinal: Negative for abdominal pain and blood in stool.   Endocrine: Negative for cold intolerance and heat intolerance.   Musculoskeletal: Positive for gait problem and joint swelling. Negative for back pain.   Skin: Negative for pallor and rash.   Neurological: Negative for speech difficulty and headaches.   Psychiatric/Behavioral: Positive for dysphoric mood. Negative for confusion, hallucinations and suicidal ideas.   All other systems reviewed and are negative.    Objective:     Vital Signs (Most Recent):  Temp: 97.6 °F (36.4 °C) (12/22/19 2000)  Pulse: 82 (12/22/19 2000)  Resp: 16 (12/22/19 2000)  BP: (!) 184/89 (12/22/19 2000)  SpO2: 96 % (12/22/19 2000) Vital Signs (24h Range):  Temp:  [97.6 °F (36.4 °C)-98.3 °F (36.8 °C)] 97.6 °F (36.4 °C)  Pulse:  [75-82] 82  Resp:  [16-20] 16  SpO2:  [92 %-96 %] 96 %  BP: (172-188)/(82-92) 184/89     Weight: 64.4 kg (142 lb)  Body mass index is 20.37 kg/m².    Intake/Output Summary (Last 24 hours) at 12/23/2019 0744  Last data filed at 12/23/2019 0624  Gross per 24 hour   Intake 600 ml   Output 2475 ml   Net -1875 ml      Physical Exam   Constitutional: He is oriented to person, place, and time. No distress.   Thin, chronically ill-appearing  male who appears older than stated age.  Anxious and tremulous   HENT:   Head: Normocephalic.   Mouth/Throat: Oropharynx is clear and moist.   Eyes: Conjunctivae are normal. Right eye exhibits no discharge. Left eye exhibits no discharge. No scleral icterus.   Pupils pinpoint   Neck: No JVD present.   Cardiovascular: Normal rate, regular rhythm, normal heart sounds and intact distal pulses. Exam reveals no friction rub.   No murmur heard.  Pulmonary/Chest:  Effort normal and breath sounds normal. No respiratory distress. He has no wheezes.   Abdominal: Soft. Bowel sounds are normal. He exhibits no distension. There is no tenderness.   Musculoskeletal: Normal range of motion. He exhibits no edema.   Right hip dressing clean dry and intact.   Lymphadenopathy:     He has no cervical adenopathy.   Neurological: He is alert and oriented to person, place, and time. He has normal reflexes.   Skin: No rash noted. He is not diaphoretic. No erythema.   Psychiatric: He has a normal mood and affect. His behavior is normal.   Nursing note and vitals reviewed.      Significant Labs:   CBC:   Recent Labs   Lab 12/22/19 0441 12/23/19 0428   WBC 6.28 6.91   HGB 10.1* 11.1*   HCT 29.9* 32.2*    200     CMP:   Recent Labs   Lab 12/22/19 0441 12/23/19 0428    132*   K 3.8 3.6    94*   CO2 30* 27    102   BUN 7* 6*   CREATININE 0.7 0.7   CALCIUM 8.1* 8.9   PROT 6.1 6.8   ALBUMIN 2.8* 2.9*   BILITOT 0.5 0.6   ALKPHOS 49* 83   AST 40 61*   ALT 16 34   ANIONGAP 6* 11   EGFRNONAA >60 >60       Significant Imaging:  Right hip x-ray:   Right femoral neck fracture with mild impaction.  Lumbar spine degenerative disc disease noted.    Right hip x-ray: Status post right hip replacement.      Assessment/Plan:      * Hip fracture  Status post right total hip arthroplasty-December 20, 2019  POD-3, physical therapist recommending skilled nursing facility placement.  Patient is in agreement.  Discussed with  and . Time spent in care of the patient, counseling and coordination of care (Greater than 50% spent in direct face to face contact): 35 min.        Essential hypertension  Resume home coreg and increase to 6.25 mg BID  PRN hydralazine .  Off IV fluid hydration.    Vitals:    12/22/19 1241 12/22/19 1411 12/22/19 1533 12/22/19 2000   BP: (!) 183/88 (!) 188/92 (!) 172/82 (!) 184/89   BP Location: Left arm  Left arm    Patient Position: Lying   Lying    Pulse: 77 75 80 82   Resp: 20  20 16   Temp: 98 °F (36.7 °C)  98.3 °F (36.8 °C) 97.6 °F (36.4 °C)   TempSrc: Oral  Oral Oral   SpO2: (!) 92%  95% 96%   Weight:       Height:               Tremor  Continue home primidone & coreg      Dementia associated with alcoholism without behavioral disturbance  Dementia is controlled currently. Continue home dementia meds and non-pharmacologic interventions to prevent delirium (No VS between 11PM-5AM, activity during day, opening blinds, providing glasses/hearing aids, and up in chair during daytime). Use PRN anti-psychotics to prevent behavior of self harm during sundowning, and avoid narcotics and benzos unless absolutely necessary. PRN anti-psychotics prescribed to avoid self harm behaviors.    Does not take home namenda      ETOH abuse  Patient states that he drinks approximately 3-5 beers on the day of presentation.  Denies daily drinking.  States that he drank because he was depressed , and that this does not happen every day.  Continue monitoring of CIWA scores for withdrawal. Home scheduled xanax resumed.    PAD (peripheral artery disease), mnoderate  Continue treatment for CAD.  Patient high risk surgical candidate.      History of heart artery stent, one in 2009  Treatment for CAD noted above      History of MI (myocardial infarction) x2, 1987 and 2015  Patient with known CAD s/p stent placement, which is controlled Will continue ASA and Statin and monitor for S/Sx of angina/ACS. Continue to monitor on telemetry.          Disposition:  Skilled nursing facility placement.  VTE Risk Mitigation (From admission, onward)         Ordered     enoxaparin injection 40 mg  Daily      12/18/19 1351     IP VTE HIGH RISK PATIENT  Once      12/18/19 1351                      Zahira Cortes MD  Department of Hospital Medicine   [facility]

## 2019-12-23 NOTE — PLAN OF CARE
Problem: Occupational Therapy Goal  Goal: Occupational Therapy Goal  Description  Goals to be met by: 1/4/2020     Patient will increase functional independence with ADLs by performing:    LE Dressing with Modified [place] and Assistive Devices as needed.  Grooming while standing at sink with Modified [place].  Toileting from toilet with Modified [place] for hygiene and clothing management.   Supine to sit with Modified [place].  Toilet transfer to toilet with Modified [place].     Outcome: Ongoing, Not Progressing

## 2019-12-23 NOTE — ASSESSMENT & PLAN NOTE
Resume home coreg and increase to 6.25 mg BID  PRN hydralazine .  Off IV fluid hydration.    Vitals:    12/22/19 1241 12/22/19 1411 12/22/19 1533 12/22/19 2000   BP: (!) 183/88 (!) 188/92 (!) 172/82 (!) 184/89   BP Location: Left arm  Left arm    Patient Position: Lying  Lying    Pulse: 77 75 80 82   Resp: 20  20 16   Temp: 98 °F (36.7 °C)  98.3 °F (36.8 °C) 97.6 °F (36.4 °C)   TempSrc: Oral  Oral Oral   SpO2: (!) 92%  95% 96%   Weight:       Height:

## 2019-12-23 NOTE — PLAN OF CARE
Patient AAO, VSS. Pt verbalized understanding of POC. Pain well managed with current pain management plan.  PRN medications utilized. Tele monitored; nsr.  Abrasion to right elbow cleansed and dressed with gauze. IVF infusing as ordered. Rash noted to patient's back. NP on call ordered benadryl for itching.  Purposeful hourly/q2hr rounding done during shift to promote patient safety. Patient free from falls and injury during shift. Will continue to monitor.

## 2019-12-24 VITALS
OXYGEN SATURATION: 96 % | HEART RATE: 77 BPM | WEIGHT: 142 LBS | RESPIRATION RATE: 20 BRPM | DIASTOLIC BLOOD PRESSURE: 75 MMHG | TEMPERATURE: 98 F | SYSTOLIC BLOOD PRESSURE: 144 MMHG | HEIGHT: 70 IN | BODY MASS INDEX: 20.33 KG/M2

## 2019-12-24 LAB
ALBUMIN SERPL BCP-MCNC: 2.9 G/DL (ref 3.5–5.2)
ALP SERPL-CCNC: 84 U/L (ref 55–135)
ALT SERPL W/O P-5'-P-CCNC: 44 U/L (ref 10–44)
ANION GAP SERPL CALC-SCNC: 9 MMOL/L (ref 8–16)
AST SERPL-CCNC: 66 U/L (ref 10–40)
BASOPHILS # BLD AUTO: 0.03 K/UL (ref 0–0.2)
BASOPHILS NFR BLD: 0.5 % (ref 0–1.9)
BILIRUB SERPL-MCNC: 0.5 MG/DL (ref 0.1–1)
BUN SERPL-MCNC: 7 MG/DL (ref 8–23)
CALCIUM SERPL-MCNC: 9.1 MG/DL (ref 8.7–10.5)
CHLORIDE SERPL-SCNC: 95 MMOL/L (ref 95–110)
CO2 SERPL-SCNC: 29 MMOL/L (ref 23–29)
CREAT SERPL-MCNC: 0.8 MG/DL (ref 0.5–1.4)
DIFFERENTIAL METHOD: ABNORMAL
EOSINOPHIL # BLD AUTO: 0.1 K/UL (ref 0–0.5)
EOSINOPHIL NFR BLD: 1.8 % (ref 0–8)
ERYTHROCYTE [DISTWIDTH] IN BLOOD BY AUTOMATED COUNT: 11.9 % (ref 11.5–14.5)
EST. GFR  (AFRICAN AMERICAN): >60 ML/MIN/1.73 M^2
EST. GFR  (NON AFRICAN AMERICAN): >60 ML/MIN/1.73 M^2
GLUCOSE SERPL-MCNC: 107 MG/DL (ref 70–110)
HCT VFR BLD AUTO: 34.5 % (ref 40–54)
HGB BLD-MCNC: 11.5 G/DL (ref 14–18)
IMM GRANULOCYTES # BLD AUTO: 0.03 K/UL (ref 0–0.04)
LYMPHOCYTES # BLD AUTO: 1 K/UL (ref 1–4.8)
LYMPHOCYTES NFR BLD: 15.7 % (ref 18–48)
MAGNESIUM SERPL-MCNC: 2 MG/DL (ref 1.6–2.6)
MCH RBC QN AUTO: 33.1 PG (ref 27–31)
MCHC RBC AUTO-ENTMCNC: 33.3 G/DL (ref 32–36)
MCV RBC AUTO: 99 FL (ref 82–98)
MONOCYTES # BLD AUTO: 0.9 K/UL (ref 0.3–1)
MONOCYTES NFR BLD: 13.5 % (ref 4–15)
NEUTROPHILS # BLD AUTO: 4.5 K/UL (ref 1.8–7.7)
NEUTROPHILS NFR BLD: 68 % (ref 38–73)
NRBC BLD-RTO: 0 /100 WBC
PHOSPHATE SERPL-MCNC: 3.7 MG/DL (ref 2.7–4.5)
PLATELET # BLD AUTO: 241 K/UL (ref 150–350)
PMV BLD AUTO: 9.1 FL (ref 9.2–12.9)
POTASSIUM SERPL-SCNC: 3.9 MMOL/L (ref 3.5–5.1)
PROT SERPL-MCNC: 7.2 G/DL (ref 6–8.4)
RBC # BLD AUTO: 3.47 M/UL (ref 4.6–6.2)
SODIUM SERPL-SCNC: 133 MMOL/L (ref 136–145)
WBC # BLD AUTO: 6.57 K/UL (ref 3.9–12.7)

## 2019-12-24 PROCEDURE — 80053 COMPREHEN METABOLIC PANEL: CPT

## 2019-12-24 PROCEDURE — 97116 GAIT TRAINING THERAPY: CPT

## 2019-12-24 PROCEDURE — 25000003 PHARM REV CODE 250: Performed by: HOSPITALIST

## 2019-12-24 PROCEDURE — 25000003 PHARM REV CODE 250: Performed by: INTERNAL MEDICINE

## 2019-12-24 PROCEDURE — 84100 ASSAY OF PHOSPHORUS: CPT

## 2019-12-24 PROCEDURE — 94761 N-INVAS EAR/PLS OXIMETRY MLT: CPT

## 2019-12-24 PROCEDURE — 85025 COMPLETE CBC W/AUTO DIFF WBC: CPT

## 2019-12-24 PROCEDURE — 83735 ASSAY OF MAGNESIUM: CPT

## 2019-12-24 PROCEDURE — 36415 COLL VENOUS BLD VENIPUNCTURE: CPT

## 2019-12-24 RX ORDER — FOLIC ACID 1 MG/1
1 TABLET ORAL DAILY
Qty: 30 TABLET | Refills: 0 | Status: SHIPPED | OUTPATIENT
Start: 2019-12-25 | End: 2020-05-27

## 2019-12-24 RX ORDER — LANOLIN ALCOHOL/MO/W.PET/CERES
100 CREAM (GRAM) TOPICAL DAILY
Qty: 30 TABLET | Refills: 0 | Status: SHIPPED | OUTPATIENT
Start: 2019-12-25 | End: 2020-05-27

## 2019-12-24 RX ORDER — CARVEDILOL 6.25 MG/1
6.25 TABLET ORAL 2 TIMES DAILY
Qty: 60 TABLET | Refills: 0 | Status: ON HOLD | OUTPATIENT
Start: 2019-12-24 | End: 2020-08-25 | Stop reason: SDUPTHER

## 2019-12-24 RX ORDER — ASPIRIN 325 MG
325 TABLET ORAL DAILY
Refills: 0
Start: 2020-01-24 | End: 2020-05-27

## 2019-12-24 RX ORDER — TRAMADOL HYDROCHLORIDE 50 MG/1
50 TABLET ORAL EVERY 8 HOURS PRN
Qty: 20 TABLET | Refills: 0 | Status: SHIPPED | OUTPATIENT
Start: 2019-12-24 | End: 2020-05-27

## 2019-12-24 RX ORDER — ASPIRIN 325 MG
325 TABLET ORAL 2 TIMES DAILY
Refills: 0
Start: 2019-12-24 | End: 2020-01-23

## 2019-12-24 RX ADMIN — Medication 100 MG: at 08:12

## 2019-12-24 RX ADMIN — ALPRAZOLAM 0.5 MG: 0.25 TABLET ORAL at 08:12

## 2019-12-24 RX ADMIN — PANTOPRAZOLE SODIUM 40 MG: 40 TABLET, DELAYED RELEASE ORAL at 08:12

## 2019-12-24 RX ADMIN — FOLIC ACID 1 MG: 1 TABLET ORAL at 08:12

## 2019-12-24 RX ADMIN — ASPIRIN 325 MG ORAL TABLET 325 MG: 325 PILL ORAL at 08:12

## 2019-12-24 RX ADMIN — CARVEDILOL 6.25 MG: 6.25 TABLET, FILM COATED ORAL at 08:12

## 2019-12-24 RX ADMIN — PRIMIDONE 50 MG: 50 TABLET ORAL at 08:12

## 2019-12-24 RX ADMIN — THERA TABS 1 TABLET: TAB at 08:12

## 2019-12-24 NOTE — ASSESSMENT & PLAN NOTE
Status post right total hip arthroplasty-December 20, 2019  POD-4, physical therapist recommending skilled nursing facility placement.  Patient is in agreement.

## 2019-12-24 NOTE — PLAN OF CARE
12/24/19 1323   Post-Acute Status   Post-Acute Authorization E   [facility] Status Referrals Sent

## 2019-12-24 NOTE — SUBJECTIVE & OBJECTIVE
Interval History:  Status post right total hip arthroplasty-POD # 4.  Right hip pain is stable.  Patient is slow to work with physical therapy.  Pressure is better controlled.  Patient denies any chest pain, shortness of breath or any fever.    Review of Systems   Constitutional: Positive for activity change and fatigue. Negative for fever.   HENT: Negative for ear discharge, facial swelling and sore throat.    Eyes: Negative for photophobia, pain and visual disturbance.   Respiratory: Negative for apnea, cough and shortness of breath.    Cardiovascular: Negative for chest pain and leg swelling.   Gastrointestinal: Negative for abdominal pain and blood in stool.   Endocrine: Negative for cold intolerance and heat intolerance.   Musculoskeletal: Positive for gait problem and joint swelling. Negative for back pain.   Skin: Negative for pallor and rash.   Neurological: Negative for speech difficulty and headaches.   Psychiatric/Behavioral: Positive for dysphoric mood. Negative for confusion, hallucinations and suicidal ideas.   All other systems reviewed and are negative.    Objective:     Vital Signs (Most Recent):  Temp: 97.2 °F (36.2 °C) (12/24/19 0829)  Pulse: 76 (12/24/19 0829)  Resp: 16 (12/24/19 0829)  BP: 138/68 (12/24/19 0829)  SpO2: (!) 94 % (12/24/19 0829) Vital Signs (24h Range):  Temp:  [97.2 °F (36.2 °C)-99.6 °F (37.6 °C)] 97.2 °F (36.2 °C)  Pulse:  [76-94] 76  Resp:  [14-18] 16  SpO2:  [92 %-96 %] 94 %  BP: (138-193)/(68-86) 138/68     Weight: 64.4 kg (142 lb)  Body mass index is 20.37 kg/m².    Intake/Output Summary (Last 24 hours) at 12/24/2019 0848  Last data filed at 12/23/2019 1822  Gross per 24 hour   Intake 600 ml   Output 1150 ml   Net -550 ml      Physical Exam   Constitutional: He is oriented to person, place, and time. No distress.   Thin, chronically ill-appearing  male who appears older than stated age.  Anxious and tremulous   HENT:   Head: Normocephalic.   Mouth/Throat: Oropharynx  is clear and moist.   Eyes: Conjunctivae are normal. Right eye exhibits no discharge. Left eye exhibits no discharge. No scleral icterus.   Pupils pinpoint   Neck: No JVD present.   Cardiovascular: Normal rate, regular rhythm, normal heart sounds and intact distal pulses. Exam reveals no friction rub.   No murmur heard.  Pulmonary/Chest: Effort normal and breath sounds normal. No respiratory distress. He has no wheezes.   Abdominal: Soft. Bowel sounds are normal. He exhibits no distension. There is no tenderness.   Musculoskeletal: Normal range of motion. He exhibits no edema.   Right hip dressing clean dry and intact.   Lymphadenopathy:     He has no cervical adenopathy.   Neurological: He is alert and oriented to person, place, and time. He has normal reflexes.   Skin: No rash noted. He is not diaphoretic. No erythema.   Psychiatric: He has a normal mood and affect. His behavior is normal.   Nursing note and vitals reviewed.      Significant Labs:   CBC:   Recent Labs   Lab 12/23/19 0428 12/24/19 0429   WBC 6.91 6.57   HGB 11.1* 11.5*   HCT 32.2* 34.5*    241     CMP:   Recent Labs   Lab 12/23/19 0428 12/24/19 0429   * 133*   K 3.6 3.9   CL 94* 95   CO2 27 29    107   BUN 6* 7*   CREATININE 0.7 0.8   CALCIUM 8.9 9.1   PROT 6.8 7.2   ALBUMIN 2.9* 2.9*   BILITOT 0.6 0.5   ALKPHOS 83 84   AST 61* 66*   ALT 34 44   ANIONGAP 11 9   EGFRNONAA >60 >60       Significant Imaging:  Right hip x-ray:   Right femoral neck fracture with mild impaction.  Lumbar spine degenerative disc disease noted.    Right hip x-ray: Status post right hip replacement.

## 2019-12-24 NOTE — PT/OT/SLP PROGRESS
Physical Therapy Treatment    Patient Name:  Ricky Cole   MRN:  [id number]    Recommendations:     Discharge Recommendations:  home health PT   Discharge Equipment Recommendations: walker, rolling   Barriers to discharge: None    Assessment:     Ricky Cole is a 64 y.o. male admitted with a medical diagnosis of Hip fracture.  He presents with the following impairments/functional limitations:  weakness, impaired functional mobilty, impaired balance, decreased ROM, orthopedic precautions. Pt agreeable to therapy and tolerated session well. Pt able to recall 3/3 posterior precautions. Pt t/f to sitting EOB with CGA for RLE, sit<>stand supervision, and gait of 100' SBA with RW.     Rehab Prognosis: Good; patient would benefit from acute skilled PT services to address these deficits and reach maximum level of function.    Recent Surgery: Procedure(s) (LRB):  ARTHROPLASTY, HIP, Lindy, pegboard, 1st assist (Right) 4 Days Post-Op    Plan:     During this hospitalization, patient to be seen BID to address the identified rehab impairments via gait training, therapeutic activities, therapeutic exercises and progress toward the following goals:    · Plan of Care Expires:  12/31/19    Subjective     Chief Complaint: Could walk more without the knee immobilizer.   Patient/Family Comments/goals: To go home.   Pain/Comfort:  · Pain Rating 1: 0/10  · Location - Side 1: Right  · Location 1: hip      Objective:     Communicated with nurse Bowers prior to session.  Patient found supine with telemetry, knee immobilizer upon PT entry to room.     General Precautions: Standard, fall   Orthopedic Precautions:RLE weight bearing as tolerated, RLE posterior precautions   Braces: Knee immobilizer     Functional Mobility:  · Bed Mobility:     · Rolling Left:  stand by assistance  · Rolling Right: stand by assistance  · Scooting: contact guard assistance  · Supine to Sit: contact guard assistance  · Sit to Supine: contact guard  assistance  · Transfers:     · Sit to Stand:  supervision with rolling walker  · Gait: 100' SBA RW. No LOB. Posterior precautions maintained.       AM-PAC 6 CLICK MOBILITY          Therapeutic Activities and Exercises:   Pt completing exercises throughout day.     Patient left supine with all lines intact, call button in reach, bed alarm on and nurse Elissa notified..    GOALS:   Multidisciplinary Problems     Physical Therapy Goals        Problem: Physical Therapy Goal    Goal Priority Disciplines Outcome Goal Variances Interventions   Physical Therapy Goal     PT, PT/OT Ongoing, Progressing     Description:  Goals to be met by:      Patient will increase functional independence with mobility by performin). Supine to sit with Modified [place]  2). Sit to supine with Modified [place]  3). Sit to stand transfer with Modified [place]  4). Bed to chair transfer with Modified [place] using Rolling Walker  5). Gait  x  > 150 feet with Modified [place] using Rolling Walker.    6) Comprehends posterior hip precautions.                    Time Tracking:     PT Received On: 19  PT Start Time: 1223     PT Stop Time: 1236  PT Total Time (min): 13 min     Billable Minutes: Gait Training 13 minutes    Treatment Type: Treatment  PT/PTA: PTA     PTA Visit Number: 4     Guerita Greenberg, PTA  2019

## 2019-12-24 NOTE — PLAN OF CARE
Patient AAO X 4. Patient free from injury during shift. Pain managed pharmacologically. Provided full relief. Patient free from N/V during shift. IV access is saline locked. Patient placed on cardiac monitoring. Running NSR. Remained afebrile during shift. Incision site clean, dry, and intact. Pt able to ambulate with assist X 1 and walker. Pt urinating without difficulty. Restroom offered. Repositioned as needed. Safety maintained with bed alarm. Bed in lowest position, call light and personal items within reach. Patient verbalized understanding of care. Will continue to monitor with every 2 hour rounding.

## 2019-12-24 NOTE — NURSING
Pt given all discharge instructions and prescriptions. Educated on when and how to take medications. Verbalized understanding. All belongs packed up and given to patient. Tele and iv removed. Left via wheelchair with patient transport. Pt to take cab to house.

## 2019-12-24 NOTE — PT/OT/SLP PROGRESS
Physical Therapy Treatment    Patient Name:  Ricky Cole   MRN:  [id number]    Recommendations:     Discharge Recommendations:  home health PT   Discharge Equipment Recommendations: walker, rolling   Barriers to discharge: None    Assessment:     Ricky Cole is a 64 y.o. male admitted with a medical diagnosis of Hip fracture.  He presents with the following impairments/functional limitations:  weakness, impaired functional mobilty, impaired balance, decreased coordination, decreased lower extremity function, decreased ROM, orthopedic precautions. Pt agreeable to therapy and states he wants to go home with home health. Pt t/f to sitting EOB with CGA, sit<>stand with SBA, and gait of 60' CGA RW.     Rehab Prognosis: Good; patient would benefit from acute skilled PT services to address these deficits and reach maximum level of function.    Recent Surgery: Procedure(s) (LRB):  ARTHROPLASTY, HIP, Lindy, pegboard, 1st assist (Right) 4 Days Post-Op    Plan:     During this hospitalization, patient to be seen BID to address the identified rehab impairments via gait training, therapeutic activities, therapeutic exercises and progress toward the following goals:    · Plan of Care Expires:  12/31/19    Subjective     Chief Complaint: Not sleeping well.   Patient/Family Comments/goals: To go home with home health.   Pain/Comfort:  · Pain Rating 1: 4/10  · Location - Side 1: Right  · Location 1: hip      Objective:     Communicated with nurse Bowers prior to session.  Patient found supine with telemetry, knee immobilizer upon PT entry to room.     General Precautions: Standard, fall   Orthopedic Precautions:RLE weight bearing as tolerated, RLE posterior precautions   Braces: Knee immobilizer     Functional Mobility:  · Bed Mobility:     · Rolling Left:  stand by assistance  · Scooting: contact guard assistance  · Supine to Sit: contact guard assistance  · Transfers:     · Sit to Stand:  stand by assistance with rolling  walker  · Gait: 60' CGA RW. No LOB or safety concerns and maintaining posterior precautions.       AM-PAC 6 CLICK MOBILITY          Therapeutic Activities and Exercises:   Pt performed hip abd, APs, glut squeezes.   Pt educated on posterior precautions and able to recall 2/3.     Patient left up in chair with all lines intact, call button in reach, chair alarm on and nurse Elissa notified..    GOALS:   Multidisciplinary Problems     Physical Therapy Goals        Problem: Physical Therapy Goal    Goal Priority Disciplines Outcome Goal Variances Interventions   Physical Therapy Goal     PT, PT/OT Ongoing, Progressing     Description:  Goals to be met by:      Patient will increase functional independence with mobility by performin). Supine to sit with Modified [place]  2). Sit to supine with Modified [place]  3). Sit to stand transfer with Modified [place]  4). Bed to chair transfer with Modified [place] using Rolling Walker  5). Gait  x  > 150 feet with Modified [place] using Rolling Walker.    6) Comprehends posterior hip precautions.                    Time Tracking:     PT Received On: 19  PT Start Time: 930     PT Stop Time: 944  PT Total Time (min): 14 min     Billable Minutes: Gait Training 14 minutes    Treatment Type: Treatment  PT/PTA: PTA     PTA Visit Number: 3     Guerita Greenberg, PTA  2019

## 2019-12-24 NOTE — PLAN OF CARE
SW acknowledge inpatient rehab consult.  Patient decline stating he has an adult son that does not work that takes care of him 24/7.  Patient would like to discharge home with home health.  SW to informed DESTINY Solis

## 2019-12-24 NOTE — PLAN OF CARE
SW met with patient at bedside regarding home health services.  Patient signed patient choice disclosure choosing [facility].  SW sent patient information to [facility] via [employer] [facility] system for authorization and acceptance.         12/24/19 1232   Post-Acute Status   Post-Acute Authorization Home Health/Hospice   Patient choice form signed by patient/caregiver List with quality metrics by geographic area provided

## 2019-12-24 NOTE — PLAN OF CARE
OK to pull rolling walker and bedside commode from [facility] closet per Nelli with [facility] [phone].  SW delivered rolling walker and bedside commode to patient's hospital room.  Patient signed delivery ticket.       12/24/19 1323   Post-Acute Status   [facility] Complete

## 2019-12-24 NOTE — ASSESSMENT & PLAN NOTE
Chronic problem. Will continue chronic medications and monitor for any changes, adjusting as needed.    Vitals:    12/23/19 1937 12/23/19 2200 12/24/19 0754 12/24/19 0829   BP: (!) 180/72   138/68   Patient Position:       Pulse: 94   76   Resp: 14   16   Temp: 99.6 °F (37.6 °C)   97.2 °F (36.2 °C)   TempSrc:       SpO2: (!) 94% 96% (!) 94% (!) 94%   Weight:       Height:

## 2019-12-24 NOTE — PLAN OF CARE
· 12/24/2019 1:00:32 PM Accepted  Enedina Regan@PAC--per response from [facility] via [employer] system       12/24/19 1322   Post-Acute Status   Post-Acute Authorization Home Health/Hospice   Home Health/Hospice Status Set-up Complete   Discharge Delays None known at this time

## 2019-12-24 NOTE — DISCHARGE SUMMARY
[facility] Hospital Medicine  Discharge Summary      Patient Name: Ricky Cole  MRN: [id number]  Admission Date: 12/18/2019  Hospital Length of Stay: 6 days  Discharge Date and Time:  12/24/2019 12:24 PM  Attending Physician: Zahira Cortes MD   Discharging Provider: Zahira Cortes MD  Primary Care Provider: Hiram Leija MD      HPI:   Per Dr. Mario:    Patient is a 64-year-old  male with a past medical history significant for binge drinking of alcohol, COPD, hypertension, stroke with alcohol/vascular dementia and depression who presents the hospital after becoming intoxicated and falling.  Subsequent to the patient's fall, he developed a femoral neck fracture and presented to the emergency department outside hospital.  He was found to be mildly intoxicated, hyponatremic and was transferred to [facility] for orthopedics consult and hip fracture repair.  Patient denies any chest pain, shortness of breath, nausea, vomiting, diarrhea, fevers.  He appears to have pain in the affected extremity, but otherwise denies pain or neurologic symptoms.    Procedure(s) (LRB):  ARTHROPLASTY, HIP, Lindy, pegboard, 1st assist (Right)      Hospital Course:   Patient was evaluated by Dr. Patel from Orthopedics who performed right total hip arthroplasty. Post-operative, patient did well.  Patient was initially slow to work with physical therapy.  Pain adequately controlled. Patient participated with physical therapy.  It was recommended to the patient, he would benefit with skilled nursing facility placement.  Patient declined skilled nursing facility placement.  Patient's conditioning has improved significantly.  Home health and home physical therapy has been arranged. Fall precautions discussed with the patient.  Patient counseled regarding abstinence from alcohol drinking and avoiding cigarette smoking.  Dangers of alcohol use and cigarette smoking discussed in detail on many occasions.   Alcohol rehab resources given to the patient.  Patient to follow up with primary care physician next week and orthopedic doctor in 2 weeks. Post-operative anti-coagulation as per orthopedics recommendations advised.  Dr. Guy recommended aspirin 325 mg twice daily for 30 days.  In case of chest pain, shortness of breath, stroke or stroke like symptoms, high grade fever or any signs or symptoms of surgical site wound infection symptoms, patient to return to nearest emergency room as soon as possible.    Consults:   Consults (From admission, onward)        Status Ordering Provider     Inpatient consult to Orthopedics  Once     Provider:  Dania Guy MD    Completed RAY HERRING     Inpatient consult to Social Work  Once     Provider:  (Not yet assigned)    Completed KAMLA JOHNSON     IP consult case management/social work  Once     Provider:  (Not yet assigned)    Acknowledged DANIA GUY      Right hip x-ray:   Right femoral neck fracture with mild impaction.  Lumbar spine degenerative disc disease noted.     Right hip x-ray: Status post right hip replacement.  Final Active Diagnoses:    Diagnosis Date Noted POA    PRINCIPAL PROBLEM:  Hip fracture [S72.009A] 12/18/2019 Yes    Tremor [R25.1] 12/21/2019 Yes    Essential hypertension [I10] 12/21/2019 Unknown    ETOH abuse [F10.10] 12/18/2019 Yes    Dementia associated with alcoholism without behavioral disturbance [F10.27] 12/18/2019 Yes    History of MI (myocardial infarction) x2, 1987 and 2015 [I25.2] 07/24/2019 Not Applicable    History of heart artery stent, one in 2009 [Z95.5] 07/24/2019 Not Applicable    PAD (peripheral artery disease), mnoderate [I73.9] 07/24/2019 Yes      Problems Resolved During this Admission:    Diagnosis Date Noted Date Resolved POA    Smoker, trying to quit again, 1 cig last a week, started age 17, quit for 42 years, restarted 2018 [F17.200] 07/24/2019 12/22/2019 Yes       Discharged Condition:  "good    Disposition: Home or Self Care    Follow Up:  Follow-up Information     Hiram Leija MD In 1 week.    Specialty:  Family Medicine  Contact information:  [address]  [place] [address]  [phone]             Kody Patel MD In 2 weeks.    Specialty:  Orthopedic Surgery  Contact information:  [address] [place] [address]  [address]  [facility]  [place] [address]  [phone]             Please follow up.    Contact information:  Please do not smoke cigarettes and abstain from drinking alcohol.                Patient Instructions:      COMMODE FOR HOME USE     Order Specific Question Answer Comments   Type: Standard    Height: 5' 10" (1.778 m)    Weight: 64.4 kg (142 lb)    Does patient have medical equipment at home? none    Length of need (1-99 months): 44      WALKER FOR HOME USE     Order Specific Question Answer Comments   Type of Walker: Adult (5'4"-6'6")    With wheels? Yes    Height: 5' 10" (1.778 m)    Weight: 64.4 kg (142 lb)    Length of need (1-99 months): 44    Does patient have medical equipment at home? none    Please check all that apply: Patient's condition impairs ambulation.    Please check all that apply: Patient needs help to get in and out of chair.    Please check all that apply: Walker will be used for gait training.    Please check all that apply: Patient is unable to safely ambulate without equipment.      Referral to Home health   Referral Priority: Routine Referral Type: Home Health   Referral Reason: Specialty Services Required   Requested Specialty: Home Health Services   Number of Visits Requested: 1     Diet Cardiac   Order Comments: Boost Plus can with meals.     Other restrictions (specify):   Order Comments: PLEASE OBSERVE FALL PRECAUTIONS. Hip precautions     Call MD for:   Order Comments: For worsening symptoms, chest pain, shortness of breath, increased abdominal pain, high grade fever, stroke or stroke like symptoms, " immediately go to the nearest Emergency Room or call 911 as soon as possible.       Significant Diagnostic Studies: Labs:   CMP   Recent Labs   Lab 12/23/19 0428 12/24/19 0429   * 133*   K 3.6 3.9   CL 94* 95   CO2 27 29    107   BUN 6* 7*   CREATININE 0.7 0.8   CALCIUM 8.9 9.1   PROT 6.8 7.2   ALBUMIN 2.9* 2.9*   BILITOT 0.6 0.5   ALKPHOS 83 84   AST 61* 66*   ALT 34 44   ANIONGAP 11 9   ESTGFRAFRICA >60 >60   EGFRNONAA >60 >60    and CBC   Recent Labs   Lab 12/23/19 0428 12/24/19 0429   WBC 6.91 6.57   HGB 11.1* 11.5*   HCT 32.2* 34.5*    241     Right hip x-ray:   Right femoral neck fracture with mild impaction.  Lumbar spine degenerative disc disease noted.     Right hip x-ray: Status post right hip replacement.    Pending Diagnostic Studies:     None         Medications:  Reconciled Home Medications:      Medication List      START taking these medications    folic acid 1 MG tablet  Commonly known as:  FOLVITE  Take 1 tablet (1 mg total) by mouth once daily.  Start taking on:  December 25, 2019     thiamine 100 MG tablet  Take 1 tablet (100 mg total) by mouth once daily.  Start taking on:  December 25, 2019     traMADol 50 mg tablet  Commonly known as:  ULTRAM  Take 1 tablet (50 mg total) by mouth every 8 (eight) hours as needed for Pain.        CHANGE how you take these medications    * aspirin 325 MG tablet  Take 1 tablet (325 mg total) by mouth 2 (two) times daily.  What changed:  You were already taking a medication with the same name, and this prescription was added. Make sure you understand how and when to take each.     * aspirin 325 MG tablet  Take 1 tablet (325 mg total) by mouth once daily.  Start taking on:  January 24, 2020  What changed:  These instructions start on January 24, 2020. If you are unsure what to do until then, ask your doctor or other care provider.     * carvedilol 3.125 MG tablet  Commonly known as:  COREG  Take 1 tablet (3.125 mg total) by mouth 2 (two)  times daily.  What changed:  Another medication with the same name was added. Make sure you understand how and when to take each.     * carvedilol 6.25 MG tablet  Commonly known as:  COREG  Take 1 tablet (6.25 mg total) by mouth 2 (two) times daily.  What changed:  You were already taking a medication with the same name, and this prescription was added. Make sure you understand how and when to take each.         * This list has 4 medication(s) that are the same as other medications prescribed for you. Read the directions carefully, and ask your doctor or other care provider to review them with you.            CONTINUE taking these medications    ALPRAZolam 1 MG tablet  Commonly known as:  XANAX  TAKE 1 TABLET 4 TIMES A DAY AS NEEDED FOR ANXIETY     dicyclomine 20 mg tablet  Commonly known as:  BENTYL  Take 1 tablet (20 mg total) by mouth 2 (two) times daily.     meloxicam 15 MG tablet  Commonly known as:  MOBIC  Take 1 tablet (15 mg total) by mouth daily as needed.     memantine 5 MG Tab  Commonly known as:  NAMENDA  TAKE 1 TABLET 2 TIMES A DAY (WITH MORNING AND EVENING MEAL) FOR MEMORY     pantoprazole 40 MG tablet  Commonly known as:  PROTONIX  TAKE 1 TABLET IN THE MORNING (30 MINUTES BEFORE BREAKFAST) FOR STOMACH     primidone 50 MG Tab  Commonly known as:  MYSOLINE  TAKE 1 TABLET 4 TIMES A DAY AS DIRECTED     rosuvastatin 40 MG Tab  Commonly known as:  CRESTOR  Take 1 tablet (40 mg total) by mouth every evening.            Indwelling Lines/Drains at time of discharge:   Lines/Drains/Airways     None                 Time spent on the discharge of patient: 34 minutes  Patient was seen and examined on the date of discharge and determined to be suitable for discharge.         Zahira Cortes MD  Department of Hospital Medicine  [facility]

## 2019-12-24 NOTE — PLAN OF CARE
12/24/19 1448   Final Note   Assessment Type Final Discharge Note   Anticipated Discharge Disposition Home-Health   Right Care Referral Info   Post Acute Recommendation Home-care

## 2019-12-26 ENCOUNTER — TELEPHONE (OUTPATIENT)
Dept: FAMILY MEDICINE | Facility: CLINIC | Age: 64
End: 2019-12-26

## 2019-12-26 ENCOUNTER — PATIENT OUTREACH (OUTPATIENT)
Dept: ADMINISTRATIVE | Facility: CLINIC | Age: 64
End: 2019-12-26

## 2019-12-26 NOTE — TELEPHONE ENCOUNTER
----- Message from Jocelyne Diana sent at 12/26/2019 11:24 AM CST -----  Contact: self   Patient miss call from your office please call back at [phone] (home)     Case number [id number]

## 2019-12-26 NOTE — TELEPHONE ENCOUNTER
"Spoke with patient and he states he is walking with assistance to walk with walker, rt leg is "dead", son & caregiver is staying with him. Homehealth & physical therapy are coming to home to work with patient. He has appointment in [place] with Orthopedist in a month. Michael is son, appointment made for 2/28/20.   "

## 2019-12-26 NOTE — TELEPHONE ENCOUNTER
Attempted to reach out to patient to schedule follow-up appointment, no answer at this time. Also attempted to call next of kin, no VM.

## 2019-12-27 ENCOUNTER — TELEPHONE (OUTPATIENT)
Dept: FAMILY MEDICINE | Facility: CLINIC | Age: 64
End: 2019-12-27

## 2019-12-27 DIAGNOSIS — S72.001B: Primary | ICD-10-CM

## 2019-12-27 NOTE — TELEPHONE ENCOUNTER
----- Message from Bridgette Barnes sent at 12/27/2019 12:02 PM CST -----  Contact: [facility]  [facility] calling stating pt complain pain to right hip, at a 10.On traMADol (ULTRAM) 50 mg tablet,Take 1 tablet (50 mg total) by mouth every 8 (eight) hours as needed for Pain. Also all over pt back red raised rash requesting something  For the itch...[phone]      .  [facility] - [place] - [address] [place] [address]  Phone: [phone] Fax: [phone]

## 2020-01-02 ENCOUNTER — TELEPHONE (OUTPATIENT)
Dept: FAMILY MEDICINE | Facility: CLINIC | Age: 65
End: 2020-01-02
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
## 2020-01-03 ENCOUNTER — NURSE TRIAGE (OUTPATIENT)
Dept: ADMINISTRATIVE | Facility: CLINIC | Age: 65
End: 2020-01-03

## 2020-01-03 DIAGNOSIS — F41.1 GAD (GENERALIZED ANXIETY DISORDER): ICD-10-CM

## 2020-01-03 RX ORDER — ALPRAZOLAM 1 MG/1
TABLET ORAL
Qty: 120 TABLET | Refills: 2 | OUTPATIENT
Start: 2020-01-03

## 2020-01-03 NOTE — TELEPHONE ENCOUNTER
----- Message from Deidre Bagley sent at 1/3/2020  3:28 PM CST -----  Contact: Ricky pt  Type:  RX Refill Request    Who Called:  Ricky  Refill or New Rx:  refill  RX Name and Strength:  ALPRAZolam (XANAX) 1 MG tablet  How is the patient currently taking it? (ex. 1XDay):  As needed  Is this a 30 day or 90 day RX:  30  Preferred Pharmacy with phone number:    [facility] [place] [facility] [address] [facility]  [address] [facility] [address]  Phone: [phone] Fax: [phone]    Local or Mail Order:  local  Ordering Provider:  Heladio Reyes Call Back Number:  [phone]  Additional Information:  Pls call pt regarding sending him the xanax. He sent a msg earlier but nobody has called him back.
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
## 2020-01-04 NOTE — TELEPHONE ENCOUNTER
Pt states he was prescribed tramadol and he is allergic to codeine, he states he took it twice but has since stopped, he now states he has no appetite but is drinking well, admits to nausea also, he states he feels cold all the time also, wants to know if he should go to ER, phone was disconnect and when I called back his son answered and states pt does not need to go to ER but if anything changes he will call back or take him to ER    Reason for Disposition   Nursing judgment    Protocols used: ST NO GUIDELINE OR REFERENCE [id number]

## 2020-01-07 ENCOUNTER — TELEPHONE (OUTPATIENT)
Dept: FAMILY MEDICINE | Facility: CLINIC | Age: 65
End: 2020-01-07

## 2020-01-07 NOTE — TELEPHONE ENCOUNTER
MEKA        ----- Message from Morenita Flaherty sent at 1/7/2020  1:39 PM CST -----  Type:  Sooner Apoointment Request    Caller is requesting a sooner appointment.  Caller declined first available appointment listed below.  Caller will not accept being placed on the waitlist and is requesting a message be sent to doctor.    Name of Caller: self   When is the first available appointment? NA   Symptoms:  Best Call Back Number:  [phone]  Additional Information:  Patient requesting a sooner appointment for hospital follow up from [facility]. Patient has an appointment for next month.

## 2020-01-08 DIAGNOSIS — F41.1 GAD (GENERALIZED ANXIETY DISORDER): ICD-10-CM

## 2020-01-08 RX ORDER — ALPRAZOLAM 1 MG/1
TABLET ORAL
Qty: 120 TABLET | Refills: 2 | Status: SHIPPED | OUTPATIENT
Start: 2020-01-08 | End: 2020-03-27 | Stop reason: SDUPTHER

## 2020-02-01 ENCOUNTER — HOSPITAL ENCOUNTER (EMERGENCY)
Facility: HOSPITAL | Age: 65
Discharge: HOME | End: 2020-02-01
Attending: EMERGENCY MEDICINE
Payer: MEDICARE

## 2020-02-01 VITALS
HEART RATE: 86 BPM | WEIGHT: 140 LBS | DIASTOLIC BLOOD PRESSURE: 93 MMHG | TEMPERATURE: 98 F | SYSTOLIC BLOOD PRESSURE: 151 MMHG | RESPIRATION RATE: 22 BRPM | OXYGEN SATURATION: 99 % | HEIGHT: 70 IN | BODY MASS INDEX: 20.04 KG/M2

## 2020-02-01 DIAGNOSIS — R07.9 CHEST PAIN: ICD-10-CM

## 2020-02-01 DIAGNOSIS — R53.1 WEAKNESS: ICD-10-CM

## 2020-02-01 DIAGNOSIS — I10 HYPERTENSION, UNSPECIFIED TYPE: ICD-10-CM

## 2020-02-01 DIAGNOSIS — R07.9 CHEST PAIN, UNSPECIFIED TYPE: Primary | ICD-10-CM

## 2020-02-01 LAB
ALBUMIN SERPL BCP-MCNC: 3.7 G/DL (ref 3.5–5.2)
ALP SERPL-CCNC: 117 U/L (ref 55–135)
ALT SERPL W/O P-5'-P-CCNC: 25 U/L (ref 10–44)
ANION GAP SERPL CALC-SCNC: 9 MMOL/L (ref 8–16)
AST SERPL-CCNC: 23 U/L (ref 10–40)
BASOPHILS # BLD AUTO: 0.03 K/UL (ref 0–0.2)
BASOPHILS NFR BLD: 0.5 % (ref 0–1.9)
BILIRUB SERPL-MCNC: 0.5 MG/DL (ref 0.1–1)
BILIRUB UR QL STRIP: NEGATIVE
BNP SERPL-MCNC: 33 PG/ML (ref 0–99)
BUN SERPL-MCNC: 5 MG/DL (ref 8–23)
CALCIUM SERPL-MCNC: 8.1 MG/DL (ref 8.7–10.5)
CHLORIDE SERPL-SCNC: 93 MMOL/L (ref 95–110)
CLARITY UR: CLEAR
CO2 SERPL-SCNC: 27 MMOL/L (ref 23–29)
COLOR UR: YELLOW
CREAT SERPL-MCNC: 0.8 MG/DL (ref 0.5–1.4)
DIFFERENTIAL METHOD: ABNORMAL
EOSINOPHIL # BLD AUTO: 0.1 K/UL (ref 0–0.5)
EOSINOPHIL NFR BLD: 2.5 % (ref 0–8)
ERYTHROCYTE [DISTWIDTH] IN BLOOD BY AUTOMATED COUNT: 12.1 % (ref 11.5–14.5)
EST. GFR  (AFRICAN AMERICAN): >60 ML/MIN/1.73 M^2
EST. GFR  (NON AFRICAN AMERICAN): >60 ML/MIN/1.73 M^2
GLUCOSE SERPL-MCNC: 154 MG/DL (ref 70–110)
GLUCOSE UR QL STRIP: NEGATIVE
HCT VFR BLD AUTO: 35 % (ref 40–54)
HGB BLD-MCNC: 11.8 G/DL (ref 14–18)
HGB UR QL STRIP: NEGATIVE
IMM GRANULOCYTES # BLD AUTO: 0.02 K/UL (ref 0–0.04)
IMM GRANULOCYTES NFR BLD AUTO: 0.4 % (ref 0–0.5)
INFLUENZA A, MOLECULAR: NEGATIVE
INFLUENZA B, MOLECULAR: NEGATIVE
KETONES UR QL STRIP: NEGATIVE
LEUKOCYTE ESTERASE UR QL STRIP: NEGATIVE
LYMPHOCYTES # BLD AUTO: 1.7 K/UL (ref 1–4.8)
LYMPHOCYTES NFR BLD: 31.5 % (ref 18–48)
MCH RBC QN AUTO: 31.8 PG (ref 27–31)
MCHC RBC AUTO-ENTMCNC: 33.7 G/DL (ref 32–36)
MCV RBC AUTO: 94 FL (ref 82–98)
MONOCYTES # BLD AUTO: 0.6 K/UL (ref 0.3–1)
MONOCYTES NFR BLD: 10.2 % (ref 4–15)
NEUTROPHILS # BLD AUTO: 3 K/UL (ref 1.8–7.7)
NEUTROPHILS NFR BLD: 54.9 % (ref 38–73)
NITRITE UR QL STRIP: NEGATIVE
NRBC BLD-RTO: 0 /100 WBC
PH UR STRIP: 7 [PH] (ref 5–8)
PLATELET # BLD AUTO: 269 K/UL (ref 150–350)
PMV BLD AUTO: 9.5 FL (ref 9.2–12.9)
POTASSIUM SERPL-SCNC: 3.2 MMOL/L (ref 3.5–5.1)
PROT SERPL-MCNC: 7.3 G/DL (ref 6–8.4)
PROT UR QL STRIP: NEGATIVE
RBC # BLD AUTO: 3.71 M/UL (ref 4.6–6.2)
SODIUM SERPL-SCNC: 129 MMOL/L (ref 136–145)
SP GR UR STRIP: 1.01 (ref 1–1.03)
SPECIMEN SOURCE: NORMAL
TROPONIN I SERPL DL<=0.01 NG/ML-MCNC: <0.01 NG/ML (ref 0.02–0.5)
TROPONIN I SERPL DL<=0.01 NG/ML-MCNC: <0.01 NG/ML (ref 0.02–0.5)
URN SPEC COLLECT METH UR: NORMAL
UROBILINOGEN UR STRIP-ACNC: NEGATIVE EU/DL
WBC # BLD AUTO: 5.5 K/UL (ref 3.9–12.7)

## 2020-02-01 PROCEDURE — 63600175 PHARM REV CODE 636 W HCPCS: Performed by: EMERGENCY MEDICINE

## 2020-02-01 PROCEDURE — 25000003 PHARM REV CODE 250: Performed by: EMERGENCY MEDICINE

## 2020-02-01 PROCEDURE — 71045 XR CHEST AP PORTABLE: ICD-10-PCS | Mod: 26,,, | Performed by: RADIOLOGY

## 2020-02-01 PROCEDURE — 96375 TX/PRO/DX INJ NEW DRUG ADDON: CPT

## 2020-02-01 PROCEDURE — 96365 THER/PROPH/DIAG IV INF INIT: CPT

## 2020-02-01 PROCEDURE — 71045 X-RAY EXAM CHEST 1 VIEW: CPT | Mod: 26,,, | Performed by: RADIOLOGY

## 2020-02-01 PROCEDURE — 84484 ASSAY OF TROPONIN QUANT: CPT

## 2020-02-01 PROCEDURE — 96361 HYDRATE IV INFUSION ADD-ON: CPT

## 2020-02-01 PROCEDURE — 80053 COMPREHEN METABOLIC PANEL: CPT

## 2020-02-01 PROCEDURE — 71045 X-RAY EXAM CHEST 1 VIEW: CPT | Mod: TC,FY

## 2020-02-01 PROCEDURE — 85025 COMPLETE CBC W/AUTO DIFF WBC: CPT

## 2020-02-01 PROCEDURE — 81003 URINALYSIS AUTO W/O SCOPE: CPT

## 2020-02-01 PROCEDURE — 93005 ELECTROCARDIOGRAM TRACING: CPT

## 2020-02-01 PROCEDURE — 99285 EMERGENCY DEPT VISIT HI MDM: CPT | Mod: 25

## 2020-02-01 PROCEDURE — 83880 ASSAY OF NATRIURETIC PEPTIDE: CPT

## 2020-02-01 PROCEDURE — 87502 INFLUENZA DNA AMP PROBE: CPT

## 2020-02-01 RX ORDER — POTASSIUM CHLORIDE 20 MEQ/1
40 TABLET, EXTENDED RELEASE ORAL
Status: COMPLETED | OUTPATIENT
Start: 2020-02-01 | End: 2020-02-01

## 2020-02-01 RX ORDER — THIAMINE HYDROCHLORIDE 100 MG/ML
INJECTION, SOLUTION INTRAMUSCULAR; INTRAVENOUS
Status: COMPLETED
Start: 2020-02-01 | End: 2020-02-01

## 2020-02-01 RX ORDER — HYDRALAZINE HYDROCHLORIDE 20 MG/ML
20 INJECTION INTRAMUSCULAR; INTRAVENOUS
Status: COMPLETED | OUTPATIENT
Start: 2020-02-01 | End: 2020-02-01

## 2020-02-01 RX ADMIN — POTASSIUM CHLORIDE 40 MEQ: 1500 TABLET, EXTENDED RELEASE ORAL at 06:02

## 2020-02-01 RX ADMIN — SODIUM CHLORIDE 500 ML: 0.9 INJECTION, SOLUTION INTRAVENOUS at 06:02

## 2020-02-01 RX ADMIN — THIAMINE HYDROCHLORIDE: 100 INJECTION, SOLUTION INTRAMUSCULAR; INTRAVENOUS at 08:02

## 2020-02-01 RX ADMIN — HYDRALAZINE HYDROCHLORIDE 20 MG: 20 INJECTION INTRAMUSCULAR; INTRAVENOUS at 04:02

## 2020-02-01 NOTE — ED PROVIDER NOTES
"Encounter Date: 2/1/2020       History     Chief Complaint   Patient presents with    Chest Pain     Patient complaining of off and on chest pain, weakness and shaking since yesterday    Shaking    Weakness     65-year-old male past medical history significant for anxiety, depression, hypertension, COPD, CAD with reported MI, CVA in 2017 and 2019 presents to the ED for urgent evaluation in intermittent, nonradiating aching substernal chest pain with associated "shaking" and generalized weakness that began yesterday.  Denies any diaphoresis, dyspnea, palpitations, edema, weight gain. Denies fever, chills, cough.  Denies back pain. Denies known sick contacts.  Denies recent travel.      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, including shoulder     Cancer 2005    right eye    Colon polyps     COPD (chronic obstructive pulmonary disease)     Depression     Hypertension     Inguinal hernia     left    Kidney stone     MI (myocardial infarction)     Smoker, trying to quit again, 1 cig last a week, started age 17, quit for 42 years, restarted 2018 7/24/2019    Stroke     last stroke 2017    Tremor     Ulcer      Past Surgical History:   Procedure Laterality Date    ANGIOPLASTY      cardiac stent    CARDIAC CATHETERIZATION      COLECTOMY      EYE SURGERY      FINGER SURGERY Left     left index finger    HERNIA REPAIR      HIP ARTHROPLASTY Right 12/20/2019    Procedure: ARTHROPLASTY, HIP, [place], pegboard, 1st assist;  Surgeon: Kody Patel MD;  Location: [facility];  Service: Orthopedics;  Laterality: Right;    OLECRANON BURSECTOMY Right " 2018    Procedure: BURSECTOMY, OLECRANON;  Surgeon: Atul Cooper DO;  Location: [facility] OR;  Service: Orthopedics;  Laterality: Right;  Excision Olecranon Bursa Right Elbow    POLYPECTOMY      REPAIR OF TRICEPS TENDON Right 2018    Procedure: REPAIR, TENDON, TRICEPS;  Surgeon: Atul Cooper DO;  Location: [facility] OR;  Service: Orthopedics;  Laterality: Right;    SURGICAL REMOVAL OF BONE SPUR Right 2018    Procedure: EXCISION, BONE SPUR;  Surgeon: Atul Cooper DO;  Location: [facility] OR;  Service: Orthopedics;  Laterality: Right;  Excision Olecranon Bone Spur Right Elbow     Family History   Problem Relation Age of Onset    Heart disease Mother     Heart disease Father     Cancer Father     Dementia Brother     Heart disease Brother      Social History     Tobacco Use    Smoking status: Current Some Day Smoker     Years: 15.00     Types: Cigarettes     Last attempt to quit: 2018     Years since quittin.6    Smokeless tobacco: Current User     Types: Chew    Tobacco comment: Pt trying to quit   Substance Use Topics    Alcohol use: Yes     Alcohol/week: 2.0 - 3.0 standard drinks     Types: 2 - 3 Cans of beer per week     Comment: occasionally beer    Drug use: No     Review of Systems   Constitutional: Negative for appetite change, chills, diaphoresis, fatigue and fever.   HENT: Negative for congestion, ear pain, rhinorrhea, sinus pressure, sinus pain, sore throat and tinnitus.    Eyes: Negative for photophobia and visual disturbance.   Respiratory: Negative for cough, chest tightness, shortness of breath and wheezing.    Cardiovascular: Positive for chest pain. Negative for palpitations and leg swelling.   Gastrointestinal: Negative for abdominal pain, constipation, diarrhea, nausea and vomiting.   Endocrine: Negative for cold intolerance, heat intolerance, polydipsia, polyphagia and polyuria.   Genitourinary: Negative for decreased urine volume, difficulty urinating, dysuria,  flank pain, frequency, hematuria and urgency.   Musculoskeletal: Negative for arthralgias, back pain, gait problem, joint swelling, myalgias, neck pain and neck stiffness.   Skin: Negative for color change, pallor, rash and wound.   Allergic/Immunologic: Negative for immunocompromised state.   Neurological: Positive for tremors and weakness. Negative for dizziness, syncope, light-headedness, numbness and headaches.   Hematological: Negative for adenopathy. Does not bruise/bleed easily.   Psychiatric/Behavioral: Negative for decreased concentration, dysphoric mood and sleep disturbance. The patient is not nervous/anxious.    All other systems reviewed and are negative.      Physical Exam     Initial Vitals [02/01/20 1636]   BP Pulse Resp Temp SpO2   (!) 208/130 81 20 98.3 °F (36.8 °C) 100 %      MAP       --         Physical Exam    Nursing note and vitals reviewed.  Constitutional: He appears well-developed and well-nourished. He is not diaphoretic. No distress.   HENT:   Head: Normocephalic and atraumatic.   Right Ear: External ear normal.   Left Ear: External ear normal.   Nose: Nose normal.   Mouth/Throat: Oropharynx is clear and moist.   Eyes: Conjunctivae are normal. Pupils are equal, round, and reactive to light. No scleral icterus.   Neck: Normal range of motion. Neck supple. No JVD present.   Cardiovascular: Normal rate, regular rhythm, normal heart sounds and intact distal pulses.   Pulmonary/Chest: Breath sounds normal. No respiratory distress. He has no wheezes. He has no rhonchi. He has no rales. He exhibits no tenderness.   Abdominal: Soft. Bowel sounds are normal. He exhibits no distension. There is no tenderness. There is no rebound and no guarding.   Musculoskeletal: Normal range of motion. He exhibits no edema or tenderness.   Lymphadenopathy:     He has no cervical adenopathy.   Neurological: He is alert and oriented to person, place, and time. GCS score is 15. GCS eye subscore is 4. GCS verbal  subscore is 5. GCS motor subscore is 6.   Skin: Skin is warm and dry. Capillary refill takes less than 2 seconds. No rash and no abscess noted. No erythema. No pallor.   Psychiatric: His speech is normal and behavior is normal. Judgment and thought content normal. His mood appears anxious.         ED Course   Procedures  Labs Reviewed   INFLUENZA A & B BY MOLECULAR   CBC W/ AUTO DIFFERENTIAL   COMPREHENSIVE METABOLIC PANEL   TROPONIN I   B-TYPE NATRIURETIC PEPTIDE          Imaging Results    None          Medical Decision Making:   ED Management:  The patient is stable at this time.  After further discussion with patient appears the patient is extremely weak and has been such since he has been walking so much recently.  I will give this patient a banana bag on the anterior may be some nutritional deficits.  Second troponin is.  I will discharge home at this time.  Other:   I have discussed this case with another health care provider.       <> Summary of the Discussion: Still pending labs.  Blood pressure improved with administration of 20 mg hydralazine and subsequent improvement of presenting complaint of chest pain. Case discussed with the incoming physician Dr. Warner, who will assume care disposition as appropriate.                                 Clinical Impression:       ICD-10-CM ICD-9-CM   1. Chest pain, unspecified type R07.9 786.50   2. Chest pain R07.9 786.50   3. Weakness R53.1 780.79   4. Hypertension, unspecified type I10 401.9         Disposition:   Disposition: Discharged  Condition: Stable                     Oliver Warner MD  02/01/20 2039

## 2020-02-02 NOTE — ED NOTES
"RN to bedside to discuss discharge information. RN discontinues IV, disconnects patient from monitor. Patient asks if RN has called his son to pick him up. RN apologizes, but states she was never given a phone number or informed that RN needed to make this call for him. He states "that's fine, I'll call him myself" and calls the son on his own cell phone. After he hangs up, patient tells RN that he is mad that the son was not called for him like he was told he would be, claming that "some nurse" came in and got that information. Patient yells "that's fine, I'll just go wait outside in the cold!" Informs him that no one has told him he has to go wait in the cold. RN again apologizes for this and slight delay in coming back to bedside due to an emergency in another room. Patient refuses to talk to RN anymore.    Shauna, charge RN, notified.  "

## 2020-02-17 ENCOUNTER — PATIENT OUTREACH (OUTPATIENT)
Dept: ADMINISTRATIVE | Facility: HOSPITAL | Age: 65
End: 2020-02-17

## 2020-03-27 DIAGNOSIS — F41.1 GAD (GENERALIZED ANXIETY DISORDER): ICD-10-CM

## 2020-03-27 RX ORDER — ALPRAZOLAM 1 MG/1
TABLET ORAL
Qty: 120 TABLET | Refills: 2 | Status: SHIPPED | OUTPATIENT
Start: 2020-03-27 | End: 2020-06-18

## 2020-03-27 NOTE — TELEPHONE ENCOUNTER
Patient states that he is having issues with his stomach x one week. Takes protonix 30 mins before breakfast, it helps but by meal time in the evening it wears off. Has been taking 2 protonix a day to help. Complains of diarrhea, upset stomach and gas which has him bloated. Please advise

## 2020-03-27 NOTE — TELEPHONE ENCOUNTER
----- Message from Summer Borden sent at 3/27/2020 11:21 AM CDT -----  Contact: Patient  Type:  Apoointment Request    Name of Caller:  Patient    Symptoms:  Stomach issues    Best Call Back Number:  [phone]  Additional Information:

## 2020-03-27 NOTE — TELEPHONE ENCOUNTER
++Attempted to call pt x2. Unable to leave VM    ----- Message from Summer Borden sent at 3/27/2020 11:21 AM CDT -----  Contact: Patient  Type:  Apoointment Request    Name of Caller:  Patient    Symptoms:  Stomach issues    Best Call Back Number:  [phone]  Additional Information:

## 2020-04-16 RX ORDER — CARVEDILOL 3.12 MG/1
TABLET ORAL
Qty: 180 TABLET | Refills: 3 | Status: SHIPPED | OUTPATIENT
Start: 2020-04-16 | End: 2020-05-27

## 2020-05-12 ENCOUNTER — HOSPITAL ENCOUNTER (EMERGENCY)
Facility: HOSPITAL | Age: 65
Discharge: HOME | End: 2020-05-12
Attending: EMERGENCY MEDICINE
Payer: MEDICARE

## 2020-05-12 VITALS
HEART RATE: 67 BPM | DIASTOLIC BLOOD PRESSURE: 95 MMHG | BODY MASS INDEX: 20.44 KG/M2 | OXYGEN SATURATION: 98 % | RESPIRATION RATE: 16 BRPM | HEIGHT: 69 IN | WEIGHT: 138 LBS | SYSTOLIC BLOOD PRESSURE: 183 MMHG | TEMPERATURE: 98 F

## 2020-05-12 DIAGNOSIS — R19.7 DIARRHEA, UNSPECIFIED TYPE: ICD-10-CM

## 2020-05-12 DIAGNOSIS — I10 UNCONTROLLED HYPERTENSION: Primary | ICD-10-CM

## 2020-05-12 DIAGNOSIS — R06.02 SOB (SHORTNESS OF BREATH): ICD-10-CM

## 2020-05-12 DIAGNOSIS — R07.9 CHEST PAIN: ICD-10-CM

## 2020-05-12 LAB
ALBUMIN SERPL BCP-MCNC: 4.1 G/DL (ref 3.5–5.2)
ALP SERPL-CCNC: 71 U/L (ref 55–135)
ALT SERPL W/O P-5'-P-CCNC: 20 U/L (ref 10–44)
ANION GAP SERPL CALC-SCNC: 9 MMOL/L (ref 8–16)
AST SERPL-CCNC: 24 U/L (ref 10–40)
BASOPHILS # BLD AUTO: 0.06 K/UL (ref 0–0.2)
BASOPHILS NFR BLD: 0.9 % (ref 0–1.9)
BILIRUB SERPL-MCNC: 0.6 MG/DL (ref 0.1–1)
BNP SERPL-MCNC: 47 PG/ML (ref 0–99)
BUN SERPL-MCNC: 9 MG/DL (ref 8–23)
CALCIUM SERPL-MCNC: 8.7 MG/DL (ref 8.7–10.5)
CHLORIDE SERPL-SCNC: 94 MMOL/L (ref 95–110)
CO2 SERPL-SCNC: 25 MMOL/L (ref 23–29)
CREAT SERPL-MCNC: 0.9 MG/DL (ref 0.5–1.4)
D DIMER PPP FEU-MCNC: 457 NG/ML (ref 100–400)
DIFFERENTIAL METHOD: ABNORMAL
EOSINOPHIL # BLD AUTO: 0.2 K/UL (ref 0–0.5)
EOSINOPHIL NFR BLD: 3.5 % (ref 0–8)
ERYTHROCYTE [DISTWIDTH] IN BLOOD BY AUTOMATED COUNT: 12.5 % (ref 11.5–14.5)
EST. GFR  (AFRICAN AMERICAN): >60 ML/MIN/1.73 M^2
EST. GFR  (NON AFRICAN AMERICAN): >60 ML/MIN/1.73 M^2
GLUCOSE SERPL-MCNC: 115 MG/DL (ref 70–110)
HCT VFR BLD AUTO: 40 % (ref 40–54)
HGB BLD-MCNC: 13.8 G/DL (ref 14–18)
IMM GRANULOCYTES # BLD AUTO: 0.02 K/UL (ref 0–0.04)
IMM GRANULOCYTES NFR BLD AUTO: 0.3 % (ref 0–0.5)
LYMPHOCYTES # BLD AUTO: 2.2 K/UL (ref 1–4.8)
LYMPHOCYTES NFR BLD: 34 % (ref 18–48)
MCH RBC QN AUTO: 32.5 PG (ref 27–31)
MCHC RBC AUTO-ENTMCNC: 34.5 G/DL (ref 32–36)
MCV RBC AUTO: 94 FL (ref 82–98)
MONOCYTES # BLD AUTO: 0.8 K/UL (ref 0.3–1)
MONOCYTES NFR BLD: 12.3 % (ref 4–15)
NEUTROPHILS # BLD AUTO: 3.1 K/UL (ref 1.8–7.7)
NEUTROPHILS NFR BLD: 49 % (ref 38–73)
NRBC BLD-RTO: 0 /100 WBC
PLATELET # BLD AUTO: 228 K/UL (ref 150–350)
PMV BLD AUTO: 9.3 FL (ref 9.2–12.9)
POTASSIUM SERPL-SCNC: 3.9 MMOL/L (ref 3.5–5.1)
PROT SERPL-MCNC: 7.8 G/DL (ref 6–8.4)
RBC # BLD AUTO: 4.24 M/UL (ref 4.6–6.2)
SODIUM SERPL-SCNC: 128 MMOL/L (ref 136–145)
TROPONIN I SERPL DL<=0.01 NG/ML-MCNC: <0.01 NG/ML (ref 0.02–0.5)
TROPONIN I SERPL DL<=0.01 NG/ML-MCNC: <0.01 NG/ML (ref 0.02–0.5)
WBC # BLD AUTO: 6.36 K/UL (ref 3.9–12.7)

## 2020-05-12 PROCEDURE — 80053 COMPREHEN METABOLIC PANEL: CPT

## 2020-05-12 PROCEDURE — 96374 THER/PROPH/DIAG INJ IV PUSH: CPT

## 2020-05-12 PROCEDURE — 71045 X-RAY EXAM CHEST 1 VIEW: CPT | Mod: TC,FY

## 2020-05-12 PROCEDURE — 85025 COMPLETE CBC W/AUTO DIFF WBC: CPT

## 2020-05-12 PROCEDURE — 93005 ELECTROCARDIOGRAM TRACING: CPT

## 2020-05-12 PROCEDURE — 84484 ASSAY OF TROPONIN QUANT: CPT | Mod: 91

## 2020-05-12 PROCEDURE — 85379 FIBRIN DEGRADATION QUANT: CPT

## 2020-05-12 PROCEDURE — 63600175 PHARM REV CODE 636 W HCPCS: Performed by: EMERGENCY MEDICINE

## 2020-05-12 PROCEDURE — 25000003 PHARM REV CODE 250: Performed by: EMERGENCY MEDICINE

## 2020-05-12 PROCEDURE — 71045 XR CHEST AP PORTABLE: ICD-10-PCS | Mod: 26,,, | Performed by: RADIOLOGY

## 2020-05-12 PROCEDURE — 83880 ASSAY OF NATRIURETIC PEPTIDE: CPT

## 2020-05-12 PROCEDURE — 71045 X-RAY EXAM CHEST 1 VIEW: CPT | Mod: 26,,, | Performed by: RADIOLOGY

## 2020-05-12 PROCEDURE — 99285 EMERGENCY DEPT VISIT HI MDM: CPT | Mod: 25

## 2020-05-12 RX ORDER — HYDRALAZINE HYDROCHLORIDE 20 MG/ML
10 INJECTION INTRAMUSCULAR; INTRAVENOUS
Status: COMPLETED | OUTPATIENT
Start: 2020-05-12 | End: 2020-05-12

## 2020-05-12 RX ORDER — LIDOCAINE HYDROCHLORIDE 20 MG/ML
5 SOLUTION OROPHARYNGEAL
Status: COMPLETED | OUTPATIENT
Start: 2020-05-12 | End: 2020-05-12

## 2020-05-12 RX ORDER — MAG HYDROX/ALUMINUM HYD/SIMETH 200-200-20
5 SUSPENSION, ORAL (FINAL DOSE FORM) ORAL
Status: COMPLETED | OUTPATIENT
Start: 2020-05-12 | End: 2020-05-12

## 2020-05-12 RX ADMIN — LIDOCAINE HYDROCHLORIDE 5 ML: 20 SOLUTION ORAL; TOPICAL at 11:05

## 2020-05-12 RX ADMIN — HYDRALAZINE HYDROCHLORIDE 10 MG: 20 INJECTION INTRAMUSCULAR; INTRAVENOUS at 09:05

## 2020-05-12 RX ADMIN — ALUMINUM HYDROXIDE, MAGNESIUM HYDROXIDE, AND SIMETHICONE 5 ML: 200; 200; 20 SUSPENSION ORAL at 11:05

## 2020-05-13 ENCOUNTER — NURSE TRIAGE (OUTPATIENT)
Dept: ADMINISTRATIVE | Facility: CLINIC | Age: 65
End: 2020-05-13

## 2020-05-13 ENCOUNTER — TELEPHONE (OUTPATIENT)
Dept: FAMILY MEDICINE | Facility: CLINIC | Age: 65
End: 2020-05-13

## 2020-05-13 DIAGNOSIS — I73.9 PAD (PERIPHERAL ARTERY DISEASE): Primary | ICD-10-CM

## 2020-05-13 NOTE — TELEPHONE ENCOUNTER
Dick with [facility] came in and stated that the patient has been calling and is requesting home health.  Sent message to provider for home health orders to be entered and then will fax to Dick at [facility].

## 2020-05-13 NOTE — TELEPHONE ENCOUNTER
Spoke with pt: c/o ED visit last night. This am continued weakness, + seeing spots. Unknown home BP this am. Is able to walk but not very much per pt report. + headache rated as severe. + dizziness.+ continued tingling numbness to face and arm.  + difficulty breathing with waking this am. Continuing to have diarrhea.     instructed pt to go to ED for re-evaluation. Pt verbalizes understanding.       Reason for Disposition   Drinking very little and has signs of dehydration (e.g., no urine > 12 hours, very dry mouth, very lightheaded)   Difficulty breathing    Additional Information   Negative: Shock suspected (e.g., cold/pale/clammy skin, too weak to stand, low BP, rapid pulse)   Negative: Difficult to awaken or acting confused (e.g., disoriented, slurred speech)   Negative: Sounds like a life-threatening emergency to the triager   Negative: Bloody, black, or tarry bowel movements   Negative: SEVERE abdominal pain (e.g., excruciating) and present > 1 hour   Negative: SEVERE abdominal pain and age > 60   Negative: SEVERE diarrhea (e.g., 7 or more times / day more than normal) and age > 60 years   Negative: Constant abdominal pain lasting > 2 hours   Negative: Severe difficulty breathing (e.g., struggling for each breath, speaks in single words)   Negative: [1] Difficulty breathing or swallowing AND [2] started suddenly after medicine, an allergic food or bee sting   Negative: Shock suspected (e.g., cold/pale/clammy skin, too weak to stand, low BP, rapid pulse)   Negative: Difficult to awaken or acting confused (e.g., disoriented, slurred speech)   Negative: [1] Weakness (i.e., paralysis, loss of muscle strength) of the face, arm or leg on one side of the body AND [2] sudden onset AND [3] present now   Negative: [1] Numbness (i.e., loss of sensation) of the face, arm or leg on one side of the body AND [2] sudden onset AND [3] present now   Negative: [1] Loss of speech or garbled speech AND [2] sudden  onset AND [3] present now   Negative: Overdose (accidental or intentional) of medications   Negative: [1] Fainted > 15 minutes ago AND [2] still feels too weak or dizzy to stand   Negative: Heart beating < 50 beats per minute OR > 140 beats per minute   Negative: Sounds like a life-threatening emergency to the triager    Protocols used: DIARRHEA-A-OH, DIZZINESS - [id number]-H-OD

## 2020-05-13 NOTE — ED NOTES
Patient placed on continuous cardiac monitor, automatic blood pressure cuff and continuous pulse oximeter. Pt oriented to room. Advised no needs at this time. Pt secured in bed wheels locked, and bed all the way to the ground. I will continue to monitor.

## 2020-05-13 NOTE — ED PROVIDER NOTES
Encounter Date: 5/12/2020       History     Chief Complaint   Patient presents with    Chest Pain     The patient is a 65-year-old male.  He has a documented history of anxiety, back pain, cancer, COPD, depression, hypertension, kidney stones, MI, stroke, tremor, and ulcer.  He complains of intermittent left-sided chest pain that began yesterday.  The pain is sharp.  He also complains of nonproductive cough that began today.  He has had episodic shortness of breath for three days.  These episodes improved with albuterol inhaler.  He complains of headache and intermittent spots in his visual fields.  He feels that his blood pressure might be elevated despite compliance with his antihypertensive medication.  He has a history of myocardial infarction and coronary stents.  He is not currently followed by a cardiologist.  He also complains of decreased appetite for a few days and reports that everything he eats immediately results and watery bowel movements.  He denies sick contacts.  He denies fever and chills.  He denies abdominal pain, nausea, and vomiting.  He has not taken any medications to attempt treatment for these symptoms.  There are no exacerbating or alleviating factors.        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, including shoulder     Cancer 2005    right eye    Colon polyps     COPD (chronic obstructive pulmonary disease)     Depression     Hypertension     Inguinal hernia     left    Kidney stone     MI (myocardial infarction)     Smoker, trying to quit again, 1 cig last a week, started age 17, quit for 42 years, restarted 2018 7/24/2019    Stroke     last stroke 2017    Tremor     Ulcer      Past Surgical  History:   Procedure Laterality Date    ANGIOPLASTY      cardiac stent    CARDIAC CATHETERIZATION      COLECTOMY      EYE SURGERY      FINGER SURGERY Left     left index finger    HERNIA REPAIR      HIP ARTHROPLASTY Right 2019    Procedure: ARTHROPLASTY, HIP, [place], pegboard, 1st assist;  Surgeon: Kody Patel MD;  Location: [facility] OR;  Service: Orthopedics;  Laterality: Right;    OLECRANON BURSECTOMY Right 2018    Procedure: BURSECTOMY, OLECRANON;  Surgeon: Atul Cooper DO;  Location: [facility] OR;  Service: Orthopedics;  Laterality: Right;  Excision Olecranon Bursa Right Elbow    POLYPECTOMY      REPAIR OF TRICEPS TENDON Right 2018    Procedure: REPAIR, TENDON, TRICEPS;  Surgeon: Atul Cooper DO;  Location: [facility] OR;  Service: Orthopedics;  Laterality: Right;    SURGICAL REMOVAL OF BONE SPUR Right 2018    Procedure: EXCISION, BONE SPUR;  Surgeon: Atul Cooper DO;  Location: [facility] OR;  Service: Orthopedics;  Laterality: Right;  Excision Olecranon Bone Spur Right Elbow     Family History   Problem Relation Age of Onset    Heart disease Mother     Heart disease Father     Cancer Father     Dementia Brother     Heart disease Brother      Social History     Tobacco Use    Smoking status: Current Some Day Smoker     Years: 15.00     Types: Cigarettes     Last attempt to quit: 2018     Years since quittin.9    Smokeless tobacco: Current User     Types: Chew    Tobacco comment: Pt trying to quit   Substance Use Topics    Alcohol use: Yes     Alcohol/week: 2.0 - 3.0 standard drinks     Types: 2 - 3 Cans of beer per week     Comment: occasionally beer    Drug use: No     Review of Systems   Constitutional: Positive for appetite change. Negative for chills, diaphoresis and fever.   HENT: Negative for sore throat and trouble swallowing.    Eyes: Positive for visual disturbance.   Respiratory: Positive for cough and shortness of breath.    Cardiovascular:  Positive for chest pain. Negative for palpitations and leg swelling.   Gastrointestinal: Positive for diarrhea. Negative for abdominal pain, nausea and vomiting.   Endocrine: Negative for polydipsia and polyuria.   Genitourinary: Negative for difficulty urinating and dysuria.   Musculoskeletal: Negative for arthralgias and myalgias.   Skin: Negative for rash.   Allergic/Immunologic: Negative for immunocompromised state.   Neurological: Negative for dizziness, syncope, weakness, light-headedness, numbness and headaches.   Hematological: Does not bruise/bleed easily.       Physical Exam     Initial Vitals [05/12/20 1906]   BP Pulse Resp Temp SpO2   (!) 231/115 70 16 98.3 °F (36.8 °C) 100 %      MAP       --         Physical Exam    Nursing note and vitals reviewed.  Constitutional: He appears well-developed and well-nourished. He is not diaphoretic. No distress.   HENT:   Head: Normocephalic and atraumatic.   Mouth/Throat: Oropharynx is clear and moist.   Eyes: Conjunctivae are normal. No scleral icterus.   Neck: No JVD present.   Cardiovascular: Normal rate, regular rhythm and normal heart sounds. Exam reveals no gallop and no friction rub.    No murmur heard.  Pulmonary/Chest: Breath sounds normal. No stridor. No respiratory distress. He has no wheezes. He has no rhonchi. He has no rales. He exhibits tenderness.   Mild tenderness to the left upper chest.  Overlying rashes or lesions.  No palpable deformities.   Abdominal: Soft. He exhibits no distension and no mass. There is no tenderness.   Musculoskeletal:   No calf swelling or tenderness bilaterally. Negative bilateral Isidro's sign.   Neurological: He is alert and oriented to person, place, and time. GCS score is 15. GCS eye subscore is 4. GCS verbal subscore is 5. GCS motor subscore is 6.   Skin: Skin is warm and dry. No pallor.         ED Course   Procedures  Labs Reviewed   CBC W/ AUTO DIFFERENTIAL - Abnormal; Notable for the following components:       Result  Value    RBC 4.24 (*)     Hemoglobin 13.8 (*)     Mean Corpuscular Hemoglobin 32.5 (*)     All other components within normal limits   COMPREHENSIVE METABOLIC PANEL - Abnormal; Notable for the following components:    Sodium 128 (*)     Chloride 94 (*)     Glucose 115 (*)     All other components within normal limits   TROPONIN I - Abnormal; Notable for the following components:    Troponin I <0.01 (*)     All other components within normal limits   B-TYPE NATRIURETIC PEPTIDE   D DIMER, QUANTITATIVE   D DIMER, QUANTITATIVE     EKG Readings: (Independently Interpreted)   05/12/2020 19:12  Normal sinus rhythm.  Ventricular rate 64 beats per minute.  Normal axis.  Normal QRS and QT intervals.  No ST segment elevation or depression.  Normal T-wave morphology.  Possible LVH.       Imaging Results          X-Ray Chest AP Portable (Final result)  Result time 05/13/20 08:23:20    Final result by Mary Morel MD (05/13/20 08:23:20)                 Impression:      No acute abnormality.      Electronically signed by: Mary Morel  Date:    05/13/2020  Time:    08:23             Narrative:    EXAMINATION:  XR CHEST AP PORTABLE    CLINICAL HISTORY:  Chest Pain;    TECHNIQUE:  Single frontal view of the chest was performed.    COMPARISON:  02/01/2020    FINDINGS:  The lungs are deeply inflated and clear, with normal appearance of pulmonary vasculature and no pleural effusion or pneumothorax.    The cardiac silhouette is normal in size. The hilar and mediastinal contours are unremarkable.    Bones are intact.                                 Medical Decision Making:   History:   Old Medical Records: I decided to obtain old medical records.  Old Records Summarized: other records.       <> Summary of Records: Reviewed past medical history, see HPI.  Independently Interpreted Test(s):   I have ordered and independently interpreted EKG Reading(s) - see prior notes  Clinical Tests:   Lab Tests: Ordered and  Reviewed  Radiological Study: Ordered and Reviewed  Medical Tests: Ordered and Reviewed    Medical decision making:  This patient underwent evaluation for cough, shortness of breath, chest pain, and diarrhea.  He was afebrile.  He did not appear to be clinically dehydrated.  He had no focal neurological deficits.  He was in no respiratory distress and had clear breath sounds.  He was evaluated with serial exams, cardiopulmonary monitoring, EKG, chest x-ray, and labs.  Differential diagnoses included cardiac arrhythmia, acute coronary syndromes, new onset CHF, PE, pneumonia, and other processes.  His blood pressure was persistently elevated throughout the evaluation.  Chart review revealed likely poor BP management.  There were no concerning findings on workup and he was discharged.  He reported to me that he would be contacting his former cardiologist on the following day to arrange follow-up.  He was also advised to see follow-up with his PCP.  He was given strict ED return precautions.                                 Clinical Impression:       ICD-10-CM ICD-9-CM   1. Uncontrolled hypertension I10 401.9   2. Chest pain R07.9 786.50   3. SOB (shortness of breath) R06.02 786.05   4. Diarrhea, unspecified type R19.7 787.91         Disposition:   Disposition: Discharged  Condition: Stable                        Shaun Li III, MD  05/17/20 5677

## 2020-05-15 ENCOUNTER — TELEPHONE (OUTPATIENT)
Dept: FAMILY MEDICINE | Facility: CLINIC | Age: 65
End: 2020-05-15

## 2020-05-15 NOTE — TELEPHONE ENCOUNTER
Amrita, nurse with [facility], went to patient's house to do intake and patient presented with symptoms of left sided weakness, fever, and uncontrollable diarrhea.  She states patient felt lethargic and she had advised him to go to the ER.  They just wanted to make us aware.  Advised I would send to provider for FYI.

## 2020-05-15 NOTE — TELEPHONE ENCOUNTER
----- Message from Ebonie Samaniego sent at 5/15/2020 11:52 AM CDT -----  Contact: [facility]  Type: Needs Medical Advice    Who Called:  omega Reyes Call Back Number:   Additional Information: Requesting a call back regarding wanted to let doctor know that nurse sent pt to [facility] in [place] for further eval  Uncontrol htn,  Nausea, and vomiting fort 5 days  And all of  left Side weakness fever 104    Please Advise ---Thank you

## 2020-05-27 ENCOUNTER — OFFICE VISIT (OUTPATIENT)
Dept: FAMILY MEDICINE | Facility: CLINIC | Age: 65
End: 2020-05-27
Payer: MEDICARE

## 2020-05-27 VITALS
SYSTOLIC BLOOD PRESSURE: 151 MMHG | RESPIRATION RATE: 17 BRPM | WEIGHT: 139.38 LBS | DIASTOLIC BLOOD PRESSURE: 85 MMHG | TEMPERATURE: 98 F | BODY MASS INDEX: 20.64 KG/M2 | OXYGEN SATURATION: 98 % | HEART RATE: 68 BPM | HEIGHT: 69 IN

## 2020-05-27 DIAGNOSIS — F41.1 GAD (GENERALIZED ANXIETY DISORDER): ICD-10-CM

## 2020-05-27 DIAGNOSIS — I10 ESSENTIAL HYPERTENSION: ICD-10-CM

## 2020-05-27 DIAGNOSIS — I16.1 HYPERTENSIVE EMERGENCY: ICD-10-CM

## 2020-05-27 DIAGNOSIS — I25.2 HISTORY OF MI (MYOCARDIAL INFARCTION): ICD-10-CM

## 2020-05-27 DIAGNOSIS — Z79.899 CHRONIC PRESCRIPTION BENZODIAZEPINE USE: ICD-10-CM

## 2020-05-27 DIAGNOSIS — E87.1 HYPONATREMIA: ICD-10-CM

## 2020-05-27 DIAGNOSIS — Z09 HOSPITAL DISCHARGE FOLLOW-UP: Primary | ICD-10-CM

## 2020-05-27 DIAGNOSIS — R53.1 WEAKNESS GENERALIZED: ICD-10-CM

## 2020-05-27 DIAGNOSIS — I73.9 PAD (PERIPHERAL ARTERY DISEASE): ICD-10-CM

## 2020-05-27 PROCEDURE — 99215 PR OFFICE/OUTPT VISIT, EST, LEVL V, 40-54 MIN: ICD-10-PCS | Mod: S$PBB,,, | Performed by: FAMILY MEDICINE

## 2020-05-27 PROCEDURE — 99999 PR PBB SHADOW E&M-EST. PATIENT-LVL III: CPT | Mod: PBBFAC,,, | Performed by: FAMILY MEDICINE

## 2020-05-27 PROCEDURE — 99213 OFFICE O/P EST LOW 20 MIN: CPT | Mod: PBBFAC,PN | Performed by: FAMILY MEDICINE

## 2020-05-27 PROCEDURE — 99215 OFFICE O/P EST HI 40 MIN: CPT | Mod: S$PBB,,, | Performed by: FAMILY MEDICINE

## 2020-05-27 PROCEDURE — 99999 PR PBB SHADOW E&M-EST. PATIENT-LVL III: ICD-10-PCS | Mod: PBBFAC,,, | Performed by: FAMILY MEDICINE

## 2020-05-27 RX ORDER — HYDRALAZINE HYDROCHLORIDE 50 MG/1
50 TABLET, FILM COATED ORAL 2 TIMES DAILY
COMMUNITY
End: 2020-06-16 | Stop reason: SDUPTHER

## 2020-05-27 NOTE — PROGRESS NOTES
"  Subjective:       Patient ID: Ricky Cole is a 65 y.o. male.    Chief Complaint: Hospital Follow Up (Hypertension)  New to me.  Previous records (HOSPTAL, ER, CLINIC, LABS, IMAGING) reviewed.    Hospital follow up. Was discharged 5/15/20.    [facility] hospitalized for 2 days with HTN emergency. Was seen in the ER at [facility] the day prior to hospitalization at [facility]. Patient agrees to sign for records release. States he was admitted with diarrhea, numbness in L jaw and L arm and inability to eat for 5 days. Reports he was on an infusion of antihypertensive meds during hospitalization, and was told he was "completely dehydrated."    New home medication of hydralazine 50mg at hospital discharge, patient states he only takes a half tablet in the mornings then takes another half at bedtime. Takes a fourth tablet (12.5mg) about every 3-4 hours throughout the day.    Has appointment with neurologist Dr. Martin early June, and has appointment to follow up with his cardiologist Dr. Leo in June as well.       Review of Systems   Constitutional: Positive for fatigue and unexpected weight change (about 3-4 pounds). Negative for activity change, appetite change, chills, diaphoresis and fever.   HENT: Negative for congestion, ear pain, nosebleeds and sinus pressure.    Eyes: Negative for photophobia, pain, redness and visual disturbance.   Respiratory: Negative for apnea, cough, choking, chest tightness, shortness of breath and wheezing.    Cardiovascular: Negative for chest pain, palpitations and leg swelling.   Gastrointestinal: Positive for diarrhea. Negative for abdominal distention, abdominal pain, blood in stool, constipation, nausea, rectal pain and vomiting.   Endocrine: Negative for cold intolerance, heat intolerance, polydipsia, polyphagia and polyuria.   Genitourinary: Negative for decreased urine volume, difficulty urinating, dysuria, flank pain, frequency, hematuria and urgency. "   Musculoskeletal: Positive for back pain. Negative for arthralgias, gait problem, joint swelling and myalgias.   Skin: Negative for rash.   Allergic/Immunologic: Negative for environmental allergies.   Neurological: Positive for weakness. Negative for dizziness, syncope and headaches.   Hematological: Negative for adenopathy.   Psychiatric/Behavioral: Negative for confusion, dysphoric mood and sleep disturbance. The patient is not nervous/anxious.          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, including shoulder     Cancer 2005    right eye    Colon polyps     COPD (chronic obstructive pulmonary disease)     Depression     Hypertension     Inguinal hernia     left    Kidney stone     MI (myocardial infarction)     Smoker, trying to quit again, 1 cig last a week, started age 17, quit for 42 years, restarted 2018 7/24/2019    Stroke     last stroke 2017    Tremor     Ulcer      Past Surgical History:   Procedure Laterality Date    ANGIOPLASTY      cardiac stent    CARDIAC CATHETERIZATION      COLECTOMY      EYE SURGERY      FINGER SURGERY Left     left index finger    HERNIA REPAIR      HIP ARTHROPLASTY Right 12/20/2019    Procedure: ARTHROPLASTY, HIP, Lindy, pegboard, 1st assist;  Surgeon: Kody Patel MD;  Location: [facility] OR;  Service: Orthopedics;  Laterality: Right;    OLECRANON BURSECTOMY Right 11/6/2018    Procedure: BURSECTOMY, OLECRANON;  Surgeon: Atul Cooper DO;  Location: [facility] OR;  Service: Orthopedics;  Laterality: Right;  Excision Olecranon Bursa Right Elbow    POLYPECTOMY      REPAIR OF TRICEPS TENDON Right 11/6/2018    Procedure: REPAIR, TENDON, TRICEPS;  Surgeon: Atul Cooper DO;  Location: [facility] OR;  Service: Orthopedics;  Laterality: Right;    SURGICAL REMOVAL OF BONE SPUR Right 11/6/2018    Procedure: EXCISION, BONE SPUR;  Surgeon: Atul Cooper DO;  Location: [facility] OR;  Service:  "Orthopedics;  Laterality: Right;  Excision Olecranon Bone Spur Right Elbow     Family History   Problem Relation Age of Onset    Heart disease Mother     Heart disease Father     Cancer Father     Dementia Brother     Heart disease Brother        Review of patient's allergies indicates:   Allergen Reactions    Codeine Other (See Comments)     hyper    Nsaids (non-steroidal anti-inflammatory drug) Other (See Comments)     Says eats holes in his stomach       Current Outpatient Medications:     ALPRAZolam (XANAX) 1 MG tablet, TAKE 1 TABLET 4 TIMES A DAY AS NEEDED FOR ANXIETY, Disp: 120 tablet, Rfl: 2    carvedilol (COREG) 6.25 MG tablet, Take 1 tablet (6.25 mg total) by mouth 2 (two) times daily., Disp: 60 tablet, Rfl: 0    hydrALAZINE (APRESOLINE) 50 MG tablet, Take 50 mg by mouth 2 (two) times a day., Disp: , Rfl:     meloxicam (MOBIC) 15 MG tablet, Take 1 tablet (15 mg total) by mouth daily as needed., Disp: 90 tablet, Rfl: 3    pantoprazole (PROTONIX) 40 MG tablet, TAKE 1 TABLET IN THE MORNING (30 MINUTES BEFORE BREAKFAST) FOR STOMACH, Disp: 30 tablet, Rfl: 11    primidone (MYSOLINE) 50 MG Tab, TAKE 1 TABLET 4 TIMES A DAY AS DIRECTED, Disp: 120 tablet, Rfl: 11    vitamin renal formula, B-complex-vitamin c-folic acid, (NEPHROCAPS) 1 mg Cap, Take 1 capsule by mouth once daily., Disp: 180 capsule, Rfl: 0      Objective:      BP (!) 151/85 (BP Location: Right arm, Patient Position: Sitting, BP Method: Medium (Automatic))   Pulse 68   Temp 97.6 °F (36.4 °C) (Oral)   Resp 17   Ht 5' 9" (1.753 m)   Wt 63.2 kg (139 lb 6.4 oz)   SpO2 98%   BMI 20.59 kg/m²   Physical Exam   Constitutional: He is oriented to person, place, and time. He appears well-developed and well-nourished. No distress.   HENT:   Head: Normocephalic and atraumatic.   Right Ear: External ear normal.   Left Ear: External ear normal.   Nose: Nose normal.   Mouth/Throat: Oropharynx is clear and moist. No oropharyngeal exudate.   Eyes: " Pupils are equal, round, and reactive to light. Conjunctivae and EOM are normal. Right eye exhibits no discharge. Left eye exhibits no discharge. No scleral icterus.   Neck: Normal range of motion. Neck supple. No JVD present. No tracheal deviation present. No thyromegaly present.   Cardiovascular: Normal rate, regular rhythm, normal heart sounds and intact distal pulses.   Pulmonary/Chest: Effort normal and breath sounds normal. No respiratory distress. He has no wheezes.   Abdominal: Soft. Bowel sounds are normal.   Musculoskeletal: Normal range of motion. He exhibits no edema or deformity.   Lymphadenopathy:     He has no cervical adenopathy.   Neurological: He is alert and oriented to person, place, and time.   Skin: Skin is warm and dry. Capillary refill takes less than 2 seconds. No rash noted. No erythema. No pallor.   Psychiatric: He has a normal mood and affect. His behavior is normal. Judgment and thought content normal.   Nursing note and vitals reviewed.      Assessment:       1. Hospital discharge follow-up    2. Hyponatremia    3. Hypertensive emergency    4. Essential hypertension    5. Weakness generalized    6. PAD (peripheral artery disease), mnoderate    7. CHANTELLE (generalized anxiety disorder)    8. Chronic prescription benzodiazepine use    9. History of MI (myocardial infarction) x2, 1987 and 2015        Plan:       Hospital discharge follow-up    Hyponatremia  -     Basic metabolic panel; Future; Expected date: 05/28/2020    Hypertensive emergency    Essential hypertension    Weakness generalized    PAD (peripheral artery disease), mnoderate    CHANTELLE (generalized anxiety disorder)    Chronic prescription benzodiazepine use    History of MI (myocardial infarction) x2, 1987 and 2015    Sign for records release for [facility] ER and inpatient.    Patient will need continued home health nursing services for help with medication education, vitals monitoring, and would benefit from physical and/or  occupational therapy to aid in decreasing his fall risk and helping with general strength improvement. Will get HH to draw BMP this week.      Strict return precautions reviewed and patient verbalized understanding. Risks, benefits, and alternatives to the plan were reviewed in detail and all questions answered to the patient's satisfaction. 40 minutes were spent with patient, >50% of time based on counseling and coordination of care.    Follow up in about 4 weeks (around 6/24/2020) for HTN f/u 4 weeks, also schedule with Dr. Leija soonest available.

## 2020-06-15 ENCOUNTER — PATIENT OUTREACH (OUTPATIENT)
Dept: ADMINISTRATIVE | Facility: HOSPITAL | Age: 65
End: 2020-06-15

## 2020-06-16 RX ORDER — HYDRALAZINE HYDROCHLORIDE 50 MG/1
50 TABLET, FILM COATED ORAL 2 TIMES DAILY
Qty: 180 TABLET | Refills: 3 | Status: ON HOLD | OUTPATIENT
Start: 2020-06-16 | End: 2020-08-25 | Stop reason: SDUPTHER

## 2020-06-16 NOTE — TELEPHONE ENCOUNTER
----- Message from Sophia Cook sent at 6/16/2020 10:24 AM CDT -----  Regarding: medication  Type:  Pharmacy Calling to Clarify an RX    Name of Caller:  [place]  Pharmacy Name:  [facility]  Prescription Name:  Hydralazine  What do they need to clarify?:  medication directions  Best Call Back Number:  [phone]  Additional Information:

## 2020-06-18 DIAGNOSIS — F41.1 GAD (GENERALIZED ANXIETY DISORDER): ICD-10-CM

## 2020-06-18 RX ORDER — ALPRAZOLAM 1 MG/1
TABLET ORAL
Qty: 120 TABLET | Refills: 0 | Status: ON HOLD | OUTPATIENT
Start: 2020-06-18 | End: 2020-07-07

## 2020-06-26 ENCOUNTER — OFFICE VISIT (OUTPATIENT)
Dept: FAMILY MEDICINE | Facility: CLINIC | Age: 65
End: 2020-06-26
Payer: MEDICARE

## 2020-06-26 VITALS
DIASTOLIC BLOOD PRESSURE: 84 MMHG | SYSTOLIC BLOOD PRESSURE: 160 MMHG | OXYGEN SATURATION: 98 % | TEMPERATURE: 99 F | HEART RATE: 65 BPM | HEIGHT: 69 IN | WEIGHT: 141.5 LBS | BODY MASS INDEX: 20.96 KG/M2 | RESPIRATION RATE: 14 BRPM

## 2020-06-26 DIAGNOSIS — I73.9 PAD (PERIPHERAL ARTERY DISEASE): ICD-10-CM

## 2020-06-26 DIAGNOSIS — I63.9 CEREBROVASCULAR ACCIDENT (CVA), UNSPECIFIED MECHANISM: ICD-10-CM

## 2020-06-26 DIAGNOSIS — E87.1 HYPONATREMIA: ICD-10-CM
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
## 2020-06-26 DIAGNOSIS — I10 HYPERTENSION, UNSPECIFIED TYPE: Primary | ICD-10-CM

## 2020-06-26 DIAGNOSIS — F03.90 DEMENTIA WITHOUT BEHAVIORAL DISTURBANCE, UNSPECIFIED DEMENTIA TYPE: ICD-10-CM

## 2020-06-26 DIAGNOSIS — F10.10 ETOH ABUSE: ICD-10-CM

## 2020-06-26 PROCEDURE — 99999 PR PBB SHADOW E&M-EST. PATIENT-LVL V: CPT | Mod: PBBFAC,,, | Performed by: FAMILY MEDICINE

## 2020-06-26 PROCEDURE — 99215 PR OFFICE/OUTPT VISIT, EST, LEVL V, 40-54 MIN: ICD-10-PCS | Mod: S$PBB,,, | Performed by: FAMILY MEDICINE

## 2020-06-26 PROCEDURE — 99999 PR PBB SHADOW E&M-EST. PATIENT-LVL V: ICD-10-PCS | Mod: PBBFAC,,, | Performed by: FAMILY MEDICINE

## 2020-06-26 PROCEDURE — 99215 OFFICE O/P EST HI 40 MIN: CPT | Mod: S$PBB,,, | Performed by: FAMILY MEDICINE

## 2020-06-26 PROCEDURE — 99215 OFFICE O/P EST HI 40 MIN: CPT | Mod: PBBFAC,PN | Performed by: FAMILY MEDICINE

## 2020-06-26 NOTE — PROGRESS NOTES
Subjective:       Patient ID: Ricky Cole is a 65 y.o. male.    Chief Complaint: Blood Pressure Check    Here for follow up of blood pressure.    [facility], RN Andi.    Labs ordered but not done the end of May as ordered.    Fell earlier this week when getting up to go to the bathroom. States he got up too fast. Feels that only the hydralazine works.    Upon awakening, he eats breakfast then checks BP. Usually normal. Takes carvedilol 6.25mg at that time. Does not take the hydralazine due to concern that it might drop his pressure too low.    Around 10-11am, BP is up to 190s/100s. He takes his hydralazine 25mg at that time (cuts the 50mg in half.)    Around 12pm BP comes back down to normal.     Around 4pm BP is back up to 190s/100s and he then takes another half of the hydralazine (to equal 25mg.)    Before bedtime, BP is normal but he still takes a whole hydralazine of 50mg along with his carvedilol 6.25mg.    Concerned that the meds are not working.    No CP, SOB, HA. Feels dizzy at times, but denies any falls.    Review of Systems   All other systems reviewed and are negative.        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, including shoulder     Cancer 2005    right eye    Colon polyps     COPD (chronic obstructive pulmonary disease)     Depression     Hypertension     Inguinal hernia     left    Kidney stone     MI (myocardial infarction)     Smoker, trying to quit again, 1 cig last a week, started age 17, quit for 42 years, restarted 2018 7/24/2019    Stroke     last stroke 2017    Tremor     Ulcer      Past Surgical History:   Procedure Laterality Date    ANGIOPLASTY      cardiac stent    CARDIAC CATHETERIZATION      COLECTOMY      EYE SURGERY      FINGER SURGERY Left     left index finger    HERNIA REPAIR      HIP ARTHROPLASTY Right 12/20/2019    Procedure: ARTHROPLASTY, HIP, Lindy, pegboard, 1st assist;   Surgeon: Kody Patel MD;  Location: [facility] OR;  Service: Orthopedics;  Laterality: Right;    OLECRANON BURSECTOMY Right 11/6/2018    Procedure: BURSECTOMY, OLECRANON;  Surgeon: Atul Cooper DO;  Location: [facility] OR;  Service: Orthopedics;  Laterality: Right;  Excision Olecranon Bursa Right Elbow    POLYPECTOMY      REPAIR OF TRICEPS TENDON Right 11/6/2018    Procedure: REPAIR, TENDON, TRICEPS;  Surgeon: Atul Cooper DO;  Location: [facility] OR;  Service: Orthopedics;  Laterality: Right;    SURGICAL REMOVAL OF BONE SPUR Right 11/6/2018    Procedure: EXCISION, BONE SPUR;  Surgeon: Atul Cooper DO;  Location: [facility] OR;  Service: Orthopedics;  Laterality: Right;  Excision Olecranon Bone Spur Right Elbow     Family History   Problem Relation Age of Onset    Heart disease Mother     Heart disease Father     Cancer Father     Dementia Brother     Heart disease Brother        Review of patient's allergies indicates:   Allergen Reactions    Codeine Other (See Comments)     hyper    Nsaids (non-steroidal anti-inflammatory drug) Other (See Comments)     Says eats holes in his stomach       Current Outpatient Medications:     ALPRAZolam (XANAX) 1 MG tablet, TAKE 1 TABLET 4 TIMES A DAY AS NEEDED FOR ANXIETY, Disp: 120 tablet, Rfl: 0    carvedilol (COREG) 6.25 MG tablet, Take 1 tablet (6.25 mg total) by mouth 2 (two) times daily., Disp: 60 tablet, Rfl: 0    hydrALAZINE (APRESOLINE) 50 MG tablet, Take 1 tablet (50 mg total) by mouth 2 (two) times a day., Disp: 180 tablet, Rfl: 3    meloxicam (MOBIC) 15 MG tablet, Take 1 tablet (15 mg total) by mouth daily as needed., Disp: 90 tablet, Rfl: 3    pantoprazole (PROTONIX) 40 MG tablet, TAKE 1 TABLET IN THE MORNING (30 MINUTES BEFORE BREAKFAST) FOR STOMACH, Disp: 30 tablet, Rfl: 11    primidone (MYSOLINE) 50 MG Tab, TAKE 1 TABLET 4 TIMES A DAY AS DIRECTED, Disp: 120 tablet, Rfl: 11    vitamin renal formula, B-complex-vitamin c-folic acid, (NEPHROCAPS)  "1 mg Cap, Take 1 capsule by mouth once daily., Disp: 180 capsule, Rfl: 0      Objective:      BP (!) 160/84 (BP Location: Left arm, Patient Position: Sitting, BP Method: Medium (Automatic))   Pulse 65   Temp 98.6 °F (37 °C) (Oral)   Resp 14   Ht 5' 9" (1.753 m)   Wt 64.2 kg (141 lb 8 oz)   SpO2 98%   BMI 20.90 kg/m²   Physical Exam  Vitals signs and nursing note reviewed.   Constitutional:       General: He is not in acute distress.     Appearance: He is well-developed and normal weight. He is not ill-appearing, toxic-appearing or diaphoretic.   HENT:      Head: Normocephalic and atraumatic.      Right Ear: Tympanic membrane, ear canal and external ear normal. There is no impacted cerumen.      Left Ear: Tympanic membrane, ear canal and external ear normal. There is no impacted cerumen.      Nose: Nose normal. No congestion or rhinorrhea.      Mouth/Throat:      Mouth: Mucous membranes are moist.      Pharynx: Oropharynx is clear. No oropharyngeal exudate or posterior oropharyngeal erythema.   Eyes:      General: No scleral icterus.        Right eye: No discharge.         Left eye: No discharge.      Conjunctiva/sclera: Conjunctivae normal.      Pupils: Pupils are equal, round, and reactive to light.   Neck:      Musculoskeletal: Normal range of motion and neck supple. No neck rigidity or muscular tenderness.      Thyroid: No thyromegaly.      Vascular: No carotid bruit or JVD.      Trachea: No tracheal deviation.   Cardiovascular:      Rate and Rhythm: Normal rate and regular rhythm.      Pulses: Normal pulses.      Heart sounds: Normal heart sounds.   Pulmonary:      Effort: Pulmonary effort is normal. No respiratory distress.      Breath sounds: Normal breath sounds. No wheezing.   Abdominal:      General: Abdomen is flat. Bowel sounds are normal.      Palpations: Abdomen is soft.   Musculoskeletal: Normal range of motion.         General: No deformity.   Lymphadenopathy:      Cervical: No cervical " adenopathy.   Skin:     General: Skin is warm and dry.      Capillary Refill: Capillary refill takes less than 2 seconds.      Coloration: Skin is not jaundiced or pale.      Findings: No erythema or rash.   Neurological:      General: No focal deficit present.      Mental Status: He is alert and oriented to person, place, and time. Mental status is at baseline.      Motor: Weakness (generalized weakness) present.      Deep Tendon Reflexes: Reflexes normal.   Psychiatric:         Mood and Affect: Mood normal.         Behavior: Behavior normal.         Thought Content: Thought content normal.         Judgment: Judgment normal.         Assessment:       1. Hypertension, unspecified type    2. Cerebrovascular accident (CVA), unspecified mechanism    3. Dementia without behavioral disturbance, unspecified dementia type    4. ETOH abuse    5. PAD (peripheral artery disease)    6. Fall, initial encounter    7. Hyponatremia        Plan:       Hypertension, unspecified type  -     Basic metabolic panel; Future; Expected date: 06/26/2020  -     Ambulatory referral/consult to Physical/Occupational Therapy; Future; Expected date: 06/27/2020    Cerebrovascular accident (CVA), unspecified mechanism  -     Ambulatory referral/consult to Outpatient Case Management  -     Ambulatory referral/consult to Physical/Occupational Therapy; Future; Expected date: 06/27/2020    Dementia without behavioral disturbance, unspecified dementia type  -     Ambulatory referral/consult to Outpatient Case Management  -     Ambulatory referral/consult to Physical/Occupational Therapy; Future; Expected date: 06/27/2020    ETOH abuse    PAD (peripheral artery disease)  -     Ambulatory referral/consult to Outpatient Case Management    Fall, initial encounter  -     Ambulatory referral/consult to Outpatient Case Management  -     Basic metabolic panel; Future; Expected date: 06/26/2020  -     Ambulatory referral/consult to Physical/Occupational Therapy;  Future; Expected date: 06/27/2020    Hyponatremia  -     Basic metabolic panel; Future; Expected date: 06/26/2020      I have asked patient to take his hydralazine as follows:  Hydralazine 25mg in AM with morning meds  Hydralazine 25mg around 1-2PM  Hydralazine 50mg at bedtime with night meds  Continue other meds as prescribed.    Strict return precautions reviewed and patient verbalized understanding. Risks, benefits, and alternatives to the plan were reviewed in detail and all questions answered to the patient's satisfaction. 35 minutes were spent with patient, >50% of time based on counseling and coordination of care.          Follow up in about 2 weeks (around 7/10/2020) for blood pressure follow up.

## 2020-06-26 NOTE — PATIENT INSTRUCTIONS
8:00 AM--Take hydralazine 25mg with breakfast EVERY DAY. If your blood pressure is LOW, below 110 on top or below 60 on bottom, then HOLD that dose. Take carvedilol 6.25mg with breakfast EVERY DAY.    12:00PM--Take hydralazine 25mg with lunch EVERY DAY.  If your blood pressure is LOW, below 110 on top or below 60 on bottom, then HOLD that dose.     6:00 PM--Take hydralazine 25mg with supper EVERY DAY. If your blood pressure is LOW, below 110 on top or below 60 on bottom, then HOLD that dose. Take carvedilol 6.25mg with supper EVERY DAY.

## 2020-06-29 ENCOUNTER — PATIENT OUTREACH (OUTPATIENT)
Dept: ADMINISTRATIVE | Facility: HOSPITAL | Age: 65
End: 2020-06-29

## 2020-07-06 ENCOUNTER — OUTPATIENT CASE MANAGEMENT (OUTPATIENT)
Dept: ADMINISTRATIVE | Facility: OTHER | Age: 65
End: 2020-07-06

## 2020-07-06 ENCOUNTER — HOSPITAL ENCOUNTER (INPATIENT)
Facility: HOSPITAL | Age: 65
LOS: 4 days | Discharge: HOME HEALTH | DRG: 641 | End: 2020-07-10
Attending: FAMILY MEDICINE | Admitting: FAMILY MEDICINE
Payer: MEDICARE

## 2020-07-06 DIAGNOSIS — F41.9 ANXIETY: ICD-10-CM

## 2020-07-06 DIAGNOSIS — F41.1 GAD (GENERALIZED ANXIETY DISORDER): ICD-10-CM

## 2020-07-06 DIAGNOSIS — E87.1 HYPONATREMIA: Primary | ICD-10-CM

## 2020-07-06 DIAGNOSIS — R20.2 PARESTHESIA: ICD-10-CM

## 2020-07-06 PROBLEM — R53.1 LEFT-SIDED WEAKNESS: Status: ACTIVE | Noted: 2020-07-06

## 2020-07-06 LAB
ALBUMIN SERPL BCP-MCNC: 3.9 G/DL (ref 3.5–5.2)
ALP SERPL-CCNC: 78 U/L (ref 55–135)
ALT SERPL W/O P-5'-P-CCNC: 15 U/L (ref 10–44)
ANION GAP SERPL CALC-SCNC: 12 MMOL/L (ref 8–16)
ANION GAP SERPL CALC-SCNC: 9 MMOL/L (ref 8–16)
APTT BLDCRRT: 32.3 SEC (ref 21–32)
AST SERPL-CCNC: 21 U/L (ref 10–40)
BASOPHILS # BLD AUTO: 0.05 K/UL (ref 0–0.2)
BASOPHILS NFR BLD: 0.9 % (ref 0–1.9)
BILIRUB SERPL-MCNC: 0.9 MG/DL (ref 0.1–1)
BILIRUB UR QL STRIP: NEGATIVE
BUN SERPL-MCNC: 5 MG/DL (ref 8–23)
BUN SERPL-MCNC: 5 MG/DL (ref 8–23)
CALCIUM SERPL-MCNC: 8.5 MG/DL (ref 8.7–10.5)
CALCIUM SERPL-MCNC: 8.8 MG/DL (ref 8.7–10.5)
CHLORIDE SERPL-SCNC: 81 MMOL/L (ref 95–110)
CHLORIDE SERPL-SCNC: 87 MMOL/L (ref 95–110)
CHOLEST SERPL-MCNC: 114 MG/DL (ref 120–199)
CHOLEST/HDLC SERPL: 2.1 {RATIO} (ref 2–5)
CLARITY UR: CLEAR
CO2 SERPL-SCNC: 23 MMOL/L (ref 23–29)
CO2 SERPL-SCNC: 26 MMOL/L (ref 23–29)
COLOR UR: YELLOW
CREAT SERPL-MCNC: 0.6 MG/DL (ref 0.5–1.4)
CREAT SERPL-MCNC: 0.6 MG/DL (ref 0.5–1.4)
DIFFERENTIAL METHOD: ABNORMAL
EOSINOPHIL # BLD AUTO: 0.1 K/UL (ref 0–0.5)
EOSINOPHIL NFR BLD: 2 % (ref 0–8)
ERYTHROCYTE [DISTWIDTH] IN BLOOD BY AUTOMATED COUNT: 12.3 % (ref 11.5–14.5)
EST. GFR  (AFRICAN AMERICAN): >60 ML/MIN/1.73 M^2
EST. GFR  (AFRICAN AMERICAN): >60 ML/MIN/1.73 M^2
EST. GFR  (NON AFRICAN AMERICAN): >60 ML/MIN/1.73 M^2
EST. GFR  (NON AFRICAN AMERICAN): >60 ML/MIN/1.73 M^2
ESTIMATED AVG GLUCOSE: 105 MG/DL (ref 68–131)
GLUCOSE SERPL-MCNC: 118 MG/DL (ref 70–110)
GLUCOSE SERPL-MCNC: 93 MG/DL (ref 70–110)
GLUCOSE UR QL STRIP: NEGATIVE
HBA1C MFR BLD HPLC: 5.3 % (ref 4.5–6.2)
HCT VFR BLD AUTO: 35.6 % (ref 40–54)
HDLC SERPL-MCNC: 55 MG/DL (ref 40–75)
HDLC SERPL: 48.2 % (ref 20–50)
HGB BLD-MCNC: 12.9 G/DL (ref 14–18)
HGB UR QL STRIP: NEGATIVE
IMM GRANULOCYTES # BLD AUTO: 0.02 K/UL (ref 0–0.04)
IMM GRANULOCYTES NFR BLD AUTO: 0.3 % (ref 0–0.5)
INR PPP: 1 (ref 0.8–1.2)
KETONES UR QL STRIP: NEGATIVE
LDLC SERPL CALC-MCNC: 49.6 MG/DL (ref 63–159)
LEUKOCYTE ESTERASE UR QL STRIP: NEGATIVE
LYMPHOCYTES # BLD AUTO: 1.5 K/UL (ref 1–4.8)
LYMPHOCYTES NFR BLD: 25.2 % (ref 18–48)
MCH RBC QN AUTO: 32.3 PG (ref 27–31)
MCHC RBC AUTO-ENTMCNC: 36.2 G/DL (ref 32–36)
MCV RBC AUTO: 89 FL (ref 82–98)
MONOCYTES # BLD AUTO: 0.6 K/UL (ref 0.3–1)
MONOCYTES NFR BLD: 10.4 % (ref 4–15)
NEUTROPHILS # BLD AUTO: 3.6 K/UL (ref 1.8–7.7)
NEUTROPHILS NFR BLD: 61.2 % (ref 38–73)
NITRITE UR QL STRIP: NEGATIVE
NONHDLC SERPL-MCNC: 59 MG/DL
NRBC BLD-RTO: 0 /100 WBC
PH UR STRIP: >8 [PH] (ref 5–8)
PLATELET # BLD AUTO: 281 K/UL (ref 150–350)
PMV BLD AUTO: 8.9 FL (ref 9.2–12.9)
POCT GLUCOSE: 113 MG/DL (ref 70–110)
POTASSIUM SERPL-SCNC: 3.5 MMOL/L (ref 3.5–5.1)
POTASSIUM SERPL-SCNC: 3.8 MMOL/L (ref 3.5–5.1)
PROT SERPL-MCNC: 7.2 G/DL (ref 6–8.4)
PROT UR QL STRIP: NEGATIVE
PROTHROMBIN TIME: 10.5 SEC (ref 9–12.5)
RBC # BLD AUTO: 3.99 M/UL (ref 4.6–6.2)
SARS-COV-2 RDRP RESP QL NAA+PROBE: NEGATIVE
SODIUM SERPL-SCNC: 116 MMOL/L (ref 136–145)
SODIUM SERPL-SCNC: 122 MMOL/L (ref 136–145)
SP GR UR STRIP: 1.01 (ref 1–1.03)
TRIGL SERPL-MCNC: 47 MG/DL (ref 30–150)
TROPONIN I SERPL DL<=0.01 NG/ML-MCNC: <0.01 NG/ML (ref 0.02–0.5)
TSH SERPL DL<=0.005 MIU/L-ACNC: 0.99 UIU/ML (ref 0.34–5.6)
URN SPEC COLLECT METH UR: ABNORMAL
UROBILINOGEN UR STRIP-ACNC: NEGATIVE EU/DL
WBC # BLD AUTO: 5.88 K/UL (ref 3.9–12.7)

## 2020-07-06 PROCEDURE — 71045 XR CHEST AP PORTABLE: ICD-10-PCS | Mod: 26,,, | Performed by: RADIOLOGY

## 2020-07-06 PROCEDURE — 94761 N-INVAS EAR/PLS OXIMETRY MLT: CPT

## 2020-07-06 PROCEDURE — 85730 THROMBOPLASTIN TIME PARTIAL: CPT

## 2020-07-06 PROCEDURE — 71045 X-RAY EXAM CHEST 1 VIEW: CPT | Mod: TC,FY

## 2020-07-06 PROCEDURE — 93010 EKG 12-LEAD: ICD-10-PCS | Mod: ,,, | Performed by: INTERNAL MEDICINE

## 2020-07-06 PROCEDURE — 96375 TX/PRO/DX INJ NEW DRUG ADDON: CPT

## 2020-07-06 PROCEDURE — 80053 COMPREHEN METABOLIC PANEL: CPT

## 2020-07-06 PROCEDURE — 93010 ELECTROCARDIOGRAM REPORT: CPT | Mod: ,,, | Performed by: INTERNAL MEDICINE

## 2020-07-06 PROCEDURE — 81003 URINALYSIS AUTO W/O SCOPE: CPT

## 2020-07-06 PROCEDURE — 25000003 PHARM REV CODE 250: Performed by: FAMILY MEDICINE

## 2020-07-06 PROCEDURE — 20000000 HC ICU ROOM

## 2020-07-06 PROCEDURE — 63600175 PHARM REV CODE 636 W HCPCS: Performed by: FAMILY MEDICINE

## 2020-07-06 PROCEDURE — 99285 EMERGENCY DEPT VISIT HI MDM: CPT | Mod: 25

## 2020-07-06 PROCEDURE — 84443 ASSAY THYROID STIM HORMONE: CPT

## 2020-07-06 PROCEDURE — 99223 1ST HOSP IP/OBS HIGH 75: CPT | Mod: AI,,, | Performed by: FAMILY MEDICINE

## 2020-07-06 PROCEDURE — 83036 HEMOGLOBIN GLYCOSYLATED A1C: CPT

## 2020-07-06 PROCEDURE — 25000242 PHARM REV CODE 250 ALT 637 W/ HCPCS

## 2020-07-06 PROCEDURE — 70450 CT HEAD WITHOUT CONTRAST: ICD-10-PCS | Mod: 26,,, | Performed by: RADIOLOGY

## 2020-07-06 PROCEDURE — 96374 THER/PROPH/DIAG INJ IV PUSH: CPT

## 2020-07-06 PROCEDURE — 82962 GLUCOSE BLOOD TEST: CPT

## 2020-07-06 PROCEDURE — 70450 CT HEAD/BRAIN W/O DYE: CPT | Mod: TC

## 2020-07-06 PROCEDURE — 93005 ELECTROCARDIOGRAM TRACING: CPT

## 2020-07-06 PROCEDURE — 36415 COLL VENOUS BLD VENIPUNCTURE: CPT

## 2020-07-06 PROCEDURE — 80048 BASIC METABOLIC PNL TOTAL CA: CPT

## 2020-07-06 PROCEDURE — 84484 ASSAY OF TROPONIN QUANT: CPT

## 2020-07-06 PROCEDURE — 85610 PROTHROMBIN TIME: CPT

## 2020-07-06 PROCEDURE — 71045 X-RAY EXAM CHEST 1 VIEW: CPT | Mod: 26,,, | Performed by: RADIOLOGY

## 2020-07-06 PROCEDURE — 70450 CT HEAD/BRAIN W/O DYE: CPT | Mod: 26,,, | Performed by: RADIOLOGY

## 2020-07-06 PROCEDURE — 80061 LIPID PANEL: CPT

## 2020-07-06 PROCEDURE — 85025 COMPLETE CBC W/AUTO DIFF WBC: CPT

## 2020-07-06 PROCEDURE — U0002 COVID-19 LAB TEST NON-CDC: HCPCS

## 2020-07-06 PROCEDURE — 99223 PR INITIAL HOSPITAL CARE,LEVL III: ICD-10-PCS | Mod: AI,,, | Performed by: FAMILY MEDICINE

## 2020-07-06 RX ORDER — ONDANSETRON 2 MG/ML
4 INJECTION INTRAMUSCULAR; INTRAVENOUS EVERY 8 HOURS PRN
Status: DISCONTINUED | OUTPATIENT
Start: 2020-07-06 | End: 2020-07-10 | Stop reason: HOSPADM

## 2020-07-06 RX ORDER — METOPROLOL TARTRATE 1 MG/ML
5 INJECTION, SOLUTION INTRAVENOUS
Status: DISCONTINUED | OUTPATIENT
Start: 2020-07-06 | End: 2020-07-06

## 2020-07-06 RX ORDER — SODIUM CHLORIDE 9 MG/ML
INJECTION, SOLUTION INTRAVENOUS CONTINUOUS
Status: DISCONTINUED | OUTPATIENT
Start: 2020-07-06 | End: 2020-07-10 | Stop reason: HOSPADM

## 2020-07-06 RX ORDER — SODIUM CHLORIDE 0.9 % (FLUSH) 0.9 %
10 SYRINGE (ML) INJECTION
Status: DISCONTINUED | OUTPATIENT
Start: 2020-07-06 | End: 2020-07-10 | Stop reason: HOSPADM

## 2020-07-06 RX ORDER — PRIMIDONE 50 MG/1
50 TABLET ORAL 4 TIMES DAILY
Status: DISCONTINUED | OUTPATIENT
Start: 2020-07-06 | End: 2020-07-10 | Stop reason: HOSPADM

## 2020-07-06 RX ORDER — MORPHINE SULFATE 4 MG/ML
2 INJECTION, SOLUTION INTRAMUSCULAR; INTRAVENOUS EVERY 6 HOURS PRN
Status: DISCONTINUED | OUTPATIENT
Start: 2020-07-06 | End: 2020-07-08

## 2020-07-06 RX ORDER — SODIUM CHLORIDE 9 MG/ML
INJECTION, SOLUTION INTRAVENOUS CONTINUOUS
Status: DISCONTINUED | OUTPATIENT
Start: 2020-07-06 | End: 2020-07-06

## 2020-07-06 RX ORDER — ASPIRIN 325 MG
325 TABLET ORAL NIGHTLY
Status: DISCONTINUED | OUTPATIENT
Start: 2020-07-06 | End: 2020-07-10 | Stop reason: HOSPADM

## 2020-07-06 RX ORDER — SODIUM CHLORIDE 9 MG/ML
1000 INJECTION, SOLUTION INTRAVENOUS
Status: COMPLETED | OUTPATIENT
Start: 2020-07-06 | End: 2020-07-06

## 2020-07-06 RX ORDER — MORPHINE SULFATE 4 MG/ML
INJECTION, SOLUTION INTRAMUSCULAR; INTRAVENOUS
Status: COMPLETED
Start: 2020-07-06 | End: 2020-07-06

## 2020-07-06 RX ORDER — ENOXAPARIN SODIUM 100 MG/ML
40 INJECTION SUBCUTANEOUS EVERY 24 HOURS
Status: DISCONTINUED | OUTPATIENT
Start: 2020-07-06 | End: 2020-07-10 | Stop reason: HOSPADM

## 2020-07-06 RX ORDER — ACETAMINOPHEN 325 MG/1
650 TABLET ORAL EVERY 8 HOURS PRN
Status: DISCONTINUED | OUTPATIENT
Start: 2020-07-06 | End: 2020-07-10 | Stop reason: HOSPADM

## 2020-07-06 RX ORDER — ATORVASTATIN CALCIUM 40 MG/1
40 TABLET, FILM COATED ORAL NIGHTLY
Status: DISCONTINUED | OUTPATIENT
Start: 2020-07-06 | End: 2020-07-10 | Stop reason: HOSPADM

## 2020-07-06 RX ORDER — NITROGLYCERIN 0.4 MG/1
TABLET SUBLINGUAL
Status: COMPLETED
Start: 2020-07-06 | End: 2020-07-06

## 2020-07-06 RX ORDER — CARVEDILOL 3.12 MG/1
6.25 TABLET ORAL 2 TIMES DAILY
Status: DISCONTINUED | OUTPATIENT
Start: 2020-07-06 | End: 2020-07-10 | Stop reason: HOSPADM

## 2020-07-06 RX ORDER — ALPRAZOLAM 0.25 MG/1
1 TABLET ORAL 3 TIMES DAILY PRN
Status: DISCONTINUED | OUTPATIENT
Start: 2020-07-06 | End: 2020-07-10 | Stop reason: HOSPADM

## 2020-07-06 RX ORDER — HYDROCODONE BITARTRATE AND ACETAMINOPHEN 5; 325 MG/1; MG/1
1 TABLET ORAL EVERY 8 HOURS PRN
Status: DISCONTINUED | OUTPATIENT
Start: 2020-07-06 | End: 2020-07-07

## 2020-07-06 RX ORDER — PANTOPRAZOLE SODIUM 40 MG/1
40 TABLET, DELAYED RELEASE ORAL DAILY
Status: DISCONTINUED | OUTPATIENT
Start: 2020-07-07 | End: 2020-07-10 | Stop reason: HOSPADM

## 2020-07-06 RX ORDER — HYDRALAZINE HYDROCHLORIDE 25 MG/1
50 TABLET, FILM COATED ORAL 2 TIMES DAILY
Status: DISCONTINUED | OUTPATIENT
Start: 2020-07-06 | End: 2020-07-10 | Stop reason: HOSPADM

## 2020-07-06 RX ORDER — LORAZEPAM 2 MG/ML
1 INJECTION INTRAMUSCULAR
Status: DISCONTINUED | OUTPATIENT
Start: 2020-07-06 | End: 2020-07-06

## 2020-07-06 RX ORDER — LORAZEPAM 2 MG/ML
0.5 INJECTION INTRAMUSCULAR
Status: COMPLETED | OUTPATIENT
Start: 2020-07-06 | End: 2020-07-06

## 2020-07-06 RX ORDER — NITROGLYCERIN 0.4 MG/1
0.4 TABLET SUBLINGUAL
Status: COMPLETED | OUTPATIENT
Start: 2020-07-06 | End: 2020-07-06

## 2020-07-06 RX ORDER — MORPHINE SULFATE 4 MG/ML
4 INJECTION, SOLUTION INTRAMUSCULAR; INTRAVENOUS
Status: COMPLETED | OUTPATIENT
Start: 2020-07-06 | End: 2020-07-06

## 2020-07-06 RX ADMIN — ATORVASTATIN CALCIUM 40 MG: 40 TABLET, FILM COATED ORAL at 09:07

## 2020-07-06 RX ADMIN — HYDRALAZINE HYDROCHLORIDE 50 MG: 25 TABLET, FILM COATED ORAL at 09:07

## 2020-07-06 RX ADMIN — ALPRAZOLAM 1 MG: 0.25 TABLET ORAL at 10:07

## 2020-07-06 RX ADMIN — SODIUM CHLORIDE 1000 ML: 0.9 INJECTION, SOLUTION INTRAVENOUS at 04:07

## 2020-07-06 RX ADMIN — NITROGLYCERIN 0.4 MG: 0.4 TABLET SUBLINGUAL at 03:07

## 2020-07-06 RX ADMIN — ENOXAPARIN SODIUM 40 MG: 40 INJECTION SUBCUTANEOUS at 09:07

## 2020-07-06 RX ADMIN — ASPIRIN 325 MG ORAL TABLET 325 MG: 325 PILL ORAL at 09:07

## 2020-07-06 RX ADMIN — LORAZEPAM 0.5 MG: 2 INJECTION INTRAMUSCULAR; INTRAVENOUS at 04:07

## 2020-07-06 RX ADMIN — SODIUM CHLORIDE: 0.9 INJECTION, SOLUTION INTRAVENOUS at 09:07

## 2020-07-06 RX ADMIN — PRIMIDONE 50 MG: 50 TABLET ORAL at 09:07

## 2020-07-06 RX ADMIN — CARVEDILOL 6.25 MG: 3.12 TABLET, FILM COATED ORAL at 09:07

## 2020-07-06 RX ADMIN — MORPHINE SULFATE 4 MG: 4 INJECTION, SOLUTION INTRAMUSCULAR; INTRAVENOUS at 03:07

## 2020-07-06 NOTE — ASSESSMENT & PLAN NOTE
Patient takes Xanax 1 mg q.i.d. at home as needed  Q2 hr neuro checks, monitor for alcohol withdrawal though patient says he does not drink any longer  Denies any illicit drug use as well

## 2020-07-06 NOTE — HPI
Patient is a 65-year-old male with past medical history of COPD, hypertension, MI, tremor, history of stroke, anxiety who presents to the ER with a 3 day history of left-sided weakness, numbness and burning sensation.  Patient reports that 3 days ago he started feeling a burning sensation in his left arm that spread down to his left leg felt like he could move his left leg at all.  Thought it was getting better but then it continued to get worse and returned until today he became worried because the whole left side of his body was numb and hurting.  He reports that he has had some nausea and 1 episode of vomiting but otherwise no shortness of breath, chest pain, leg swelling.  He does report that he has had lot of increased urination and is drinking an excessive amount of water.  He reports that he has felt really dizzy and lightheaded.  No seizure activity.  In the ER, CT head without acute abnormality, troponin negative, EKG without signs of acute infarct.  TSH normal.  Glucose normal.  Renal function normal.  However sodium found to be 116.  Patient has a chronic hyponatremia with a baseline between 125 in 128.  Hospital team called for admission for paresthesias and hyponatremia.  Admitted to ICU

## 2020-07-06 NOTE — ED PROVIDER NOTES
Encounter Date: 7/6/2020       History     Chief Complaint   Patient presents with    Extremity Weakness     Onset x 3 days ago. Complaint of left sided weakness and left sided numbness. History of TIA/CVA.    Numbness     Left sided.    Chest Pain     Complaint of chest pain. Describes pain to be worse with cough and inspiration.     [age] year old male presents per EMS complaining of numbness and weakness to the left face left arm left leg for the past 3 and half days, he also describes having chest pain 10/10 which is worse with cough and inspiration for the past 24 hr, patient denies fever chills sneezing, sore throat, patient endorse history of anxiety back pain cancer depression COPD and prior MI and stroke, prior surgeries include angioplasty partial colectomy and hernia surgery        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, including shoulder     Cancer 2005    right eye    Colon polyps     COPD (chronic obstructive pulmonary disease)     Depression     Hypertension     Inguinal hernia     left    Kidney stone     MI (myocardial infarction)     Smoker, trying to quit again, 1 cig last a week, started age 17, quit for 42 years, restarted 2018 7/24/2019    Stroke     last stroke 2017    Tremor     Ulcer      Past Surgical History:   Procedure Laterality Date    ANGIOPLASTY      cardiac stent    CARDIAC CATHETERIZATION      COLECTOMY      EYE SURGERY      FINGER SURGERY Left     left index finger    HERNIA REPAIR      HIP ARTHROPLASTY Right 12/20/2019    Procedure: ARTHROPLASTY, HIP, [place], pegboard, 1st assist;  Surgeon: Kody Patel MD;  Location: [facility];  Service: Orthopedics;  Laterality:  Right;    OLECRANON BURSECTOMY Right 2018    Procedure: BURSECTOMY, OLECRANON;  Surgeon: Atul Cooper DO;  Location: [facility];  Service: Orthopedics;  Laterality: Right;  Excision Olecranon Bursa Right Elbow    POLYPECTOMY      REPAIR OF TRICEPS TENDON Right 2018    Procedure: REPAIR, TENDON, TRICEPS;  Surgeon: Atul Cooper DO;  Location: [facility] OR;  Service: Orthopedics;  Laterality: Right;    SURGICAL REMOVAL OF BONE SPUR Right 2018    Procedure: EXCISION, BONE SPUR;  Surgeon: Atul Cooper DO;  Location: [facility];  Service: Orthopedics;  Laterality: Right;  Excision Olecranon Bone Spur Right Elbow     Family History   Problem Relation Age of Onset    Heart disease Mother     Heart disease Father     Cancer Father     Dementia Brother     Heart disease Brother      Social History     Tobacco Use    Smoking status: Former Smoker     Years: 15.00     Types: Cigarettes     Quit date: 2018     Years since quittin.0    Smokeless tobacco: Current User     Types: Chew    Tobacco comment: Pt trying to quit   Substance Use Topics    Alcohol use: Yes     Alcohol/week: 2.0 - 3.0 standard drinks     Types: 2 - 3 Cans of beer per week     Comment: occasionally beer    Drug use: No     Review of Systems   Constitutional: Negative for fever.   HENT: Negative for sore throat.    Respiratory: Negative for shortness of breath.    Cardiovascular: Negative for chest pain.   Gastrointestinal: Negative for nausea.   Genitourinary: Negative for dysuria.   Musculoskeletal: Negative for back pain.   Skin: Negative for rash.   Neurological: Negative for weakness.   Hematological: Does not bruise/bleed easily.   Psychiatric/Behavioral: Negative for hallucinations. The patient is nervous/anxious. The patient is not hyperactive.        Physical Exam     Initial Vitals [20 1449]   BP Pulse Resp Temp SpO2   (!) 170/87 70 18 98.4 °F (36.9 °C) 99 %      MAP       --         Physical  Exam    Nursing note and vitals reviewed.  Constitutional: He appears well-developed and well-nourished. He is not diaphoretic. No distress.   HENT:   Head: Normocephalic and atraumatic.   Right Ear: External ear normal.   Left Ear: External ear normal.   Nose: Nose normal.   Mouth/Throat: Oropharynx is clear and moist. No oropharyngeal exudate.   Eyes: EOM are normal.   Neck: Normal range of motion. Neck supple. No tracheal deviation present.   Cardiovascular: Normal rate and regular rhythm.   No murmur heard.  Pulmonary/Chest: Breath sounds normal. No stridor. No respiratory distress. He has no rales.   Abdominal: Soft. He exhibits no distension and no mass. There is no abdominal tenderness. There is no rebound.   Musculoskeletal: Normal range of motion. No edema.   Lymphadenopathy:     He has no cervical adenopathy.   Neurological: He is alert and oriented to person, place, and time. He has normal strength.   Skin: Skin is warm and dry. Capillary refill takes less than 2 seconds. No pallor.   Psychiatric:   Pt appears anxious         ED Course   Procedures  Labs Reviewed   CBC W/ AUTO DIFFERENTIAL - Abnormal; Notable for the following components:       Result Value    RBC 3.99 (*)     Hemoglobin 12.9 (*)     Hematocrit 35.6 (*)     Mean Corpuscular Hemoglobin 32.3 (*)     Mean Corpuscular Hemoglobin Conc 36.2 (*)     MPV 8.9 (*)     All other components within normal limits   COMPREHENSIVE METABOLIC PANEL - Abnormal; Notable for the following components:    Sodium 116 (*)     Chloride 81 (*)     Glucose 118 (*)     BUN, Bld 5 (*)     All other components within normal limits    Narrative:     Sodium critical result(s) called and verbal readback obtained from   Kylee Galeano RN ED.  by [facility] 07/06/2020 16:21   LIPID PANEL - Abnormal; Notable for the following components:    Cholesterol 114 (*)     LDL Cholesterol 49.6 (*)     All other components within normal limits   TROPONIN I - Abnormal; Notable for the  following components:    Troponin I <0.01 (*)     All other components within normal limits   APTT - Abnormal; Notable for the following components:    aPTT 32.3 (*)     All other components within normal limits   URINALYSIS, REFLEX TO URINE CULTURE - Abnormal; Notable for the following components:    pH, UA >8.0 (*)     All other components within normal limits    Narrative:     Specimen Source->Urine   POCT GLUCOSE - Abnormal; Notable for the following components:    POCT Glucose 113 (*)     All other components within normal limits   PROTIME-INR   TSH   SARS-COV-2 RNA AMPLIFICATION, QUAL   HEMOGLOBIN A1C   BASIC METABOLIC PANEL   HEMOGLOBIN A1C   POCT GLUCOSE MONITORING CONTINUOUS     EKG Readings: (Independently Interpreted)   Initial Reading: No STEMI. Rhythm: Normal Sinus Rhythm. Heart Rate: 70. Ectopy: No Ectopy. Conduction: Normal. ST Segments: Normal ST Segments. T Waves: Normal.     ECG Results          ECG 12 lead (Final result)  Result time 07/06/20 16:45:09    Final result by Interface, Lab [facility] (07/06/20 16:45:09)                 Narrative:    Test Reason : I63.9    Vent. Rate : 070 BPM     Atrial Rate : 070 BPM     P-R Int : 150 ms          QRS Dur : 098 ms      QT Int : 430 ms       P-R-T Axes : 077 088 075 degrees     QTc Int : 464 ms    Normal sinus rhythm  Possible Left atrial enlargement  Nonspecific ST abnormality  Abnormal ECG  When compared with ECG of 12-MAY-2020 19:12,  No significant change was found  Confirmed by Dale Galloway MD ([id number]) on 7/6/2020 4:45:00 PM    Referred By: [email or web address]   SELF           Confirmed By:Dale Galloway MD                            Imaging Results          CT Head Without Contrast (Final result)  Result time 07/06/20 15:47:43    Final result by Whitney Chaidez MD (07/06/20 15:47:43)                 Impression:      Mild involutional changes and findings suggesting mild microvascular ischemic change with no acute intracranial findings      Electronically signed  by: Whitney Chaidez MD  Date:    07/06/2020  Time:    15:47             Narrative:    EXAMINATION:  CT HEAD WITHOUT CONTRAST    CLINICAL HISTORY:  Neuro deficit, acute, stroke suspected;    TECHNIQUE:  Low dose axial images were obtained through the head.  Coronal and sagittal reformations were also performed. Contrast was not administered.    COMPARISON:  12/30/2017    FINDINGS:  There is mild dilatation of ventricles, sulci, fissures.  There is subtle decreased density white matter suggesting mild microvascular ischemic change.  There is no acute intracranial hemorrhage.  No intracranial mass effect.  No acute major vascular territory infarct.  Note is made that MRI is typically more sensitive than CT particular for detection of early or small nonhemorrhagic infarcts.  The calvarium appears intact.  Mastoids appear well pneumatized.  Visualized paranasal sinuses appear essentially clear.  There are calcifications in parasellar portions of internal carotid arteries.                               X-Ray Chest AP Portable (Final result)  Result time 07/06/20 15:26:55    Final result by Omer Singh MD (07/06/20 15:26:55)                 Impression:      Focal airspace opacity overlying the right midlung zone is favored to represent superimposition artifact..  Consider repeat radiographs as deemed clinically appropriate.      Electronically signed by: Pablo Singh MD  Date:    07/06/2020  Time:    15:26             Narrative:    EXAMINATION:  XR CHEST AP PORTABLE    CLINICAL HISTORY:  Stroke;    TECHNIQUE:  A single portable upright AP chest radiograph was acquired.    COMPARISON:  Multiple prior chest radiographs dating back to 06/22/2017.    FINDINGS:  The cardiomediastinal silhouette is normal in appearance.  No pulmonary vascular congestion appreciated. There is a suggestion of faint segmental airspace opacity in the right midlung zone at the convergence of the right anterior 4th rib and right posterior 6th  rib which is favored to represent superimposition artifact.  Focal developing pneumonitis is considered less likely..  No significant volume of pleural fluid or pneumothorax. The bones reveal degenerative change of the AC joints and thoracic spine..  There are surgical clips projected over the lower chest/upper abdomen                                                   ED Course as of Jul 06 1826   Mon Jul 06, 2020   1651 CaseDiscussed with Dr. Vela concerning patient going to ICU for treatment of his hyponatremia    [WK]      ED Course User Index  [WK] Shawn Rizo MD                Clinical Impression:       ICD-10-CM ICD-9-CM   1. Hyponatremia  E87.1 276.1   2. Paresthesia  R20.2 782.0   3. Anxiety  F41.9 300.00             ED Disposition Condition    Admit                           Shawn Rizo MD  07/06/20 1826       Shawn Rizo MD  07/21/20 1631

## 2020-07-06 NOTE — ASSESSMENT & PLAN NOTE
Suspected stroke vs paresthesia from hyponatremia  Will order MRI Brain WO to r/o old infarction/stroke  Symptoms have been present for 3 days so outside window for acute intervention  Admit to ICU  q2h neuro checks

## 2020-07-06 NOTE — LETTER
July 17, 2020    Ricky Cole  [address]  [place] [address]             [facility]  [address] [address] Dear Mr. Gabriel,    I work with [facility]'s Outpatient Care Management Department. We received a referral from Dr. Autumn Diaz, to call you to review your medical history. These services are free of charge and are offered to [facility] patients who have recently been discharged from any of our facilities or who have complex medical conditions that may require the skill of a nurse to assist with management. When we receive a referral, we attempt to contact you to go over some health questions in order to determine how we can best assist you with your health care needs. I spoke with you briefly on 7/16/20, but was unable to reach you the following morning as you requested.    I am a Registered Nurse who specializes in connecting patients with available medical and financial resources as well as addressing any educational needs that may be indicated. We also have a  on our team of providers that is available to assist with any social or mental health needs that may be indicated. Our department works closely with the [facility] to help you manage your health condition(s) safely within your living environment. Our goal is to help you function at the healthiest and highest level possible.     If you would like to enroll in this free program, please give me a call at the contact number provided below. We will go over some questions regarding your health in order to determine how we can best assist you with your health care needs. This is a voluntary program and once enrolled, you may dis-enroll at any time.     I can be reached directly at [phone] from 8:00 AM to 4:30 PM, Monday through Friday. The general Outpatient Care Management Department can be reached at [phone] from 8:00AM to 4:30PM, Monday through Friday. [facility] also has  a program where a nurse is available 24/7 to answer questions or provide medical advice. That number is [phone]. Please feel free to contact us for any questions or concerns.    Kind Regards,        Fabiola Christian, RN  Outpatient Care Management  [phone]

## 2020-07-06 NOTE — SUBJECTIVE & OBJECTIVE
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, including shoulder     Cancer 2005    right eye    Colon polyps     COPD (chronic obstructive pulmonary disease)     Depression     Hypertension     Inguinal hernia     left    Kidney stone     MI (myocardial infarction)     Smoker, trying to quit again, 1 cig last a week, started age 17, quit for 42 years, restarted 2018 7/24/2019    Stroke     last stroke 2017    Tremor     Ulcer        Past Surgical History:   Procedure Laterality Date    ANGIOPLASTY      cardiac stent    CARDIAC CATHETERIZATION      COLECTOMY      EYE SURGERY      FINGER SURGERY Left     left index finger    HERNIA REPAIR      HIP ARTHROPLASTY Right 12/20/2019    Procedure: ARTHROPLASTY, HIP, [place], pegboard, 1st assist;  Surgeon: Kody Patel MD;  Location: [facility] OR;  Service: Orthopedics;  Laterality: Right;    OLECRANON BURSECTOMY Right 11/6/2018    Procedure: BURSECTOMY, OLECRANON;  Surgeon: Atul Cooper DO;  Location: [facility] OR;  Service: Orthopedics;  Laterality: Right;  Excision Olecranon Bursa Right Elbow    POLYPECTOMY      REPAIR OF TRICEPS TENDON Right 11/6/2018    Procedure: REPAIR, TENDON, TRICEPS;  Surgeon: Atul Cooper DO;  Location: [facility] OR;  Service: Orthopedics;  Laterality: Right;    SURGICAL REMOVAL OF BONE SPUR Right 11/6/2018    Procedure: EXCISION, BONE SPUR;  Surgeon: Atul Cooper DO;  Location: [facility] OR;  Service: Orthopedics;  Laterality: Right;  Excision Olecranon Bone Spur Right Elbow       Review of patient's allergies indicates:   Allergen Reactions    Codeine Other (See Comments)     hyper    Nsaids (non-steroidal anti-inflammatory drug) Other (See Comments)     Says eats holes in his stomach       No current facility-administered medications on file prior to encounter.      Current Outpatient Medications on File Prior to Encounter   Medication Sig     ALPRAZolam (XANAX) 1 MG tablet TAKE 1 TABLET 4 TIMES A DAY AS NEEDED FOR ANXIETY    carvedilol (COREG) 6.25 MG tablet Take 1 tablet (6.25 mg total) by mouth 2 (two) times daily.    hydrALAZINE (APRESOLINE) 50 MG tablet Take 1 tablet (50 mg total) by mouth 2 (two) times a day.    meloxicam (MOBIC) 15 MG tablet Take 1 tablet (15 mg total) by mouth daily as needed.    pantoprazole (PROTONIX) 40 MG tablet TAKE 1 TABLET IN THE MORNING (30 MINUTES BEFORE BREAKFAST) FOR STOMACH    primidone (MYSOLINE) 50 MG Tab TAKE 1 TABLET 4 TIMES A DAY AS DIRECTED    vitamin renal formula, B-complex-vitamin c-folic acid, (NEPHROCAPS) 1 mg Cap Take 1 capsule by mouth once daily.     Family History     Problem Relation (Age of Onset)    Cancer Father    Dementia Brother    Heart disease Mother, Father, Brother        Tobacco Use    Smoking status: Former Smoker     Years: 15.00     Types: Cigarettes     Quit date: 2018     Years since quittin.0    Smokeless tobacco: Current User     Types: Chew    Tobacco comment: Pt trying to quit   Substance and Sexual Activity    Alcohol use: Yes     Alcohol/week: 2.0 - 3.0 standard drinks     Types: 2 - 3 Cans of beer per week     Comment: occasionally beer    Drug use: No    Sexual activity: Not Currently     Review of Systems   Constitutional: Negative for fatigue and fever.   HENT: Negative.    Eyes: Negative for visual disturbance.   Respiratory: Negative for shortness of breath.    Cardiovascular: Negative for chest pain.   Gastrointestinal: Positive for nausea and vomiting. Negative for abdominal pain.   Endocrine: Positive for polydipsia and polyuria.   Musculoskeletal: Positive for arthralgias and neck pain (Chronic).   Skin: Negative for rash.   Neurological: Positive for tremors, weakness and numbness. Negative for dizziness, seizures and syncope.   Psychiatric/Behavioral: Positive for agitation and sleep disturbance. Negative for suicidal ideas. The patient is  nervous/anxious.      Objective:     Vital Signs (Most Recent):  Temp: 98.4 °F (36.9 °C) (07/06/20 1449)  Pulse: 67 (07/06/20 1802)  Resp: 14 (07/06/20 1802)  BP: (!) 145/75 (07/06/20 1802)  SpO2: 97 % (07/06/20 1749) Vital Signs (24h Range):  Temp:  [98.4 °F (36.9 °C)] 98.4 °F (36.9 °C)  Pulse:  [62-70] 67  Resp:  [12-19] 14  SpO2:  [96 %-99 %] 97 %  BP: (126-170)/(70-87) 145/75     Weight: 64 kg (141 lb)  Body mass index is 20.82 kg/m².    Physical Exam  Constitutional:       General: He is not in acute distress.     Appearance: He is normal weight. He is not toxic-appearing.   HENT:      Head: Normocephalic and atraumatic.      Right Ear: External ear normal.      Left Ear: External ear normal.      Nose: Nose normal.      Mouth/Throat:      Comments: Poor dentition  Cardiovascular:      Rate and Rhythm: Normal rate and regular rhythm.      Pulses: Normal pulses.      Heart sounds: No murmur. No gallop.    Pulmonary:      Effort: Pulmonary effort is normal. No respiratory distress.      Breath sounds: Normal breath sounds. No wheezing or rales.   Abdominal:      General: Abdomen is flat. Bowel sounds are normal. There is no distension.      Tenderness: There is no abdominal tenderness.   Musculoskeletal:      Right lower leg: No edema.      Left lower leg: No edema.   Skin:     General: Skin is warm and dry.   Neurological:      Mental Status: He is alert and oriented to person, place, and time.      Cranial Nerves: Facial asymmetry (Left-sided) present. No dysarthria.      Sensory: Sensory deficit ( decreased sensation on the left side of the face, left upper and lower extremity) present.      Motor: Motor function is intact. No weakness, tremor, seizure activity or pronator drift.      Coordination: Coordination is intact.      Comments: Patient with decreased sensation on the left side of the body and face.  Asymmetric mouth.  Some mild dysarthria.  Motor strength is intact throughout   Psychiatric:       Comments: Anxious mood and affect             Significant Labs:   Recent Lab Results       07/06/20  1813   07/06/20  1539   07/06/20  1531   07/06/20  1528        Albumin       3.9     Alkaline Phosphatase       78     ALT       15     Anion Gap       12     Appearance, UA     Clear       aPTT       32.3     AST       21     Baso #       0.05     Basophil%       0.9     Bilirubin (UA)     Negative       BILIRUBIN TOTAL       0.9  Comment:  For infants and newborns, interpretation of results should be based  on gestational age, weight and in agreement with clinical  observations.  Premature Infant recommended reference ranges:  Up to 24 hours.............<8.0 mg/dL  Up to 48 hours............<12.0 mg/dL  3-5 days..................<15.0 mg/dL  6-29 days.................<15.0 mg/dL       BUN, Bld       5     Calcium       8.8     Chloride       81     Cholesterol       114  Comment:  The National Cholesterol Education Program (NCEP) has set the  following guidelines (reference ranges) for Cholesterol:  Optimal.....................<200 mg/dL  Borderline High.............200-239 mg/dL  High........................> or = 240 mg/dL       CO2       23     Color, UA     Yellow       Creatinine       0.6     Differential Method       Automated     eGFR if        >60.0     eGFR if non        >60.0  Comment:  Calculation used to obtain the estimated glomerular filtration  rate (eGFR) is the CKD-EPI equation.        Eos #       0.1     Eosinophil%       2.0     Estimated Avg Glucose 105           Glucose       118     Glucose, UA     Negative       Gran # (ANC)       3.6     Gran%       61.2     HDL       55  Comment:  The National Cholesterol Education Program (NCEP) has set the  following guidelines (reference values) for HDL Cholesterol:  Low...............<40 mg/dL  Optimal...........>60 mg/dL       Hdl/Cholesterol Ratio       48.2     Hematocrit       35.6     Hemoglobin       12.9      Hemoglobin A1C External 5.3  Comment:  According to ADA guidelines, hemoglobin A1C <7.0% represents  optimal control in non-pregnant diabetic patients.  Different  metrics may apply to specific populations.   Standards of Medical Care in Diabetes - 2016.  For the purpose of screening for the presence of diabetes:  <5.7%     Consistent with the absence of diabetes  5.7-6.4%  Consistent with increasing risk for diabetes   (prediabetes)  >or=6.5%  Consistent with diabetes  Currently no consensus exists for use of hemoglobin A1C  for diagnosis of diabetes for children.             Immature Grans (Abs)       0.02  Comment:  Mild elevation in immature granulocytes is non specific and   can be seen in a variety of conditions including stress response,   acute inflammation, trauma and pregnancy. Correlation with other   laboratory and clinical findings is essential.       Immature Granulocytes       0.3     INR       1.0  Comment:  Coumadin Therapy:  2.0 - 3.0 for INR for all indicators except mechanical heart valves  and antiphospholipid syndromes which should use 2.5 - 3.5.       Ketones, UA     Negative       LDL Cholesterol External       49.6  Comment:  The National Cholesterol Education Program (NCEP) has set the  following guidelines (reference values) for LDL Cholesterol:  Optimal.......................<130 mg/dL  Borderline High...............130-159 mg/dL  High..........................160-189 mg/dL  Very High.....................>190 mg/dL       Leukocytes, UA     Negative       Lymph #       1.5     Lymph%       25.2     MCH       32.3     MCHC       36.2     MCV       89     Mono #       0.6     Mono%       10.4     MPV       8.9     NITRITE UA     Negative       Non-HDL Cholesterol       59  Comment:  Risk category and Non-HDL cholesterol goals:  Coronary heart disease (CHD)or equivalent (10-year risk of CHD >20%):  Non-HDL cholesterol goal     <130 mg/dL  Two or more CHD risk factors and 10-year risk of CHD  <= 20%:  Non-HDL cholesterol goal     <160 mg/dL  0 to 1 CHD risk factor:  Non-HDL cholesterol goal     <190 mg/dL       nRBC       0     Occult Blood UA     Negative       pH, UA     >8.0       Platelets       281     POCT Glucose   113         Potassium       3.8     PROTEIN TOTAL       7.2     Protein, UA     Negative  Comment:  Recommend a 24 hour urine protein or a urine   protein/creatinine ratio if globulin induced proteinuria is  clinically suspected.         Protime       10.5     RBC       3.99     RDW       12.3     SARS-CoV-2 RNA, Amplification, Qual     Negative  Comment:  This test utilizes isothermal nucleic acid amplification   technology to detect the SARS-CoV-2 RdRp nucleic acid segment.   The analytical sensitivity (limit of detection) is 125 genome   equivalents/mL.   A POSITIVE result implies infection with the SARS-CoV-2 virus;  the patient is presumed to be contagious.    A NEGATIVE result means that SARS-CoV-2 nucleic acids are not  present above the limit of detection. A NEGATIVE result should be   treated as presumptive. It does not rule out the possibility of   COVID-19 and should not be the sole basis for treatment decisions.   If COVID-19 is strongly suspected based on clinical and exposure   history, re-testing using an alternate molecular assay should be   considered.   This test is only for use under the Food and Drug   Administration s Emergency Use Authorization (EUA).   Commercial kits are provided by [employer].   Performance characteristics of the EUA have been independently  verified by [facility] Department of  Pathology and Laboratory Medicine.   _________________________________________________________________  The ID NOW COVID-19 Letter of Authorization, along with the   authorized Fact Sheet for Healthcare Providers, the authorized Fact  Sheet for Patients, and authorized labeling are available on the FDA    website:  www.fda.gov/MedicalDevices/Safety/EmergencySituations/cwd841682.htm         Sodium       116  Comment:  Results confirmed, test repeated  Sodium critical result(s) called and verbal readback obtained from   Kylee Galeano RN ED.  [place] [facility] [place] 16:21       Specific [place], UA     1.015       Specimen UA     Urine, Clean Catch       Total Cholesterol/HDL Ratio       2.1     Triglycerides       47  Comment:  The National Cholesterol Education Program (NCEP) has set the  following guidelines (reference values) for triglycerides:  Normal......................<150 mg/dL  Borderline High.............150-199 mg/dL  High........................200-499 mg/dL       Troponin I       <0.01     TSH       0.990     UROBILINOGEN UA     Negative       WBC       5.88         All pertinent labs within the past 24 hours have been reviewed.    Significant Imaging: I have reviewed all pertinent imaging results/findings within the past 24 hours.

## 2020-07-06 NOTE — ASSESSMENT & PLAN NOTE
Resolved in ER  Troponin negative x1  EKG without acute MI  Will trend troponin, monitor for chest pain

## 2020-07-06 NOTE — ASSESSMENT & PLAN NOTE
Admit to ICU  Etiology uncertain though patient does report polydipsia and excessive fluid intake  Na+ 116 on admission  Will treat with NS at 150 mL/hr as acuity of hyponatremia uncertain, baseline appears to be near 128  q4h BMP to monitor

## 2020-07-07 DIAGNOSIS — F41.1 GAD (GENERALIZED ANXIETY DISORDER): ICD-10-CM

## 2020-07-07 PROBLEM — M25.552 LEFT HIP PAIN: Status: ACTIVE | Noted: 2020-07-07

## 2020-07-07 LAB
ANION GAP SERPL CALC-SCNC: 10 MMOL/L (ref 8–16)
ANION GAP SERPL CALC-SCNC: 11 MMOL/L (ref 8–16)
BASOPHILS # BLD AUTO: 0.03 K/UL (ref 0–0.2)
BASOPHILS NFR BLD: 0.5 % (ref 0–1.9)
BUN SERPL-MCNC: 5 MG/DL (ref 8–23)
BUN SERPL-MCNC: <5 MG/DL (ref 8–23)
CALCIUM SERPL-MCNC: 8.3 MG/DL (ref 8.7–10.5)
CALCIUM SERPL-MCNC: 8.4 MG/DL (ref 8.7–10.5)
CHLORIDE SERPL-SCNC: 89 MMOL/L (ref 95–110)
CHLORIDE SERPL-SCNC: 92 MMOL/L (ref 95–110)
CO2 SERPL-SCNC: 25 MMOL/L (ref 23–29)
CO2 SERPL-SCNC: 25 MMOL/L (ref 23–29)
CREAT SERPL-MCNC: 0.6 MG/DL (ref 0.5–1.4)
CREAT SERPL-MCNC: 0.6 MG/DL (ref 0.5–1.4)
DIFFERENTIAL METHOD: ABNORMAL
EOSINOPHIL # BLD AUTO: 0.2 K/UL (ref 0–0.5)
EOSINOPHIL NFR BLD: 2.9 % (ref 0–8)
ERYTHROCYTE [DISTWIDTH] IN BLOOD BY AUTOMATED COUNT: 12.5 % (ref 11.5–14.5)
EST. GFR  (AFRICAN AMERICAN): >60 ML/MIN/1.73 M^2
EST. GFR  (AFRICAN AMERICAN): >60 ML/MIN/1.73 M^2
EST. GFR  (NON AFRICAN AMERICAN): >60 ML/MIN/1.73 M^2
EST. GFR  (NON AFRICAN AMERICAN): >60 ML/MIN/1.73 M^2
GLUCOSE SERPL-MCNC: 91 MG/DL (ref 70–110)
GLUCOSE SERPL-MCNC: 96 MG/DL (ref 70–110)
HCT VFR BLD AUTO: 36.3 % (ref 40–54)
HGB BLD-MCNC: 12.7 G/DL (ref 14–18)
IMM GRANULOCYTES # BLD AUTO: 0.02 K/UL (ref 0–0.04)
IMM GRANULOCYTES NFR BLD AUTO: 0.4 % (ref 0–0.5)
LYMPHOCYTES # BLD AUTO: 1.1 K/UL (ref 1–4.8)
LYMPHOCYTES NFR BLD: 19.8 % (ref 18–48)
MCH RBC QN AUTO: 32.1 PG (ref 27–31)
MCHC RBC AUTO-ENTMCNC: 35 G/DL (ref 32–36)
MCV RBC AUTO: 92 FL (ref 82–98)
MONOCYTES # BLD AUTO: 0.7 K/UL (ref 0.3–1)
MONOCYTES NFR BLD: 12.4 % (ref 4–15)
NEUTROPHILS # BLD AUTO: 3.6 K/UL (ref 1.8–7.7)
NEUTROPHILS NFR BLD: 64 % (ref 38–73)
NRBC BLD-RTO: 0 /100 WBC
PLATELET # BLD AUTO: 237 K/UL (ref 150–350)
PMV BLD AUTO: 8.9 FL (ref 9.2–12.9)
POTASSIUM SERPL-SCNC: 3.4 MMOL/L (ref 3.5–5.1)
POTASSIUM SERPL-SCNC: 3.5 MMOL/L (ref 3.5–5.1)
RBC # BLD AUTO: 3.96 M/UL (ref 4.6–6.2)
SODIUM SERPL-SCNC: 125 MMOL/L (ref 136–145)
SODIUM SERPL-SCNC: 127 MMOL/L (ref 136–145)
WBC # BLD AUTO: 5.55 K/UL (ref 3.9–12.7)

## 2020-07-07 PROCEDURE — 63600175 PHARM REV CODE 636 W HCPCS: Performed by: FAMILY MEDICINE

## 2020-07-07 PROCEDURE — 80048 BASIC METABOLIC PNL TOTAL CA: CPT | Mod: 91

## 2020-07-07 PROCEDURE — 99233 SBSQ HOSP IP/OBS HIGH 50: CPT | Mod: ,,, | Performed by: FAMILY MEDICINE

## 2020-07-07 PROCEDURE — 63600175 PHARM REV CODE 636 W HCPCS

## 2020-07-07 PROCEDURE — 94761 N-INVAS EAR/PLS OXIMETRY MLT: CPT

## 2020-07-07 PROCEDURE — 63600175 PHARM REV CODE 636 W HCPCS: Performed by: HOSPITALIST

## 2020-07-07 PROCEDURE — 25000003 PHARM REV CODE 250: Performed by: FAMILY MEDICINE

## 2020-07-07 PROCEDURE — 85025 COMPLETE CBC W/AUTO DIFF WBC: CPT

## 2020-07-07 PROCEDURE — 80048 BASIC METABOLIC PNL TOTAL CA: CPT

## 2020-07-07 PROCEDURE — 99233 PR SUBSEQUENT HOSPITAL CARE,LEVL III: ICD-10-PCS | Mod: ,,, | Performed by: FAMILY MEDICINE

## 2020-07-07 PROCEDURE — 21400001 HC TELEMETRY ROOM

## 2020-07-07 RX ORDER — MORPHINE SULFATE 2 MG/ML
INJECTION, SOLUTION INTRAMUSCULAR; INTRAVENOUS
Status: COMPLETED
Start: 2020-07-07 | End: 2020-07-07

## 2020-07-07 RX ORDER — ALPRAZOLAM 1 MG/1
TABLET ORAL
Qty: 120 TABLET | Refills: 0 | Status: SHIPPED | OUTPATIENT
Start: 2020-07-16 | End: 2020-07-10 | Stop reason: SDUPTHER

## 2020-07-07 RX ORDER — MORPHINE SULFATE 2 MG/ML
INJECTION, SOLUTION INTRAMUSCULAR; INTRAVENOUS
Status: COMPLETED
Start: 2020-07-07 | End: 2020-07-08

## 2020-07-07 RX ADMIN — PRIMIDONE 50 MG: 50 TABLET ORAL at 09:07

## 2020-07-07 RX ADMIN — HYDRALAZINE HYDROCHLORIDE 50 MG: 25 TABLET, FILM COATED ORAL at 09:07

## 2020-07-07 RX ADMIN — PRIMIDONE 50 MG: 50 TABLET ORAL at 05:07

## 2020-07-07 RX ADMIN — CARVEDILOL 6.25 MG: 3.12 TABLET, FILM COATED ORAL at 09:07

## 2020-07-07 RX ADMIN — ALPRAZOLAM 1 MG: 0.25 TABLET ORAL at 07:07

## 2020-07-07 RX ADMIN — ASPIRIN 325 MG ORAL TABLET 325 MG: 325 PILL ORAL at 09:07

## 2020-07-07 RX ADMIN — ENOXAPARIN SODIUM 40 MG: 40 INJECTION SUBCUTANEOUS at 05:07

## 2020-07-07 RX ADMIN — ATORVASTATIN CALCIUM 40 MG: 40 TABLET, FILM COATED ORAL at 09:07

## 2020-07-07 RX ADMIN — PANTOPRAZOLE SODIUM 40 MG: 40 TABLET, DELAYED RELEASE ORAL at 09:07

## 2020-07-07 RX ADMIN — MORPHINE SULFATE 2 MG: 4 INJECTION, SOLUTION INTRAMUSCULAR; INTRAVENOUS at 04:07

## 2020-07-07 RX ADMIN — ALPRAZOLAM 1 MG: 0.25 TABLET ORAL at 01:07

## 2020-07-07 RX ADMIN — MORPHINE SULFATE 2 MG: 2 INJECTION, SOLUTION INTRAMUSCULAR; INTRAVENOUS at 03:07

## 2020-07-07 RX ADMIN — MORPHINE SULFATE 2 MG: 4 INJECTION, SOLUTION INTRAMUSCULAR; INTRAVENOUS at 10:07

## 2020-07-07 RX ADMIN — PRIMIDONE 50 MG: 50 TABLET ORAL at 01:07

## 2020-07-07 RX ADMIN — SODIUM CHLORIDE: 0.9 INJECTION, SOLUTION INTRAVENOUS at 05:07

## 2020-07-07 RX ADMIN — HYDRALAZINE HYDROCHLORIDE 25 MG: 25 TABLET, FILM COATED ORAL at 09:07

## 2020-07-07 RX ADMIN — SODIUM CHLORIDE: 0.9 INJECTION, SOLUTION INTRAVENOUS at 08:07

## 2020-07-07 NOTE — ASSESSMENT & PLAN NOTE
Suspected stroke vs paresthesia from hyponatremia  Will order MRI Brain WO to r/o old infarction/stroke  Symptoms have been present for 3 days so outside window for acute intervention  Admit to ICU  q2h neuro checks    07/07/2020  MRI brain shows areas of prior chronic infarction but no acute or subacute changes  Symptoms resolved for patient with pain medication, so suspect more of a paresthesia affect  Continue to monitor daily CBC and BMP  May need pain management referral on discharge

## 2020-07-07 NOTE — H&P
[facility] - ED Hospital Medicine  History & Physical    Patient Name: Ricky Cole  MRN: [id number]  Admission Date: 7/6/2020  Attending Physician: Vero Vela MD  Primary Care Provider: Hiram Leija MD         Patient information was obtained from patient, ER physician and ER records.     Subjective:     Principal Problem:<principal problem not specified>    Chief Complaint:   Chief Complaint   Patient presents with    Extremity Weakness     Onset x 3 days ago. Complaint of left sided weakness and left sided numbness. History of TIA/CVA.    Numbness     Left sided.    Chest Pain     Complaint of chest pain. Describes pain to be worse with cough and inspiration.        HPI: Patient is a 65-year-old male with past medical history of COPD, hypertension, MI, tremor, history of stroke, anxiety who presents to the ER with a 3 day history of left-sided weakness, numbness and burning sensation.  Patient reports that 3 days ago he started feeling a burning sensation in his left arm that spread down to his left leg felt like he could move his left leg at all.  Thought it was getting better but then it continued to get worse and returned until today he became worried because the whole left side of his body was numb and hurting.  He reports that he has had some nausea and 1 episode of vomiting but otherwise no shortness of breath, chest pain, leg swelling.  He does report that he has had lot of increased urination and is drinking an excessive amount of water.  He reports that he has felt really dizzy and lightheaded.  No seizure activity.  In the ER, CT head without acute abnormality, troponin negative, EKG without signs of acute infarct.  TSH normal.  Glucose normal.  Renal function normal.  However sodium found to be 116.  Patient has a chronic hyponatremia with a baseline between 125 in 128.  Hospital team called for admission for paresthesias and hyponatremia.  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, including shoulder     Cancer 2005    right eye    Colon polyps     COPD (chronic obstructive pulmonary disease)     Depression     Hypertension     Inguinal hernia     left    Kidney stone     MI (myocardial infarction)     Smoker, trying to quit again, 1 cig last a week, started age 17, quit for 42 years, restarted 2018 7/24/2019    Stroke     last stroke 2017    Tremor     Ulcer        Past Surgical History:   Procedure Laterality Date    ANGIOPLASTY      cardiac stent    CARDIAC CATHETERIZATION      COLECTOMY      EYE SURGERY      FINGER SURGERY Left     left index finger    HERNIA REPAIR      HIP ARTHROPLASTY Right 12/20/2019    Procedure: ARTHROPLASTY, HIP, [place], pegboard, 1st assist;  Surgeon: Kody Patel MD;  Location: [facility] OR;  Service: Orthopedics;  Laterality: Right;    OLECRANON BURSECTOMY Right 11/6/2018    Procedure: BURSECTOMY, OLECRANON;  Surgeon: Atul Cooper DO;  Location: [facility] OR;  Service: Orthopedics;  Laterality: Right;  Excision Olecranon Bursa Right Elbow    POLYPECTOMY      REPAIR OF TRICEPS TENDON Right 11/6/2018    Procedure: REPAIR, TENDON, TRICEPS;  Surgeon: Atul Cooper DO;  Location: [facility] OR;  Service: Orthopedics;  Laterality: Right;    SURGICAL REMOVAL OF BONE SPUR Right 11/6/2018    Procedure: EXCISION, BONE SPUR;  Surgeon: Atul Cooper DO;  Location: [facility] OR;  Service: Orthopedics;  Laterality: Right;  Excision Olecranon Bone Spur Right Elbow       Review of patient's allergies indicates:   Allergen Reactions    Codeine Other (See Comments)     hyper    Nsaids (non-steroidal anti-inflammatory drug) Other (See Comments)     Says eats holes in his stomach       No current facility-administered medications on file prior to encounter.      Current Outpatient Medications on File Prior to Encounter   Medication Sig    ALPRAZolam  (XANAX) 1 MG tablet TAKE 1 TABLET 4 TIMES A DAY AS NEEDED FOR ANXIETY    carvedilol (COREG) 6.25 MG tablet Take 1 tablet (6.25 mg total) by mouth 2 (two) times daily.    hydrALAZINE (APRESOLINE) 50 MG tablet Take 1 tablet (50 mg total) by mouth 2 (two) times a day.    meloxicam (MOBIC) 15 MG tablet Take 1 tablet (15 mg total) by mouth daily as needed.    pantoprazole (PROTONIX) 40 MG tablet TAKE 1 TABLET IN THE MORNING (30 MINUTES BEFORE BREAKFAST) FOR STOMACH    primidone (MYSOLINE) 50 MG Tab TAKE 1 TABLET 4 TIMES A DAY AS DIRECTED    vitamin renal formula, B-complex-vitamin c-folic acid, (NEPHROCAPS) 1 mg Cap Take 1 capsule by mouth once daily.     Family History     Problem Relation (Age of Onset)    Cancer Father    Dementia Brother    Heart disease Mother, Father, Brother        Tobacco Use    Smoking status: Former Smoker     Years: 15.00     Types: Cigarettes     Quit date: 2018     Years since quittin.0    Smokeless tobacco: Current User     Types: Chew    Tobacco comment: Pt trying to quit   Substance and Sexual Activity    Alcohol use: Yes     Alcohol/week: 2.0 - 3.0 standard drinks     Types: 2 - 3 Cans of beer per week     Comment: occasionally beer    Drug use: No    Sexual activity: Not Currently     Review of Systems   Constitutional: Negative for fatigue and fever.   HENT: Negative.    Eyes: Negative for visual disturbance.   Respiratory: Negative for shortness of breath.    Cardiovascular: Negative for chest pain.   Gastrointestinal: Positive for nausea and vomiting. Negative for abdominal pain.   Endocrine: Positive for polydipsia and polyuria.   Musculoskeletal: Positive for arthralgias and neck pain (Chronic).   Skin: Negative for rash.   Neurological: Positive for tremors, weakness and numbness. Negative for dizziness, seizures and syncope.   Psychiatric/Behavioral: Positive for agitation and sleep disturbance. Negative for suicidal ideas. The patient is nervous/anxious.       Objective:     Vital Signs (Most Recent):  Temp: 98.4 °F (36.9 °C) (07/06/20 1449)  Pulse: 67 (07/06/20 1802)  Resp: 14 (07/06/20 1802)  BP: (!) 145/75 (07/06/20 1802)  SpO2: 97 % (07/06/20 1749) Vital Signs (24h Range):  Temp:  [98.4 °F (36.9 °C)] 98.4 °F (36.9 °C)  Pulse:  [62-70] 67  Resp:  [12-19] 14  SpO2:  [96 %-99 %] 97 %  BP: (126-170)/(70-87) 145/75     Weight: 64 kg (141 lb)  Body mass index is 20.82 kg/m².    Physical Exam  Constitutional:       General: He is not in acute distress.     Appearance: He is normal weight. He is not toxic-appearing.   HENT:      Head: Normocephalic and atraumatic.      Right Ear: External ear normal.      Left Ear: External ear normal.      Nose: Nose normal.      Mouth/Throat:      Comments: Poor dentition  Cardiovascular:      Rate and Rhythm: Normal rate and regular rhythm.      Pulses: Normal pulses.      Heart sounds: No murmur. No gallop.    Pulmonary:      Effort: Pulmonary effort is normal. No respiratory distress.      Breath sounds: Normal breath sounds. No wheezing or rales.   Abdominal:      General: Abdomen is flat. Bowel sounds are normal. There is no distension.      Tenderness: There is no abdominal tenderness.   Musculoskeletal:      Right lower leg: No edema.      Left lower leg: No edema.   Skin:     General: Skin is warm and dry.   Neurological:      Mental Status: He is alert and oriented to person, place, and time.      Cranial Nerves: Facial asymmetry (Left-sided) present. No dysarthria.      Sensory: Sensory deficit ( decreased sensation on the left side of the face, left upper and lower extremity) present.      Motor: Motor function is intact. No weakness, tremor, seizure activity or pronator drift.      Coordination: Coordination is intact.      Comments: Patient with decreased sensation on the left side of the body and face.  Asymmetric mouth.  Some mild dysarthria.  Motor strength is intact throughout   Psychiatric:      Comments: Anxious mood  and affect             Significant Labs:   Recent Lab Results       07/06/20  1813   07/06/20  1539   07/06/20  1531   07/06/20  1528        Albumin       3.9     Alkaline Phosphatase       78     ALT       15     Anion Gap       12     Appearance, UA     Clear       aPTT       32.3     AST       21     Baso #       0.05     Basophil%       0.9     Bilirubin (UA)     Negative       BILIRUBIN TOTAL       0.9  Comment:  For infants and newborns, interpretation of results should be based  on gestational age, weight and in agreement with clinical  observations.  Premature Infant recommended reference ranges:  Up to 24 hours.............<8.0 mg/dL  Up to 48 hours............<12.0 mg/dL  3-5 days..................<15.0 mg/dL  6-29 days.................<15.0 mg/dL       BUN, Bld       5     Calcium       8.8     Chloride       81     Cholesterol       114  Comment:  The National Cholesterol Education Program (NCEP) has set the  following guidelines (reference ranges) for Cholesterol:  Optimal.....................<200 mg/dL  Borderline High.............200-239 mg/dL  High........................> or = 240 mg/dL       CO2       23     Color, UA     Yellow       Creatinine       0.6     Differential Method       Automated     eGFR if        >60.0     eGFR if non        >60.0  Comment:  Calculation used to obtain the estimated glomerular filtration  rate (eGFR) is the CKD-EPI equation.        Eos #       0.1     Eosinophil%       2.0     Estimated Avg Glucose 105           Glucose       118     Glucose, UA     Negative       Gran # (ANC)       3.6     Gran%       61.2     HDL       55  Comment:  The National Cholesterol Education Program (NCEP) has set the  following guidelines (reference values) for HDL Cholesterol:  Low...............<40 mg/dL  Optimal...........>60 mg/dL       Hdl/Cholesterol Ratio       48.2     Hematocrit       35.6     Hemoglobin       12.9     Hemoglobin A1C External  5.3  Comment:  According to ADA guidelines, hemoglobin A1C <7.0% represents  optimal control in non-pregnant diabetic patients.  Different  metrics may apply to specific populations.   Standards of Medical Care in Diabetes - 2016.  For the purpose of screening for the presence of diabetes:  <5.7%     Consistent with the absence of diabetes  5.7-6.4%  Consistent with increasing risk for diabetes   (prediabetes)  >or=6.5%  Consistent with diabetes  Currently no consensus exists for use of hemoglobin A1C  for diagnosis of diabetes for children.             Immature Grans (Abs)       0.02  Comment:  Mild elevation in immature granulocytes is non specific and   can be seen in a variety of conditions including stress response,   acute inflammation, trauma and pregnancy. Correlation with other   laboratory and clinical findings is essential.       Immature Granulocytes       0.3     INR       1.0  Comment:  Coumadin Therapy:  2.0 - 3.0 for INR for all indicators except mechanical heart valves  and antiphospholipid syndromes which should use 2.5 - 3.5.       Ketones, UA     Negative       LDL Cholesterol External       49.6  Comment:  The National Cholesterol Education Program (NCEP) has set the  following guidelines (reference values) for LDL Cholesterol:  Optimal.......................<130 mg/dL  Borderline High...............130-159 mg/dL  High..........................160-189 mg/dL  Very High.....................>190 mg/dL       Leukocytes, UA     Negative       Lymph #       1.5     Lymph%       25.2     MCH       32.3     MCHC       36.2     MCV       89     Mono #       0.6     Mono%       10.4     MPV       8.9     NITRITE UA     Negative       Non-HDL Cholesterol       59  Comment:  Risk category and Non-HDL cholesterol goals:  Coronary heart disease (CHD)or equivalent (10-year risk of CHD >20%):  Non-HDL cholesterol goal     <130 mg/dL  Two or more CHD risk factors and 10-year risk of CHD <= 20%:  Non-HDL  cholesterol goal     <160 mg/dL  0 to 1 CHD risk factor:  Non-HDL cholesterol goal     <190 mg/dL       nRBC       0     Occult Blood UA     Negative       pH, UA     >8.0       Platelets       281     POCT Glucose   113         Potassium       3.8     PROTEIN TOTAL       7.2     Protein, UA     Negative  Comment:  Recommend a 24 hour urine protein or a urine   protein/creatinine ratio if globulin induced proteinuria is  clinically suspected.         Protime       10.5     RBC       3.99     RDW       12.3     SARS-CoV-2 RNA, Amplification, Qual     Negative  Comment:  This test utilizes isothermal nucleic acid amplification   technology to detect the SARS-CoV-2 RdRp nucleic acid segment.   The analytical sensitivity (limit of detection) is 125 genome   equivalents/mL.   A POSITIVE result implies infection with the SARS-CoV-2 virus;  the patient is presumed to be contagious.    A NEGATIVE result means that SARS-CoV-2 nucleic acids are not  present above the limit of detection. A NEGATIVE result should be   treated as presumptive. It does not rule out the possibility of   COVID-19 and should not be the sole basis for treatment decisions.   If COVID-19 is strongly suspected based on clinical and exposure   history, re-testing using an alternate molecular assay should be   considered.   This test is only for use under the Food and Drug   Administration s Emergency Use Authorization (EUA).   Commercial kits are provided by [employer].   Performance characteristics of the EUA have been independently  verified by [facility] Department of  Pathology and Laboratory Medicine.   _________________________________________________________________  The ID NOW COVID-19 Letter of Authorization, along with the   authorized Fact Sheet for Healthcare Providers, the authorized Fact  Sheet for Patients, and authorized labeling are available on the FDA    website:  www.fda.gov/MedicalDevices/Safety/EmergencySituations/tfx387484.htm         Sodium       116  Comment:  Results confirmed, test repeated  Sodium critical result(s) called and verbal readback obtained from   Kylee Galeano RN ED.  [place] [facility] [place] 16:21       Specific [place], UA     1.015       Specimen UA     Urine, Clean Catch       Total Cholesterol/HDL Ratio       2.1     Triglycerides       47  Comment:  The National Cholesterol Education Program (NCEP) has set the  following guidelines (reference values) for triglycerides:  Normal......................<150 mg/dL  Borderline High.............150-199 mg/dL  High........................200-499 mg/dL       Troponin I       <0.01     TSH       0.990     UROBILINOGEN UA     Negative       WBC       5.88         All pertinent labs within the past 24 hours have been reviewed.    Significant Imaging: I have reviewed all pertinent imaging results/findings within the past 24 hours.    Assessment/Plan:     Left-sided weakness  Suspected stroke vs paresthesia from hyponatremia  Will order MRI Brain WO to r/o old infarction/stroke  Symptoms have been present for 3 days so outside window for acute intervention  Admit to ICU  q2h neuro checks        Hyponatremia  Admit to ICU  Etiology uncertain though patient does report polydipsia and excessive fluid intake  Na+ 116 on admission  Will treat with NS at 150 mL/hr as acuity of hyponatremia uncertain, baseline appears to be near 128  q4h BMP to monitor      Tremor  Start home primidone      ETOH abuse  Patient takes Xanax 1 mg q.i.d. at home as needed  Q2 hr neuro checks, monitor for alcohol withdrawal though patient says he does not drink any longer  Denies any illicit drug use as well      Other chest pain  Resolved in ER  Troponin negative x1  EKG without acute MI  Will trend troponin, monitor for chest pain        VTE Risk Mitigation (From admission, onward)         Ordered     enoxaparin injection 40 mg   Every 24 hours      07/06/20 1811     IP VTE LOW RISK PATIENT  Once      07/06/20 1811               Admit to ICU for monitoring of severe hyponatremia with potential for acute decline.    Vero Vela MD  Department of Hospital Medicine   [facility] - ED

## 2020-07-07 NOTE — ASSESSMENT & PLAN NOTE
Left hip pain is chronic but will obtain an x-ray to ensure no fracture  Suspect that this is related to a spinal process such as degenerative disc disease, more appropriate for outpatient follow-up

## 2020-07-07 NOTE — NURSING
PATIENT ADMITTED TO ICU #8 VIA STRETCHER FROM THE ER, ASSISTED PATIENT TO THE BED, ALERT X 3, LUNG SUAREZ ARE CLEAR IN ALL LOBES UPON AUSCULTATION, BOWEL SOUNDS PRESENT IN ALL 4 QUADRANTS, #18 JELCO X 2 SITES TO THE LEFT AND RIGHT A/C, BOTH SITES CLEAR OF REDNESS AND SWELLING, +3 STRENGTH NOTED TO LEFT ARM AND LEFT LEG, +4 STRENGTH NOTED TO THE RIGHT ARM AND RIGHT LEG, PEARLA NOTED, SLIGHT DROOPING TO THE LEFT SIDE OF FACE, BED IN LOW POSITION, DOOR OPEN, SR UP X 2, C/L IN REACH, BED BRAKES ON, NO DISTRESS NOTED.

## 2020-07-07 NOTE — ASSESSMENT & PLAN NOTE
Admit to ICU  Etiology uncertain though patient does report polydipsia and excessive fluid intake  Na+ 116 on admission  Will treat with NS at 150 mL/hr as acuity of hyponatremia uncertain, baseline appears to be near 128  q4h BMP to monitor    07/07/2020  Hyponatremia has nearly resolved, much closer to patient's baseline at 127  Continue to monitor, will change to daily BMP  Decrease IV fluids to 100 mL/hr  Will transfer to floor today

## 2020-07-07 NOTE — ASSESSMENT & PLAN NOTE
Patient takes Xanax 1 mg q.i.d. at home as needed  Q2 hr neuro checks, monitor for alcohol withdrawal though patient says he does not drink any longer  Denies any illicit drug use as well    07/07/2020  Patient has a very recent history of significant alcohol abuse resulting in ischemic event and hip fracture.  He has chronic pain in bilateral hips from this fracture  Continue to monitor closely for withdrawal symptoms though patient does say that he does not drink

## 2020-07-07 NOTE — NURSING
PATIENT TALKING ON HIS CELL PHONE AT THIS TIME, NIGHT MEDS GIVEN WITH WATER, TOLERATED WELL, NO DIFFICULTY NOTED WHEN SWALLOWING PILLS AND WATER.

## 2020-07-07 NOTE — PROGRESS NOTES
[facility] Medicine  Progress Note    Patient Name: Ricky Cole  MRN: [id number]  Patient Class: IP- Inpatient   Admission Date: 7/6/2020  Length of Stay: 1 days  Attending Physician: Veor Vela MD  Primary Care Provider: Hiram Leija MD        Subjective:     Principal Problem:Left-sided weakness        HPI:  Patient is a 65-year-old male with past medical history of COPD, hypertension, MI, tremor, history of stroke, anxiety who presents to the ER with a 3 day history of left-sided weakness, numbness and burning sensation.  Patient reports that 3 days ago he started feeling a burning sensation in his left arm that spread down to his left leg felt like he could move his left leg at all.  Thought it was getting better but then it continued to get worse and returned until today he became worried because the whole left side of his body was numb and hurting.  He reports that he has had some nausea and 1 episode of vomiting but otherwise no shortness of breath, chest pain, leg swelling.  He does report that he has had lot of increased urination and is drinking an excessive amount of water.  He reports that he has felt really dizzy and lightheaded.  No seizure activity.  In the ER, CT head without acute abnormality, troponin negative, EKG without signs of acute infarct.  TSH normal.  Glucose normal.  Renal function normal.  However sodium found to be 116.  Patient has a chronic hyponatremia with a baseline between 125 in 128.  Hospital team called for admission for paresthesias and hyponatremia.  Admitted to ICU    Overview/Hospital Course:  07/07/2020  Sodium is returning to normal with IV fluids normal saline initially at 150 mL/hour.  Decreased today to 100 mL/hour.  MRI of the brain showed no acute or subacute infarct but did show changes from chronic ischemic events.  Transfer to floor today.  Continue to monitor daily labs.  Patient's symptoms have responded  well to pain medicine.  Discussed that he may need a pain management consult outpatient.    Interval History:  Interval improvement in symptoms.  Patient reports that he will die in this bed if he does not get completely fixed because he is not leaving the hospital before then and that is his right.  Discussed that we would work very diligently to treat all of his hospital problems.    Review of Systems   Constitutional: Negative for fever.   HENT: Negative.    Eyes: Negative.    Respiratory: Positive for shortness of breath.    Cardiovascular: Negative for chest pain, palpitations and leg swelling.   Gastrointestinal: Positive for nausea. Negative for abdominal pain and vomiting.   Genitourinary: Negative.    Musculoskeletal: Positive for arthralgias. Negative for neck pain.        Hip pain, leg pain, left arm pain   Skin: Negative.    Neurological: Negative.    Hematological: Negative.    Psychiatric/Behavioral: Negative.      Objective:     Vital Signs (Most Recent):  Temp: 98.1 °F (36.7 °C) (07/07/20 1140)  Pulse: 67 (07/07/20 1140)  Resp: 20 (07/07/20 1020)  BP: 136/63 (07/07/20 1140)  SpO2: 97 % (07/07/20 1140) Vital Signs (24h Range):  Temp:  [97.5 °F (36.4 °C)-98.4 °F (36.9 °C)] 98.1 °F (36.7 °C)  Pulse:  [57-75] 67  Resp:  [10-20] 20  SpO2:  [93 %-99 %] 97 %  BP: ()/() 136/63     Weight: 64 kg (141 lb)  Body mass index is 20.82 kg/m².    Intake/Output Summary (Last 24 hours) at 7/7/2020 1158  Last data filed at 7/7/2020 0710  Gross per 24 hour   Intake 1010 ml   Output 4050 ml   Net -3040 ml      Physical Exam  Vitals signs reviewed.   Constitutional:       General: He is not in acute distress.     Appearance: He is normal weight. He is not ill-appearing or toxic-appearing.   HENT:      Head: Normocephalic and atraumatic.      Right Ear: External ear normal.      Left Ear: External ear normal.      Nose: Nose normal.      Mouth/Throat:      Comments: Poor dentition  Eyes:       Conjunctiva/sclera: Conjunctivae normal.   Cardiovascular:      Rate and Rhythm: Normal rate.      Heart sounds: Murmur (Grade III/VI murmur heard best at the left lower sternal border, no radiation to the carotid) present.   Pulmonary:      Effort: Pulmonary effort is normal. No respiratory distress.      Breath sounds: Normal breath sounds. No stridor. No wheezing or rales.   Abdominal:      General: Abdomen is flat. Bowel sounds are normal. There is no distension.      Tenderness: There is no abdominal tenderness.   Skin:     General: Skin is warm and dry.      Coloration: Skin is not jaundiced.   Neurological:      Mental Status: He is alert and oriented to person, place, and time. Mental status is at baseline.   Psychiatric:         Mood and Affect: Mood normal.         Behavior: Behavior normal.         Significant Labs:   Recent Lab Results       07/07/20  0509   07/07/20  0102   07/06/20  2048   07/06/20  1813   07/06/20  1539        Albumin               Alkaline Phosphatase               ALT               Anion Gap 10 11 9         Appearance, UA               aPTT               AST               Baso # 0.03             Basophil% 0.5             Bilirubin (UA)               BILIRUBIN TOTAL               BUN, Bld 5 <5 5         Calcium 8.4 8.3 8.5         Chloride 92 89 87         Cholesterol               CO2 25 25 26         Color, UA               Creatinine 0.6 0.6 0.6         Differential Method Automated             eGFR if  >60.0 >60.0 >60.0         eGFR if non  >60.0  Comment:  Calculation used to obtain the estimated glomerular filtration  rate (eGFR) is the CKD-EPI equation.    >60.0  Comment:  Calculation used to obtain the estimated glomerular filtration  rate (eGFR) is the CKD-EPI equation.    >60.0  Comment:  Calculation used to obtain the estimated glomerular filtration  rate (eGFR) is the CKD-EPI equation.            Eos # 0.2             Eosinophil% 2.9              Estimated Avg Glucose       105       Glucose 96 91 93         Glucose, UA               Gran # (ANC) 3.6             Gran% 64.0             HDL               Hdl/Cholesterol Ratio               Hematocrit 36.3             Hemoglobin 12.7             Hemoglobin A1C External       5.3  Comment:  According to ADA guidelines, hemoglobin A1C <7.0% represents  optimal control in non-pregnant diabetic patients.  Different  metrics may apply to specific populations.   Standards of Medical Care in Diabetes - 2016.  For the purpose of screening for the presence of diabetes:  <5.7%     Consistent with the absence of diabetes  5.7-6.4%  Consistent with increasing risk for diabetes   (prediabetes)  >or=6.5%  Consistent with diabetes  Currently no consensus exists for use of hemoglobin A1C  for diagnosis of diabetes for children.         Immature Grans (Abs) 0.02  Comment:  Mild elevation in immature granulocytes is non specific and   can be seen in a variety of conditions including stress response,   acute inflammation, trauma and pregnancy. Correlation with other   laboratory and clinical findings is essential.               Immature Granulocytes 0.4             INR               Ketones, UA               LDL Cholesterol External               Leukocytes, UA               Lymph # 1.1             Lymph% 19.8             MCH 32.1             MCHC 35.0             MCV 92             Mono # 0.7             Mono% 12.4             MPV 8.9             NITRITE UA               Non-HDL Cholesterol               nRBC 0             Occult Blood UA               pH, UA               Platelets 237             POCT Glucose         113     Potassium 3.4 3.5 3.5         PROTEIN TOTAL               Protein, UA               Protime               RBC 3.96             RDW 12.5             SARS-CoV-2 RNA, Amplification, Qual               Sodium 127 125 122         Specific [place], UA               Specimen UA               Total  Cholesterol/HDL Ratio               Triglycerides               Troponin I               TSH               UROBILINOGEN UA               WBC 5.55                              07/06/20  1531   07/06/20  1528        Albumin   3.9     Alkaline Phosphatase   78     ALT   15     Anion Gap   12     Appearance, UA Clear       aPTT   32.3     AST   21     Baso #   0.05     Basophil%   0.9     Bilirubin (UA) Negative       BILIRUBIN TOTAL   0.9  Comment:  For infants and newborns, interpretation of results should be based  on gestational age, weight and in agreement with clinical  observations.  Premature Infant recommended reference ranges:  Up to 24 hours.............<8.0 mg/dL  Up to 48 hours............<12.0 mg/dL  3-5 days..................<15.0 mg/dL  6-29 days.................<15.0 mg/dL       BUN, Bld   5     Calcium   8.8     Chloride   81     Cholesterol   114  Comment:  The National Cholesterol Education Program (NCEP) has set the  following guidelines (reference ranges) for Cholesterol:  Optimal.....................<200 mg/dL  Borderline High.............200-239 mg/dL  High........................> or = 240 mg/dL       CO2   23     Color, UA Yellow       Creatinine   0.6     Differential Method   Automated     eGFR if    >60.0     eGFR if non    >60.0  Comment:  Calculation used to obtain the estimated glomerular filtration  rate (eGFR) is the CKD-EPI equation.        Eos #   0.1     Eosinophil%   2.0     Estimated Avg Glucose         Glucose   118     Glucose, UA Negative       Gran # (ANC)   3.6     Gran%   61.2     HDL   55  Comment:  The National Cholesterol Education Program (NCEP) has set the  following guidelines (reference values) for HDL Cholesterol:  Low...............<40 mg/dL  Optimal...........>60 mg/dL       Hdl/Cholesterol Ratio   48.2     Hematocrit   35.6     Hemoglobin   12.9     Hemoglobin A1C External         Immature Grans (Abs)   0.02  Comment:  Mild elevation  in immature granulocytes is non specific and   can be seen in a variety of conditions including stress response,   acute inflammation, trauma and pregnancy. Correlation with other   laboratory and clinical findings is essential.       Immature Granulocytes   0.3     INR   1.0  Comment:  Coumadin Therapy:  2.0 - 3.0 for INR for all indicators except mechanical heart valves  and antiphospholipid syndromes which should use 2.5 - 3.5.       Ketones, UA Negative       LDL Cholesterol External   49.6  Comment:  The National Cholesterol Education Program (NCEP) has set the  following guidelines (reference values) for LDL Cholesterol:  Optimal.......................<130 mg/dL  Borderline High...............130-159 mg/dL  High..........................160-189 mg/dL  Very High.....................>190 mg/dL       Leukocytes, UA Negative       Lymph #   1.5     Lymph%   25.2     MCH   32.3     MCHC   36.2     MCV   89     Mono #   0.6     Mono%   10.4     MPV   8.9     NITRITE UA Negative       Non-HDL Cholesterol   59  Comment:  Risk category and Non-HDL cholesterol goals:  Coronary heart disease (CHD)or equivalent (10-year risk of CHD >20%):  Non-HDL cholesterol goal     <130 mg/dL  Two or more CHD risk factors and 10-year risk of CHD <= 20%:  Non-HDL cholesterol goal     <160 mg/dL  0 to 1 CHD risk factor:  Non-HDL cholesterol goal     <190 mg/dL       nRBC   0     Occult Blood UA Negative       pH, UA >8.0       Platelets   281     POCT Glucose         Potassium   3.8     PROTEIN TOTAL   7.2     Protein, UA Negative  Comment:  Recommend a 24 hour urine protein or a urine   protein/creatinine ratio if globulin induced proteinuria is  clinically suspected.         Protime   10.5     RBC   3.99     RDW   12.3     SARS-CoV-2 RNA, Amplification, Qual Negative  Comment:  This test utilizes isothermal nucleic acid amplification   technology to detect the SARS-CoV-2 RdRp nucleic acid segment.   The analytical sensitivity (limit of  detection) is 125 genome   equivalents/mL.   A POSITIVE result implies infection with the SARS-CoV-2 virus;  the patient is presumed to be contagious.    A NEGATIVE result means that SARS-CoV-2 nucleic acids are not  present above the limit of detection. A NEGATIVE result should be   treated as presumptive. It does not rule out the possibility of   COVID-19 and should not be the sole basis for treatment decisions.   If COVID-19 is strongly suspected based on clinical and exposure   history, re-testing using an alternate molecular assay should be   considered.   This test is only for use under the Food and Drug   Administration s Emergency Use Authorization (EUA).   Commercial kits are provided by [employer].   Performance characteristics of the EUA have been independently  verified by [facility] Department of  Pathology and Laboratory Medicine.   _________________________________________________________________  The ID NOW COVID-19 Letter of Authorization, along with the   authorized Fact Sheet for Healthcare Providers, the authorized Fact  Sheet for Patients, and authorized labeling are available on the FDA   website:  www.fda.gov/MedicalDevices/Safety/EmergencySituations/sdy514823.htm         Sodium   116  Comment:  Results confirmed, test repeated  Sodium critical result(s) called and verbal readback obtained from   Kylee Galeano RN ED.  by [facility] 07/06/2020 16:21       Specific Gravity, UA 1.015       Specimen UA Urine, Clean Catch       Total Cholesterol/HDL Ratio   2.1     Triglycerides   47  Comment:  The National Cholesterol Education Program (NCEP) has set the  following guidelines (reference values) for triglycerides:  Normal......................<150 mg/dL  Borderline High.............150-199 mg/dL  High........................200-499 mg/dL       Troponin I   <0.01     TSH   0.990     UROBILINOGEN UA Negative       WBC   5.88         All pertinent labs within the past 24 hours have  been reviewed.    Significant Imaging: I have reviewed all pertinent imaging results/findings within the past 24 hours.      Assessment/Plan:      * Left-sided weakness  Suspected stroke vs paresthesia from hyponatremia  Will order MRI Brain WO to r/o old infarction/stroke  Symptoms have been present for 3 days so outside window for acute intervention  Admit to ICU  q2h neuro checks    07/07/2020  MRI brain shows areas of prior chronic infarction but no acute or subacute changes  Symptoms resolved for patient with pain medication, so suspect more of a paresthesia affect  Continue to monitor daily CBC and BMP  May need pain management referral on discharge        Left hip pain  Left hip pain is chronic but will obtain an x-ray to ensure no fracture  Suspect that this is related to a spinal process such as degenerative disc disease, more appropriate for outpatient follow-up      Hyponatremia  Admit to ICU  Etiology uncertain though patient does report polydipsia and excessive fluid intake  Na+ 116 on admission  Will treat with NS at 150 mL/hr as acuity of hyponatremia uncertain, baseline appears to be near 128  q4h BMP to monitor    07/07/2020  Hyponatremia has nearly resolved, much closer to patient's baseline at 127  Continue to monitor, will change to daily BMP  Decrease IV fluids to 100 mL/hr  Will transfer to floor today      Tremor  Start home primidone      ETOH abuse  Patient takes Xanax 1 mg q.i.d. at home as needed  Q2 hr neuro checks, monitor for alcohol withdrawal though patient says he does not drink any longer  Denies any illicit drug use as well    07/07/2020  Patient has a very recent history of significant alcohol abuse resulting in ischemic event and hip fracture.  He has chronic pain in bilateral hips from this fracture  Continue to monitor closely for withdrawal symptoms though patient does say that he does not drink      Other chest pain  Resolved in ER  Troponin negative x1  EKG without acute  MI  Will trend troponin, monitor for chest pain      VTE Risk Mitigation (From admission, onward)         Ordered     enoxaparin injection 40 mg  Every 24 hours      07/06/20 1811     IP VTE LOW RISK PATIENT  Once      07/06/20 1811                      Vero Vela MD  Department of Hospital Medicine   [facility]

## 2020-07-07 NOTE — PLAN OF CARE
07/07/20 1100   Discharge Assessment   Assessment Type Discharge Planning Assessment   Confirmed/corrected address and phone number on facesheet? Yes   Assessment information obtained from? Patient   Expected Length of Stay (days) 3   Communicated expected length of stay with patient/caregiver yes   Prior to hospitilization cognitive status: Alert/Oriented   Prior to hospitalization functional status: Independent   Current cognitive status: Alert/Oriented   Current Functional Status: Independent   Lives With child(jackson), adult   Able to Return to Prior Arrangements yes   Is patient able to care for self after discharge? Yes   Who are your caregiver(s) and their phone number(s)? Patient lives with son and daughter-in-law  Michael Acostagen [phone]   Patient's perception of discharge disposition home or selfcare   Readmission Within the Last 30 Days previous discharge plan unsuccessful   If yes, most recent facility name: [facility]   Patient currently being followed by outpatient case management? No   Patient currently receives any other outside agency services? Yes   Name and contact number of agency or person providing outside services [facility]   Is it the patient/care giver preference to resume care with the current outside agency? Yes   Equipment Currently Used at Home walker, rolling   Do you have any problems affording any of your prescribed medications? Yes   If yes, what medications? Insurance only pays for 6; states he sometimes doesnt get all of his medications refilled; and if he goes home with new meds he may not be able to afford them.   Is the patient taking medications as prescribed? yes   Does the patient have transportation home? Yes   Transportation Anticipated family or friend will provide   Does the patient receive services at the [facility]? No   Discharge Plan A Home with family   DME Needed Upon Discharge  none   Readmission Questionnaire   At the time of  "your discharge, did someone talk to you about what your health problems were? Yes   At the time of discharge, did someone talk to you about what to watch out for regarding worsening of your health problem? Yes   At the time of discharge, did someone talk to you about what to do if you experienced worsening of your health problem? Yes   At the time of discharge, did someone talk to you about which medication to take when you left the hospital and which ones to stop taking? Yes   At the time of discharge, did someone talk to you about when and where to follow up with a doctor after you left the hospital? Yes   What do you believe caused you to be sick enough to be re-admitted? He stated "I just got worse"   How often do you need to have someone help you when you read instructions, pamphlets, or other written material from your doctor or pharmacy? Rarely   Do you have problems taking your medications as prescribed? No   Do you have any problems affording any of  your prescribed medications? To be determined   Do you have problems obtaining/receiving your medications? No   Does the patient have transportation to healthcare appointments? Yes   Living Arrangements house   Does the patient have family/friends to help with healtcare needs after discharge? yes   Does your caregiver provide all the help you need? Yes   Are you currently feeling confused? No   Are you currently having problems thinking? No   Are you currently having memory problems? No   Have you felt down, depressed, or hopeless? 0   Have you felt little interest or pleasure in doing things? 0   In the last 7 days, my sleep quality was: poor       Patient resting in bed with hob elevated.  Patient states he lives with his adult son and daughter in law ( Michael Conti [phone]).. Stated he is current with [facility].  Mr Conti has a rolling walker at home for use. Stated his insurance only covers 6 medications a month, so he doesn't always get all of " his medications due to not being able to afford them.  Will give patient a medication assistance voucher at discharge.  No other needs voiced at this time.  Case Management will continue to follow for further needs.

## 2020-07-07 NOTE — HOSPITAL COURSE
07/07/2020  Sodium is returning to normal with IV fluids normal saline initially at 150 mL/hour.  Decreased today to 100 mL/hour.  MRI of the brain showed no acute or subacute infarct but did show changes from chronic ischemic events.  Transfer to floor today.  Continue to monitor daily labs.  Patient's symptoms have responded well to pain medicine.  Discussed that he may need a pain management consult outpatient.    \07/08/2020:  Patient states was feeling better but today's feels weak again.  He states that the numbness has dissipated in his left side.  There is no focal deficits sensory or motor in the left-sided arm or leg.  Sodium 127 potassium 3.4 chloride 92 calcium 8.4 GFR greater than 60.  White blood cell count 5.0 hemoglobin 11.2 hematocrit 33 platelets 214 and differential no    07/09/2020:  Sodium is 130 to this morning potassium 4.1 and calcium 8.1 GFR greater than 60  CBC white blood cell count normal at 4.8 hemoglobin 11.1 hematocrit 33 differential 53 granulocytes 25 lymphocytes  Patient complains of severe back pain and leg pain.  It appears as a radicular-type pain such checks sciatic nerve.  Weakness of left lower extremity and left side has resolved.  Patient complains of some nausea early this morning but he stated this was from the pain.    07/10/2020:  Patient remains stable with vital signs normal blood pressure is mildly elevated at 150 5/74 and 190/86.  Patient will be given hypertensive medications prior to discharge.  Patient otherwise is stable and sodium has increased left-sided weakness is resolved and patient can be discharged home in stable condition for follow-up with his primary care physician within 1 week.  Labs showed normal white blood cell count hemoglobin 11 hematocrit 32 differential normal platelets 206.  Sodium 132 potassium 4.1 BUN creatinine were normal and GFR greater than 60

## 2020-07-07 NOTE — PLAN OF CARE
Problem: Adult Inpatient Plan of Care  Goal: Plan of Care Review  Outcome: Ongoing, Progressing  Goal: Patient-Specific Goal (Individualization)  Outcome: Ongoing, Progressing  Goal: Absence of Hospital-Acquired Illness or Injury  Outcome: Ongoing, Progressing  Goal: Optimal Comfort and Wellbeing  Outcome: Ongoing, Progressing  Goal: Readiness for Transition of Care  Outcome: Ongoing, Progressing  Goal: Rounds/Family Conference  Outcome: Ongoing, Progressing     Problem: Fall Injury Risk  Goal: Absence of Fall and Fall-Related Injury  Outcome: Ongoing, Progressing     Problem: Skin Injury Risk Increased  Goal: Skin Health and Integrity  Outcome: Ongoing, Progressing     Problem: Electrolyte Imbalance  Goal: Electrolyte Balance  Outcome: Ongoing, Progressing   Plan of care reviewed with patient

## 2020-07-07 NOTE — NURSING
Report received from Vivienne in ICU, patient transferred by w/c to room. NADN. Denies needs. Agree with am assessment, resumed care at this time.Oriented to room and call system.  Will cont to monitor.

## 2020-07-07 NOTE — SUBJECTIVE & OBJECTIVE
Interval History:  Interval improvement in symptoms.  Patient reports that he will die in this bed if he does not get completely fixed because he is not leaving the hospital before then and that is his right.  Discussed that we would work very diligently to treat all of his hospital problems.    Review of Systems   Constitutional: Negative for fever.   HENT: Negative.    Eyes: Negative.    Respiratory: Positive for shortness of breath.    Cardiovascular: Negative for chest pain, palpitations and leg swelling.   Gastrointestinal: Positive for nausea. Negative for abdominal pain and vomiting.   Genitourinary: Negative.    Musculoskeletal: Positive for arthralgias. Negative for neck pain.        Hip pain, leg pain, left arm pain   Skin: Negative.    Neurological: Negative.    Hematological: Negative.    Psychiatric/Behavioral: Negative.      Objective:     Vital Signs (Most Recent):  Temp: 98.1 °F (36.7 °C) (07/07/20 1140)  Pulse: 67 (07/07/20 1140)  Resp: 20 (07/07/20 1020)  BP: 136/63 (07/07/20 1140)  SpO2: 97 % (07/07/20 1140) Vital Signs (24h Range):  Temp:  [97.5 °F (36.4 °C)-98.4 °F (36.9 °C)] 98.1 °F (36.7 °C)  Pulse:  [57-75] 67  Resp:  [10-20] 20  SpO2:  [93 %-99 %] 97 %  BP: ()/() 136/63     Weight: 64 kg (141 lb)  Body mass index is 20.82 kg/m².    Intake/Output Summary (Last 24 hours) at 7/7/2020 1158  Last data filed at 7/7/2020 0710  Gross per 24 hour   Intake 1010 ml   Output 4050 ml   Net -3040 ml      Physical Exam  Vitals signs reviewed.   Constitutional:       General: He is not in acute distress.     Appearance: He is normal weight. He is not ill-appearing or toxic-appearing.   HENT:      Head: Normocephalic and atraumatic.      Right Ear: External ear normal.      Left Ear: External ear normal.      Nose: Nose normal.      Mouth/Throat:      Comments: Poor dentition  Eyes:      Conjunctiva/sclera: Conjunctivae normal.   Cardiovascular:      Rate and Rhythm: Normal rate.      Heart  sounds: Murmur (Grade III/VI murmur heard best at the left lower sternal border, no radiation to the carotid) present.   Pulmonary:      Effort: Pulmonary effort is normal. No respiratory distress.      Breath sounds: Normal breath sounds. No stridor. No wheezing or rales.   Abdominal:      General: Abdomen is flat. Bowel sounds are normal. There is no distension.      Tenderness: There is no abdominal tenderness.   Skin:     General: Skin is warm and dry.      Coloration: Skin is not jaundiced.   Neurological:      Mental Status: He is alert and oriented to person, place, and time. Mental status is at baseline.   Psychiatric:         Mood and Affect: Mood normal.         Behavior: Behavior normal.         Significant Labs:   Recent Lab Results       07/07/20  0509   07/07/20  0102   07/06/20  2048   07/06/20  1813   07/06/20  1539        Albumin               Alkaline Phosphatase               ALT               Anion Gap 10 11 9         Appearance, UA               aPTT               AST               Baso # 0.03             Basophil% 0.5             Bilirubin (UA)               BILIRUBIN TOTAL               BUN, Bld 5 <5 5         Calcium 8.4 8.3 8.5         Chloride 92 89 87         Cholesterol               CO2 25 25 26         Color, UA               Creatinine 0.6 0.6 0.6         Differential Method Automated             eGFR if  >60.0 >60.0 >60.0         eGFR if non  >60.0  Comment:  Calculation used to obtain the estimated glomerular filtration  rate (eGFR) is the CKD-EPI equation.    >60.0  Comment:  Calculation used to obtain the estimated glomerular filtration  rate (eGFR) is the CKD-EPI equation.    >60.0  Comment:  Calculation used to obtain the estimated glomerular filtration  rate (eGFR) is the CKD-EPI equation.            Eos # 0.2             Eosinophil% 2.9             Estimated Avg Glucose       105       Glucose 96 91 93         Glucose, UA               Gran #  (ANC) 3.6             Gran% 64.0             HDL               Hdl/Cholesterol Ratio               Hematocrit 36.3             Hemoglobin 12.7             Hemoglobin A1C External       5.3  Comment:  According to ADA guidelines, hemoglobin A1C <7.0% represents  optimal control in non-pregnant diabetic patients.  Different  metrics may apply to specific populations.   Standards of Medical Care in Diabetes - 2016.  For the purpose of screening for the presence of diabetes:  <5.7%     Consistent with the absence of diabetes  5.7-6.4%  Consistent with increasing risk for diabetes   (prediabetes)  >or=6.5%  Consistent with diabetes  Currently no consensus exists for use of hemoglobin A1C  for diagnosis of diabetes for children.         Immature Grans (Abs) 0.02  Comment:  Mild elevation in immature granulocytes is non specific and   can be seen in a variety of conditions including stress response,   acute inflammation, trauma and pregnancy. Correlation with other   laboratory and clinical findings is essential.               Immature Granulocytes 0.4             INR               Ketones, UA               LDL Cholesterol External               Leukocytes, UA               Lymph # 1.1             Lymph% 19.8             MCH 32.1             MCHC 35.0             MCV 92             Mono # 0.7             Mono% 12.4             MPV 8.9             NITRITE UA               Non-HDL Cholesterol               nRBC 0             Occult Blood UA               pH, UA               Platelets 237             POCT Glucose         113     Potassium 3.4 3.5 3.5         PROTEIN TOTAL               Protein, UA               Protime               RBC 3.96             RDW 12.5             SARS-CoV-2 RNA, Amplification, Qual               Sodium 127 125 122         Specific [place], UA               Specimen UA               Total Cholesterol/HDL Ratio               Triglycerides               Troponin I               TSH                UROBILINOGEN UA               WBC 5.55                              07/06/20  1531   07/06/20  1528        Albumin   3.9     Alkaline Phosphatase   78     ALT   15     Anion Gap   12     Appearance, UA Clear       aPTT   32.3     AST   21     Baso #   0.05     Basophil%   0.9     Bilirubin (UA) Negative       BILIRUBIN TOTAL   0.9  Comment:  For infants and newborns, interpretation of results should be based  on gestational age, weight and in agreement with clinical  observations.  Premature Infant recommended reference ranges:  Up to 24 hours.............<8.0 mg/dL  Up to 48 hours............<12.0 mg/dL  3-5 days..................<15.0 mg/dL  6-29 days.................<15.0 mg/dL       BUN, Bld   5     Calcium   8.8     Chloride   81     Cholesterol   114  Comment:  The National Cholesterol Education Program (NCEP) has set the  following guidelines (reference ranges) for Cholesterol:  Optimal.....................<200 mg/dL  Borderline High.............200-239 mg/dL  High........................> or = 240 mg/dL       CO2   23     Color, UA Yellow       Creatinine   0.6     Differential Method   Automated     eGFR if    >60.0     eGFR if non    >60.0  Comment:  Calculation used to obtain the estimated glomerular filtration  rate (eGFR) is the CKD-EPI equation.        Eos #   0.1     Eosinophil%   2.0     Estimated Avg Glucose         Glucose   118     Glucose, UA Negative       Gran # (ANC)   3.6     Gran%   61.2     HDL   55  Comment:  The National Cholesterol Education Program (NCEP) has set the  following guidelines (reference values) for HDL Cholesterol:  Low...............<40 mg/dL  Optimal...........>60 mg/dL       Hdl/Cholesterol Ratio   48.2     Hematocrit   35.6     Hemoglobin   12.9     Hemoglobin A1C External         Immature Grans (Abs)   0.02  Comment:  Mild elevation in immature granulocytes is non specific and   can be seen in a variety of conditions including stress  response,   acute inflammation, trauma and pregnancy. Correlation with other   laboratory and clinical findings is essential.       Immature Granulocytes   0.3     INR   1.0  Comment:  Coumadin Therapy:  2.0 - 3.0 for INR for all indicators except mechanical heart valves  and antiphospholipid syndromes which should use 2.5 - 3.5.       Ketones, UA Negative       LDL Cholesterol External   49.6  Comment:  The National Cholesterol Education Program (NCEP) has set the  following guidelines (reference values) for LDL Cholesterol:  Optimal.......................<130 mg/dL  Borderline High...............130-159 mg/dL  High..........................160-189 mg/dL  Very High.....................>190 mg/dL       Leukocytes, UA Negative       Lymph #   1.5     Lymph%   25.2     MCH   32.3     MCHC   36.2     MCV   89     Mono #   0.6     Mono%   10.4     MPV   8.9     NITRITE UA Negative       Non-HDL Cholesterol   59  Comment:  Risk category and Non-HDL cholesterol goals:  Coronary heart disease (CHD)or equivalent (10-year risk of CHD >20%):  Non-HDL cholesterol goal     <130 mg/dL  Two or more CHD risk factors and 10-year risk of CHD <= 20%:  Non-HDL cholesterol goal     <160 mg/dL  0 to 1 CHD risk factor:  Non-HDL cholesterol goal     <190 mg/dL       nRBC   0     Occult Blood UA Negative       pH, UA >8.0       Platelets   281     POCT Glucose         Potassium   3.8     PROTEIN TOTAL   7.2     Protein, UA Negative  Comment:  Recommend a 24 hour urine protein or a urine   protein/creatinine ratio if globulin induced proteinuria is  clinically suspected.         Protime   10.5     RBC   3.99     RDW   12.3     SARS-CoV-2 RNA, Amplification, Qual Negative  Comment:  This test utilizes isothermal nucleic acid amplification   technology to detect the SARS-CoV-2 RdRp nucleic acid segment.   The analytical sensitivity (limit of detection) is 125 genome   equivalents/mL.   A POSITIVE result implies infection with the SARS-CoV-2  virus;  the patient is presumed to be contagious.    A NEGATIVE result means that SARS-CoV-2 nucleic acids are not  present above the limit of detection. A NEGATIVE result should be   treated as presumptive. It does not rule out the possibility of   COVID-19 and should not be the sole basis for treatment decisions.   If COVID-19 is strongly suspected based on clinical and exposure   history, re-testing using an alternate molecular assay should be   considered.   This test is only for use under the Food and Drug   Administration s Emergency Use Authorization (EUA).   Commercial kits are provided by [employer].   Performance characteristics of the EUA have been independently  verified by [facility] Department of  Pathology and Laboratory Medicine.   _________________________________________________________________  The ID NOW COVID-19 Letter of Authorization, along with the   authorized Fact Sheet for Healthcare Providers, the authorized Fact  Sheet for Patients, and authorized labeling are available on the FDA   website:  www.fda.gov/MedicalDevices/Safety/EmergencySituations/ydv693131.htm         Sodium   116  Comment:  Results confirmed, test repeated  Sodium critical result(s) called and verbal readback obtained from   Kylee Galeano RN ED.  by [facility] [place] 07/06/2020 16:21       Specific Gravity, UA 1.015       Specimen UA Urine, Clean Catch       Total Cholesterol/HDL Ratio   2.1     Triglycerides   47  Comment:  The National Cholesterol Education Program (NCEP) has set the  following guidelines (reference values) for triglycerides:  Normal......................<150 mg/dL  Borderline High.............150-199 mg/dL  High........................200-499 mg/dL       Troponin I   <0.01     TSH   0.990     UROBILINOGEN UA Negative       WBC   5.88         All pertinent labs within the past 24 hours have been reviewed.    Significant Imaging: I have reviewed all pertinent imaging results/findings within the  past 24 hours.

## 2020-07-08 LAB
BASOPHILS # BLD AUTO: 0.05 K/UL (ref 0–0.2)
BASOPHILS NFR BLD: 1 % (ref 0–1.9)
DIFFERENTIAL METHOD: ABNORMAL
EOSINOPHIL # BLD AUTO: 0.2 K/UL (ref 0–0.5)
EOSINOPHIL NFR BLD: 4.2 % (ref 0–8)
ERYTHROCYTE [DISTWIDTH] IN BLOOD BY AUTOMATED COUNT: 12.8 % (ref 11.5–14.5)
HCT VFR BLD AUTO: 33 % (ref 40–54)
HGB BLD-MCNC: 11.2 G/DL (ref 14–18)
IMM GRANULOCYTES # BLD AUTO: 0.01 K/UL (ref 0–0.04)
IMM GRANULOCYTES NFR BLD AUTO: 0.2 % (ref 0–0.5)
LYMPHOCYTES # BLD AUTO: 1.3 K/UL (ref 1–4.8)
LYMPHOCYTES NFR BLD: 26.3 % (ref 18–48)
MCH RBC QN AUTO: 32.2 PG (ref 27–31)
MCHC RBC AUTO-ENTMCNC: 33.9 G/DL (ref 32–36)
MCV RBC AUTO: 95 FL (ref 82–98)
MONOCYTES # BLD AUTO: 0.7 K/UL (ref 0.3–1)
MONOCYTES NFR BLD: 14.2 % (ref 4–15)
NEUTROPHILS # BLD AUTO: 2.7 K/UL (ref 1.8–7.7)
NEUTROPHILS NFR BLD: 54.1 % (ref 38–73)
NRBC BLD-RTO: 0 /100 WBC
PLATELET # BLD AUTO: 214 K/UL (ref 150–350)
PMV BLD AUTO: 9.1 FL (ref 9.2–12.9)
RBC # BLD AUTO: 3.48 M/UL (ref 4.6–6.2)
WBC # BLD AUTO: 5.01 K/UL (ref 3.9–12.7)

## 2020-07-08 PROCEDURE — 21400001 HC TELEMETRY ROOM

## 2020-07-08 PROCEDURE — 63600175 PHARM REV CODE 636 W HCPCS

## 2020-07-08 PROCEDURE — 94761 N-INVAS EAR/PLS OXIMETRY MLT: CPT

## 2020-07-08 PROCEDURE — 85025 COMPLETE CBC W/AUTO DIFF WBC: CPT

## 2020-07-08 PROCEDURE — 99233 SBSQ HOSP IP/OBS HIGH 50: CPT | Mod: ,,, | Performed by: INTERNAL MEDICINE

## 2020-07-08 PROCEDURE — 94640 AIRWAY INHALATION TREATMENT: CPT

## 2020-07-08 PROCEDURE — 99233 PR SUBSEQUENT HOSPITAL CARE,LEVL III: ICD-10-PCS | Mod: ,,, | Performed by: INTERNAL MEDICINE

## 2020-07-08 PROCEDURE — 36415 COLL VENOUS BLD VENIPUNCTURE: CPT

## 2020-07-08 PROCEDURE — 25000003 PHARM REV CODE 250: Performed by: FAMILY MEDICINE

## 2020-07-08 PROCEDURE — 25000242 PHARM REV CODE 250 ALT 637 W/ HCPCS: Performed by: INTERNAL MEDICINE

## 2020-07-08 PROCEDURE — 63600175 PHARM REV CODE 636 W HCPCS: Performed by: INTERNAL MEDICINE

## 2020-07-08 PROCEDURE — 63600175 PHARM REV CODE 636 W HCPCS: Performed by: FAMILY MEDICINE

## 2020-07-08 PROCEDURE — 25000003 PHARM REV CODE 250: Performed by: INTERNAL MEDICINE

## 2020-07-08 RX ORDER — MORPHINE SULFATE 2 MG/ML
INJECTION, SOLUTION INTRAMUSCULAR; INTRAVENOUS
Status: COMPLETED
Start: 2020-07-08 | End: 2020-07-08

## 2020-07-08 RX ORDER — IPRATROPIUM BROMIDE AND ALBUTEROL SULFATE 2.5; .5 MG/3ML; MG/3ML
3 SOLUTION RESPIRATORY (INHALATION) EVERY 4 HOURS PRN
Status: DISCONTINUED | OUTPATIENT
Start: 2020-07-08 | End: 2020-07-10 | Stop reason: HOSPADM

## 2020-07-08 RX ORDER — MORPHINE SULFATE 4 MG/ML
2 INJECTION, SOLUTION INTRAMUSCULAR; INTRAVENOUS EVERY 4 HOURS PRN
Status: DISCONTINUED | OUTPATIENT
Start: 2020-07-08 | End: 2020-07-10 | Stop reason: HOSPADM

## 2020-07-08 RX ORDER — POTASSIUM CHLORIDE 20 MEQ/1
40 TABLET, EXTENDED RELEASE ORAL ONCE
Status: COMPLETED | OUTPATIENT
Start: 2020-07-08 | End: 2020-07-08

## 2020-07-08 RX ORDER — POTASSIUM CHLORIDE 20 MEQ/1
20 TABLET, EXTENDED RELEASE ORAL 2 TIMES DAILY
Status: DISCONTINUED | OUTPATIENT
Start: 2020-07-08 | End: 2020-07-10 | Stop reason: HOSPADM

## 2020-07-08 RX ORDER — IPRATROPIUM BROMIDE AND ALBUTEROL SULFATE 2.5; .5 MG/3ML; MG/3ML
3 SOLUTION RESPIRATORY (INHALATION) EVERY 4 HOURS
Status: DISCONTINUED | OUTPATIENT
Start: 2020-07-08 | End: 2020-07-08

## 2020-07-08 RX ADMIN — HYDRALAZINE HYDROCHLORIDE 50 MG: 25 TABLET, FILM COATED ORAL at 08:07

## 2020-07-08 RX ADMIN — POTASSIUM CHLORIDE 40 MEQ: 1500 TABLET, EXTENDED RELEASE ORAL at 03:07

## 2020-07-08 RX ADMIN — CARVEDILOL 6.25 MG: 3.12 TABLET, FILM COATED ORAL at 08:07

## 2020-07-08 RX ADMIN — PRIMIDONE 50 MG: 50 TABLET ORAL at 08:07

## 2020-07-08 RX ADMIN — MORPHINE SULFATE 2 MG: 2 INJECTION, SOLUTION INTRAMUSCULAR; INTRAVENOUS at 06:07

## 2020-07-08 RX ADMIN — ATORVASTATIN CALCIUM 40 MG: 40 TABLET, FILM COATED ORAL at 08:07

## 2020-07-08 RX ADMIN — MORPHINE SULFATE 2 MG: 2 INJECTION, SOLUTION INTRAMUSCULAR; INTRAVENOUS at 11:07

## 2020-07-08 RX ADMIN — MORPHINE SULFATE: 2 INJECTION, SOLUTION INTRAMUSCULAR; INTRAVENOUS at 12:07

## 2020-07-08 RX ADMIN — ASPIRIN 325 MG ORAL TABLET 325 MG: 325 PILL ORAL at 08:07

## 2020-07-08 RX ADMIN — MORPHINE SULFATE 2 MG: 4 INJECTION, SOLUTION INTRAMUSCULAR; INTRAVENOUS at 12:07

## 2020-07-08 RX ADMIN — PRIMIDONE 50 MG: 50 TABLET ORAL at 04:07

## 2020-07-08 RX ADMIN — PANTOPRAZOLE SODIUM 40 MG: 40 TABLET, DELAYED RELEASE ORAL at 08:07

## 2020-07-08 RX ADMIN — ENOXAPARIN SODIUM 40 MG: 40 INJECTION SUBCUTANEOUS at 04:07

## 2020-07-08 RX ADMIN — PRIMIDONE 50 MG: 50 TABLET ORAL at 01:07

## 2020-07-08 RX ADMIN — SODIUM CHLORIDE 1000 ML: 0.9 INJECTION, SOLUTION INTRAVENOUS at 01:07

## 2020-07-08 RX ADMIN — POTASSIUM CHLORIDE 20 MEQ: 1500 TABLET, EXTENDED RELEASE ORAL at 02:07

## 2020-07-08 RX ADMIN — ALPRAZOLAM 1 MG: 0.25 TABLET ORAL at 09:07

## 2020-07-08 RX ADMIN — POTASSIUM CHLORIDE 20 MEQ: 1500 TABLET, EXTENDED RELEASE ORAL at 08:07

## 2020-07-08 RX ADMIN — IPRATROPIUM BROMIDE AND ALBUTEROL SULFATE 3 ML: .5; 3 SOLUTION RESPIRATORY (INHALATION) at 03:07

## 2020-07-08 RX ADMIN — ALPRAZOLAM 1 MG: 0.25 TABLET ORAL at 01:07

## 2020-07-08 RX ADMIN — MORPHINE SULFATE 2 MG: 4 INJECTION, SOLUTION INTRAMUSCULAR; INTRAVENOUS at 06:07

## 2020-07-08 NOTE — ASSESSMENT & PLAN NOTE
07/08/2020:  Numbness and tingling have stopped in the leg but he states that he is starting back today because his morphine has run out and he states this is what fixes his problem

## 2020-07-08 NOTE — SUBJECTIVE & OBJECTIVE
Interval History:     Review of Systems   Constitutional: Positive for fatigue. Negative for activity change, appetite change and fever.   HENT: Negative for congestion, ear discharge, mouth sores, nosebleeds, rhinorrhea, sinus pressure, sinus pain and tinnitus.    Eyes: Negative.  Negative for pain, redness and itching.   Respiratory: Negative for apnea, cough, choking, chest tightness, shortness of breath, wheezing and stridor.    Cardiovascular: Negative for chest pain, palpitations and leg swelling.   Gastrointestinal: Negative for abdominal distention, abdominal pain, anal bleeding, blood in stool, constipation and diarrhea.   Endocrine: Negative.    Genitourinary: Negative for difficulty urinating, flank pain, frequency and urgency.   Musculoskeletal: Positive for arthralgias, back pain and myalgias. Negative for gait problem.   Skin: Negative for color change and pallor.   Allergic/Immunologic: Negative.    Neurological: Positive for weakness, light-headedness and numbness. Negative for dizziness, facial asymmetry and headaches.   Hematological: Negative for adenopathy. Does not bruise/bleed easily.   Psychiatric/Behavioral: Positive for sleep disturbance. The patient is nervous/anxious.      Objective:     Vital Signs (Most Recent):  Temp: 97.3 °F (36.3 °C) (07/08/20 1027)  Pulse: 63 (07/08/20 1200)  Resp: 18 (07/08/20 1200)  BP: 139/62 (07/08/20 1027)  SpO2: 97 % (07/08/20 1027) Vital Signs (24h Range):  Temp:  [96.3 °F (35.7 °C)-98.3 °F (36.8 °C)] 97.3 °F (36.3 °C)  Pulse:  [63-92] 63  Resp:  [16-18] 18  SpO2:  [96 %-98 %] 97 %  BP: (112-143)/(59-77) 139/62     Weight: 64 kg (141 lb)  Body mass index is 20.82 kg/m².    Intake/Output Summary (Last 24 hours) at 7/8/2020 1356  Last data filed at 7/8/2020 1200  Gross per 24 hour   Intake 2276 ml   Output 3050 ml   Net -774 ml      Physical Exam  Vitals signs and nursing note reviewed.   Constitutional:       Appearance: Normal appearance. He is well-developed.    HENT:      Head: Normocephalic and atraumatic.   Eyes:      Pupils: Pupils are equal, round, and reactive to light.   Neck:      Musculoskeletal: Normal range of motion and neck supple.      Thyroid: No thyromegaly.      Trachea: No tracheal deviation.   Cardiovascular:      Rate and Rhythm: Normal rate and regular rhythm.      Heart sounds: Normal heart sounds.   Pulmonary:      Effort: Pulmonary effort is normal.      Breath sounds: Rhonchi and rales present.   Abdominal:      General: Bowel sounds are normal. There is no distension.      Palpations: Abdomen is soft.      Tenderness: There is no abdominal tenderness. There is no guarding or rebound.   Musculoskeletal: Normal range of motion.   Lymphadenopathy:      Cervical: No cervical adenopathy.   Skin:     General: Skin is warm and dry.      Capillary Refill: Capillary refill takes less than 2 seconds.   Neurological:      General: No focal deficit present.      Mental Status: He is alert and oriented to person, place, and time.   Psychiatric:         Behavior: Behavior normal.         Thought Content: Thought content normal.         Judgment: Judgment normal.         Significant Labs:   Recent Lab Results       07/08/20  0618        Baso # 0.05     Basophil% 1.0     Differential Method Automated     Eos # 0.2     Eosinophil% 4.2     Gran # (ANC) 2.7     Gran% 54.1     Hematocrit 33.0     Hemoglobin 11.2     Immature Grans (Abs) 0.01  Comment:  Mild elevation in immature granulocytes is non specific and   can be seen in a variety of conditions including stress response,   acute inflammation, trauma and pregnancy. Correlation with other   laboratory and clinical findings is essential.       Immature Granulocytes 0.2     Lymph # 1.3     Lymph% 26.3     MCH 32.2     MCHC 33.9     MCV 95     Mono # 0.7     Mono% 14.2     MPV 9.1     nRBC 0     Platelets 214     RBC 3.48     RDW 12.8     WBC 5.01         All pertinent labs within the past 24 hours have been  reviewed.    Significant Imaging: I have reviewed and interpreted all pertinent imaging results/findings within the past 24 hours.

## 2020-07-08 NOTE — ASSESSMENT & PLAN NOTE
Admit to ICU  Etiology uncertain though patient does report polydipsia and excessive fluid intake  Na+ 116 on admission  Will treat with NS at 150 mL/hr as acuity of hyponatremia uncertain, baseline appears to be near 128  q4h BMP to monitor    07/07/2020  Hyponatremia has nearly resolved, much closer to patient's baseline at 127  Continue to monitor, will change to daily BMP  Decrease IV fluids to 100 mL/hr  Will transfer to floor today    07/08/2020:  Hyponatremia is much better this morning at 1:27 a.m.  Fluid restriction to 1200 cc per day  Begin nebulization treatments.  Follow-up labs in the a.m. to evaluate sodium and potassium.

## 2020-07-08 NOTE — PROGRESS NOTES
[facility] Medicine  Progress Note    Patient Name: Ricky Cole  MRN: [id number]  Patient Class: IP- Inpatient   Admission Date: 7/6/2020  Length of Stay: 2 days  Attending Physician: Vero Vela MD  Primary Care Provider: Hiram Leija MD        Subjective:     Principal Problem:Left-sided weakness        HPI:  Patient is a 65-year-old male with past medical history of COPD, hypertension, MI, tremor, history of stroke, anxiety who presents to the ER with a 3 day history of left-sided weakness, numbness and burning sensation.  Patient reports that 3 days ago he started feeling a burning sensation in his left arm that spread down to his left leg felt like he could move his left leg at all.  Thought it was getting better but then it continued to get worse and returned until today he became worried because the whole left side of his body was numb and hurting.  He reports that he has had some nausea and 1 episode of vomiting but otherwise no shortness of breath, chest pain, leg swelling.  He does report that he has had lot of increased urination and is drinking an excessive amount of water.  He reports that he has felt really dizzy and lightheaded.  No seizure activity.  In the ER, CT head without acute abnormality, troponin negative, EKG without signs of acute infarct.  TSH normal.  Glucose normal.  Renal function normal.  However sodium found to be 116.  Patient has a chronic hyponatremia with a baseline between 125 in 128.  Hospital team called for admission for paresthesias and hyponatremia.  Admitted to ICU    Overview/Hospital Course:  07/07/2020  Sodium is returning to normal with IV fluids normal saline initially at 150 mL/hour.  Decreased today to 100 mL/hour.  MRI of the brain showed no acute or subacute infarct but did show changes from chronic ischemic events.  Transfer to floor today.  Continue to monitor daily labs.  Patient's symptoms have responded  well to pain medicine.  Discussed that he may need a pain management consult outpatient.    \07/08/2020:  Patient states was feeling better but today's feels weak again.  He states that the numbness has dissipated in his left side.  There is no focal deficits sensory or motor in the left-sided arm or leg.  Sodium 127 potassium 3.4 chloride 92 calcium 8.4 GFR greater than 60.  White blood cell count 5.0 hemoglobin 11.2 hematocrit 33 platelets 214 and differential no    Interval History:     Review of Systems   Constitutional: Positive for fatigue. Negative for activity change, appetite change and fever.   HENT: Negative for congestion, ear discharge, mouth sores, nosebleeds, rhinorrhea, sinus pressure, sinus pain and tinnitus.    Eyes: Negative.  Negative for pain, redness and itching.   Respiratory: Negative for apnea, cough, choking, chest tightness, shortness of breath, wheezing and stridor.    Cardiovascular: Negative for chest pain, palpitations and leg swelling.   Gastrointestinal: Negative for abdominal distention, abdominal pain, anal bleeding, blood in stool, constipation and diarrhea.   Endocrine: Negative.    Genitourinary: Negative for difficulty urinating, flank pain, frequency and urgency.   Musculoskeletal: Positive for arthralgias, back pain and myalgias. Negative for gait problem.   Skin: Negative for color change and pallor.   Allergic/Immunologic: Negative.    Neurological: Positive for weakness, light-headedness and numbness. Negative for dizziness, facial asymmetry and headaches.   Hematological: Negative for adenopathy. Does not bruise/bleed easily.   Psychiatric/Behavioral: Positive for sleep disturbance. The patient is nervous/anxious.      Objective:     Vital Signs (Most Recent):  Temp: 97.3 °F (36.3 °C) (07/08/20 1027)  Pulse: 63 (07/08/20 1200)  Resp: 18 (07/08/20 1200)  BP: 139/62 (07/08/20 1027)  SpO2: 97 % (07/08/20 1027) Vital Signs (24h Range):  Temp:  [96.3 °F (35.7 °C)-98.3 °F (36.8  °C)] 97.3 °F (36.3 °C)  Pulse:  [63-92] 63  Resp:  [16-18] 18  SpO2:  [96 %-98 %] 97 %  BP: (112-143)/(59-77) 139/62     Weight: 64 kg (141 lb)  Body mass index is 20.82 kg/m².    Intake/Output Summary (Last 24 hours) at 7/8/2020 1356  Last data filed at 7/8/2020 1200  Gross per 24 hour   Intake 2276 ml   Output 3050 ml   Net -774 ml      Physical Exam  Vitals signs and nursing note reviewed.   Constitutional:       Appearance: Normal appearance. He is well-developed.   HENT:      Head: Normocephalic and atraumatic.   Eyes:      Pupils: Pupils are equal, round, and reactive to light.   Neck:      Musculoskeletal: Normal range of motion and neck supple.      Thyroid: No thyromegaly.      Trachea: No tracheal deviation.   Cardiovascular:      Rate and Rhythm: Normal rate and regular rhythm.      Heart sounds: Normal heart sounds.   Pulmonary:      Effort: Pulmonary effort is normal.      Breath sounds: Rhonchi and rales present.   Abdominal:      General: Bowel sounds are normal. There is no distension.      Palpations: Abdomen is soft.      Tenderness: There is no abdominal tenderness. There is no guarding or rebound.   Musculoskeletal: Normal range of motion.   Lymphadenopathy:      Cervical: No cervical adenopathy.   Skin:     General: Skin is warm and dry.      Capillary Refill: Capillary refill takes less than 2 seconds.   Neurological:      General: No focal deficit present.      Mental Status: He is alert and oriented to person, place, and time.   Psychiatric:         Behavior: Behavior normal.         Thought Content: Thought content normal.         Judgment: Judgment normal.         Significant Labs:   Recent Lab Results       07/08/20  0618        Baso # 0.05     Basophil% 1.0     Differential Method Automated     Eos # 0.2     Eosinophil% 4.2     Gran # (ANC) 2.7     Gran% 54.1     Hematocrit 33.0     Hemoglobin 11.2     Immature Grans (Abs) 0.01  Comment:  Mild elevation in immature granulocytes is non  specific and   can be seen in a variety of conditions including stress response,   acute inflammation, trauma and pregnancy. Correlation with other   laboratory and clinical findings is essential.       Immature Granulocytes 0.2     Lymph # 1.3     Lymph% 26.3     MCH 32.2     MCHC 33.9     MCV 95     Mono # 0.7     Mono% 14.2     MPV 9.1     nRBC 0     Platelets 214     RBC 3.48     RDW 12.8     WBC 5.01         All pertinent labs within the past 24 hours have been reviewed.    Significant Imaging: I have reviewed and interpreted all pertinent imaging results/findings within the past 24 hours.      Assessment/Plan:      * Left-sided weakness  Suspected stroke vs paresthesia from hyponatremia  Will order MRI Brain WO to r/o old infarction/stroke  Symptoms have been present for 3 days so outside window for acute intervention  Admit to ICU  q2h neuro checks    07/07/2020  MRI brain shows areas of prior chronic infarction but no acute or subacute changes  Symptoms resolved for patient with pain medication, so suspect more of a paresthesia affect  Continue to monitor daily CBC and BMP  May need pain management referral on discharge        Left hip pain  Left hip pain is chronic but will obtain an x-ray to ensure no fracture  Suspect that this is related to a spinal process such as degenerative disc disease, more appropriate for outpatient follow-up      Paresthesia of left arm and leg  07/08/2020:  Numbness and tingling have stopped in the leg but he states that he is starting back today because his morphine has run out and he states this is what fixes his problem      Hyponatremia  Admit to ICU  Etiology uncertain though patient does report polydipsia and excessive fluid intake  Na+ 116 on admission  Will treat with NS at 150 mL/hr as acuity of hyponatremia uncertain, baseline appears to be near 128  q4h BMP to monitor    07/07/2020  Hyponatremia has nearly resolved, much closer to patient's baseline at 127  Continue  to monitor, will change to daily BMP  Decrease IV fluids to 100 mL/hr  Will transfer to floor today    07/08/2020:  Hyponatremia is much better this morning at 1:27 a.m.  Fluid restriction to 1200 cc per day  Begin nebulization treatments.  Follow-up labs in the a.m. to evaluate sodium and potassium.    Tremor  Start home primidone      ETOH abuse  Patient takes Xanax 1 mg q.i.d. at home as needed  Q2 hr neuro checks, monitor for alcohol withdrawal though patient says he does not drink any longer  Denies any illicit drug use as well    07/07/2020  Patient has a very recent history of significant alcohol abuse resulting in ischemic event and hip fracture.  He has chronic pain in bilateral hips from this fracture  Continue to monitor closely for withdrawal symptoms though patient does say that he does not drink      Other chest pain  Resolved in ER  Troponin negative x1  EKG without acute MI  Will trend troponin, monitor for chest pain        VTE Risk Mitigation (From admission, onward)         Ordered     enoxaparin injection 40 mg  Every 24 hours      07/06/20 1811     IP VTE LOW RISK PATIENT  Once      07/06/20 1811                      Kar Marrero MD  Department of Hospital Medicine   [facility]

## 2020-07-08 NOTE — NURSING
RETURNED VIA W/C FROM HAVING XRAY. TOLERATED WELL. IVF RESTARTED AND INFUSING W/O PROBLEMS NOTED OR VERBALIZED.

## 2020-07-08 NOTE — PLAN OF CARE
Problem: Adult Inpatient Plan of Care  Goal: Plan of Care Review  Outcome: Ongoing, Progressing  Goal: Patient-Specific Goal (Individualization)  Outcome: Ongoing, Progressing  Goal: Absence of Hospital-Acquired Illness or Injury  Outcome: Ongoing, Progressing  Goal: Optimal Comfort and Wellbeing  Outcome: Ongoing, Progressing  Goal: Readiness for Transition of Care  Outcome: Ongoing, Progressing  Goal: Rounds/Family Conference  Outcome: Ongoing, Progressing     Problem: Fall Injury Risk  Goal: Absence of Fall and Fall-Related Injury  Outcome: Ongoing, Progressing     Problem: Skin Injury Risk Increased  Goal: Skin Health and Integrity  Outcome: Ongoing, Progressing     Problem: Electrolyte Imbalance  Goal: Electrolyte Balance  Outcome: Ongoing, Progressing

## 2020-07-08 NOTE — PROGRESS NOTES
07/06/2020 RN-CM received OPCM referral for high risk (79%) patient from Dr. Autumn Diaz after office visit on 6/26/20. Reason for referral per MD note: Having falls, uncontrolled BP, weakness, trouble sleeping. Chart review completed. Patient is MSSP attributed to PCP, Dr. Hiram Leija. Patient has a history of Hypertension, MI X2 (1987, 2015), CVA X2 (2015, 2017), ETOH abuse, Dementia associated with alcoholism,  PAD, Falls, Right Hip Fx (Hip Arthroplasty 12/2019), Skin Neoplasm, Hyponatremia. Due to late hour, will call patient tomorrow to complete initial assessment for OPCM. ORLANDO Gregory    07/07/2020 Noted this AM that patient was admitted to the hospital thru the ED yesterday evening. Currently inpatient with initial diagnoses of Hypomatremia (critical NA of 116 on arrival), Paraesthesia and Anxiety. Will monitor for discharge planning and coordinate care with IPCM when assigned. ORLANDO Gregory     07/09/2020 Patient remains hospitalized. There are no IPCM notes regarding discharge planning. Sodium level this AM at 132. Will continue to monitor chart for discharge disposition. ORLANDO Gregory     07/10/2020 Patient remains hospitalized with discharge likely today or this weekend. Will continue to monitor for discharge disposition. ORLANDO Gregory    07/16/2020 (1st Attempt)  RN-CM contacted patient, introduced self and explained referral and OPCM services. Patient in agreement with enrollment in OPCM, but was unable to complete assessment due to difficulties with telephone; voice kept cutting off and patient reports bag signal at this time of day. States usually signal is much better in the morning and requested call back tomorrow morning at 0900 to complete assessment. Will call back tomorrow as requested. ORLANDO Gregory    07/17/2020 (2nd Attempt) RN-CM attempted to contact patient as requested this morning at 0900. No answer; phone rang and then rolled to recording that  call cannot be completed at this time. Patient is not active in Patient Portal. (3rd Attempt) Will send attempt letter via [employer] with contact information for OPCM and [facility], requesting a return call. Will allow time for patient to receive letter in mail and respond. If no response by 7/24/20, will close case. - DAYDAY Christian, RN-OPCM    Outpatient Care Management  Patient Not Qualified    Patient: Ricky Cole  MRN:  [id number]  Date of Service:  7/24/2020  Completed by:  Josephine Howell, RN    Chief Complaint   Patient presents with    OPCM Chart Review     7/6/2020    Other     7/7/20, 7/9/20, 7/10/20 Inpatient, admitted through ED 7/6/2020    OPCM RN First Assessment Attempt     7/16/2020 Requested call back tomorrow at 0900    OPCM RN Second Assessment Attempt     7/17/2020    OPCM RN Third Assessment Attempt     7/17/2020 Sent attempt letter via [employer]    Case Closure     7/24/2020 - No Response       Patient Summary     Program:  OPCM High Risk  Qualification Status:  No  Reason Not Qualified:  Unable to reach

## 2020-07-09 LAB
ANION GAP SERPL CALC-SCNC: 4 MMOL/L (ref 8–16)
BASOPHILS # BLD AUTO: 0.04 K/UL (ref 0–0.2)
BASOPHILS NFR BLD: 0.8 % (ref 0–1.9)
BUN SERPL-MCNC: 5 MG/DL (ref 8–23)
CALCIUM SERPL-MCNC: 8.1 MG/DL (ref 8.7–10.5)
CHLORIDE SERPL-SCNC: 101 MMOL/L (ref 95–110)
CO2 SERPL-SCNC: 27 MMOL/L (ref 23–29)
CREAT SERPL-MCNC: 0.6 MG/DL (ref 0.5–1.4)
DIFFERENTIAL METHOD: ABNORMAL
EOSINOPHIL # BLD AUTO: 0.3 K/UL (ref 0–0.5)
EOSINOPHIL NFR BLD: 6.2 % (ref 0–8)
ERYTHROCYTE [DISTWIDTH] IN BLOOD BY AUTOMATED COUNT: 12.8 % (ref 11.5–14.5)
EST. GFR  (AFRICAN AMERICAN): >60 ML/MIN/1.73 M^2
EST. GFR  (NON AFRICAN AMERICAN): >60 ML/MIN/1.73 M^2
GLUCOSE SERPL-MCNC: 78 MG/DL (ref 70–110)
HCT VFR BLD AUTO: 33.2 % (ref 40–54)
HGB BLD-MCNC: 11.1 G/DL (ref 14–18)
IMM GRANULOCYTES # BLD AUTO: 0.01 K/UL (ref 0–0.04)
IMM GRANULOCYTES NFR BLD AUTO: 0.2 % (ref 0–0.5)
LYMPHOCYTES # BLD AUTO: 1.2 K/UL (ref 1–4.8)
LYMPHOCYTES NFR BLD: 25.5 % (ref 18–48)
MCH RBC QN AUTO: 32.4 PG (ref 27–31)
MCHC RBC AUTO-ENTMCNC: 33.4 G/DL (ref 32–36)
MCV RBC AUTO: 97 FL (ref 82–98)
MONOCYTES # BLD AUTO: 0.7 K/UL (ref 0.3–1)
MONOCYTES NFR BLD: 13.8 % (ref 4–15)
NEUTROPHILS # BLD AUTO: 2.6 K/UL (ref 1.8–7.7)
NEUTROPHILS NFR BLD: 53.5 % (ref 38–73)
NRBC BLD-RTO: 0 /100 WBC
PLATELET # BLD AUTO: 186 K/UL (ref 150–350)
PMV BLD AUTO: 8.6 FL (ref 9.2–12.9)
POTASSIUM SERPL-SCNC: 4.1 MMOL/L (ref 3.5–5.1)
RBC # BLD AUTO: 3.43 M/UL (ref 4.6–6.2)
SODIUM SERPL-SCNC: 132 MMOL/L (ref 136–145)
WBC # BLD AUTO: 4.87 K/UL (ref 3.9–12.7)

## 2020-07-09 PROCEDURE — 21400001 HC TELEMETRY ROOM

## 2020-07-09 PROCEDURE — 36415 COLL VENOUS BLD VENIPUNCTURE: CPT

## 2020-07-09 PROCEDURE — 63600175 PHARM REV CODE 636 W HCPCS: Performed by: FAMILY MEDICINE

## 2020-07-09 PROCEDURE — 25000003 PHARM REV CODE 250: Performed by: FAMILY MEDICINE

## 2020-07-09 PROCEDURE — 63600175 PHARM REV CODE 636 W HCPCS

## 2020-07-09 PROCEDURE — 99233 PR SUBSEQUENT HOSPITAL CARE,LEVL III: ICD-10-PCS | Mod: ,,, | Performed by: INTERNAL MEDICINE

## 2020-07-09 PROCEDURE — 99233 SBSQ HOSP IP/OBS HIGH 50: CPT | Mod: ,,, | Performed by: INTERNAL MEDICINE

## 2020-07-09 PROCEDURE — 85025 COMPLETE CBC W/AUTO DIFF WBC: CPT

## 2020-07-09 PROCEDURE — 80048 BASIC METABOLIC PNL TOTAL CA: CPT

## 2020-07-09 PROCEDURE — 25000003 PHARM REV CODE 250: Performed by: INTERNAL MEDICINE

## 2020-07-09 RX ORDER — MORPHINE SULFATE 2 MG/ML
INJECTION, SOLUTION INTRAMUSCULAR; INTRAVENOUS
Status: COMPLETED
Start: 2020-07-09 | End: 2020-07-09

## 2020-07-09 RX ORDER — MORPHINE SULFATE 2 MG/ML
INJECTION, SOLUTION INTRAMUSCULAR; INTRAVENOUS
Status: COMPLETED
Start: 2020-07-09 | End: 2020-07-10

## 2020-07-09 RX ADMIN — CARVEDILOL 6.25 MG: 3.12 TABLET, FILM COATED ORAL at 08:07

## 2020-07-09 RX ADMIN — HYDRALAZINE HYDROCHLORIDE 50 MG: 25 TABLET, FILM COATED ORAL at 08:07

## 2020-07-09 RX ADMIN — PANTOPRAZOLE SODIUM 40 MG: 40 TABLET, DELAYED RELEASE ORAL at 08:07

## 2020-07-09 RX ADMIN — PRIMIDONE 50 MG: 50 TABLET ORAL at 04:07

## 2020-07-09 RX ADMIN — ATORVASTATIN CALCIUM 40 MG: 40 TABLET, FILM COATED ORAL at 08:07

## 2020-07-09 RX ADMIN — MORPHINE SULFATE 2 MG: 2 INJECTION, SOLUTION INTRAMUSCULAR; INTRAVENOUS at 05:07

## 2020-07-09 RX ADMIN — ONDANSETRON HYDROCHLORIDE 4 MG: 2 SOLUTION INTRAMUSCULAR; INTRAVENOUS at 08:07

## 2020-07-09 RX ADMIN — POTASSIUM CHLORIDE 20 MEQ: 1500 TABLET, EXTENDED RELEASE ORAL at 08:07

## 2020-07-09 RX ADMIN — ALPRAZOLAM 1 MG: 0.25 TABLET ORAL at 08:07

## 2020-07-09 RX ADMIN — PRIMIDONE 50 MG: 50 TABLET ORAL at 08:07

## 2020-07-09 RX ADMIN — SODIUM CHLORIDE 1000 ML: 0.9 INJECTION, SOLUTION INTRAVENOUS at 10:07

## 2020-07-09 RX ADMIN — ALPRAZOLAM 1 MG: 0.25 TABLET ORAL at 07:07

## 2020-07-09 RX ADMIN — MORPHINE SULFATE 2 MG: 2 INJECTION, SOLUTION INTRAMUSCULAR; INTRAVENOUS at 10:07

## 2020-07-09 RX ADMIN — ENOXAPARIN SODIUM 40 MG: 40 INJECTION SUBCUTANEOUS at 04:07

## 2020-07-09 RX ADMIN — ASPIRIN 325 MG ORAL TABLET 325 MG: 325 PILL ORAL at 08:07

## 2020-07-09 RX ADMIN — PRIMIDONE 50 MG: 50 TABLET ORAL at 12:07

## 2020-07-09 RX ADMIN — MORPHINE SULFATE 2 MG: 2 INJECTION, SOLUTION INTRAMUSCULAR; INTRAVENOUS at 12:07

## 2020-07-09 NOTE — PROGRESS NOTES
[facility]  Progress Note    Patient Name: Ricky Cole  MRN: [id number]  Patient Class: IP- Inpatient   Admission Date: 7/6/2020  Length of Stay: 3 days  Attending Physician: Vero Vela MD  Primary Care Provider: Hiram Leija MD        Subjective:     Principal Problem:Left-sided weakness        HPI:  Patient is a 65-year-old male with past medical history of COPD, hypertension, MI, tremor, history of stroke, anxiety who presents to the ER with a 3 day history of left-sided weakness, numbness and burning sensation.  Patient reports that 3 days ago he started feeling a burning sensation in his left arm that spread down to his left leg felt like he could move his left leg at all.  Thought it was getting better but then it continued to get worse and returned until today he became worried because the whole left side of his body was numb and hurting.  He reports that he has had some nausea and 1 episode of vomiting but otherwise no shortness of breath, chest pain, leg swelling.  He does report that he has had lot of increased urination and is drinking an excessive amount of water.  He reports that he has felt really dizzy and lightheaded.  No seizure activity.  In the ER, CT head without acute abnormality, troponin negative, EKG without signs of acute infarct.  TSH normal.  Glucose normal.  Renal function normal.  However sodium found to be 116.  Patient has a chronic hyponatremia with a baseline between 125 in 128.  Hospital team called for admission for paresthesias and hyponatremia.  Admitted to ICU    Overview/Hospital Course:  07/07/2020  Sodium is returning to normal with IV fluids normal saline initially at 150 mL/hour.  Decreased today to 100 mL/hour.  MRI of the brain showed no acute or subacute infarct but did show changes from chronic ischemic events.  Transfer to floor today.  Continue to monitor daily labs.  Patient's symptoms have responded  well to pain medicine.  Discussed that he may need a pain management consult outpatient.    \07/08/2020:  Patient states was feeling better but today's feels weak again.  He states that the numbness has dissipated in his left side.  There is no focal deficits sensory or motor in the left-sided arm or leg.  Sodium 127 potassium 3.4 chloride 92 calcium 8.4 GFR greater than 60.  White blood cell count 5.0 hemoglobin 11.2 hematocrit 33 platelets 214 and differential no    07/09/2020:  Sodium is 130 to this morning potassium 4.1 and calcium 8.1 GFR greater than 60  CBC white blood cell count normal at 4.8 hemoglobin 11.1 hematocrit 33 differential 53 granulocytes 25 lymphocytes  Patient complains of severe back pain and leg pain.  It appears as a radicular-type pain such checks sciatic nerve.  Weakness of left lower extremity and left side has resolved.  Patient complains of some nausea early this morning but he stated this was from the pain.      Interval History:     Review of Systems   Constitutional: Positive for fatigue. Negative for activity change, appetite change and fever.   HENT: Negative for congestion, ear discharge, mouth sores, nosebleeds, rhinorrhea, sinus pressure, sinus pain and tinnitus.    Eyes: Negative.  Negative for pain, redness and itching.   Respiratory: Negative for apnea, cough, choking, chest tightness, shortness of breath, wheezing and stridor.    Cardiovascular: Negative for chest pain, palpitations and leg swelling.   Gastrointestinal: Negative for abdominal distention, abdominal pain, anal bleeding, blood in stool, constipation and diarrhea.   Endocrine: Negative.    Genitourinary: Negative for difficulty urinating, flank pain, frequency and urgency.   Musculoskeletal: Positive for arthralgias, back pain and myalgias. Negative for gait problem.   Skin: Negative for color change and pallor.   Allergic/Immunologic: Negative.    Neurological: Positive for weakness, light-headedness and  numbness. Negative for dizziness, facial asymmetry and headaches.   Hematological: Negative for adenopathy. Does not bruise/bleed easily.   Psychiatric/Behavioral: Positive for sleep disturbance. The patient is nervous/anxious.      Objective:     Vital Signs (Most Recent):  Temp: 97 °F (36.1 °C) (07/09/20 1033)  Pulse: 63 (07/09/20 1033)  Resp: 18 (07/09/20 1033)  BP: 115/64 (07/09/20 1033)  SpO2: 97 % (07/09/20 1033) Vital Signs (24h Range):  Temp:  [96.3 °F (35.7 °C)-97.9 °F (36.6 °C)] 97 °F (36.1 °C)  Pulse:  [59-68] 63  Resp:  [16-18] 18  SpO2:  [96 %-100 %] 97 %  BP: (109-167)/(60-80) 115/64     Weight: 64 kg (141 lb)  Body mass index is 20.82 kg/m².    Intake/Output Summary (Last 24 hours) at 7/9/2020 1208  Last data filed at 7/9/2020 1100  Gross per 24 hour   Intake 3418.29 ml   Output 3275 ml   Net 143.29 ml      Physical Exam  Vitals signs and nursing note reviewed.   Constitutional:       Appearance: Normal appearance. He is well-developed. He is ill-appearing.   HENT:      Head: Normocephalic and atraumatic.   Eyes:      Pupils: Pupils are equal, round, and reactive to light.   Neck:      Musculoskeletal: Normal range of motion and neck supple.      Thyroid: No thyromegaly.      Trachea: No tracheal deviation.   Cardiovascular:      Rate and Rhythm: Normal rate and regular rhythm.      Heart sounds: Normal heart sounds.   Pulmonary:      Effort: Pulmonary effort is normal.      Breath sounds: Rhonchi and rales present.   Abdominal:      General: Bowel sounds are normal. There is no distension.      Palpations: Abdomen is soft.      Tenderness: There is no abdominal tenderness. There is no guarding or rebound.   Musculoskeletal: Normal range of motion.         General: Tenderness present.      Left lower leg: Edema present.   Lymphadenopathy:      Cervical: No cervical adenopathy.   Skin:     General: Skin is warm and dry.      Capillary Refill: Capillary refill takes less than 2 seconds.      Coloration:  Skin is pale.   Neurological:      General: No focal deficit present.      Mental Status: He is alert and oriented to person, place, and time.   Psychiatric:         Behavior: Behavior normal.         Thought Content: Thought content normal.         Judgment: Judgment normal.         Significant Labs:   Recent Lab Results       07/09/20  0620        Anion Gap 4     Baso # 0.04     Basophil% 0.8     BUN, Bld 5     Calcium 8.1     Chloride 101     CO2 27     Creatinine 0.6     Differential Method Automated     eGFR if African American >60.0     eGFR if non  >60.0  Comment:  Calculation used to obtain the estimated glomerular filtration  rate (eGFR) is the CKD-EPI equation.        Eos # 0.3     Eosinophil% 6.2     Glucose 78     Gran # (ANC) 2.6     Gran% 53.5     Hematocrit 33.2     Hemoglobin 11.1     Immature Grans (Abs) 0.01  Comment:  Mild elevation in immature granulocytes is non specific and   can be seen in a variety of conditions including stress response,   acute inflammation, trauma and pregnancy. Correlation with other   laboratory and clinical findings is essential.       Immature Granulocytes 0.2     Lymph # 1.2     Lymph% 25.5     MCH 32.4     MCHC 33.4     MCV 97     Mono # 0.7     Mono% 13.8     MPV 8.6     nRBC 0     Platelets 186     Potassium 4.1     RBC 3.43     RDW 12.8     Sodium 132     WBC 4.87         All pertinent labs within the past 24 hours have been reviewed.    Significant Imaging: I have reviewed and interpreted all pertinent imaging results/findings within the past 24 hours.      Assessment/Plan:      * Left-sided weakness  Suspected stroke vs paresthesia from hyponatremia  Will order MRI Brain WO to r/o old infarction/stroke  Symptoms have been present for 3 days so outside window for acute intervention  Admit to ICU  q2h neuro checks    07/07/2020  MRI brain shows areas of prior chronic infarction but no acute or subacute changes  Symptoms resolved for patient with  pain medication, so suspect more of a paresthesia affect  Continue to monitor daily CBC and BMP  May need pain management referral on discharge    07/09/2020:  Patient's hyponatremia has resolved.  Left-sided weakness has resolved.        Left hip pain  Left hip pain is chronic but will obtain an x-ray to ensure no fracture  Suspect that this is related to a spinal process such as degenerative disc disease, more appropriate for outpatient follow-up    07/09/2020  Patient has continue left hip and leg pain.  He states this is when his pain medication wears off.  Will obtain CT scan of the lumbar spine to look for reasons that would explain radicular-type pain in the left leg.      Paresthesia of left arm and leg  07/08/2020:  Numbness and tingling have stopped in the leg but he states that he is starting back today because his morphine has run out and he states this is what fixes his problem      Hyponatremia  Admit to ICU  Etiology uncertain though patient does report polydipsia and excessive fluid intake  Na+ 116 on admission  Will treat with NS at 150 mL/hr as acuity of hyponatremia uncertain, baseline appears to be near 128  q4h BMP to monitor    07/07/2020  Hyponatremia has nearly resolved, much closer to patient's baseline at 127  Continue to monitor, will change to daily BMP  Decrease IV fluids to 100 mL/hr  Will transfer to floor today    07/08/2020:  Hyponatremia is much better this morning at 1:27 a.m.  Fluid restriction to 1200 cc per day  Begin nebulization treatments.  Follow-up labs in the a.m. to evaluate sodium and potassium.    07/09/2020:  Hyponatremia is resolving  Continue fluid restriction  Follow-up labs in the a.m. with probable discharge in    Tremor  Start home primidone      ETOH abuse  Patient takes Xanax 1 mg q.i.d. at home as needed  Q2 hr neuro checks, monitor for alcohol withdrawal though patient says he does not drink any longer  Denies any illicit drug use as  well    07/07/2020  Patient has a very recent history of significant alcohol abuse resulting in ischemic event and hip fracture.  He has chronic pain in bilateral hips from this fracture  Continue to monitor closely for withdrawal symptoms though patient does say that he does not drink      Other chest pain  Resolved in ER  Troponin negative x1  EKG without acute MI  Will trend troponin, monitor for chest pain        VTE Risk Mitigation (From admission, onward)         Ordered     enoxaparin injection 40 mg  Every 24 hours      07/06/20 1811     IP VTE LOW RISK PATIENT  Once      07/06/20 1811                      Kar Marrero MD  Department of Hospital Medicine   [facility]

## 2020-07-09 NOTE — ASSESSMENT & PLAN NOTE
Left hip pain is chronic but will obtain an x-ray to ensure no fracture  Suspect that this is related to a spinal process such as degenerative disc disease, more appropriate for outpatient follow-up    07/09/2020  Patient has continue left hip and leg pain.  He states this is when his pain medication wears off.  Will obtain CT scan of the lumbar spine to look for reasons that would explain radicular-type pain in the left leg.

## 2020-07-09 NOTE — ASSESSMENT & PLAN NOTE
Admit to ICU  Etiology uncertain though patient does report polydipsia and excessive fluid intake  Na+ 116 on admission  Will treat with NS at 150 mL/hr as acuity of hyponatremia uncertain, baseline appears to be near 128  q4h BMP to monitor    07/07/2020  Hyponatremia has nearly resolved, much closer to patient's baseline at 127  Continue to monitor, will change to daily BMP  Decrease IV fluids to 100 mL/hr  Will transfer to floor today    07/08/2020:  Hyponatremia is much better this morning at 1:27 a.m.  Fluid restriction to 1200 cc per day  Begin nebulization treatments.  Follow-up labs in the a.m. to evaluate sodium and potassium.    07/09/2020:  Hyponatremia is resolving  Continue fluid restriction  Follow-up labs in the a.m. with probable discharge in

## 2020-07-09 NOTE — ASSESSMENT & PLAN NOTE
Suspected stroke vs paresthesia from hyponatremia  Will order MRI Brain WO to r/o old infarction/stroke  Symptoms have been present for 3 days so outside window for acute intervention  Admit to ICU  q2h neuro checks    07/07/2020  MRI brain shows areas of prior chronic infarction but no acute or subacute changes  Symptoms resolved for patient with pain medication, so suspect more of a paresthesia affect  Continue to monitor daily CBC and BMP  May need pain management referral on discharge    07/09/2020:  Patient's hyponatremia has resolved.  Left-sided weakness has resolved.

## 2020-07-09 NOTE — SUBJECTIVE & OBJECTIVE
Interval History:     Review of Systems   Constitutional: Positive for fatigue. Negative for activity change, appetite change and fever.   HENT: Negative for congestion, ear discharge, mouth sores, nosebleeds, rhinorrhea, sinus pressure, sinus pain and tinnitus.    Eyes: Negative.  Negative for pain, redness and itching.   Respiratory: Negative for apnea, cough, choking, chest tightness, shortness of breath, wheezing and stridor.    Cardiovascular: Negative for chest pain, palpitations and leg swelling.   Gastrointestinal: Negative for abdominal distention, abdominal pain, anal bleeding, blood in stool, constipation and diarrhea.   Endocrine: Negative.    Genitourinary: Negative for difficulty urinating, flank pain, frequency and urgency.   Musculoskeletal: Positive for arthralgias, back pain and myalgias. Negative for gait problem.   Skin: Negative for color change and pallor.   Allergic/Immunologic: Negative.    Neurological: Positive for weakness, light-headedness and numbness. Negative for dizziness, facial asymmetry and headaches.   Hematological: Negative for adenopathy. Does not bruise/bleed easily.   Psychiatric/Behavioral: Positive for sleep disturbance. The patient is nervous/anxious.      Objective:     Vital Signs (Most Recent):  Temp: 97 °F (36.1 °C) (07/09/20 1033)  Pulse: 63 (07/09/20 1033)  Resp: 18 (07/09/20 1033)  BP: 115/64 (07/09/20 1033)  SpO2: 97 % (07/09/20 1033) Vital Signs (24h Range):  Temp:  [96.3 °F (35.7 °C)-97.9 °F (36.6 °C)] 97 °F (36.1 °C)  Pulse:  [59-68] 63  Resp:  [16-18] 18  SpO2:  [96 %-100 %] 97 %  BP: (109-167)/(60-80) 115/64     Weight: 64 kg (141 lb)  Body mass index is 20.82 kg/m².    Intake/Output Summary (Last 24 hours) at 7/9/2020 1208  Last data filed at 7/9/2020 1100  Gross per 24 hour   Intake 3418.29 ml   Output 3275 ml   Net 143.29 ml      Physical Exam  Vitals signs and nursing note reviewed.   Constitutional:       Appearance: Normal appearance. He is  well-developed. He is ill-appearing.   HENT:      Head: Normocephalic and atraumatic.   Eyes:      Pupils: Pupils are equal, round, and reactive to light.   Neck:      Musculoskeletal: Normal range of motion and neck supple.      Thyroid: No thyromegaly.      Trachea: No tracheal deviation.   Cardiovascular:      Rate and Rhythm: Normal rate and regular rhythm.      Heart sounds: Normal heart sounds.   Pulmonary:      Effort: Pulmonary effort is normal.      Breath sounds: Rhonchi and rales present.   Abdominal:      General: Bowel sounds are normal. There is no distension.      Palpations: Abdomen is soft.      Tenderness: There is no abdominal tenderness. There is no guarding or rebound.   Musculoskeletal: Normal range of motion.         General: Tenderness present.      Left lower leg: Edema present.   Lymphadenopathy:      Cervical: No cervical adenopathy.   Skin:     General: Skin is warm and dry.      Capillary Refill: Capillary refill takes less than 2 seconds.      Coloration: Skin is pale.   Neurological:      General: No focal deficit present.      Mental Status: He is alert and oriented to person, place, and time.   Psychiatric:         Behavior: Behavior normal.         Thought Content: Thought content normal.         Judgment: Judgment normal.         Significant Labs:   Recent Lab Results       07/09/20  0620        Anion Gap 4     Baso # 0.04     Basophil% 0.8     BUN, Bld 5     Calcium 8.1     Chloride 101     CO2 27     Creatinine 0.6     Differential Method Automated     eGFR if African American >60.0     eGFR if non  >60.0  Comment:  Calculation used to obtain the estimated glomerular filtration  rate (eGFR) is the CKD-EPI equation.        Eos # 0.3     Eosinophil% 6.2     Glucose 78     Gran # (ANC) 2.6     Gran% 53.5     Hematocrit 33.2     Hemoglobin 11.1     Immature Grans (Abs) 0.01  Comment:  Mild elevation in immature granulocytes is non specific and   can be seen in a  variety of conditions including stress response,   acute inflammation, trauma and pregnancy. Correlation with other   laboratory and clinical findings is essential.       Immature Granulocytes 0.2     Lymph # 1.2     Lymph% 25.5     MCH 32.4     MCHC 33.4     MCV 97     Mono # 0.7     Mono% 13.8     MPV 8.6     nRBC 0     Platelets 186     Potassium 4.1     RBC 3.43     RDW 12.8     Sodium 132     WBC 4.87         All pertinent labs within the past 24 hours have been reviewed.    Significant Imaging: I have reviewed and interpreted all pertinent imaging results/findings within the past 24 hours.

## 2020-07-10 VITALS
HEART RATE: 62 BPM | DIASTOLIC BLOOD PRESSURE: 86 MMHG | WEIGHT: 141 LBS | OXYGEN SATURATION: 96 % | TEMPERATURE: 97 F | SYSTOLIC BLOOD PRESSURE: 190 MMHG | BODY MASS INDEX: 20.88 KG/M2 | HEIGHT: 69 IN | RESPIRATION RATE: 17 BRPM

## 2020-07-10 LAB
BASOPHILS # BLD AUTO: 0.03 K/UL (ref 0–0.2)
BASOPHILS NFR BLD: 0.6 % (ref 0–1.9)
DIFFERENTIAL METHOD: ABNORMAL
EOSINOPHIL # BLD AUTO: 0.3 K/UL (ref 0–0.5)
EOSINOPHIL NFR BLD: 5.8 % (ref 0–8)
ERYTHROCYTE [DISTWIDTH] IN BLOOD BY AUTOMATED COUNT: 12.8 % (ref 11.5–14.5)
HCT VFR BLD AUTO: 32.7 % (ref 40–54)
HGB BLD-MCNC: 11 G/DL (ref 14–18)
IMM GRANULOCYTES # BLD AUTO: 0.02 K/UL (ref 0–0.04)
IMM GRANULOCYTES NFR BLD AUTO: 0.4 % (ref 0–0.5)
LYMPHOCYTES # BLD AUTO: 1.2 K/UL (ref 1–4.8)
LYMPHOCYTES NFR BLD: 22.7 % (ref 18–48)
MCH RBC QN AUTO: 32.2 PG (ref 27–31)
MCHC RBC AUTO-ENTMCNC: 33.6 G/DL (ref 32–36)
MCV RBC AUTO: 96 FL (ref 82–98)
MONOCYTES # BLD AUTO: 0.6 K/UL (ref 0.3–1)
MONOCYTES NFR BLD: 12 % (ref 4–15)
NEUTROPHILS # BLD AUTO: 3 K/UL (ref 1.8–7.7)
NEUTROPHILS NFR BLD: 58.5 % (ref 38–73)
NRBC BLD-RTO: 0 /100 WBC
PLATELET # BLD AUTO: 206 K/UL (ref 150–350)
PMV BLD AUTO: 9.3 FL (ref 9.2–12.9)
RBC # BLD AUTO: 3.42 M/UL (ref 4.6–6.2)
WBC # BLD AUTO: 5.16 K/UL (ref 3.9–12.7)

## 2020-07-10 PROCEDURE — 25000003 PHARM REV CODE 250: Performed by: INTERNAL MEDICINE

## 2020-07-10 PROCEDURE — 99239 HOSP IP/OBS DSCHRG MGMT >30: CPT | Mod: ,,, | Performed by: INTERNAL MEDICINE

## 2020-07-10 PROCEDURE — 36415 COLL VENOUS BLD VENIPUNCTURE: CPT

## 2020-07-10 PROCEDURE — 63600175 PHARM REV CODE 636 W HCPCS: Performed by: FAMILY MEDICINE

## 2020-07-10 PROCEDURE — 94760 N-INVAS EAR/PLS OXIMETRY 1: CPT

## 2020-07-10 PROCEDURE — 63600175 PHARM REV CODE 636 W HCPCS

## 2020-07-10 PROCEDURE — 85025 COMPLETE CBC W/AUTO DIFF WBC: CPT

## 2020-07-10 PROCEDURE — 25000003 PHARM REV CODE 250: Performed by: FAMILY MEDICINE

## 2020-07-10 PROCEDURE — 99239 PR HOSPITAL DISCHARGE DAY,>30 MIN: ICD-10-PCS | Mod: ,,, | Performed by: INTERNAL MEDICINE

## 2020-07-10 RX ORDER — HYDROCODONE BITARTRATE AND ACETAMINOPHEN 10; 325 MG/1; MG/1
1 TABLET ORAL EVERY 4 HOURS PRN
Qty: 18 TABLET | Refills: 0 | Status: ON HOLD | OUTPATIENT
Start: 2020-07-10 | End: 2020-08-25 | Stop reason: HOSPADM

## 2020-07-10 RX ORDER — ALPRAZOLAM 1 MG/1
TABLET ORAL
Qty: 120 TABLET | Refills: 0 | Status: SHIPPED | OUTPATIENT
Start: 2020-07-16 | End: 2020-07-31 | Stop reason: SDUPTHER

## 2020-07-10 RX ORDER — CYCLOBENZAPRINE HCL 10 MG
10 TABLET ORAL 3 TIMES DAILY PRN
Qty: 15 TABLET | Refills: 0 | Status: SHIPPED | OUTPATIENT
Start: 2020-07-10 | End: 2020-07-15

## 2020-07-10 RX ORDER — MORPHINE SULFATE 2 MG/ML
INJECTION, SOLUTION INTRAMUSCULAR; INTRAVENOUS
Status: COMPLETED
Start: 2020-07-10 | End: 2020-07-10

## 2020-07-10 RX ADMIN — PRIMIDONE 50 MG: 50 TABLET ORAL at 08:07

## 2020-07-10 RX ADMIN — MORPHINE SULFATE: 2 INJECTION, SOLUTION INTRAMUSCULAR; INTRAVENOUS at 08:07

## 2020-07-10 RX ADMIN — POTASSIUM CHLORIDE 20 MEQ: 1500 TABLET, EXTENDED RELEASE ORAL at 08:07

## 2020-07-10 RX ADMIN — MORPHINE SULFATE: 2 INJECTION, SOLUTION INTRAMUSCULAR; INTRAVENOUS at 09:07

## 2020-07-10 RX ADMIN — HYDRALAZINE HYDROCHLORIDE 50 MG: 25 TABLET, FILM COATED ORAL at 08:07

## 2020-07-10 RX ADMIN — PANTOPRAZOLE SODIUM 40 MG: 40 TABLET, DELAYED RELEASE ORAL at 08:07

## 2020-07-10 RX ADMIN — ONDANSETRON HYDROCHLORIDE 4 MG: 2 SOLUTION INTRAMUSCULAR; INTRAVENOUS at 08:07

## 2020-07-10 RX ADMIN — ALPRAZOLAM 0.5 MG: 0.25 TABLET ORAL at 08:07

## 2020-07-10 RX ADMIN — CARVEDILOL 6.25 MG: 3.12 TABLET, FILM COATED ORAL at 08:07

## 2020-07-10 NOTE — PLAN OF CARE
07/10/20 0845   Medicare Message   Important Message from Medicare regarding Discharge Appeal Rights Given to patient/caregiver;Explained to patient/caregiver;Signed/date by patient/caregiver   Date IMM was signed 07/10/20   Time IMM was signed 0845

## 2020-07-10 NOTE — PLAN OF CARE
07/10/20 1504   Final Note   Assessment Type Final Discharge Note   Anticipated Discharge Disposition Home-Health   What phone number can be called within the next 1-3 days to see how you are doing after discharge? [phone]   Hospital Follow Up  Appt(s) scheduled? Yes   Discharge plans and expectations educations in teach back method with documentation complete? Yes   Post-Acute Status   Post-Acute Authorization Home Health   Home Health Status Set-up Complete   Patient choice form signed by patient/caregiver List from [facility]   Discharge Delays None known at this time     Pt discharged home to resume home health services with [facility] as prior to this admission. Pt provided follow up visit information prior to discharge. Pt was also given a voucher to assist with cost of medications.

## 2020-07-10 NOTE — ASSESSMENT & PLAN NOTE
07/10/2020:  Continue home medications for blood pressure control  Follow-up with primary care physician within 1 week for recheck

## 2020-07-10 NOTE — DISCHARGE SUMMARY
[facility] Medicine  Discharge Summary      Patient Name: Ricky Cole  MRN: [id number]  Admission Date: 7/6/2020  Hospital Length of Stay: 4 days  Discharge Date and Time:  07/10/2020 1:07 PM  Attending Physician: Vero Vela MD   Discharging Provider: Kar Marrero MD  Primary Care Provider: Hiram Leija MD      HPI:   Patient is a 65-year-old male with past medical history of COPD, hypertension, MI, tremor, history of stroke, anxiety who presents to the ER with a 3 day history of left-sided weakness, numbness and burning sensation.  Patient reports that 3 days ago he started feeling a burning sensation in his left arm that spread down to his left leg felt like he could move his left leg at all.  Thought it was getting better but then it continued to get worse and returned until today he became worried because the whole left side of his body was numb and hurting.  He reports that he has had some nausea and 1 episode of vomiting but otherwise no shortness of breath, chest pain, leg swelling.  He does report that he has had lot of increased urination and is drinking an excessive amount of water.  He reports that he has felt really dizzy and lightheaded.  No seizure activity.  In the ER, CT head without acute abnormality, troponin negative, EKG without signs of acute infarct.  TSH normal.  Glucose normal.  Renal function normal.  However sodium found to be 116.  Patient has a chronic hyponatremia with a baseline between 125 in 128.  Hospital team called for admission for paresthesias and hyponatremia.  Admitted to ICU    * No surgery found *      Hospital Course:   07/07/2020  Sodium is returning to normal with IV fluids normal saline initially at 150 mL/hour.  Decreased today to 100 mL/hour.  MRI of the brain showed no acute or subacute infarct but did show changes from chronic ischemic events.  Transfer to floor today.  Continue to monitor daily labs.   Patient's symptoms have responded well to pain medicine.  Discussed that he may need a pain management consult outpatient.    \07/08/2020:  Patient states was feeling better but today's feels weak again.  He states that the numbness has dissipated in his left side.  There is no focal deficits sensory or motor in the left-sided arm or leg.  Sodium 127 potassium 3.4 chloride 92 calcium 8.4 GFR greater than 60.  White blood cell count 5.0 hemoglobin 11.2 hematocrit 33 platelets 214 and differential no    07/09/2020:  Sodium is 130 to this morning potassium 4.1 and calcium 8.1 GFR greater than 60  CBC white blood cell count normal at 4.8 hemoglobin 11.1 hematocrit 33 differential 53 granulocytes 25 lymphocytes  Patient complains of severe back pain and leg pain.  It appears as a radicular-type pain such checks sciatic nerve.  Weakness of left lower extremity and left side has resolved.  Patient complains of some nausea early this morning but he stated this was from the pain.    07/10/2020:  Patient remains stable with vital signs normal blood pressure is mildly elevated at 150 5/74 and 190/86.  Patient will be given hypertensive medications prior to discharge.  Patient otherwise is stable and sodium has increased left-sided weakness is resolved and patient can be discharged home in stable condition for follow-up with his primary care physician within 1 week.  Labs showed normal white blood cell count hemoglobin 11 hematocrit 32 differential normal platelets 206.  Sodium 132 potassium 4.1 BUN creatinine were normal and GFR greater than 60     Consults:     * Left-sided weakness  Suspected stroke vs paresthesia from hyponatremia  Will order MRI Brain WO to r/o old infarction/stroke  Symptoms have been present for 3 days so outside window for acute intervention  Admit to ICU  q2h neuro checks    07/07/2020  MRI brain shows areas of prior chronic infarction but no acute or subacute changes  Symptoms resolved for patient  with pain medication, so suspect more of a paresthesia affect  Continue to monitor daily CBC and BMP  May need pain management referral on discharge    07/09/2020:  Patient's hyponatremia has resolved.  Left-sided weakness has resolved.    07/10/2020:  Left-sided weakness has resolved  Patient will be discharged today to follow up with primary care physician.        Essential hypertension  07/10/2020:  Continue home medications for blood pressure control  Follow-up with primary care physician within 1 week for recheck        Final Active Diagnoses:    Diagnosis Date Noted POA    PRINCIPAL PROBLEM:  Left-sided weakness [R53.1] 07/06/2020 Yes    Left hip pain [M25.552] 07/07/2020 Yes    Hyponatremia [E87.1] 07/06/2020 Yes    Paresthesia of left arm and leg [R20.2] 07/06/2020 Yes    Tremor [R25.1] 12/21/2019 Yes    Essential hypertension [I10] 12/21/2019 Yes    ETOH abuse [F10.10] 12/18/2019 Yes    Other chest pain [R07.89] 07/24/2019 Yes      Problems Resolved During this Admission:       Discharged Condition: stable    Disposition:     Follow Up:  Follow-up Information     Violeta Gomez NP On 7/13/2020.    Specialty: Family Medicine  Why: Follow up visit 2:40pm  Contact information:  [address] [place] [address]  [phone]             Hiarm Leija MD In 1 week.    Specialty: Family Medicine  Contact information:  [address]  [place] [address]  [phone]                 Patient Instructions:   No discharge procedures on file.    Significant Diagnostic Studies: Labs: All labs within the past 24 hours have been reviewed    Pending Diagnostic Studies:     None         Medications:  Reconciled Home Medications:      Medication List      START taking these medications    cyclobenzaprine 10 MG tablet  Commonly known as: FLEXERIL  Take 1 tablet (10 mg total) by mouth 3 (three) times daily as needed.     HYDROcodone-acetaminophen  mg per tablet  Commonly known as:  NORCO  Take 1 tablet by mouth every 4 (four) hours as needed.        CHANGE how you take these medications    ALPRAZolam 1 MG tablet  Commonly known as: XANAX  TAKE ONE (1) TABLET FOUR (4) TIMES A DAY AS NEEDED FOR ANXIETY  Start taking on: July 16, 2020  What changed:   · See the new instructions.  · These instructions start on July 16, 2020. If you are unsure what to do until then, ask your doctor or other care provider.        CONTINUE taking these medications    carvediloL 6.25 MG tablet  Commonly known as: COREG  Take 1 tablet (6.25 mg total) by mouth 2 (two) times daily.     hydrALAZINE 50 MG tablet  Commonly known as: APRESOLINE  Take 1 tablet (50 mg total) by mouth 2 (two) times a day.     meloxicam 15 MG tablet  Commonly known as: MOBIC  Take 1 tablet (15 mg total) by mouth daily as needed.     pantoprazole 40 MG tablet  Commonly known as: PROTONIX  TAKE 1 TABLET IN THE MORNING (30 MINUTES BEFORE BREAKFAST) FOR STOMACH     primidone 50 MG Tab  Commonly known as: MYSOLINE  TAKE 1 TABLET 4 TIMES A DAY AS DIRECTED     vitamin renal formula (B-complex-vitamin c-folic acid) 1 mg Cap  Commonly known as: NEPHROCAPS  Take 1 capsule by mouth once daily.            Indwelling Lines/Drains at time of discharge:   Lines/Drains/Airways     None                 Time spent on the discharge of patient: 35 minutes  Patient was seen and examined on the date of discharge and determined to be suitable for discharge.         Kar Marrero MD  Department of Hospital Medicine  [facility]

## 2020-07-10 NOTE — PLAN OF CARE
Problem: Adult Inpatient Plan of Care  Goal: Plan of Care Review  Outcome: Ongoing, Progressing  Goal: Patient-Specific Goal (Individualization)  Outcome: Ongoing, Progressing  Goal: Absence of Hospital-Acquired Illness or Injury  Outcome: Ongoing, Progressing  Goal: Optimal Comfort and Wellbeing  Outcome: Ongoing, Progressing     Problem: Fall Injury Risk  Goal: Absence of Fall and Fall-Related Injury  Outcome: Ongoing, Progressing

## 2020-07-10 NOTE — DISCHARGE INSTRUCTIONS
Discharge Instructions for Hyponatremia  You were diagnosed with hyponatremia, which means your blood level of sodium (salt) is too low. Salt is needed for the body and brain to work. Very low blood levels of sodium can be fatal. Symptoms can include headache, confusion, fatigue, muscle cramps, hallucinations, seizures, and coma. You have been treated to raise your blood levels of sodium. The following instructions will help you care for yourself at home as you have been instructed.  Home care  · Limit your intake of fluids. Drink only the amounts directed by your healthcare provider.  · Ask your healthcare provider what you should use to replace fluids if you are throwing up.  · Keep all follow-up appointments. Your provider needs to watch your condition closely.  To help prevent hyponatremia:  · Take all medicines exactly as directed. Certain medicines can lower blood sodium levels.  · If you have done something that makes you sweat a lot, drink fluids that contain salt and other electrolytes.   · Tell all healthcare providers what medicines you take. Mention all prescription and over-the-counter drugs and herbs.  · Have your sodium levels checked often. This is vital if you take a diuretic (medicine that helps your body get rid of water).  Follow-up  Schedule a follow-up visit as directed.  When to call your healthcare provider  Call your provider right away if you have any of the following:  · Severe tiredness  · Fainting  · Dizziness  · Loss of appetite  · Nausea or vomiting  · Confusion or forgetfullness  · Muscle spasms, cramping, or twitching  · Seizures  · Gait disturbances   Date Last Reviewed: 6/8/2015  © 5093-8060 [employer]. [address] [place] [address], [place] [address]. All rights reserved. This information is not intended as a substitute for professional medical care. Always follow your healthcare professional's instructions.

## 2020-07-10 NOTE — ASSESSMENT & PLAN NOTE
Suspected stroke vs paresthesia from hyponatremia  Will order MRI Brain WO to r/o old infarction/stroke  Symptoms have been present for 3 days so outside window for acute intervention  Admit to ICU  q2h neuro checks    07/07/2020  MRI brain shows areas of prior chronic infarction but no acute or subacute changes  Symptoms resolved for patient with pain medication, so suspect more of a paresthesia affect  Continue to monitor daily CBC and BMP  May need pain management referral on discharge    07/09/2020:  Patient's hyponatremia has resolved.  Left-sided weakness has resolved.    07/10/2020:  Left-sided weakness has resolved  Patient will be discharged today to follow up with primary care physician.

## 2020-07-10 NOTE — PLAN OF CARE
Problem: Adult Inpatient Plan of Care  Goal: Plan of Care Review  7/10/2020 1337 by Hiram Gonzalez RN  Outcome: Met  7/10/2020 1038 by Hiram Gonzalez RN  Outcome: Ongoing, Progressing  Goal: Patient-Specific Goal (Individualization)  7/10/2020 1337 by Hiram Gonzalez RN  Outcome: Met  7/10/2020 1038 by Hiram Gonzalez RN  Outcome: Ongoing, Progressing  Goal: Absence of Hospital-Acquired Illness or Injury  7/10/2020 1337 by Hiram Gonzalez RN  Outcome: Met  7/10/2020 1038 by Hiram Gonzalez RN  Outcome: Ongoing, Progressing  Goal: Optimal Comfort and Wellbeing  7/10/2020 1337 by Hiram Gonzalez RN  Outcome: Met  7/10/2020 1038 by Hiram Gonzalez RN  Outcome: Ongoing, Progressing  Goal: Readiness for Transition of Care  Outcome: Met  Goal: Rounds/Family Conference  Outcome: Met     Problem: Fall Injury Risk  Goal: Absence of Fall and Fall-Related Injury  7/10/2020 1337 by Hiram Gonzalez RN  Outcome: Met  7/10/2020 1038 by Hiram Gonzalez RN  Outcome: Ongoing, Progressing     Problem: Electrolyte Imbalance  Goal: Electrolyte Balance  Outcome: Met     Problem: Skin Injury Risk Increased  Goal: Skin Health and Integrity  Outcome: Met

## 2020-07-10 NOTE — NURSING
Pt given discharge instructions and verbalizes understanding.  Pt is A&Ox4, no complaints of chest pain or SOB, there is no obvious distress noted.  Pt insists on walking out to vehicle.  Pt placed in vehicle w/o incident.

## 2020-07-13 ENCOUNTER — OFFICE VISIT (OUTPATIENT)
Dept: FAMILY MEDICINE | Facility: CLINIC | Age: 65
End: 2020-07-13
Payer: MEDICARE

## 2020-07-13 VITALS
HEART RATE: 66 BPM | WEIGHT: 136 LBS | OXYGEN SATURATION: 97 % | RESPIRATION RATE: 18 BRPM | BODY MASS INDEX: 20.14 KG/M2 | TEMPERATURE: 97 F | HEIGHT: 69 IN | DIASTOLIC BLOOD PRESSURE: 80 MMHG | SYSTOLIC BLOOD PRESSURE: 125 MMHG

## 2020-07-13 DIAGNOSIS — Z95.5 HISTORY OF HEART ARTERY STENT: ICD-10-CM

## 2020-07-13 DIAGNOSIS — I73.9 PAD (PERIPHERAL ARTERY DISEASE): ICD-10-CM

## 2020-07-13 DIAGNOSIS — E87.1 HYPONATREMIA: ICD-10-CM

## 2020-07-13 DIAGNOSIS — I10 HYPERTENSION, UNSPECIFIED TYPE: ICD-10-CM

## 2020-07-13 DIAGNOSIS — I10 ESSENTIAL HYPERTENSION: ICD-10-CM

## 2020-07-13 DIAGNOSIS — I25.2 HISTORY OF MI (MYOCARDIAL INFARCTION): ICD-10-CM

## 2020-07-13 DIAGNOSIS — Z09 HOSPITAL DISCHARGE FOLLOW-UP: Primary | ICD-10-CM

## 2020-07-13 PROCEDURE — 99999 PR PBB SHADOW E&M-EST. PATIENT-LVL V: CPT | Mod: PBBFAC,,, | Performed by: NURSE PRACTITIONER

## 2020-07-13 PROCEDURE — 99215 OFFICE O/P EST HI 40 MIN: CPT | Mod: PBBFAC,PN | Performed by: NURSE PRACTITIONER

## 2020-07-13 PROCEDURE — 99999 PR PBB SHADOW E&M-EST. PATIENT-LVL V: ICD-10-PCS | Mod: PBBFAC,,, | Performed by: NURSE PRACTITIONER

## 2020-07-13 PROCEDURE — 99214 OFFICE O/P EST MOD 30 MIN: CPT | Mod: S$PBB,,, | Performed by: NURSE PRACTITIONER

## 2020-07-13 PROCEDURE — 99214 PR OFFICE/OUTPT VISIT, EST, LEVL IV, 30-39 MIN: ICD-10-PCS | Mod: S$PBB,,, | Performed by: NURSE PRACTITIONER

## 2020-07-13 RX ORDER — HYDROCHLOROTHIAZIDE 12.5 MG/1
CAPSULE ORAL
COMMUNITY
Start: 2020-05-29 | End: 2020-10-01

## 2020-07-13 NOTE — PROGRESS NOTES
"Subjective:       Patient ID: Ricky Cole is a 65 y.o. male.    Chief Complaint: Follow-up (hospital f/u)    Mr. Harvey Cole is a 65 year old male who presents to the clinic today for hospital f/u. He is doing "much better." reports he is walking 1 mile a day. He continues to monitor his intake of water, as he was admitted with low sodium levels of 116. He is improving. Continues to take BP medication as ordered. In regards to the patient's hypertension, patient denies chest pain/sob, and has been compliant with the medication regimen. hypertension well controlled. Goal of <130/80. Meds and labs as per orders.            Review of Systems   Constitutional: Negative for activity change, diaphoresis, fatigue and fever.   HENT: Negative for congestion and sore throat.    Eyes: Negative for pain.   Respiratory: Negative for cough, chest tightness, shortness of breath and wheezing.    Cardiovascular: Negative for chest pain and palpitations.   Gastrointestinal: Negative for abdominal pain, nausea and vomiting.   Musculoskeletal: Negative for arthralgias and myalgias.   Skin: Negative for color change.   Neurological: Negative for dizziness, tremors, syncope, numbness and headaches.   Psychiatric/Behavioral: Negative for dysphoric mood and sleep disturbance. The patient is not nervous/anxious.    All other systems reviewed and are negative.        Reviewed family, medical, surgical, and social history.    Objective:      /80 (BP Location: Left arm, Patient Position: Sitting, BP Method: Medium (Automatic))   Pulse 66   Temp 97 °F (36.1 °C) (Tympanic)   Resp 18   Ht 5' 9" (1.753 m)   Wt 61.7 kg (136 lb)   SpO2 97%   BMI 20.08 kg/m²   Physical Exam  Vitals signs and nursing note reviewed.   Constitutional:       General: He is not in acute distress.     Appearance: He is well-developed and normal weight. He is not ill-appearing, toxic-appearing or diaphoretic.   HENT:      Head: Normocephalic and " atraumatic.      Right Ear: Tympanic membrane, ear canal and external ear normal. There is no impacted cerumen.      Left Ear: Tympanic membrane, ear canal and external ear normal. There is no impacted cerumen.      Nose: Nose normal. No congestion or rhinorrhea.      Mouth/Throat:      Mouth: Mucous membranes are moist.      Pharynx: Oropharynx is clear. No oropharyngeal exudate or posterior oropharyngeal erythema.   Eyes:      General: No scleral icterus.        Right eye: No discharge.         Left eye: No discharge.      Conjunctiva/sclera: Conjunctivae normal.      Pupils: Pupils are equal, round, and reactive to light.   Neck:      Musculoskeletal: Normal range of motion and neck supple. No neck rigidity or muscular tenderness.      Thyroid: No thyromegaly.      Vascular: No carotid bruit or JVD.      Trachea: No tracheal deviation.   Cardiovascular:      Rate and Rhythm: Normal rate and regular rhythm.      Pulses: Normal pulses.      Heart sounds: Normal heart sounds.   Pulmonary:      Effort: Pulmonary effort is normal. No respiratory distress.      Breath sounds: Normal breath sounds. No wheezing.   Abdominal:      General: Abdomen is flat. Bowel sounds are normal.      Palpations: Abdomen is soft.   Musculoskeletal: Normal range of motion.         General: No deformity.   Lymphadenopathy:      Cervical: No cervical adenopathy.   Skin:     General: Skin is warm and dry.      Capillary Refill: Capillary refill takes less than 2 seconds.      Coloration: Skin is not jaundiced or pale.      Findings: No erythema or rash.   Neurological:      General: No focal deficit present.      Mental Status: He is alert and oriented to person, place, and time. Mental status is at baseline.      Motor: Weakness: generalized weakness.      Deep Tendon Reflexes: Reflexes normal.   Psychiatric:         Mood and Affect: Mood normal.         Behavior: Behavior normal.         Thought Content: Thought content normal.          Judgment: Judgment normal.         Assessment:       1. Hospital discharge follow-up    2. Hyponatremia    3. Hypertension, unspecified type    4. Essential hypertension    5. History of MI (myocardial infarction) x2, 1987 and 2015    6. History of heart artery stent, one in 2009    7. PAD (peripheral artery disease), mnoderate        Plan:       Hospital discharge follow-up    Hyponatremia  -     Basic metabolic panel; Future; Expected date: 07/13/2020    Hypertension, unspecified type    Essential hypertension  -     Ambulatory referral/consult to Cardiology; Future; Expected date: 07/20/2020    History of MI (myocardial infarction) x2, 1987 and 2015  -     Ambulatory referral/consult to Cardiology; Future; Expected date: 07/20/2020    History of heart artery stent, one in 2009  -     Ambulatory referral/consult to Cardiology; Future; Expected date: 07/20/2020    PAD (peripheral artery disease), mnoderate  -     Ambulatory referral/consult to Cardiology; Future; Expected date: 07/20/2020          PLAN:  - Discussed with patient the plan of care  - Refer to Dr Leo  - Obtain BMP in 1 week  - Medications reviewed. Medication side effects discussed. Patient has no questions or concerns at this time. Informed patient to notify me regarding any concerns.   - Continue monitoring intake of water  - Continue to walk as currently doing  - Reviewed Xanax and opioid guidelines for chronic use, patient to continue xanax  - Informed patient to please notify me with any questions or concerns at anytime  - Follow up ordered for 4 weeks      Risks, benefits, and side effects were discussed with the patient. All questions were answered to the fullest satisfaction of the patient, and pt verbalized understanding and agreement to treatment plan. Pt was to call with any new or worsening symptoms, or present to the ER.

## 2020-07-17 PROCEDURE — G0179 MD RECERTIFICATION HHA PT: HCPCS | Mod: ,,, | Performed by: FAMILY MEDICINE

## 2020-07-17 PROCEDURE — G0179 PR HOME HEALTH MD RECERTIFICATION: ICD-10-PCS | Mod: ,,, | Performed by: FAMILY MEDICINE

## 2020-07-22 ENCOUNTER — EXTERNAL HOME HEALTH (OUTPATIENT)
Dept: HOME HEALTH SERVICES | Facility: HOSPITAL | Age: 65
End: 2020-07-22
Payer: MEDICARE

## 2020-07-22 NOTE — PROGRESS NOTES
60 Day Recert Order # [id number]  New Cert Period: 07/17/2020 to 09/14/2020 with [facility] - Dr. Hiram Leija

## 2020-07-27 ENCOUNTER — PATIENT OUTREACH (OUTPATIENT)
Dept: ADMINISTRATIVE | Facility: HOSPITAL | Age: 65
End: 2020-07-27

## 2020-07-27 ENCOUNTER — HOSPITAL ENCOUNTER (EMERGENCY)
Facility: HOSPITAL | Age: 65
Discharge: HOME | End: 2020-07-27
Attending: EMERGENCY MEDICINE
Payer: MEDICARE

## 2020-07-27 VITALS
RESPIRATION RATE: 15 BRPM | WEIGHT: 134 LBS | OXYGEN SATURATION: 100 % | BODY MASS INDEX: 19.18 KG/M2 | DIASTOLIC BLOOD PRESSURE: 82 MMHG | HEART RATE: 64 BPM | SYSTOLIC BLOOD PRESSURE: 116 MMHG | HEIGHT: 70 IN | TEMPERATURE: 98 F

## 2020-07-27 DIAGNOSIS — R20.0 LEFT UPPER EXTREMITY NUMBNESS: ICD-10-CM

## 2020-07-27 DIAGNOSIS — E87.1 HYPONATREMIA: Primary | ICD-10-CM

## 2020-07-27 LAB
ALBUMIN SERPL BCP-MCNC: 4.3 G/DL (ref 3.5–5.2)
ALP SERPL-CCNC: 72 U/L (ref 55–135)
ALT SERPL W/O P-5'-P-CCNC: 20 U/L (ref 10–44)
ANION GAP SERPL CALC-SCNC: 10 MMOL/L (ref 8–16)
APTT BLDCRRT: 29.8 SEC (ref 21–32)
AST SERPL-CCNC: 23 U/L (ref 10–40)
BASOPHILS # BLD AUTO: 0.04 K/UL (ref 0–0.2)
BASOPHILS NFR BLD: 0.7 % (ref 0–1.9)
BILIRUB SERPL-MCNC: 0.5 MG/DL (ref 0.1–1)
BUN SERPL-MCNC: 8 MG/DL (ref 8–23)
CALCIUM SERPL-MCNC: 8.8 MG/DL (ref 8.7–10.5)
CHLORIDE SERPL-SCNC: 87 MMOL/L (ref 95–110)
CO2 SERPL-SCNC: 27 MMOL/L (ref 23–29)
CREAT SERPL-MCNC: 0.7 MG/DL (ref 0.5–1.4)
DIFFERENTIAL METHOD: ABNORMAL
EOSINOPHIL # BLD AUTO: 0.1 K/UL (ref 0–0.5)
EOSINOPHIL NFR BLD: 2.4 % (ref 0–8)
ERYTHROCYTE [DISTWIDTH] IN BLOOD BY AUTOMATED COUNT: 12.3 % (ref 11.5–14.5)
EST. GFR  (AFRICAN AMERICAN): >60 ML/MIN/1.73 M^2
EST. GFR  (NON AFRICAN AMERICAN): >60 ML/MIN/1.73 M^2
GLUCOSE SERPL-MCNC: 115 MG/DL (ref 70–110)
HCT VFR BLD AUTO: 36.9 % (ref 40–54)
HGB BLD-MCNC: 12.9 G/DL (ref 14–18)
IMM GRANULOCYTES # BLD AUTO: 0.02 K/UL (ref 0–0.04)
IMM GRANULOCYTES NFR BLD AUTO: 0.4 % (ref 0–0.5)
INR PPP: 1 (ref 0.8–1.2)
LYMPHOCYTES # BLD AUTO: 1.6 K/UL (ref 1–4.8)
LYMPHOCYTES NFR BLD: 29.4 % (ref 18–48)
MCH RBC QN AUTO: 32.4 PG (ref 27–31)
MCHC RBC AUTO-ENTMCNC: 35 G/DL (ref 32–36)
MCV RBC AUTO: 93 FL (ref 82–98)
MONOCYTES # BLD AUTO: 0.7 K/UL (ref 0.3–1)
MONOCYTES NFR BLD: 12.5 % (ref 4–15)
NEUTROPHILS # BLD AUTO: 3 K/UL (ref 1.8–7.7)
NEUTROPHILS NFR BLD: 54.6 % (ref 38–73)
NRBC BLD-RTO: 0 /100 WBC
PLATELET # BLD AUTO: 270 K/UL (ref 150–350)
PMV BLD AUTO: 9.5 FL (ref 9.2–12.9)
POTASSIUM SERPL-SCNC: 3.7 MMOL/L (ref 3.5–5.1)
PROT SERPL-MCNC: 7.6 G/DL (ref 6–8.4)
PROTHROMBIN TIME: 10.2 SEC (ref 9–12.5)
RBC # BLD AUTO: 3.98 M/UL (ref 4.6–6.2)
SARS-COV-2 RDRP RESP QL NAA+PROBE: NEGATIVE
SODIUM SERPL-SCNC: 124 MMOL/L (ref 136–145)
TROPONIN I SERPL DL<=0.01 NG/ML-MCNC: <0.01 NG/ML (ref 0.02–0.5)
WBC # BLD AUTO: 5.51 K/UL (ref 3.9–12.7)

## 2020-07-27 PROCEDURE — 85730 THROMBOPLASTIN TIME PARTIAL: CPT

## 2020-07-27 PROCEDURE — 85610 PROTHROMBIN TIME: CPT

## 2020-07-27 PROCEDURE — 80053 COMPREHEN METABOLIC PANEL: CPT

## 2020-07-27 PROCEDURE — 93010 EKG 12-LEAD: ICD-10-PCS | Mod: ,,, | Performed by: INTERNAL MEDICINE

## 2020-07-27 PROCEDURE — 84484 ASSAY OF TROPONIN QUANT: CPT

## 2020-07-27 PROCEDURE — 93005 ELECTROCARDIOGRAM TRACING: CPT

## 2020-07-27 PROCEDURE — 85025 COMPLETE CBC W/AUTO DIFF WBC: CPT

## 2020-07-27 PROCEDURE — 71045 XR CHEST AP PORTABLE: ICD-10-PCS | Mod: 26,,, | Performed by: RADIOLOGY

## 2020-07-27 PROCEDURE — 71045 X-RAY EXAM CHEST 1 VIEW: CPT | Mod: 26,,, | Performed by: RADIOLOGY

## 2020-07-27 PROCEDURE — 70450 CT HEAD WITHOUT CONTRAST: ICD-10-PCS | Mod: 26,,, | Performed by: RADIOLOGY

## 2020-07-27 PROCEDURE — 93010 ELECTROCARDIOGRAM REPORT: CPT | Mod: ,,, | Performed by: INTERNAL MEDICINE

## 2020-07-27 PROCEDURE — 70450 CT HEAD/BRAIN W/O DYE: CPT | Mod: TC

## 2020-07-27 PROCEDURE — 99285 EMERGENCY DEPT VISIT HI MDM: CPT | Mod: 25

## 2020-07-27 PROCEDURE — U0002 COVID-19 LAB TEST NON-CDC: HCPCS

## 2020-07-27 PROCEDURE — 71045 X-RAY EXAM CHEST 1 VIEW: CPT | Mod: TC,FY

## 2020-07-27 PROCEDURE — 70450 CT HEAD/BRAIN W/O DYE: CPT | Mod: 26,,, | Performed by: RADIOLOGY

## 2020-07-27 NOTE — ED PROVIDER NOTES
Encounter Date: 7/27/2020       History     Chief Complaint   Patient presents with    Left sided weakness     Left sided weakness     65-year-old male with past medical history significant for anxiety, chronic back pain, COPD, depression, hypertension, nephrolithiasis, CAD with MI , CVA 2017 presents to the ED for evaluation of generalized weakness and intermittent left facial/left upper extremity/left lower extremity numbness and tingling.  States he experienced increased neck pain last night and awoke at midnight with symptoms of numbness.  He was recently hospitalized for severe hyponatremia but states he has been adding salt to his food since discharge.  Denies fever, chills, recent illness, cough, sick contacts.  Denies chest pain a dyspnea, palpitations, diaphoresis.  Denies abdominal pain, nausea, vomiting, decreased appetite, decreased urination.  Denies incontinence, confusion, headache, blurred vision, slurred speech, facial asymmetry, focal weakness, gait abnormality.        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, including shoulder     Cancer 2005    right eye    Colon polyps     COPD (chronic obstructive pulmonary disease)     Depression     Hypertension     Inguinal hernia     left    Kidney stone     MI (myocardial infarction)     Smoker, trying to quit again, 1 cig last a week, started age 17, quit for 42 years, restarted 2018 7/24/2019    Stroke     last stroke 2017    Tremor     Ulcer      Past Surgical History:   Procedure Laterality Date    ANGIOPLASTY      cardiac stent    CARDIAC CATHETERIZATION      COLECTOMY      EYE SURGERY      FINGER SURGERY Left     left index finger    HERNIA REPAIR       HIP ARTHROPLASTY Right 2019    Procedure: ARTHROPLASTY, HIP, [place], pegboard, 1st assist;  Surgeon: Kody Patel MD;  Location: [facility] OR;  Service: Orthopedics;  Laterality: Right;    OLECRANON BURSECTOMY Right 2018    Procedure: BURSECTOMY, OLECRANON;  Surgeon: Atul Cooper DO;  Location: [facility] OR;  Service: Orthopedics;  Laterality: Right;  Excision Olecranon Bursa Right Elbow    POLYPECTOMY      REPAIR OF TRICEPS TENDON Right 2018    Procedure: REPAIR, TENDON, TRICEPS;  Surgeon: Atul Cooper DO;  Location: [facility] OR;  Service: Orthopedics;  Laterality: Right;    SURGICAL REMOVAL OF BONE SPUR Right 2018    Procedure: EXCISION, BONE SPUR;  Surgeon: Autl Cooper DO;  Location: [facility] OR;  Service: Orthopedics;  Laterality: Right;  Excision Olecranon Bone Spur Right Elbow     Family History   Problem Relation Age of Onset    Heart disease Mother     Heart disease Father     Cancer Father     Dementia Brother     Heart disease Brother      Social History     Tobacco Use    Smoking status: Former Smoker     Years: 15.00     Types: Cigarettes     Quit date: 2018     Years since quittin.1    Smokeless tobacco: Current User     Types: Chew    Tobacco comment: Pt trying to quit   Substance Use Topics    Alcohol use: Yes     Alcohol/week: 2.0 - 3.0 standard drinks     Types: 2 - 3 Cans of beer per week     Comment: occasionally beer    Drug use: No     Review of Systems   Constitutional: Negative for appetite change, chills, diaphoresis, fatigue and fever.   HENT: Negative for congestion, ear pain, rhinorrhea, sinus pressure, sinus pain, sore throat and tinnitus.    Eyes: Negative for photophobia and visual disturbance.   Respiratory: Negative for cough, chest tightness, shortness of breath and wheezing.    Cardiovascular: Negative for chest pain, palpitations and leg swelling.   Gastrointestinal: Negative for abdominal pain, constipation, diarrhea, nausea and  vomiting.   Endocrine: Negative for cold intolerance, heat intolerance, polydipsia, polyphagia and polyuria.   Genitourinary: Negative for decreased urine volume, difficulty urinating, dysuria, flank pain, frequency, hematuria and urgency.   Musculoskeletal: Negative for arthralgias, back pain, gait problem, joint swelling, myalgias, neck pain and neck stiffness.   Skin: Negative for color change, pallor, rash and wound.   Allergic/Immunologic: Negative for immunocompromised state.   Neurological: Positive for weakness and numbness. Negative for dizziness, syncope, light-headedness and headaches.   Hematological: Negative for adenopathy. Does not bruise/bleed easily.   Psychiatric/Behavioral: Negative for decreased concentration, dysphoric mood and sleep disturbance. The patient is not nervous/anxious.    All other systems reviewed and are negative.      Physical Exam     Initial Vitals [07/27/20 1139]   BP Pulse Resp Temp SpO2   (!) 130/92 75 20 97.9 °F (36.6 °C) 100 %      MAP       --         Physical Exam    Nursing note and vitals reviewed.  Constitutional: He appears well-developed and well-nourished. He is not diaphoretic. No distress.   HENT:   Head: Normocephalic and atraumatic.   Right Ear: External ear normal.   Left Ear: External ear normal.   Nose: Nose normal.   Mouth/Throat: Oropharynx is clear and moist.   Eyes: Conjunctivae are normal. Pupils are equal, round, and reactive to light. No scleral icterus.   Neck: Normal range of motion. Neck supple. No JVD present.   Cardiovascular: Normal rate, regular rhythm, normal heart sounds and intact distal pulses.   Pulmonary/Chest: Breath sounds normal. No respiratory distress. He has no wheezes. He has no rhonchi. He has no rales. He exhibits no tenderness.   Abdominal: Soft. Bowel sounds are normal. He exhibits no distension. There is no abdominal tenderness. There is no rebound and no guarding.   Musculoskeletal: Normal range of motion. No tenderness or  edema.   Lymphadenopathy:     He has no cervical adenopathy.   Neurological: He is alert and oriented to person, place, and time. He has normal strength and normal reflexes. He displays normal reflexes. No cranial nerve deficit or sensory deficit. GCS score is 15. GCS eye subscore is 4. GCS verbal subscore is 5. GCS motor subscore is 6.   Skin: Skin is warm and dry. Capillary refill takes less than 2 seconds. No rash and no abscess noted. No erythema. No pallor.   Psychiatric: He has a normal mood and affect. His behavior is normal. Judgment and thought content normal.         ED Course   Procedures  Labs Reviewed   CBC W/ AUTO DIFFERENTIAL - Abnormal; Notable for the following components:       Result Value    RBC 3.98 (*)     Hemoglobin 12.9 (*)     Hematocrit 36.9 (*)     Mean Corpuscular Hemoglobin 32.4 (*)     All other components within normal limits   COMPREHENSIVE METABOLIC PANEL - Abnormal; Notable for the following components:    Sodium 124 (*)     Chloride 87 (*)     Glucose 115 (*)     All other components within normal limits   TROPONIN I - Abnormal; Notable for the following components:    Troponin I <0.01 (*)     All other components within normal limits   PROTIME-INR   APTT   SARS-COV-2 RNA AMPLIFICATION, QUAL     EKG Readings: (Independently Interpreted)   Initial Reading: No STEMI. Rhythm: Normal Sinus Rhythm. Heart Rate: 64. Ectopy: No Ectopy. Conduction: Normal. ST Segments: Normal ST Segments. T Waves: Normal. Axis: Normal. Clinical Impression: Normal Sinus Rhythm       Imaging Results          X-Ray Chest AP Portable (Final result)  Result time 07/27/20 13:18:48    Final result by Westley Brooke MD (07/27/20 13:18:48)                 Impression:      No acute abnormality.      Electronically signed by: Westley Brooke  Date:    07/27/2020  Time:    13:18             Narrative:    EXAMINATION:  XR CHEST AP PORTABLE    CLINICAL HISTORY:  . Anesthesia of skin    TECHNIQUE:  Single frontal  portable view of the chest was performed.    COMPARISON:  07/08/2020.    FINDINGS:  Support devices: None    The lungs are clear, with normal appearance of pulmonary vasculature and no pleural effusion or pneumothorax.    The cardiac silhouette is normal in size. The hilar and mediastinal contours are unremarkable.    Bones are intact.                               CT Head Without Contrast (Final result)  Result time 07/27/20 13:11:41    Final result by Rj Medrano Jr., MD (07/27/20 13:11:41)                 Impression:      Mild brain atrophy otherwise negative head CT.      Electronically signed by: Rj Medrano MD  Date:    07/27/2020  Time:    13:11             Narrative:    EXAMINATION:  CT HEAD WITHOUT CONTRAST    CLINICAL HISTORY:  Neuro deficit, acute, stroke suspected;    TECHNIQUE:  Low dose axial images were obtained through the head.  Coronal and sagittal reformations were also performed. Contrast was not administered.    COMPARISON:  MRI of July 7, 2020 and CT head of July 6, 2020 .    FINDINGS:  No cranial fracture is identified. Scalp edema or hematoma is not noted. The internal auditory canals are sharp and symmetric.    The basal cisterns are clear and symmetric. There is no mass effect or midline shift. There is mild asymmetry of the lateral ventricles left side larger. This however is unchanged from the prior studies.  There is mild widening of the cerebral sulci suggesting mild brain atrophy.  Within the brain parenchyma hyper or hypodense lesions consistent with tumor, edema, CVA or hemorrhage is not seen.  No intracranial hemorrhage or hematoma is noted.                                 Medical Decision Making:   Differential Diagnosis:   Dehydration, acute electrolyte abnormality, acute myocardial infarction, acute cerebrovascular accident, transient ischemic attack, hypoglycemia, syncope, near syncope, acute renal failure, coronavirus, pneumonia, sepsis, severe sepsis, septic shock,  acute intracranial hemorrhage, increased intracranial pressure, orthostatic hypotension  ED Management:  Sodium improved from previous discharge date, CT head normal, cardiac workup normal.  Stroke alert was not initiated as the patient presented approximately 12 hr after onset of symptoms and with no focal neurologic deficits.  NIH score of 0.  Patient states he believes his symptoms are secondary to running out of his pain medication, which was not refilled by primary care due to an active benzodiazepine prescription.    As we discussed, it is important that you return to the ER for any new concerns or symptoms, worsening of your existing symptoms, if you do not completely improve, or if you are unable to be seen by your primary care provider.    Some medical conditions are difficult to diagnose and may not be identified during an ER visit. Today, we did not find a medical condition that required inpatient admission, but please remember that medical conditions can change, and we cannot predict how you will be feeling tomorrow or the next day, so if you have any worsening or new symptoms, you should not hesitate to return to the emergency department for reevaluation.     Be sure to follow up with your primary care doctor for a recheck and to review any labs/imaging that were performed today.  If you do not have a primary care doctor, you may contact the one listed on your discharge paperwork or you may also call the [facility] Appointment Desk at [phone] to schedule an appointment with one.           Additional MDM:   Heart Score:    History:          Slightly suspicious.  ECG:             Normal  Age:               >65 years  Risk factors: >= 3 risk factors or history of atherosclerotic disease  Troponin:       Less than or equal to normal limit  Final Score: 4      RICHARD Score:   Age over 65:                                    1.00   > or = to 3 CAD risk factors:           1.00  Established CAD:                             1.00  > or = to 2 anginal events in the past 24 hours: 0.00  Use of ASA in past 7 days:              0.00  Elevated Enzymes:                         0.00  ST Depression > or = to 0.05 mV:  0.00  RICHARD score: 3    NIH Stroke Scale:   Interval = baseline (upon arrival/admit)  Level of consciousness = 0 - alert  LOC questions = 0 - answers both correctly  Best gaze = 0 - normal  Visual = 0 - no visual loss  Facial palsy = 0 - normal  Motor left arm =  0 - no drift  Motor right arm =  0 - no drift  Motor left leg = 0 - no drift  Motor right leg =  0 - no drift  Limb ataxia = 0 - absent  Sensory = 0 - normal  Best language = 0 - no aphasia  Dysarthria = 0 - normal articulation  Extinction and inattention = 0 - no neglect  NIH Stroke Scale Total = 0                ED Course as of Jul 27 1146   Mon Jul 27, 2020   1135 C/o left sided weakness since midnight last night; observed ambulating w/out difficulty, seen recently on 7/6 & 7/21 for same, admitted for hyponatremia; h/o CVA & still smokes -- neuro exam reveals L sided numbness (face, LUE, LLE) reported as new, no deficits noted otherwise    I have performed the rapid medical exam (RME) portion of the medical screening exam (MSE) & have determined that this patient requires further evaluation and/or testing to determine if an emergent medical condition exists     [DH]      ED Course User Index  [DH] Rj Matta NP                Clinical Impression:       ICD-10-CM ICD-9-CM   1. Hyponatremia  E87.1 276.1   2. Left upper extremity numbness  R20.0 782.0         Disposition:   Disposition: Discharged  Condition: Stable                        Britni Saunders MD  07/29/20 1135

## 2020-07-30 ENCOUNTER — DOCUMENT SCAN (OUTPATIENT)
Dept: HOME HEALTH SERVICES | Facility: HOSPITAL | Age: 65
End: 2020-07-30
Payer: MEDICARE

## 2020-07-31 ENCOUNTER — OFFICE VISIT (OUTPATIENT)
Dept: FAMILY MEDICINE | Facility: CLINIC | Age: 65
End: 2020-07-31
Payer: MEDICARE

## 2020-07-31 VITALS
BODY MASS INDEX: 20.19 KG/M2 | RESPIRATION RATE: 18 BRPM | HEIGHT: 69 IN | SYSTOLIC BLOOD PRESSURE: 129 MMHG | TEMPERATURE: 99 F | HEART RATE: 63 BPM | OXYGEN SATURATION: 97 % | WEIGHT: 136.31 LBS | DIASTOLIC BLOOD PRESSURE: 71 MMHG

## 2020-07-31 DIAGNOSIS — F41.1 GAD (GENERALIZED ANXIETY DISORDER): Primary | ICD-10-CM

## 2020-07-31 DIAGNOSIS — F51.01 PRIMARY INSOMNIA: ICD-10-CM

## 2020-07-31 DIAGNOSIS — Z02.83 ENCOUNTER FOR DRUG SCREENING: ICD-10-CM

## 2020-07-31 DIAGNOSIS — G62.9 NEUROPATHY: ICD-10-CM

## 2020-07-31 PROCEDURE — 99214 OFFICE O/P EST MOD 30 MIN: CPT | Mod: PBBFAC,PN | Performed by: FAMILY MEDICINE

## 2020-07-31 PROCEDURE — 80307 DRUG TEST PRSMV CHEM ANLYZR: CPT

## 2020-07-31 PROCEDURE — 99214 PR OFFICE/OUTPT VISIT, EST, LEVL IV, 30-39 MIN: ICD-10-PCS | Mod: S$PBB,,, | Performed by: FAMILY MEDICINE

## 2020-07-31 PROCEDURE — 99214 OFFICE O/P EST MOD 30 MIN: CPT | Mod: S$PBB,,, | Performed by: FAMILY MEDICINE

## 2020-07-31 PROCEDURE — 99999 PR PBB SHADOW E&M-EST. PATIENT-LVL IV: ICD-10-PCS | Mod: PBBFAC,,, | Performed by: FAMILY MEDICINE

## 2020-07-31 PROCEDURE — 99999 PR PBB SHADOW E&M-EST. PATIENT-LVL IV: CPT | Mod: PBBFAC,,, | Performed by: FAMILY MEDICINE

## 2020-07-31 RX ORDER — NITROGLYCERIN 0.3 MG/1
0.3 TABLET SUBLINGUAL EVERY 5 MIN PRN
Qty: 100 TABLET | Refills: 2 | Status: SHIPPED | OUTPATIENT
Start: 2020-07-31 | End: 2021-09-27 | Stop reason: SDUPTHER

## 2020-07-31 RX ORDER — TRAZODONE HYDROCHLORIDE 50 MG/1
50 TABLET ORAL NIGHTLY
Qty: 30 TABLET | Refills: 11 | Status: SHIPPED | OUTPATIENT
Start: 2020-07-31 | End: 2021-01-11 | Stop reason: SDUPTHER

## 2020-07-31 RX ORDER — ALPRAZOLAM 1 MG/1
TABLET ORAL
Qty: 120 TABLET | Refills: 0 | Status: SHIPPED | OUTPATIENT
Start: 2020-07-31 | End: 2020-08-27 | Stop reason: SDUPTHER

## 2020-07-31 NOTE — PROGRESS NOTES
"Subjective:       Patient ID: Ricky Cole is a 65 y.o. male.    Chief Complaint: Hospital Follow Up    Pt states that anxiety is well controlled  No issues/side effects  Compliant with treatment regimen    Review of Systems   Constitutional: Negative for activity change, diaphoresis, fatigue and fever.   HENT: Negative for congestion and sore throat.    Eyes: Negative for pain.   Respiratory: Negative for cough, chest tightness, shortness of breath and wheezing.    Cardiovascular: Negative for chest pain and palpitations.   Gastrointestinal: Negative for abdominal pain, nausea and vomiting.   Musculoskeletal: Negative for arthralgias and myalgias.   Skin: Negative for color change.   Neurological: Negative for dizziness, tremors, syncope, numbness and headaches.   Psychiatric/Behavioral: Negative for dysphoric mood and sleep disturbance. The patient is not nervous/anxious.    All other systems reviewed and are negative.        Reviewed family, medical, surgical, and social history.    Objective:      /71 (BP Location: Left arm, Patient Position: Sitting, BP Method: Medium (Automatic))   Pulse 63   Temp 99 °F (37.2 °C) (Oral)   Resp 18   Ht 5' 9" (1.753 m)   Wt 61.8 kg (136 lb 4.8 oz)   SpO2 97%   BMI 20.13 kg/m²   Physical Exam  Vitals signs and nursing note reviewed.   Constitutional:       General: He is not in acute distress.     Appearance: He is well-developed and normal weight. He is not ill-appearing, toxic-appearing or diaphoretic.   HENT:      Head: Normocephalic and atraumatic.      Right Ear: Tympanic membrane, ear canal and external ear normal. There is no impacted cerumen.      Left Ear: Tympanic membrane, ear canal and external ear normal. There is no impacted cerumen.      Nose: Nose normal. No congestion or rhinorrhea.      Mouth/Throat:      Mouth: Mucous membranes are moist.      Pharynx: Oropharynx is clear. No oropharyngeal exudate or posterior oropharyngeal erythema.   Eyes:    "   General: No scleral icterus.        Right eye: No discharge.         Left eye: No discharge.      Conjunctiva/sclera: Conjunctivae normal.      Pupils: Pupils are equal, round, and reactive to light.   Neck:      Musculoskeletal: Normal range of motion and neck supple. No neck rigidity or muscular tenderness.      Thyroid: No thyromegaly.      Vascular: No carotid bruit or JVD.      Trachea: No tracheal deviation.   Cardiovascular:      Rate and Rhythm: Normal rate and regular rhythm.      Pulses: Normal pulses.      Heart sounds: Normal heart sounds.   Pulmonary:      Effort: Pulmonary effort is normal. No respiratory distress.      Breath sounds: Normal breath sounds. No wheezing.   Abdominal:      General: Abdomen is flat. Bowel sounds are normal.      Palpations: Abdomen is soft.   Musculoskeletal: Normal range of motion.         General: No deformity.   Lymphadenopathy:      Cervical: No cervical adenopathy.   Skin:     General: Skin is warm and dry.      Capillary Refill: Capillary refill takes less than 2 seconds.      Coloration: Skin is not jaundiced or pale.      Findings: No erythema or rash.   Neurological:      General: No focal deficit present.      Mental Status: He is alert and oriented to person, place, and time. Mental status is at baseline.      Motor: Weakness: generalized weakness.      Deep Tendon Reflexes: Reflexes normal.   Psychiatric:         Mood and Affect: Mood normal.         Behavior: Behavior normal.         Thought Content: Thought content normal.         Judgment: Judgment normal.         Assessment:       1. CHANTELLE (generalized anxiety disorder)    2. Encounter for drug screening    3. Neuropathy    4. Primary insomnia        Plan:       CHANTELLE (generalized anxiety disorder)  -     ALPRAZolam (XANAX) 1 MG tablet; TAKE ONE (1) TABLET FOUR (4) TIMES A DAY AS NEEDED FOR ANXIETY  Dispense: 120 tablet; Refill: 0    Encounter for drug screening  -     [facility]    Neuropathy  -      Ambulatory referral/consult to Neurology; Future; Expected date: 08/07/2020    Primary insomnia  -     traZODone (DESYREL) 50 MG tablet; Take 1 tablet (50 mg total) by mouth every evening.  Dispense: 30 tablet; Refill: 11    Other orders  -     nitroGLYCERIN (NITROSTAT) 0.3 MG SL tablet; Place 1 tablet (0.3 mg total) under the tongue every 5 (five) minutes as needed for Chest pain.  Dispense: 100 tablet; Refill: 2     reviewed        Risks, benefits, and side effects were discussed with the patient. All questions were answered to the fullest satisfaction of the patient, and pt verbalized understanding and agreement to treatment plan. Pt was to call with any new or worsening symptoms, or present to the ER.

## 2020-08-03 LAB
6MAM UR QL: NOT DETECTED
7AMINOCLONAZEPAM UR QL: NOT DETECTED
A-OH ALPRAZ UR QL: PRESENT
ALPRAZ UR QL: PRESENT
AMPHET UR QL SCN: NOT DETECTED
ANNOTATION COMMENT IMP: NORMAL
ANNOTATION COMMENT IMP: NORMAL
BARBITURATES UR QL: PRESENT
BUPRENORPHINE UR QL: NOT DETECTED
BZE UR QL: NOT DETECTED
CARBOXYTHC UR QL: NOT DETECTED
CARISOPRODOL UR QL: NOT DETECTED
CLONAZEPAM UR QL: NOT DETECTED
CODEINE UR QL: NOT DETECTED
CREAT UR-MCNC: 45.2 MG/DL (ref 20–400)
DIAZEPAM UR QL: NOT DETECTED
ETHYL GLUCURONIDE UR QL: PRESENT
FENTANYL UR QL: NOT DETECTED
HYDROCODONE UR QL: NOT DETECTED
HYDROMORPHONE UR QL: NOT DETECTED
LORAZEPAM UR QL: NOT DETECTED
MDA UR QL: NOT DETECTED
MDEA UR QL: NOT DETECTED
MDMA UR QL: NOT DETECTED
ME-PHENIDATE UR QL: NOT DETECTED
MEPERIDINE UR QL: NOT DETECTED
METHADONE UR QL: NOT DETECTED
METHAMPHET UR QL: NOT DETECTED
MIDAZOLAM UR QL SCN: NOT DETECTED
MORPHINE UR QL: NOT DETECTED
NORBUPRENORPHINE UR QL CFM: NOT DETECTED
NORDIAZEPAM UR QL: NOT DETECTED
NORFENTANYL UR QL: NOT DETECTED
NORHYDROCODONE UR QL CFM: NOT DETECTED
NOROXYCODONE UR QL CFM: NOT DETECTED
NOROXYMORPHONE: NOT DETECTED
OXAZEPAM UR QL: NOT DETECTED
OXYCODONE UR QL: NOT DETECTED
OXYMORPHONE UR QL: NOT DETECTED
PATHOLOGY STUDY: NORMAL
PCP UR QL: NOT DETECTED
PHENTERMINE UR QL: NOT DETECTED
PROPOXYPH UR QL: NOT DETECTED
SERVICE CMNT-IMP: NORMAL
TAPENTADOL UR QL SCN: NOT DETECTED
TAPENTADOL-O-SULF: NOT DETECTED
TEMAZEPAM UR QL: NOT DETECTED
TRAMADOL UR QL: NOT DETECTED
ZOLPIDEM UR QL: NOT DETECTED

## 2020-08-13 ENCOUNTER — DOCUMENT SCAN (OUTPATIENT)
Dept: HOME HEALTH SERVICES | Facility: HOSPITAL | Age: 65
End: 2020-08-13
Payer: MEDICARE

## 2020-08-14 ENCOUNTER — TELEPHONE (OUTPATIENT)
Dept: FAMILY MEDICINE | Facility: CLINIC | Age: 65
End: 2020-08-14

## 2020-08-14 DIAGNOSIS — G62.9 NEUROPATHY: Primary | ICD-10-CM

## 2020-08-14 NOTE — TELEPHONE ENCOUNTER
----- Message from Kathie Eastman MA sent at 8/14/2020  9:54 AM CDT -----  Please Advise  ----- Message -----  From: Sergey Quinonez  Sent: 8/14/2020   9:40 AM CDT  To: Heladio Gandhi Staff    Type: Needs Medical Advice  Who Called:  [facility] Call Back Number: [phone]  Additional Information: Caller states that the patient's ER doctor failed to send referrals and would like Dr. Leija to send referrals to Neurology and Pain Management.    Please call to advise

## 2020-08-18 ENCOUNTER — DOCUMENT SCAN (OUTPATIENT)
Dept: HOME HEALTH SERVICES | Facility: HOSPITAL | Age: 65
End: 2020-08-18
Payer: MEDICARE

## 2020-08-24 ENCOUNTER — HOSPITAL ENCOUNTER (INPATIENT)
Facility: HOSPITAL | Age: 65
LOS: 1 days | Discharge: HOME HEALTH | DRG: 641 | End: 2020-08-25
Attending: EMERGENCY MEDICINE | Admitting: INTERNAL MEDICINE
Payer: MEDICARE

## 2020-08-24 DIAGNOSIS — F51.01 PRIMARY INSOMNIA: ICD-10-CM

## 2020-08-24 DIAGNOSIS — K21.9 GASTROESOPHAGEAL REFLUX DISEASE, ESOPHAGITIS PRESENCE NOT SPECIFIED: ICD-10-CM

## 2020-08-24 DIAGNOSIS — I10 HYPERTENSION, UNSPECIFIED TYPE: ICD-10-CM

## 2020-08-24 DIAGNOSIS — E87.1 HYPONATREMIA: Primary | ICD-10-CM

## 2020-08-24 DIAGNOSIS — R07.9 CHEST PAIN: ICD-10-CM

## 2020-08-24 DIAGNOSIS — M79.602 LEFT ARM PAIN: ICD-10-CM

## 2020-08-24 LAB
ALBUMIN SERPL BCP-MCNC: 4.1 G/DL (ref 3.5–5.2)
ALP SERPL-CCNC: 64 U/L (ref 55–135)
ALT SERPL W/O P-5'-P-CCNC: 21 U/L (ref 10–44)
ANION GAP SERPL CALC-SCNC: 9 MMOL/L (ref 8–16)
AST SERPL-CCNC: 25 U/L (ref 10–40)
BASOPHILS # BLD AUTO: 0.05 K/UL (ref 0–0.2)
BASOPHILS NFR BLD: 1.1 % (ref 0–1.9)
BILIRUB SERPL-MCNC: 0.9 MG/DL (ref 0.1–1)
BUN SERPL-MCNC: 6 MG/DL (ref 8–23)
CALCIUM SERPL-MCNC: 8.6 MG/DL (ref 8.7–10.5)
CHLORIDE SERPL-SCNC: 82 MMOL/L (ref 95–110)
CO2 SERPL-SCNC: 28 MMOL/L (ref 23–29)
CREAT SERPL-MCNC: 0.5 MG/DL (ref 0.5–1.4)
DIFFERENTIAL METHOD: ABNORMAL
EOSINOPHIL # BLD AUTO: 0.1 K/UL (ref 0–0.5)
EOSINOPHIL NFR BLD: 1.5 % (ref 0–8)
ERYTHROCYTE [DISTWIDTH] IN BLOOD BY AUTOMATED COUNT: 12 % (ref 11.5–14.5)
EST. GFR  (AFRICAN AMERICAN): >60 ML/MIN/1.73 M^2
EST. GFR  (NON AFRICAN AMERICAN): >60 ML/MIN/1.73 M^2
ESTIMATED AVG GLUCOSE: 108 MG/DL (ref 68–131)
GLUCOSE SERPL-MCNC: 94 MG/DL (ref 70–110)
HBA1C MFR BLD HPLC: 5.4 % (ref 4.5–6.2)
HCT VFR BLD AUTO: 33.3 % (ref 40–54)
HGB BLD-MCNC: 11.8 G/DL (ref 14–18)
IMM GRANULOCYTES # BLD AUTO: 0.02 K/UL (ref 0–0.04)
IMM GRANULOCYTES NFR BLD AUTO: 0.4 % (ref 0–0.5)
LYMPHOCYTES # BLD AUTO: 1.1 K/UL (ref 1–4.8)
LYMPHOCYTES NFR BLD: 24.3 % (ref 18–48)
MCH RBC QN AUTO: 32.4 PG (ref 27–31)
MCHC RBC AUTO-ENTMCNC: 35.4 G/DL (ref 32–36)
MCV RBC AUTO: 92 FL (ref 82–98)
MONOCYTES # BLD AUTO: 0.6 K/UL (ref 0.3–1)
MONOCYTES NFR BLD: 13.3 % (ref 4–15)
NEUTROPHILS # BLD AUTO: 2.7 K/UL (ref 1.8–7.7)
NEUTROPHILS NFR BLD: 59.4 % (ref 38–73)
NRBC BLD-RTO: 0 /100 WBC
PLATELET # BLD AUTO: 205 K/UL (ref 150–350)
PMV BLD AUTO: 8.9 FL (ref 9.2–12.9)
POTASSIUM SERPL-SCNC: 3.7 MMOL/L (ref 3.5–5.1)
PROT SERPL-MCNC: 7 G/DL (ref 6–8.4)
RBC # BLD AUTO: 3.64 M/UL (ref 4.6–6.2)
SARS-COV-2 RDRP RESP QL NAA+PROBE: NEGATIVE
SODIUM SERPL-SCNC: 119 MMOL/L (ref 136–145)
TROPONIN I SERPL DL<=0.01 NG/ML-MCNC: 0.02 NG/ML (ref 0.02–0.5)
WBC # BLD AUTO: 4.52 K/UL (ref 3.9–12.7)

## 2020-08-24 PROCEDURE — 99223 PR INITIAL HOSPITAL CARE,LEVL III: ICD-10-PCS | Mod: AI,,, | Performed by: INTERNAL MEDICINE

## 2020-08-24 PROCEDURE — 99223 1ST HOSP IP/OBS HIGH 75: CPT | Mod: AI,,, | Performed by: INTERNAL MEDICINE

## 2020-08-24 PROCEDURE — 36415 COLL VENOUS BLD VENIPUNCTURE: CPT

## 2020-08-24 PROCEDURE — 63600175 PHARM REV CODE 636 W HCPCS: Performed by: EMERGENCY MEDICINE

## 2020-08-24 PROCEDURE — 71045 X-RAY EXAM CHEST 1 VIEW: CPT | Mod: TC,FY

## 2020-08-24 PROCEDURE — 85025 COMPLETE CBC W/AUTO DIFF WBC: CPT

## 2020-08-24 PROCEDURE — U0002 COVID-19 LAB TEST NON-CDC: HCPCS

## 2020-08-24 PROCEDURE — 80053 COMPREHEN METABOLIC PANEL: CPT

## 2020-08-24 PROCEDURE — 84484 ASSAY OF TROPONIN QUANT: CPT

## 2020-08-24 PROCEDURE — 25000003 PHARM REV CODE 250: Performed by: INTERNAL MEDICINE

## 2020-08-24 PROCEDURE — 71045 XR CHEST AP PORTABLE: ICD-10-PCS | Mod: 26,,, | Performed by: RADIOLOGY

## 2020-08-24 PROCEDURE — 25000003 PHARM REV CODE 250: Performed by: EMERGENCY MEDICINE

## 2020-08-24 PROCEDURE — 99285 EMERGENCY DEPT VISIT HI MDM: CPT | Mod: 25

## 2020-08-24 PROCEDURE — 21400001 HC TELEMETRY ROOM

## 2020-08-24 PROCEDURE — 93005 ELECTROCARDIOGRAM TRACING: CPT

## 2020-08-24 PROCEDURE — 83036 HEMOGLOBIN GLYCOSYLATED A1C: CPT

## 2020-08-24 PROCEDURE — 71045 X-RAY EXAM CHEST 1 VIEW: CPT | Mod: 26,,, | Performed by: RADIOLOGY

## 2020-08-24 RX ORDER — AMOXICILLIN 250 MG
1 CAPSULE ORAL 2 TIMES DAILY
Status: DISCONTINUED | OUTPATIENT
Start: 2020-08-24 | End: 2020-08-25 | Stop reason: HOSPADM

## 2020-08-24 RX ORDER — PRIMIDONE 50 MG/1
50 TABLET ORAL 4 TIMES DAILY
Status: DISCONTINUED | OUTPATIENT
Start: 2020-08-24 | End: 2020-08-25 | Stop reason: HOSPADM

## 2020-08-24 RX ORDER — SUCRALFATE 1 G/10ML
1 SUSPENSION ORAL EVERY 6 HOURS
Status: DISCONTINUED | OUTPATIENT
Start: 2020-08-24 | End: 2020-08-25 | Stop reason: HOSPADM

## 2020-08-24 RX ORDER — ACETAMINOPHEN 325 MG/1
650 TABLET ORAL EVERY 4 HOURS PRN
Status: DISCONTINUED | OUTPATIENT
Start: 2020-08-24 | End: 2020-08-25 | Stop reason: HOSPADM

## 2020-08-24 RX ORDER — ZOLPIDEM TARTRATE 5 MG/1
5 TABLET ORAL NIGHTLY PRN
Status: DISCONTINUED | OUTPATIENT
Start: 2020-08-24 | End: 2020-08-25 | Stop reason: HOSPADM

## 2020-08-24 RX ORDER — SODIUM CHLORIDE 9 MG/ML
INJECTION, SOLUTION INTRAVENOUS CONTINUOUS
Status: DISCONTINUED | OUTPATIENT
Start: 2020-08-24 | End: 2020-08-25 | Stop reason: HOSPADM

## 2020-08-24 RX ORDER — HYDROCODONE BITARTRATE AND ACETAMINOPHEN 10; 325 MG/1; MG/1
1 TABLET ORAL EVERY 6 HOURS PRN
Status: DISCONTINUED | OUTPATIENT
Start: 2020-08-24 | End: 2020-08-25 | Stop reason: HOSPADM

## 2020-08-24 RX ORDER — MORPHINE SULFATE 4 MG/ML
2 INJECTION, SOLUTION INTRAMUSCULAR; INTRAVENOUS EVERY 4 HOURS PRN
Status: DISCONTINUED | OUTPATIENT
Start: 2020-08-24 | End: 2020-08-25 | Stop reason: HOSPADM

## 2020-08-24 RX ORDER — SODIUM CHLORIDE 9 MG/ML
1000 INJECTION, SOLUTION INTRAVENOUS
Status: COMPLETED | OUTPATIENT
Start: 2020-08-24 | End: 2020-08-24

## 2020-08-24 RX ORDER — ONDANSETRON 2 MG/ML
4 INJECTION INTRAMUSCULAR; INTRAVENOUS EVERY 4 HOURS PRN
Status: DISCONTINUED | OUTPATIENT
Start: 2020-08-24 | End: 2020-08-25 | Stop reason: HOSPADM

## 2020-08-24 RX ORDER — NITROGLYCERIN 0.4 MG/1
0.4 TABLET SUBLINGUAL EVERY 5 MIN PRN
Status: DISCONTINUED | OUTPATIENT
Start: 2020-08-24 | End: 2020-08-25 | Stop reason: HOSPADM

## 2020-08-24 RX ORDER — TRAZODONE HYDROCHLORIDE 50 MG/1
50 TABLET ORAL NIGHTLY
Status: DISCONTINUED | OUTPATIENT
Start: 2020-08-24 | End: 2020-08-25 | Stop reason: HOSPADM

## 2020-08-24 RX ORDER — SODIUM CHLORIDE 0.9 % (FLUSH) 0.9 %
10 SYRINGE (ML) INJECTION
Status: DISCONTINUED | OUTPATIENT
Start: 2020-08-24 | End: 2020-08-25 | Stop reason: HOSPADM

## 2020-08-24 RX ORDER — CARVEDILOL 3.12 MG/1
6.25 TABLET ORAL 2 TIMES DAILY
Status: DISCONTINUED | OUTPATIENT
Start: 2020-08-24 | End: 2020-08-25 | Stop reason: HOSPADM

## 2020-08-24 RX ORDER — MAG HYDROX/ALUMINUM HYD/SIMETH 200-200-20
30 SUSPENSION, ORAL (FINAL DOSE FORM) ORAL
Status: DISCONTINUED | OUTPATIENT
Start: 2020-08-24 | End: 2020-08-25 | Stop reason: HOSPADM

## 2020-08-24 RX ORDER — ALPRAZOLAM 0.25 MG/1
0.5 TABLET ORAL 4 TIMES DAILY PRN
Status: DISCONTINUED | OUTPATIENT
Start: 2020-08-24 | End: 2020-08-25 | Stop reason: HOSPADM

## 2020-08-24 RX ORDER — HYDRALAZINE HYDROCHLORIDE 25 MG/1
50 TABLET, FILM COATED ORAL 2 TIMES DAILY
Status: DISCONTINUED | OUTPATIENT
Start: 2020-08-24 | End: 2020-08-25 | Stop reason: HOSPADM

## 2020-08-24 RX ORDER — MORPHINE SULFATE 4 MG/ML
4 INJECTION, SOLUTION INTRAMUSCULAR; INTRAVENOUS
Status: COMPLETED | OUTPATIENT
Start: 2020-08-24 | End: 2020-08-24

## 2020-08-24 RX ORDER — PANTOPRAZOLE SODIUM 40 MG/1
40 TABLET, DELAYED RELEASE ORAL DAILY
Status: DISCONTINUED | OUTPATIENT
Start: 2020-08-25 | End: 2020-08-25 | Stop reason: HOSPADM

## 2020-08-24 RX ADMIN — PRIMIDONE 50 MG: 50 TABLET ORAL at 09:08

## 2020-08-24 RX ADMIN — ALPRAZOLAM 0.5 MG: 0.25 TABLET ORAL at 11:08

## 2020-08-24 RX ADMIN — SODIUM CHLORIDE 1000 ML: 0.9 INJECTION, SOLUTION INTRAVENOUS at 03:08

## 2020-08-24 RX ADMIN — HYDROCODONE BITARTRATE AND ACETAMINOPHEN 1 TABLET: 10; 325 TABLET ORAL at 06:08

## 2020-08-24 RX ADMIN — SUCRALFATE 1 G: 1 SUSPENSION ORAL at 06:08

## 2020-08-24 RX ADMIN — MORPHINE SULFATE 4 MG: 4 INJECTION INTRAVENOUS at 03:08

## 2020-08-24 RX ADMIN — ALPRAZOLAM 0.5 MG: 0.25 TABLET ORAL at 06:08

## 2020-08-24 RX ADMIN — SODIUM CHLORIDE: 0.9 INJECTION, SOLUTION INTRAVENOUS at 11:08

## 2020-08-24 RX ADMIN — SODIUM CHLORIDE: 0.9 INJECTION, SOLUTION INTRAVENOUS at 06:08

## 2020-08-24 RX ADMIN — TRAZODONE HYDROCHLORIDE 50 MG: 50 TABLET ORAL at 09:08

## 2020-08-24 RX ADMIN — ZOLPIDEM TARTRATE 5 MG: 5 TABLET ORAL at 10:08

## 2020-08-24 RX ADMIN — HYDRALAZINE HYDROCHLORIDE 50 MG: 25 TABLET, FILM COATED ORAL at 09:08

## 2020-08-24 RX ADMIN — SUCRALFATE 1 G: 1 SUSPENSION ORAL at 11:08

## 2020-08-24 RX ADMIN — CARVEDILOL 6.25 MG: 3.12 TABLET, FILM COATED ORAL at 09:08

## 2020-08-24 NOTE — HPI
Patient is a 65-year-old male that presented to the emergency department complaining of increased blood pressure some chest pain and also complaining of left-sided pain that has been there he stated for several months.  He states the pain on his left side is a burning/tingling type pain that is sharp in nature.  He states it is his entire left side of his body.  He denies nausea vomiting and denies diaphoresis and states this chest pain is substernal without radiation.  Patient also has a past medical history of hyponatremia which is the reason for his admission of 119 sodium.  Patient has had a previous admission for the similar findings.  Patient denies syncope denies lightheadedness denies dizziness and states that the left side of his body tinkles almost constantly.

## 2020-08-24 NOTE — HOSPITAL COURSE
Emergency department evaluation:  Chest x-ray was negative  CBC normal white blood cell count 4.5 hemoglobin 11.8 hematocrit 33, MCV 92 and platelets 205 with normal differential.  CMP:  Sodium 119 potassium 3.7 chloride 82 bicarb 28 BUN 6 creatinine 0.5  Troponin level 0.02  COVID 19-

## 2020-08-24 NOTE — ASSESSMENT & PLAN NOTE
08/24/2020:  Continue normal saline at 100 cc/hour  Regular diet  Repeat CT a in the a.m..  Fluid restriction of 1200 cc p.o. daily of fluid  Repeat labs in the a.m. to include CBC, CMP

## 2020-08-24 NOTE — SUBJECTIVE & OBJECTIVE
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, including shoulder     Cancer 2005    right eye    Colon polyps     COPD (chronic obstructive pulmonary disease)     Depression     Hypertension     Inguinal hernia     left    Kidney stone     MI (myocardial infarction)     Smoker, trying to quit again, 1 cig last a week, started age 17, quit for 42 years, restarted 2018 7/24/2019    Stroke     last stroke 2017    Tremor     Ulcer        Past Surgical History:   Procedure Laterality Date    ANGIOPLASTY      cardiac stent    CARDIAC CATHETERIZATION      COLECTOMY      EYE SURGERY      FINGER SURGERY Left     left index finger    HERNIA REPAIR      HIP ARTHROPLASTY Right 12/20/2019    Procedure: ARTHROPLASTY, HIP, [place], pegboard, 1st assist;  Surgeon: Kody Patel MD;  Location: [facility] OR;  Service: Orthopedics;  Laterality: Right;    OLECRANON BURSECTOMY Right 11/6/2018    Procedure: BURSECTOMY, OLECRANON;  Surgeon: Atul Cooper DO;  Location: [facility] OR;  Service: Orthopedics;  Laterality: Right;  Excision Olecranon Bursa Right Elbow    POLYPECTOMY      REPAIR OF TRICEPS TENDON Right 11/6/2018    Procedure: REPAIR, TENDON, TRICEPS;  Surgeon: Atul Cooper DO;  Location: [facility] OR;  Service: Orthopedics;  Laterality: Right;    SURGICAL REMOVAL OF BONE SPUR Right 11/6/2018    Procedure: EXCISION, BONE SPUR;  Surgeon: Atul Cooper DO;  Location: [facility] OR;  Service: Orthopedics;  Laterality: Right;  Excision Olecranon Bone Spur Right Elbow       Review of patient's allergies indicates:   Allergen Reactions    Codeine Other (See Comments)     hyper    Nsaids (non-steroidal anti-inflammatory drug) Other (See Comments)     Says eats holes in his stomach       No current facility-administered medications on file prior to encounter.      Current Outpatient Medications on File Prior to Encounter   Medication Sig     ALPRAZolam (XANAX) 1 MG tablet TAKE ONE (1) TABLET FOUR (4) TIMES A DAY AS NEEDED FOR ANXIETY    carvedilol (COREG) 6.25 MG tablet Take 1 tablet (6.25 mg total) by mouth 2 (two) times daily.    hydrALAZINE (APRESOLINE) 50 MG tablet Take 1 tablet (50 mg total) by mouth 2 (two) times a day.    hydroCHLOROthiazide (MICROZIDE) 12.5 mg capsule     HYDROcodone-acetaminophen (NORCO)  mg per tablet Take 1 tablet by mouth every 4 (four) hours as needed.    meloxicam (MOBIC) 15 MG tablet Take 1 tablet (15 mg total) by mouth daily as needed.    nitroGLYCERIN (NITROSTAT) 0.3 MG SL tablet Place 1 tablet (0.3 mg total) under the tongue every 5 (five) minutes as needed for Chest pain.    pantoprazole (PROTONIX) 40 MG tablet TAKE 1 TABLET IN THE MORNING (30 MINUTES BEFORE BREAKFAST) FOR STOMACH    primidone (MYSOLINE) 50 MG Tab TAKE 1 TABLET 4 TIMES A DAY AS DIRECTED    traZODone (DESYREL) 50 MG tablet Take 1 tablet (50 mg total) by mouth every evening.    vitamin renal formula, B-complex-vitamin c-folic acid, (NEPHROCAPS) 1 mg Cap Take 1 capsule by mouth once daily.     Family History     Problem Relation (Age of Onset)    Cancer Father    Dementia Brother    Heart disease Mother, Father, Brother        Tobacco Use    Smoking status: Former Smoker     Years: 15.00     Types: Cigarettes     Quit date: 2018     Years since quittin.2    Smokeless tobacco: Current User     Types: Chew    Tobacco comment: Pt trying to quit   Substance and Sexual Activity    Alcohol use: Yes     Alcohol/week: 2.0 - 3.0 standard drinks     Types: 2 - 3 Cans of beer per week     Comment: occasionally beer    Drug use: No    Sexual activity: Not Currently     Review of Systems   Constitutional: Negative for activity change, appetite change, fatigue and fever.   HENT: Negative for congestion, ear discharge, mouth sores, nosebleeds, rhinorrhea, sinus pressure, sinus pain and tinnitus.    Eyes: Negative.  Negative for pain,  redness and itching.   Respiratory: Positive for chest tightness. Negative for apnea, cough, choking, shortness of breath, wheezing and stridor.    Cardiovascular: Positive for chest pain and palpitations. Negative for leg swelling.   Gastrointestinal: Positive for nausea and vomiting. Negative for abdominal distention, abdominal pain, anal bleeding, blood in stool, constipation and diarrhea.   Endocrine: Negative.    Genitourinary: Negative for difficulty urinating, flank pain, frequency and urgency.   Musculoskeletal: Positive for arthralgias. Negative for back pain, gait problem and myalgias.   Skin: Negative for color change and pallor.   Allergic/Immunologic: Negative.    Neurological: Positive for tremors, weakness, light-headedness and numbness. Negative for dizziness, facial asymmetry and headaches.   Hematological: Negative for adenopathy. Does not bruise/bleed easily.   Psychiatric/Behavioral: The patient is nervous/anxious.      Objective:     Vital Signs (Most Recent):  Temp: 96.5 °F (35.8 °C) (08/24/20 1654)  Pulse: 66 (08/24/20 1654)  Resp: 16 (08/24/20 1654)  BP: (!) 196/93 (08/24/20 1654)  SpO2: 100 % (08/24/20 1654) Vital Signs (24h Range):  Temp:  [96.5 °F (35.8 °C)-98.3 °F (36.8 °C)] 96.5 °F (35.8 °C)  Pulse:  [63-72] 66  Resp:  [16-20] 16  SpO2:  [98 %-100 %] 100 %  BP: (162-196)/() 196/93     Weight: 63.5 kg (140 lb)  Body mass index is 20.09 kg/m².    Physical Exam  Vitals signs and nursing note reviewed.   Constitutional:       Appearance: He is well-developed. He is ill-appearing.   HENT:      Head: Normocephalic and atraumatic.      Right Ear: Tympanic membrane normal.      Left Ear: Tympanic membrane normal.      Nose: Nose normal.      Mouth/Throat:      Mouth: Mucous membranes are moist.   Eyes:      Pupils: Pupils are equal, round, and reactive to light.   Neck:      Musculoskeletal: Normal range of motion and neck supple.      Thyroid: No thyromegaly.      Trachea: No tracheal  deviation.   Cardiovascular:      Rate and Rhythm: Normal rate and regular rhythm.      Heart sounds: Normal heart sounds.   Pulmonary:      Effort: Pulmonary effort is normal.      Breath sounds: Normal breath sounds.   Abdominal:      General: Bowel sounds are normal. There is no distension.      Palpations: Abdomen is soft.      Tenderness: There is no abdominal tenderness. There is no guarding or rebound.   Musculoskeletal: Normal range of motion.   Lymphadenopathy:      Cervical: No cervical adenopathy.   Skin:     General: Skin is warm and dry.      Capillary Refill: Capillary refill takes less than 2 seconds.   Neurological:      General: No focal deficit present.      Mental Status: He is alert and oriented to person, place, and time.      Sensory: Sensory deficit present.      Motor: Weakness present.   Psychiatric:         Behavior: Behavior normal.         Thought Content: Thought content normal.         Judgment: Judgment normal.           CRANIAL NERVES     CN III, IV, VI   Pupils are equal, round, and reactive to light.       Significant Labs:   Recent Lab Results       08/24/20  1531   08/24/20  1435        Albumin   4.1     Alkaline Phosphatase   64     ALT   21     Anion Gap   9     AST   25     Baso #   0.05     Basophil%   1.1     BILIRUBIN TOTAL   0.9  Comment:  For infants and newborns, interpretation of results should be based  on gestational age, weight and in agreement with clinical  observations.  Premature Infant recommended reference ranges:  Up to 24 hours.............<8.0 mg/dL  Up to 48 hours............<12.0 mg/dL  3-5 days..................<15.0 mg/dL  6-29 days.................<15.0 mg/dL       BUN, Bld   6     Calcium   8.6     Chloride   82     CO2   28     Creatinine   0.5     Differential Method   Automated     eGFR if    >60.0     eGFR if non    >60.0  Comment:  Calculation used to obtain the estimated glomerular filtration  rate (eGFR) is the  CKD-EPI equation.        Eos #   0.1     Eosinophil%   1.5     Glucose   94     Gran # (ANC)   2.7     Gran%   59.4     Hematocrit   33.3     Hemoglobin   11.8     Immature Grans (Abs)   0.02  Comment:  Mild elevation in immature granulocytes is non specific and   can be seen in a variety of conditions including stress response,   acute inflammation, trauma and pregnancy. Correlation with other   laboratory and clinical findings is essential.       Immature Granulocytes   0.4     Lymph #   1.1     Lymph%   24.3     MCH   32.4     MCHC   35.4     MCV   92     Mono #   0.6     Mono%   13.3     MPV   8.9     nRBC   0     Platelets   205     Potassium   3.7     PROTEIN TOTAL   7.0     RBC   3.64     RDW   12.0     SARS-CoV-2 RNA, Amplification, Qual Negative  Comment:  This test utilizes isothermal nucleic acid amplification   technology to detect the SARS-CoV-2 RdRp nucleic acid segment.   The analytical sensitivity (limit of detection) is 125 genome   equivalents/mL.   A POSITIVE result implies infection with the SARS-CoV-2 virus;  the patient is presumed to be contagious.    A NEGATIVE result means that SARS-CoV-2 nucleic acids are not  present above the limit of detection. A NEGATIVE result should be   treated as presumptive. It does not rule out the possibility of   COVID-19 and should not be the sole basis for treatment decisions.   If COVID-19 is strongly suspected based on clinical and exposure   history, re-testing using an alternate molecular assay should be   considered.   This test is only for use under the Food and Drug   Administration s Emergency Use Authorization (EUA).   Commercial kits are provided by [employer].   Performance characteristics of the EUA have been independently  verified by [facility] Department of  Pathology and Laboratory Medicine.   _________________________________________________________________  The ID NOW COVID-19 Letter of Authorization, along with the    authorized Fact Sheet for Healthcare Providers, the authorized Fact  Sheet for Patients, and authorized labeling are available on the FDA   website:  www.fda.gov/MedicalDevices/Safety/EmergencySituations/tky989241.htm         Sodium   119  Comment:  Results confirmed, test repeated  Sodium critical result(s) called and verbal readback obtained from   Evelio Saini RN ED.  [place] [facility] [place] 15:21       Troponin I   0.02     WBC   4.52         All pertinent labs within the past 24 hours have been reviewed.    Significant Imaging: I have reviewed and interpreted all pertinent imaging results/findings within the past 24 hours.

## 2020-08-24 NOTE — ED PROVIDER NOTES
CHIEF COMPLAINT    Chief Complaint   Patient presents with    Hypertension     Patient complining of hypertension. Also complaining of chest pain.    Chest Pain       HPI    Ricky Cole is a 65 y.o. male who presents complaining of chest pain that started this morning and has been intermittent throughout the day.  He also has some associated shortness of breath and nausea with that.  He does have history of coronary disease.  Had a workup done earlier this year for his heart he has also had some intermittent left lower abdominal pain.  Denies any fevers, chills, cough, vomiting, diarrhea, dysuria.  Patient was admitted here for low sodium levels last month.  He says he has still been feeling generally weak for the past 3 weeks.  The severity of the symptoms is moderate.    CURRENT MEDICATIONS    Prior to Admission medications    Medication Sig Start Date End Date Taking? Authorizing Provider   ALPRAZolam (XANAX) 1 MG tablet TAKE ONE (1) TABLET FOUR (4) TIMES A DAY AS NEEDED FOR ANXIETY 7/31/20   Hiram Leija MD   carvedilol (COREG) 6.25 MG tablet Take 1 tablet (6.25 mg total) by mouth 2 (two) times daily. 12/24/19 12/23/20  Zahira Cortes MD   hydrALAZINE (APRESOLINE) 50 MG tablet Take 1 tablet (50 mg total) by mouth 2 (two) times a day. 6/16/20   Violeta Gomez NP   hydroCHLOROthiazide (MICROZIDE) 12.5 mg capsule  5/29/20   Historical Provider, MD   HYDROcodone-acetaminophen (NORCO)  mg per tablet Take 1 tablet by mouth every 4 (four) hours as needed. 7/10/20   Kar Marrero MD   meloxicam (MOBIC) 15 MG tablet Take 1 tablet (15 mg total) by mouth daily as needed. 11/19/19   Hiram Leija MD   nitroGLYCERIN (NITROSTAT) 0.3 MG SL tablet Place 1 tablet (0.3 mg total) under the tongue every 5 (five) minutes as needed for Chest pain. 7/31/20 7/31/21  Hiram Leija MD   pantoprazole (PROTONIX) 40 MG tablet TAKE 1 TABLET IN THE MORNING (30 MINUTES BEFORE BREAKFAST) FOR STOMACH 9/20/19    Hiram Leija MD   primidone (MYSOLINE) 50 MG Tab TAKE 1 TABLET 4 TIMES A DAY AS DIRECTED 8/24/19   Hiram Leija MD   traZODone (DESYREL) 50 MG tablet Take 1 tablet (50 mg total) by mouth every evening. 7/31/20 7/31/21  Hiram Leija MD   vitamin renal formula, B-complex-vitamin c-folic acid, (NEPHROCAPS) 1 mg Cap Take 1 capsule by mouth once daily. 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, including shoulder     Cancer 2005    right eye    Colon polyps     COPD (chronic obstructive pulmonary disease)     Depression     Hypertension     Inguinal hernia     left    Kidney stone     MI (myocardial infarction)     Smoker, trying to quit again, 1 cig last a week, started age 17, quit for 42 years, restarted 2018 7/24/2019    Stroke     last stroke 2017    Tremor     Ulcer        SURGICAL HISTORY    Past Surgical History:   Procedure Laterality Date    ANGIOPLASTY      cardiac stent    CARDIAC CATHETERIZATION      COLECTOMY      EYE SURGERY      FINGER SURGERY Left     left index finger    HERNIA REPAIR      HIP ARTHROPLASTY Right 12/20/2019    Procedure: ARTHROPLASTY, HIP, [place], pegboard, 1st assist;  Surgeon: Kody Patel MD;  Location: [facility] OR;  Service: Orthopedics;  Laterality: Right;    OLECRANON BURSECTOMY Right 11/6/2018    Procedure: BURSECTOMY, OLECRANON;  Surgeon: Atul Cooper DO;  Location: [facility] OR;  Service: Orthopedics;  Laterality: Right;  Excision Olecranon Bursa Right Elbow    POLYPECTOMY      REPAIR OF TRICEPS TENDON Right 11/6/2018    Procedure: REPAIR, TENDON, TRICEPS;  Surgeon: Atul Cooper DO;   Location: [facility] OR;  Service: Orthopedics;  Laterality: Right;    SURGICAL REMOVAL OF BONE SPUR Right 2018    Procedure: EXCISION, BONE SPUR;  Surgeon: Atul Cooper DO;  Location: [facility] OR;  Service: Orthopedics;  Laterality: Right;  Excision Olecranon Bone Spur Right Elbow       SOCIAL HISTORY    Social History     Socioeconomic History    Marital status:      Spouse name: Not on file    Number of children: Not on file    Years of education: Not on file    Highest education level: Not on file   Occupational History    Not on file   Social Needs    Financial resource strain: Not on file    Food insecurity     Worry: Not on file     Inability: Not on file    Transportation needs     Medical: Not on file     Non-medical: Not on file   Tobacco Use    Smoking status: Former Smoker     Years: 15.00     Types: Cigarettes     Quit date: 2018     Years since quittin.2    Smokeless tobacco: Current User     Types: Chew    Tobacco comment: Pt trying to quit   Substance and Sexual Activity    Alcohol use: Yes     Alcohol/week: 2.0 - 3.0 standard drinks     Types: 2 - 3 Cans of beer per week     Comment: occasionally beer    Drug use: No    Sexual activity: Not Currently   Lifestyle    Physical activity     Days per week: Not on file     Minutes per session: Not on file    Stress: Not on file   Relationships    Social connections     Talks on phone: Not on file     Gets together: Not on file     Attends Rest[employer] service: Not on file     Active member of club or organization: Not on file     Attends meetings of clubs or organizations: Not on file     Relationship status: Not on file   Other Topics Concern    Not on file   Social History Narrative    Not on file       FAMILY HISTORY    Family History   Problem Relation Age of Onset    Heart disease Mother     Heart disease Father     Cancer Father     Dementia Brother     Heart disease Brother        REVIEW OF SYSTEMS   "  Constitutional:  No reported fever, chills, weight loss  Eyes:  No reported photophobia or discharge. No visual changes  HENT:  No reported sore throat or ear pain.   Respiratory:  No reported cough   Cardiovascular:  No reported palpitations or swelling.   GI:  No reported vomiting, or diarrhea.   Musculoskeletal:  No reported back pain.   Skin:  No reported rash.   Neurologic:  No reported headache, focal weakness or sensory changes.   Endocrine:  No reported polyuria or polydypsia.   Lymphatic:  No reported swollen glands.   Psychiatric:  No reported depression, suicidal ideation or homicidal ideation.   All Systems otherwise negative except as noted in the Review of Systems and History of Present Illness.    PHYSICAL EXAM    VITAL SIGNS: BP (!) 189/105 (BP Location: Left arm, Patient Position: Sitting)   Pulse 72   Temp 98.3 °F (36.8 °C) (Oral)   Resp 20   Ht 5' 10" (1.778 m)   Wt 63.5 kg (140 lb)   SpO2 99%   BMI 20.09 kg/m²    Constitutional:  Well developed, Well nourished who appears stated age, No acute distress, Non-toxic appearance.   HENT:  Normocephalic, Atraumatic, Moist mucus membranes, No oral exudates or ulcerations, Nares patent,  Normal appearing turbinates.  Neck- Normal range of motion, No tenderness, Supple, No stridor. No meningismus  Eyes:  PERRL, EOMI, Conjunctiva normal, Anicteric sclera  Respiratory: Breath sounds clear to auscultation bilaterally, No respiratory distress, No wheezing, No chest tenderness.   Cardiovascular:  Normal heart rate, Normal rhythm, No murmurs, No rubs, No gallops.   GI:  Bowel sounds normal, Soft, No tenderness, No masses or hepatosplenomegaly, No pulsatile masses.   Musculoskeletal:  Intact distal pulses, No edema, No tenderness, No cyanosis, No clubbing. Good range of motion in all major joints. No tenderness to palpation or major deformities noted.   Integument:  Warm, Dry, No erythema, No rash.   Lymphatic:  No lymphadenopathy noted.   Neurologic:  " Alert & oriented x 3, Motor and sensory function grossly intact, No focal deficits noted.   Psychiatric:  Affect normal, Judgment normal, Mood normal.     LABS  Pertinent labs reviewed. (See chart for details)   Labs Reviewed   CBC W/ AUTO DIFFERENTIAL - Abnormal; Notable for the following components:       Result Value    RBC 3.64 (*)     Hemoglobin 11.8 (*)     Hematocrit 33.3 (*)     Mean Corpuscular Hemoglobin 32.4 (*)     MPV 8.9 (*)     All other components within normal limits   COMPREHENSIVE METABOLIC PANEL - Abnormal; Notable for the following components:    Sodium 119 (*)     Chloride 82 (*)     BUN, Bld 6 (*)     Calcium 8.6 (*)     All other components within normal limits    Narrative:     Sodium critical result(s) called and verbal readback obtained from   Evelio Saini RN ED.  by [facility] [place]/24/2020 15:21   TROPONIN I   SARS-COV-2 RNA AMPLIFICATION, QUAL       EKG    EKG Interpretation     Interpreted by me     Rhythm: Normal Sinus  Rate:  67  Axis: Normal  Ectopy: None  Conduction: Normal  ST Segments: No Acute Change  T Waves: No Acute Change  Q Waves: None     Clinical Impression: No acute ischemic changes      RADIOLOGY    Imaging Results          X-Ray Chest AP Portable (Final result)  Result time 08/24/20 15:00:15    Final result by Leonel Romeo MD (08/24/20 15:00:15)                 Impression:      No acute radiographic abnormality in the chest.      Electronically signed by: Leonel Romeo  Date:    08/24/2020  Time:    15:00             Narrative:    EXAMINATION:  XR CHEST AP PORTABLE    CLINICAL HISTORY:  chest pain;.    TECHNIQUE:  Single frontal portable view of the chest was performed.    COMPARISON:  07/27/2020    FINDINGS:  Cardiac monitor wires overlie the chest.  Cardiomediastinal silhouette is within normal limits.Lungs are clear.  No evidence of focal consolidation, pleural effusion, or pneumothorax.  Bones are intact.                              ED COURSE & MEDICAL DECISION MAKING     Medications   0.9%  NaCl infusion (has no administration in time range)   morphine injection 4 mg (has no administration in time range)       The patient presents with the history as noted above. The differential diagnosis would include but is not limited to:  ACS, PE, AAA, dissection, electrolyte abnormality, COPD exacerbation     patient presents with symptoms as above.  EKG nonacute.  Troponin negative.  His sodium levels have dropped again.  Sodium today was 119.  He was started on normal saline maintenance at 150 cc an hour.  Also complaining of some left arm burning pain in addition to his chest pain which improved after morphine.  Unsure the etiology of his hyponatremia, but will admit to the hospitalist service for further inpatient treatment and observation.    FINAL IMPRESSION    1. Hyponatremia    2. Chest pain    3. Left arm pain          Kody Peters      **Parts of this note were created using Dragon Voice Recognition software program. While efforts were made to correct any mistakes made by this voice recognition software program, nonsensical phrases may remain in this note.       Kody Peters, DO  08/24/20 1548

## 2020-08-24 NOTE — H&P
[facility] Medicine  History & Physical    Patient Name: Ricky Cole  MRN: [id number]  Admission Date: 8/24/2020  Attending Physician: Kar Marrero MD  Primary Care Provider: Hirma Leija MD         Patient information was obtained from patient and ER records.     Subjective:     Principal Problem:Hyponatremia    Chief Complaint:   Chief Complaint   Patient presents with    Hypertension     Patient complining of hypertension. Also complaining of chest pain.    Chest Pain        HPI: Patient is a 65-year-old male that presented to the emergency department complaining of increased blood pressure some chest pain and also complaining of left-sided pain that has been there he stated for several months.  He states the pain on his left side is a burning/tingling type pain that is sharp in nature.  He states it is his entire left side of his body.  He denies nausea vomiting and denies diaphoresis and states this chest pain is substernal without radiation.  Patient also has a past medical history of hyponatremia which is the reason for his admission of 119 sodium.  Patient has had a previous admission for the similar findings.  Patient denies syncope denies lightheadedness denies dizziness and states that the left side of his body tinkles almost constantly.    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, including shoulder     Cancer 2005    right eye    Colon polyps     COPD (chronic obstructive pulmonary disease)     Depression     Hypertension     Inguinal hernia     left    Kidney stone     MI (myocardial infarction)     Smoker, trying to quit again, 1 cig last a week, started age 17, quit for 42 years, restarted 2018 7/24/2019    Stroke     last stroke 2017    Tremor     Ulcer        Past Surgical History:   Procedure Laterality Date    ANGIOPLASTY      cardiac stent    CARDIAC  CATHETERIZATION      COLECTOMY      EYE SURGERY      FINGER SURGERY Left     left index finger    HERNIA REPAIR      HIP ARTHROPLASTY Right 12/20/2019    Procedure: ARTHROPLASTY, HIP, Lindy, pegboard, 1st assist;  Surgeon: Kody Patel MD;  Location: [facility] OR;  Service: Orthopedics;  Laterality: Right;    OLECRANON BURSECTOMY Right 11/6/2018    Procedure: BURSECTOMY, OLECRANON;  Surgeon: Atul Cooper DO;  Location: [facility] OR;  Service: Orthopedics;  Laterality: Right;  Excision Olecranon Bursa Right Elbow    POLYPECTOMY      REPAIR OF TRICEPS TENDON Right 11/6/2018    Procedure: REPAIR, TENDON, TRICEPS;  Surgeon: Atul Cooper DO;  Location: [facility] OR;  Service: Orthopedics;  Laterality: Right;    SURGICAL REMOVAL OF BONE SPUR Right 11/6/2018    Procedure: EXCISION, BONE SPUR;  Surgeon: Atul Cooper DO;  Location: [facility] OR;  Service: Orthopedics;  Laterality: Right;  Excision Olecranon Bone Spur Right Elbow       Review of patient's allergies indicates:   Allergen Reactions    Codeine Other (See Comments)     hyper    Nsaids (non-steroidal anti-inflammatory drug) Other (See Comments)     Says eats holes in his stomach       No current facility-administered medications on file prior to encounter.      Current Outpatient Medications on File Prior to Encounter   Medication Sig    ALPRAZolam (XANAX) 1 MG tablet TAKE ONE (1) TABLET FOUR (4) TIMES A DAY AS NEEDED FOR ANXIETY    carvedilol (COREG) 6.25 MG tablet Take 1 tablet (6.25 mg total) by mouth 2 (two) times daily.    hydrALAZINE (APRESOLINE) 50 MG tablet Take 1 tablet (50 mg total) by mouth 2 (two) times a day.    hydroCHLOROthiazide (MICROZIDE) 12.5 mg capsule     HYDROcodone-acetaminophen (NORCO)  mg per tablet Take 1 tablet by mouth every 4 (four) hours as needed.    meloxicam (MOBIC) 15 MG tablet Take 1 tablet (15 mg total) by mouth daily as needed.    nitroGLYCERIN (NITROSTAT) 0.3 MG SL tablet Place 1 tablet (0.3 mg  total) under the tongue every 5 (five) minutes as needed for Chest pain.    pantoprazole (PROTONIX) 40 MG tablet TAKE 1 TABLET IN THE MORNING (30 MINUTES BEFORE BREAKFAST) FOR STOMACH    primidone (MYSOLINE) 50 MG Tab TAKE 1 TABLET 4 TIMES A DAY AS DIRECTED    traZODone (DESYREL) 50 MG tablet Take 1 tablet (50 mg total) by mouth every evening.    vitamin renal formula, B-complex-vitamin c-folic acid, (NEPHROCAPS) 1 mg Cap Take 1 capsule by mouth once daily.     Family History     Problem Relation (Age of Onset)    Cancer Father    Dementia Brother    Heart disease Mother, Father, Brother        Tobacco Use    Smoking status: Former Smoker     Years: 15.00     Types: Cigarettes     Quit date: 2018     Years since quittin.2    Smokeless tobacco: Current User     Types: Chew    Tobacco comment: Pt trying to quit   Substance and Sexual Activity    Alcohol use: Yes     Alcohol/week: 2.0 - 3.0 standard drinks     Types: 2 - 3 Cans of beer per week     Comment: occasionally beer    Drug use: No    Sexual activity: Not Currently     Review of Systems   Constitutional: Negative for activity change, appetite change, fatigue and fever.   HENT: Negative for congestion, ear discharge, mouth sores, nosebleeds, rhinorrhea, sinus pressure, sinus pain and tinnitus.    Eyes: Negative.  Negative for pain, redness and itching.   Respiratory: Positive for chest tightness. Negative for apnea, cough, choking, shortness of breath, wheezing and stridor.    Cardiovascular: Positive for chest pain and palpitations. Negative for leg swelling.   Gastrointestinal: Positive for nausea and vomiting. Negative for abdominal distention, abdominal pain, anal bleeding, blood in stool, constipation and diarrhea.   Endocrine: Negative.    Genitourinary: Negative for difficulty urinating, flank pain, frequency and urgency.   Musculoskeletal: Positive for arthralgias. Negative for back pain, gait problem and myalgias.   Skin: Negative for  color change and pallor.   Allergic/Immunologic: Negative.    Neurological: Positive for tremors, weakness, light-headedness and numbness. Negative for dizziness, facial asymmetry and headaches.   Hematological: Negative for adenopathy. Does not bruise/bleed easily.   Psychiatric/Behavioral: The patient is nervous/anxious.      Objective:     Vital Signs (Most Recent):  Temp: 96.5 °F (35.8 °C) (08/24/20 1654)  Pulse: 66 (08/24/20 1654)  Resp: 16 (08/24/20 1654)  BP: (!) 196/93 (08/24/20 1654)  SpO2: 100 % (08/24/20 1654) Vital Signs (24h Range):  Temp:  [96.5 °F (35.8 °C)-98.3 °F (36.8 °C)] 96.5 °F (35.8 °C)  Pulse:  [63-72] 66  Resp:  [16-20] 16  SpO2:  [98 %-100 %] 100 %  BP: (162-196)/() 196/93     Weight: 63.5 kg (140 lb)  Body mass index is 20.09 kg/m².    Physical Exam  Vitals signs and nursing note reviewed.   Constitutional:       Appearance: He is well-developed. He is ill-appearing.   HENT:      Head: Normocephalic and atraumatic.      Right Ear: Tympanic membrane normal.      Left Ear: Tympanic membrane normal.      Nose: Nose normal.      Mouth/Throat:      Mouth: Mucous membranes are moist.   Eyes:      Pupils: Pupils are equal, round, and reactive to light.   Neck:      Musculoskeletal: Normal range of motion and neck supple.      Thyroid: No thyromegaly.      Trachea: No tracheal deviation.   Cardiovascular:      Rate and Rhythm: Normal rate and regular rhythm.      Heart sounds: Normal heart sounds.   Pulmonary:      Effort: Pulmonary effort is normal.      Breath sounds: Normal breath sounds.   Abdominal:      General: Bowel sounds are normal. There is no distension.      Palpations: Abdomen is soft.      Tenderness: There is no abdominal tenderness. There is no guarding or rebound.   Musculoskeletal: Normal range of motion.   Lymphadenopathy:      Cervical: No cervical adenopathy.   Skin:     General: Skin is warm and dry.      Capillary Refill: Capillary refill takes less than 2 seconds.    Neurological:      General: No focal deficit present.      Mental Status: He is alert and oriented to person, place, and time.      Sensory: Sensory deficit present.      Motor: Weakness present.   Psychiatric:         Behavior: Behavior normal.         Thought Content: Thought content normal.         Judgment: Judgment normal.           CRANIAL NERVES     CN III, IV, VI   Pupils are equal, round, and reactive to light.       Significant Labs:   Recent Lab Results       08/24/20  1531   08/24/20  1435        Albumin   4.1     Alkaline Phosphatase   64     ALT   21     Anion Gap   9     AST   25     Baso #   0.05     Basophil%   1.1     BILIRUBIN TOTAL   0.9  Comment:  For infants and newborns, interpretation of results should be based  on gestational age, weight and in agreement with clinical  observations.  Premature Infant recommended reference ranges:  Up to 24 hours.............<8.0 mg/dL  Up to 48 hours............<12.0 mg/dL  3-5 days..................<15.0 mg/dL  6-29 days.................<15.0 mg/dL       BUN, Bld   6     Calcium   8.6     Chloride   82     CO2   28     Creatinine   0.5     Differential Method   Automated     eGFR if    >60.0     eGFR if non    >60.0  Comment:  Calculation used to obtain the estimated glomerular filtration  rate (eGFR) is the CKD-EPI equation.        Eos #   0.1     Eosinophil%   1.5     Glucose   94     Gran # (ANC)   2.7     Gran%   59.4     Hematocrit   33.3     Hemoglobin   11.8     Immature Grans (Abs)   0.02  Comment:  Mild elevation in immature granulocytes is non specific and   can be seen in a variety of conditions including stress response,   acute inflammation, trauma and pregnancy. Correlation with other   laboratory and clinical findings is essential.       Immature Granulocytes   0.4     Lymph #   1.1     Lymph%   24.3     MCH   32.4     MCHC   35.4     MCV   92     Mono #   0.6     Mono%   13.3     MPV   8.9     nRBC   0      Platelets   205     Potassium   3.7     PROTEIN TOTAL   7.0     RBC   3.64     RDW   12.0     SARS-CoV-2 RNA, Amplification, Qual Negative  Comment:  This test utilizes isothermal nucleic acid amplification   technology to detect the SARS-CoV-2 RdRp nucleic acid segment.   The analytical sensitivity (limit of detection) is 125 genome   equivalents/mL.   A POSITIVE result implies infection with the SARS-CoV-2 virus;  the patient is presumed to be contagious.    A NEGATIVE result means that SARS-CoV-2 nucleic acids are not  present above the limit of detection. A NEGATIVE result should be   treated as presumptive. It does not rule out the possibility of   COVID-19 and should not be the sole basis for treatment decisions.   If COVID-19 is strongly suspected based on clinical and exposure   history, re-testing using an alternate molecular assay should be   considered.   This test is only for use under the Food and Drug   Administration s Emergency Use Authorization (EUA).   Commercial kits are provided by [employer].   Performance characteristics of the EUA have been independently  verified by [facility] Department of  Pathology and Laboratory Medicine.   _________________________________________________________________  The ID NOW COVID-19 Letter of Authorization, along with the   authorized Fact Sheet for Healthcare Providers, the authorized Fact  Sheet for Patients, and authorized labeling are available on the FDA   website:  www.fda.gov/MedicalDevices/Safety/EmergencySituations/qup599151.htm         Sodium   119  Comment:  Results confirmed, test repeated  Sodium critical result(s) called and verbal readback obtained from   Evelio Saini RN ED.  by [facility] 08/24/2020 15:21       Troponin I   0.02     WBC   4.52         All pertinent labs within the past 24 hours have been reviewed.    Significant Imaging: I have reviewed and interpreted all pertinent imaging results/findings within the past 24  hours.    Assessment/Plan:     * Hyponatremia  08/24/2020:  Continue normal saline at 100 cc/hour  Regular diet  Repeat CT a in the a.m..  Fluid restriction of 1200 cc p.o. daily of fluid  Repeat labs in the a.m. to include CBC, CMP        VTE Risk Mitigation (From admission, onward)         Ordered     Place KEVIN hose  Until discontinued      08/24/20 1741     IP VTE LOW RISK PATIENT  Once      08/24/20 1741                   Kar Marrero MD  Department of Hospital Medicine   [facility]

## 2020-08-25 VITALS
TEMPERATURE: 97 F | HEIGHT: 70 IN | OXYGEN SATURATION: 96 % | RESPIRATION RATE: 18 BRPM | DIASTOLIC BLOOD PRESSURE: 78 MMHG | SYSTOLIC BLOOD PRESSURE: 164 MMHG | HEART RATE: 63 BPM | WEIGHT: 140 LBS | BODY MASS INDEX: 20.04 KG/M2

## 2020-08-25 PROBLEM — M79.602 LEFT ARM PAIN: Status: ACTIVE | Noted: 2020-08-25

## 2020-08-25 LAB
ALBUMIN SERPL BCP-MCNC: 3.6 G/DL (ref 3.5–5.2)
ALP SERPL-CCNC: 56 U/L (ref 55–135)
ALT SERPL W/O P-5'-P-CCNC: 17 U/L (ref 10–44)
ANION GAP SERPL CALC-SCNC: 12 MMOL/L (ref 8–16)
AST SERPL-CCNC: 20 U/L (ref 10–40)
BASOPHILS # BLD AUTO: 0.03 K/UL (ref 0–0.2)
BASOPHILS NFR BLD: 0.7 % (ref 0–1.9)
BILIRUB SERPL-MCNC: 0.6 MG/DL (ref 0.1–1)
BUN SERPL-MCNC: 5 MG/DL (ref 8–23)
CALCIUM SERPL-MCNC: 8.4 MG/DL (ref 8.7–10.5)
CHLORIDE SERPL-SCNC: 90 MMOL/L (ref 95–110)
CO2 SERPL-SCNC: 26 MMOL/L (ref 23–29)
CREAT SERPL-MCNC: 0.7 MG/DL (ref 0.5–1.4)
DIFFERENTIAL METHOD: ABNORMAL
EOSINOPHIL # BLD AUTO: 0.1 K/UL (ref 0–0.5)
EOSINOPHIL NFR BLD: 3.1 % (ref 0–8)
ERYTHROCYTE [DISTWIDTH] IN BLOOD BY AUTOMATED COUNT: 12.2 % (ref 11.5–14.5)
EST. GFR  (AFRICAN AMERICAN): >60 ML/MIN/1.73 M^2
EST. GFR  (NON AFRICAN AMERICAN): >60 ML/MIN/1.73 M^2
GLUCOSE SERPL-MCNC: 105 MG/DL (ref 70–110)
HCT VFR BLD AUTO: 32.5 % (ref 40–54)
HGB BLD-MCNC: 11.4 G/DL (ref 14–18)
IMM GRANULOCYTES # BLD AUTO: 0.01 K/UL (ref 0–0.04)
IMM GRANULOCYTES NFR BLD AUTO: 0.2 % (ref 0–0.5)
LYMPHOCYTES # BLD AUTO: 1.5 K/UL (ref 1–4.8)
LYMPHOCYTES NFR BLD: 32.4 % (ref 18–48)
MCH RBC QN AUTO: 32.8 PG (ref 27–31)
MCHC RBC AUTO-ENTMCNC: 35.1 G/DL (ref 32–36)
MCV RBC AUTO: 93 FL (ref 82–98)
MONOCYTES # BLD AUTO: 0.6 K/UL (ref 0.3–1)
MONOCYTES NFR BLD: 13.4 % (ref 4–15)
NEUTROPHILS # BLD AUTO: 2.3 K/UL (ref 1.8–7.7)
NEUTROPHILS NFR BLD: 50.2 % (ref 38–73)
NRBC BLD-RTO: 0 /100 WBC
PLATELET # BLD AUTO: 197 K/UL (ref 150–350)
PMV BLD AUTO: 9.6 FL (ref 9.2–12.9)
POTASSIUM SERPL-SCNC: 3.4 MMOL/L (ref 3.5–5.1)
PROT SERPL-MCNC: 6.6 G/DL (ref 6–8.4)
RBC # BLD AUTO: 3.48 M/UL (ref 4.6–6.2)
SODIUM SERPL-SCNC: 128 MMOL/L (ref 136–145)
WBC # BLD AUTO: 4.54 K/UL (ref 3.9–12.7)

## 2020-08-25 PROCEDURE — 93005 ELECTROCARDIOGRAM TRACING: CPT

## 2020-08-25 PROCEDURE — 63600175 PHARM REV CODE 636 W HCPCS: Performed by: INTERNAL MEDICINE

## 2020-08-25 PROCEDURE — 99239 PR HOSPITAL DISCHARGE DAY,>30 MIN: ICD-10-PCS | Mod: ,,, | Performed by: FAMILY MEDICINE

## 2020-08-25 PROCEDURE — 80053 COMPREHEN METABOLIC PANEL: CPT

## 2020-08-25 PROCEDURE — 36415 COLL VENOUS BLD VENIPUNCTURE: CPT

## 2020-08-25 PROCEDURE — 25000003 PHARM REV CODE 250: Performed by: INTERNAL MEDICINE

## 2020-08-25 PROCEDURE — 85025 COMPLETE CBC W/AUTO DIFF WBC: CPT

## 2020-08-25 PROCEDURE — 99239 HOSP IP/OBS DSCHRG MGMT >30: CPT | Mod: ,,, | Performed by: FAMILY MEDICINE

## 2020-08-25 RX ORDER — PRIMIDONE 50 MG/1
50 TABLET ORAL 4 TIMES DAILY
Qty: 120 TABLET | Refills: 11 | Status: SHIPPED | OUTPATIENT
Start: 2020-08-25 | End: 2021-03-11 | Stop reason: SDUPTHER

## 2020-08-25 RX ORDER — PANTOPRAZOLE SODIUM 40 MG/1
40 TABLET, DELAYED RELEASE ORAL DAILY
Qty: 90 TABLET | Refills: 3 | Status: SHIPPED | OUTPATIENT
Start: 2020-08-25 | End: 2021-03-11 | Stop reason: SDUPTHER

## 2020-08-25 RX ORDER — CARVEDILOL 6.25 MG/1
6.25 TABLET ORAL 2 TIMES DAILY
Qty: 180 TABLET | Refills: 3 | Status: SHIPPED | OUTPATIENT
Start: 2020-08-25 | End: 2020-12-09 | Stop reason: SDUPTHER

## 2020-08-25 RX ORDER — HYDRALAZINE HYDROCHLORIDE 50 MG/1
50 TABLET, FILM COATED ORAL 2 TIMES DAILY
Qty: 180 TABLET | Refills: 3 | Status: SHIPPED | OUTPATIENT
Start: 2020-08-25 | End: 2020-09-21 | Stop reason: SDUPTHER

## 2020-08-25 RX ADMIN — SUCRALFATE 1 G: 1 SUSPENSION ORAL at 11:08

## 2020-08-25 RX ADMIN — MORPHINE SULFATE 2 MG: 4 INJECTION INTRAVENOUS at 08:08

## 2020-08-25 RX ADMIN — PRIMIDONE 50 MG: 50 TABLET ORAL at 11:08

## 2020-08-25 RX ADMIN — SUCRALFATE 1 G: 1 SUSPENSION ORAL at 05:08

## 2020-08-25 RX ADMIN — PRIMIDONE 50 MG: 50 TABLET ORAL at 08:08

## 2020-08-25 RX ADMIN — HYDRALAZINE HYDROCHLORIDE 50 MG: 25 TABLET, FILM COATED ORAL at 08:08

## 2020-08-25 RX ADMIN — ALUMINUM HYDROXIDE, MAGNESIUM HYDROXIDE, AND SIMETHICONE 30 ML: 200; 200; 20 SUSPENSION ORAL at 11:08

## 2020-08-25 RX ADMIN — PANTOPRAZOLE SODIUM 40 MG: 40 TABLET, DELAYED RELEASE ORAL at 08:08

## 2020-08-25 RX ADMIN — HYDROCODONE BITARTRATE AND ACETAMINOPHEN 1 TABLET: 10; 325 TABLET ORAL at 05:08

## 2020-08-25 RX ADMIN — ACETAMINOPHEN 650 MG: 325 TABLET ORAL at 08:08

## 2020-08-25 RX ADMIN — CARVEDILOL 6.25 MG: 3.12 TABLET, FILM COATED ORAL at 08:08

## 2020-08-25 RX ADMIN — ALPRAZOLAM 0.5 MG: 0.25 TABLET ORAL at 08:08

## 2020-08-25 RX ADMIN — ALUMINUM HYDROXIDE, MAGNESIUM HYDROXIDE, AND SIMETHICONE 30 ML: 200; 200; 20 SUSPENSION ORAL at 05:08

## 2020-08-25 NOTE — PLAN OF CARE
Problem: Fall Injury Risk  Goal: Absence of Fall and Fall-Related Injury  Outcome: Ongoing, Progressing     Problem: Adult Inpatient Plan of Care  Goal: Absence of Hospital-Acquired Illness or Injury  Outcome: Ongoing, Progressing  Goal: Optimal Comfort and Wellbeing  Outcome: Ongoing, Progressing  Goal: Readiness for Transition of Care  Outcome: Ongoing, Progressing

## 2020-08-25 NOTE — DISCHARGE SUMMARY
[facility] Medicine  Discharge Summary      Patient Name: Ricky Cole  MRN: [id number]  Admission Date: 8/24/2020  Hospital Length of Stay: 1 days  Discharge Date and Time:  08/25/2020 3:45 PM  Attending Physician: Kar Marrero MD   Discharging Provider: Vero Vela MD  Primary Care Provider: Hiram Leija MD        HPI:   Patient is a 65-year-old male that presented to the emergency department complaining of increased blood pressure some chest pain and also complaining of left-sided pain that has been there he stated for several months.  He states the pain on his left side is a burning/tingling type pain that is sharp in nature.  He states it is his entire left side of his body.  He denies nausea vomiting and denies diaphoresis and states this chest pain is substernal without radiation.  Patient also has a past medical history of hyponatremia which is the reason for his admission of 119 sodium.  Patient has had a previous admission for the similar findings.  Patient denies syncope denies lightheadedness denies dizziness and states that the left side of his body tinkles almost constantly.    * No surgery found *      Hospital Course:   Patient admitted and started on normal saline at 100 cc/hour.  He was fluid restricted to 1200 cc daily which resolved hyponatremia much faster than expected.  Patient admits that he does have a significant p.o. intake at home though he is trying to use Gatorade instead of just water.  Patient recovered from hyponatremia much faster than expected and had reached maximum benefit from inpatient stay. Discussed with patient that pain control of the tingling/burning in his face/arm/leg did not warrant keeping him in the hospital because his pain was unchanged from the last several months. Also acknowledged that given that we do not have a reason for his pain and the fact that he is already receiving controlled substances from his  PCP, he would benefit from follow up with Pain Management and Neurology. A work up has been started in the hospital including CT scans, lab work, MRI brain all of which have been unremarkable. Patient is admitted frequently because of hyponatremia which is resolved with fluid restriction. Follow up with specialist has been encouraged by his PCP and is recommended. Discharged home in good condition. Follow up scheduled with Neurology, PCP and Pain Management.     Consults:   Consults (From admission, onward)        Status Ordering Provider     IP consult to case management  Once     Provider:  (Not yet assigned)    Acknowledged ARCHANA LOZANO          Final Active Diagnoses:    Diagnosis Date Noted POA    PRINCIPAL PROBLEM:  Hyponatremia [E87.1] 07/06/2020 Yes    Left arm pain [M79.602] 08/25/2020 Yes      Problems Resolved During this Admission:      Discharged Condition: good    Disposition: Home-Health Care [facility]    Follow Up:  Follow-up Information     Arline Martin MD On 9/9/2020.    Specialty: Neurology  Why: Neurology appointment on Wednesday, 9/9/20 at 10:40 am. Location: [facility] [address] [place], [place]  Contact information:  [address]  [facility] [address]  [phone]             Violeta Gomez NP On 8/31/2020.    Specialty: Family Medicine  Why: Hospital follow up with primary care on Monday, 8/31/20 at 8:40am  Contact information:  [address] [facility] [address]  [phone]             Husam Barron MD On 9/23/2020.    Specialties: Pain Medicine, Anesthesiology  Why: Pain management appointment on Wednesday, 9/23/20 at 3:00pm. Location: [address] [place] [address]  Contact information:  [address] [place] [address]   [address]  [facility] [address]  [phone]                 Patient Instructions:      Diet Adult Regular     Order Specific Question Answer Comments   Fluid restriction: Fluid - 1500mL      Notify your health care provider if you  experience any of the following:  persistent dizziness, light-headedness, or visual disturbances     Activity as tolerated     Medications:  Reconciled Home Medications:      Medication List      CHANGE how you take these medications    pantoprazole 40 MG tablet  Commonly known as: PROTONIX  Take 1 tablet (40 mg total) by mouth once daily.  What changed: See the new instructions.     primidone 50 MG Tab  Commonly known as: MYSOLINE  Take 1 tablet (50 mg total) by mouth 4 (four) times daily.  What changed: See the new instructions.        CONTINUE taking these medications    ALPRAZolam 1 MG tablet  Commonly known as: XANAX  TAKE ONE (1) TABLET FOUR (4) TIMES A DAY AS NEEDED FOR ANXIETY     carvediloL 6.25 MG tablet  Commonly known as: COREG  Take 1 tablet (6.25 mg total) by mouth 2 (two) times daily.     hydrALAZINE 50 MG tablet  Commonly known as: APRESOLINE  Take 1 tablet (50 mg total) by mouth 2 (two) times a day.     hydroCHLOROthiazide 12.5 mg capsule  Commonly known as: MICROZIDE     nitroGLYCERIN 0.3 MG SL tablet  Commonly known as: NITROSTAT  Place 1 tablet (0.3 mg total) under the tongue every 5 (five) minutes as needed for Chest pain.     traZODone 50 MG tablet  Commonly known as: DESYREL  Take 1 tablet (50 mg total) by mouth every evening.     vitamin renal formula (B-complex-vitamin c-folic acid) 1 mg Cap  Commonly known as: NEPHROCAPS  Take 1 capsule by mouth once daily.        STOP taking these medications    HYDROcodone-acetaminophen  mg per tablet  Commonly known as: NORCO     meloxicam 15 MG tablet  Commonly known as: MOBIC            Significant Diagnostic Studies: Labs:   BMP:   Recent Labs   Lab 08/24/20  1435 08/25/20  0540   GLU 94 105   * 128*   K 3.7 3.4*   CL 82* 90*   CO2 28 26   BUN 6* 5*   CREATININE 0.5 0.7   CALCIUM 8.6* 8.4*   , CMP   Recent Labs   Lab 08/24/20  1435 08/25/20  0540   * 128*   K 3.7 3.4*   CL 82* 90*   CO2 28 26   GLU 94 105   BUN 6* 5*   CREATININE 0.5  0.7   CALCIUM 8.6* 8.4*   PROT 7.0 6.6   ALBUMIN 4.1 3.6   BILITOT 0.9 0.6   ALKPHOS 64 56   AST 25 20   ALT 21 17   ANIONGAP 9 12   ESTGFRAFRICA >60.0 >60.0   EGFRNONAA >60.0 >60.0   , CBC   Recent Labs   Lab 08/24/20  1435 08/25/20  0539   WBC 4.52 4.54   HGB 11.8* 11.4*   HCT 33.3* 32.5*    197    and All labs within the past 24 hours have been reviewed    Pending Diagnostic Studies:     Procedure Component Value Units Date/Time    EKG 12-lead [[id number]] Collected: 08/25/20 0717    Order Status: Sent Lab Status: In process Updated: 08/25/20 0758    Narrative:      Test Reason : E87.1,    Vent. Rate : 061 BPM     Atrial Rate : 061 BPM     P-R Int : 164 ms          QRS Dur : 092 ms      QT Int : 468 ms       P-R-T Axes : 064 079 066 degrees     QTc Int : 471 ms    Normal sinus rhythm  Normal ECG  When compared with ECG of 24-AUG-2020 14:30,  No significant change was found    Referred By: [email or web address]   SELF           Confirmed By:         Indwelling Lines/Drains at time of discharge:   Lines/Drains/Airways     None                 Time spent on the discharge of patient: 40 minutes  Patient was seen and examined on the date of discharge and determined to be suitable for discharge.         Vero Vela MD  Department of Hospital Medicine  [facility]

## 2020-08-25 NOTE — PLAN OF CARE
08/25/20 1556   Final Note   Assessment Type Final Discharge Note   Anticipated Discharge Disposition Home-Health   What phone number can be called within the next 1-3 days to see how you are doing after discharge? [phone]   Hospital Follow Up  Appt(s) scheduled? Yes   Discharge plans and expectations educations in teach back method with documentation complete? Yes   Post-Acute Status   Post-Acute Authorization Home Health   Home Health Status Referrals Sent   Patient choice form signed by patient/caregiver List from System Post-Acute Care   Discharge Delays None known at this time     Pt discharging home with home health services to be resumed. Follow up appointment with PCP was scheduled and provided to pt prior to discharge. New patient appointments were scheduled with neurology and pain management and provided to pt prior to discharge.     Pt understands medications have been transferred to [facility] pharmacy and may be picked up when filled. There are no other needs noted at this time.

## 2020-08-25 NOTE — PLAN OF CARE
08/25/20 1150   Discharge Assessment   Assessment Type Discharge Planning Assessment   Confirmed/corrected address and phone number on facesheet? Yes   Assessment information obtained from? Patient   Expected Length of Stay (days) 2   Communicated expected length of stay with patient/caregiver yes   Prior to hospitilization cognitive status: Alert/Oriented   Prior to hospitalization functional status: Independent   Current cognitive status: Alert/Oriented   Current Functional Status: Independent   Facility Arrived From: home   Lives With child(jackson), adult   Able to Return to Prior Arrangements yes   Is patient able to care for self after discharge? Yes   Who are your caregiver(s) and their phone number(s)? self   Patient's perception of discharge disposition home health   Readmission Within the Last 30 Days no previous admission in last 30 days   Patient currently being followed by outpatient case management? No   Patient currently receives any other outside agency services? Yes   How many hours a day does the patient receive services? 1   Name and contact number of agency or person providing outside services [facility] [phone]   Is it the patient/care giver preference to resume care with the current outside agency? Yes   Equipment Currently Used at Home walker, rolling  (Does not currently use this walker for daily care.)   Is the patient taking medications as prescribed? yes   Does the patient have transportation home? Yes   Transportation Anticipated family or friend will provide   Dialysis Name and Scheduled days n/a   Does the patient receive services at the [facility]mad[facility] Clinic? No   Discharge Plan A Home Health   DME Needed Upon Discharge  none   Patient/Family in Agreement with Plan yes     Pt is 65 year old male admitted with hyponatremia. Known to this SW from previous admission. The pt confirms his adult son continues to live with him and assists in his care and home upkeep as needed. Pt states he is  independent with ambulation and provides his own self care. He has a rolling walker at home, but does not require its use at this time.     Pt identifies issues in obtaining medications during this admission. States his pharmacy no longer takes his insurance and he has not found anyone that can fill his prescriptions with his current medication plan. After investigation, it was learned [facility] pharmacy was in network and prescriptions have been transferred and will be filled at discharge. There are no other needs identified at this time.

## 2020-08-25 NOTE — NURSING
Discharge instructions, follow up appointments, and new medications reviewed with patient, verbalized understanding.  Patient ambulated out to private vehicle upon discharge, no distress noted or verbalized by patient at present.

## 2020-08-25 NOTE — PLAN OF CARE
08/25/20 1145   Medicare Message   Important Message from Medicare regarding Discharge Appeal Rights Explained to patient/caregiver;Signed/date by patient/caregiver   Date IMM was signed 08/25/20   Time IMM was signed 1147

## 2020-08-27 DIAGNOSIS — F41.1 GAD (GENERALIZED ANXIETY DISORDER): ICD-10-CM

## 2020-08-27 RX ORDER — ALPRAZOLAM 1 MG/1
TABLET ORAL
Qty: 120 TABLET | Refills: 0 | Status: SHIPPED | OUTPATIENT
Start: 2020-08-27 | End: 2020-09-21 | Stop reason: SDUPTHER

## 2020-08-27 NOTE — TELEPHONE ENCOUNTER
----- Message from Tennille Paige MA sent at 8/27/2020 11:55 AM CDT -----  Type:  RX Refill Request    Who Called:  Edward  Refill or New Rx:  refill  RX Name and Strength:    ALPRAZolam (XANAX) 1 MG tablet 120 tablet 0 7/31/2020    Sig: TAKE ONE (1) TABLET FOUR (4) TIMES A DAY AS NEEDED FOR ANXIETY   How is the patient currently taking it? (ex. 1XDay):  See Above  Is this a 30 day or 90 day RX:  30  Preferred Pharmacy with phone number:    [facility] - [place] - [address]  [address]  [place] [address]  Phone: [phone] Fax: [phone]  Local or Mail Order:  local  Ordering Provider:    Eric Call Back Number:  [phone]  Additional Information:  patient states he will run out of meds on Sunday and the pharmacy is closed on the weekends.  He is asking for this to be called in today so that he can pick it up before the weekend.  Please call to discuss

## 2020-08-29 ENCOUNTER — HOSPITAL ENCOUNTER (EMERGENCY)
Facility: HOSPITAL | Age: 65
Discharge: HOME | End: 2020-08-30
Attending: FAMILY MEDICINE
Payer: MEDICARE

## 2020-08-29 DIAGNOSIS — G89.29 CHRONIC BILATERAL LOW BACK PAIN, UNSPECIFIED WHETHER SCIATICA PRESENT: ICD-10-CM

## 2020-08-29 DIAGNOSIS — R20.2 PARESTHESIAS: Primary | ICD-10-CM

## 2020-08-29 DIAGNOSIS — M54.50 CHRONIC BILATERAL LOW BACK PAIN, UNSPECIFIED WHETHER SCIATICA PRESENT: ICD-10-CM

## 2020-08-29 LAB
ALBUMIN SERPL BCP-MCNC: 4 G/DL (ref 3.5–5.2)
ALP SERPL-CCNC: 63 U/L (ref 55–135)
ALT SERPL W/O P-5'-P-CCNC: 21 U/L (ref 10–44)
AMPHET+METHAMPHET UR QL: NEGATIVE
ANION GAP SERPL CALC-SCNC: 12 MMOL/L (ref 8–16)
AST SERPL-CCNC: 20 U/L (ref 10–40)
BARBITURATES UR QL SCN>200 NG/ML: ABNORMAL
BASOPHILS # BLD AUTO: 0.03 K/UL (ref 0–0.2)
BASOPHILS NFR BLD: 0.5 % (ref 0–1.9)
BENZODIAZ UR QL SCN>200 NG/ML: NEGATIVE
BILIRUB SERPL-MCNC: 1.1 MG/DL (ref 0.1–1)
BILIRUB UR QL STRIP: NEGATIVE
BUN SERPL-MCNC: 5 MG/DL (ref 8–23)
BZE UR QL SCN: NEGATIVE
CALCIUM SERPL-MCNC: 8.5 MG/DL (ref 8.7–10.5)
CANNABINOIDS UR QL SCN: NEGATIVE
CHLORIDE SERPL-SCNC: 91 MMOL/L (ref 95–110)
CLARITY UR: CLEAR
CO2 SERPL-SCNC: 22 MMOL/L (ref 23–29)
COLOR UR: YELLOW
CREAT SERPL-MCNC: 0.7 MG/DL (ref 0.5–1.4)
CREAT UR-MCNC: 18.8 MG/DL (ref 23–375)
DIFFERENTIAL METHOD: ABNORMAL
EOSINOPHIL # BLD AUTO: 0.1 K/UL (ref 0–0.5)
EOSINOPHIL NFR BLD: 1 % (ref 0–8)
ERYTHROCYTE [DISTWIDTH] IN BLOOD BY AUTOMATED COUNT: 12.1 % (ref 11.5–14.5)
EST. GFR  (AFRICAN AMERICAN): >60 ML/MIN/1.73 M^2
EST. GFR  (NON AFRICAN AMERICAN): >60 ML/MIN/1.73 M^2
GLUCOSE SERPL-MCNC: 98 MG/DL (ref 70–110)
GLUCOSE UR QL STRIP: NEGATIVE
HCT VFR BLD AUTO: 33 % (ref 40–54)
HGB BLD-MCNC: 12 G/DL (ref 14–18)
HGB UR QL STRIP: NEGATIVE
IMM GRANULOCYTES # BLD AUTO: 0.02 K/UL (ref 0–0.04)
IMM GRANULOCYTES NFR BLD AUTO: 0.3 % (ref 0–0.5)
KETONES UR QL STRIP: NEGATIVE
LEUKOCYTE ESTERASE UR QL STRIP: NEGATIVE
LYMPHOCYTES # BLD AUTO: 1.4 K/UL (ref 1–4.8)
LYMPHOCYTES NFR BLD: 24.3 % (ref 18–48)
MAGNESIUM SERPL-MCNC: 1.7 MG/DL (ref 1.6–2.6)
MCH RBC QN AUTO: 33 PG (ref 27–31)
MCHC RBC AUTO-ENTMCNC: 36.4 G/DL (ref 32–36)
MCV RBC AUTO: 91 FL (ref 82–98)
MONOCYTES # BLD AUTO: 0.8 K/UL (ref 0.3–1)
MONOCYTES NFR BLD: 13.2 % (ref 4–15)
NEUTROPHILS # BLD AUTO: 3.5 K/UL (ref 1.8–7.7)
NEUTROPHILS NFR BLD: 60.7 % (ref 38–73)
NITRITE UR QL STRIP: NEGATIVE
NRBC BLD-RTO: 0 /100 WBC
OPIATES UR QL SCN: NEGATIVE
PCP UR QL SCN>25 NG/ML: NEGATIVE
PH UR STRIP: 7 [PH] (ref 5–8)
PLATELET # BLD AUTO: 245 K/UL (ref 150–350)
PMV BLD AUTO: 9 FL (ref 9.2–12.9)
POTASSIUM SERPL-SCNC: 3.5 MMOL/L (ref 3.5–5.1)
PROT SERPL-MCNC: 6.8 G/DL (ref 6–8.4)
PROT UR QL STRIP: NEGATIVE
RBC # BLD AUTO: 3.64 M/UL (ref 4.6–6.2)
SODIUM SERPL-SCNC: 125 MMOL/L (ref 136–145)
SP GR UR STRIP: 1.01 (ref 1–1.03)
TOXICOLOGY INFORMATION: ABNORMAL
TSH SERPL DL<=0.005 MIU/L-ACNC: 2.42 UIU/ML (ref 0.34–5.6)
URN SPEC COLLECT METH UR: NORMAL
UROBILINOGEN UR STRIP-ACNC: NEGATIVE EU/DL
WBC # BLD AUTO: 5.76 K/UL (ref 3.9–12.7)

## 2020-08-29 PROCEDURE — 70450 CT HEAD/BRAIN W/O DYE: CPT | Mod: TC

## 2020-08-29 PROCEDURE — 74176 CT ABDOMEN PELVIS WITHOUT CONTRAST: ICD-10-PCS | Mod: 26,,, | Performed by: RADIOLOGY

## 2020-08-29 PROCEDURE — 63600175 PHARM REV CODE 636 W HCPCS: Performed by: FAMILY MEDICINE

## 2020-08-29 PROCEDURE — 83735 ASSAY OF MAGNESIUM: CPT

## 2020-08-29 PROCEDURE — 74176 CT ABD & PELVIS W/O CONTRAST: CPT | Mod: TC

## 2020-08-29 PROCEDURE — 80307 DRUG TEST PRSMV CHEM ANLYZR: CPT

## 2020-08-29 PROCEDURE — 84443 ASSAY THYROID STIM HORMONE: CPT

## 2020-08-29 PROCEDURE — 80053 COMPREHEN METABOLIC PANEL: CPT

## 2020-08-29 PROCEDURE — 70450 CT HEAD/BRAIN W/O DYE: CPT | Mod: 26,,, | Performed by: RADIOLOGY

## 2020-08-29 PROCEDURE — 85025 COMPLETE CBC W/AUTO DIFF WBC: CPT

## 2020-08-29 PROCEDURE — 70450 CT HEAD WITHOUT CONTRAST: ICD-10-PCS | Mod: 26,,, | Performed by: RADIOLOGY

## 2020-08-29 PROCEDURE — 99285 EMERGENCY DEPT VISIT HI MDM: CPT | Mod: 25

## 2020-08-29 PROCEDURE — 74176 CT ABD & PELVIS W/O CONTRAST: CPT | Mod: 26,,, | Performed by: RADIOLOGY

## 2020-08-29 PROCEDURE — 81003 URINALYSIS AUTO W/O SCOPE: CPT

## 2020-08-29 RX ORDER — LORAZEPAM 2 MG/ML
1 INJECTION INTRAMUSCULAR
Status: COMPLETED | OUTPATIENT
Start: 2020-08-29 | End: 2020-08-29

## 2020-08-29 RX ADMIN — LORAZEPAM 1 MG: 2 INJECTION INTRAMUSCULAR; INTRAVENOUS at 10:08

## 2020-08-30 VITALS
RESPIRATION RATE: 16 BRPM | TEMPERATURE: 98 F | HEIGHT: 70 IN | HEART RATE: 68 BPM | SYSTOLIC BLOOD PRESSURE: 157 MMHG | WEIGHT: 140 LBS | OXYGEN SATURATION: 94 % | DIASTOLIC BLOOD PRESSURE: 85 MMHG | BODY MASS INDEX: 20.04 KG/M2

## 2020-08-30 NOTE — ED PROVIDER NOTES
Encounter Date: 8/29/2020       History     Chief Complaint   Patient presents with    Nausea    Chills    Weakness     Patient comes our facility stating that his face went numb around 1:00 p.m..  Patient states today he has a history of right face numbness as well as bilateral lower extremity paresthesias.  He was recently admitted less than a week ago with a diagnosis of hyponatremia.  Patient has reportedly he chronic hypo natremia.  He has an appointment with neurology as well as his primary care physician this coming week.  Patient states with this and numbness to his extremities and face he decided to seek medical attention tonight.  Patient's description of numbness is actually paresthesias.  He denies any incontinence.  He denies any lower extremity acute or focal weakness.  He denies any new injuries.  Patient states he has back problem that is L4 level historically.  This are the same complaints the patient had prior to admission less than a week ago except for now patient complains of bilateral facial paresthesias.        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, including shoulder     Cancer 2005    right eye    Colon polyps     COPD (chronic obstructive pulmonary disease)     Depression     Hypertension     Inguinal hernia     left    Kidney stone     MI (myocardial infarction)     Smoker, trying to quit again, 1 cig last a week, started age 17, quit for 42 years, restarted 2018 7/24/2019    Stroke     last stroke 2017    Tremor     Ulcer      Past Surgical History:   Procedure Laterality Date    ANGIOPLASTY      cardiac stent    CARDIAC CATHETERIZATION      COLECTOMY      EYE SURGERY      FINGER SURGERY  Left     left index finger    HERNIA REPAIR      HIP ARTHROPLASTY Right 2019    Procedure: ARTHROPLASTY, HIP, [place], pegboard, 1st assist;  Surgeon: Kody Patel MD;  Location: [facility] OR;  Service: Orthopedics;  Laterality: Right;    OLECRANON BURSECTOMY Right 2018    Procedure: BURSECTOMY, OLECRANON;  Surgeon: Atul Cooper DO;  Location: [facility] OR;  Service: Orthopedics;  Laterality: Right;  Excision Olecranon Bursa Right Elbow    POLYPECTOMY      REPAIR OF TRICEPS TENDON Right 2018    Procedure: REPAIR, TENDON, TRICEPS;  Surgeon: Atul Cooper DO;  Location: [facility] OR;  Service: Orthopedics;  Laterality: Right;    SURGICAL REMOVAL OF BONE SPUR Right 2018    Procedure: EXCISION, BONE SPUR;  Surgeon: Atul Cooper DO;  Location: [facility] OR;  Service: Orthopedics;  Laterality: Right;  Excision Olecranon Bone Spur Right Elbow     Family History   Problem Relation Age of Onset    Heart disease Mother     Heart disease Father     Cancer Father     Dementia Brother     Heart disease Brother      Social History     Tobacco Use    Smoking status: Former Smoker     Years: 15.00     Types: Cigarettes     Quit date: 2018     Years since quittin.2    Smokeless tobacco: Current User     Types: Chew    Tobacco comment: Pt trying to quit   Substance Use Topics    Alcohol use: Yes     Alcohol/week: 2.0 - 3.0 standard drinks     Types: 2 - 3 Cans of beer per week     Comment: occasionally beer    Drug use: No     Review of Systems   Constitutional: Negative for chills, fatigue and fever.   HENT: Negative for sinus pressure and sore throat.    Eyes: Negative for visual disturbance.   Respiratory: Negative for cough, shortness of breath and wheezing.    Cardiovascular: Negative for chest pain, palpitations and leg swelling.   Gastrointestinal: Negative for abdominal pain, diarrhea, nausea and vomiting.   Genitourinary: Negative for dysuria, flank pain and frequency.    Musculoskeletal: Positive for back pain. Negative for arthralgias and myalgias.        Chronic lower back pain   Skin: Negative for color change, pallor, rash and wound.   Neurological: Negative for dizziness, tremors, seizures, syncope, speech difficulty, weakness, light-headedness and headaches.        Paresthesias   Hematological: Negative for adenopathy. Does not bruise/bleed easily.   Psychiatric/Behavioral: Negative for agitation, behavioral problems and confusion.       Physical Exam     Initial Vitals [08/29/20 2110]   BP Pulse Resp Temp SpO2   (!) 185/117 66 16 97.6 °F (36.4 °C) 96 %      MAP       --         Physical Exam    Nursing note and vitals reviewed.  Constitutional: He appears well-developed and well-nourished. He is not diaphoretic. No distress.   HENT:   Head: Normocephalic and atraumatic.   Nose: Nose normal.   Mouth/Throat: Oropharynx is clear and moist.   Eyes: Conjunctivae and EOM are normal. Right eye exhibits no discharge. Left eye exhibits no discharge. No scleral icterus.   Neck: Normal range of motion. Neck supple.   Cardiovascular: Normal rate, regular rhythm, normal heart sounds and intact distal pulses.   No murmur heard.  Pulmonary/Chest: Breath sounds normal. No stridor. No respiratory distress. He has no wheezes. He has no rhonchi. He has no rales. He exhibits no tenderness.   Abdominal: Soft. Bowel sounds are normal. He exhibits no distension. There is no abdominal tenderness. There is no rebound.   Musculoskeletal: Normal range of motion. Tenderness present. No edema.      Comments: Mild paraspinal lumbar tenderness that is chronic   Lymphadenopathy:     He has no cervical adenopathy.   Neurological: He is alert and oriented to person, place, and time. He has normal strength. No cranial nerve deficit or sensory deficit. GCS score is 15. GCS eye subscore is 4. GCS verbal subscore is 5. GCS motor subscore is 6.   Patient claims that paresthesias to his lower extremities  circumferentially bilaterally from his hips to his toes.  Patient also claims to have bilateral facial paresthesias.  Otherwise, patient has no motor weakness.   Skin: Skin is warm and dry. Capillary refill takes less than 2 seconds. No rash and no abscess noted. No erythema. No pallor.   Psychiatric: He has a normal mood and affect. His behavior is normal. Judgment and thought content normal.         ED Course   Procedures  Labs Reviewed - No data to display       Imaging Results    None       X-Rays:   Independently Interpreted Readings:   Other Readings:  CT of head shows no acute findings.  CT of abdomen pelvis without contrast shows white a 10 mm pulmonary nodule in the left lower lobe.  There is some diverticulosis without any signs of acute diverticulitis.    Medical Decision Making:   ED Management:  Patient states allergic reaction to Neurontin.                                 Clinical Impression:       ICD-10-CM ICD-9-CM   1. Paresthesias  R20.2 782.0   2. Chronic bilateral low back pain, unspecified whether sciatica present  M54.5 724.2    G89.29 338.29         Disposition:   Disposition: Discharged  Condition: Stable                        Jonah Leal MD  08/30/20 0051

## 2020-08-30 NOTE — DISCHARGE INSTRUCTIONS
Keep all scheduled follow-up appointments.  Please follow-up with your primary care physician in 2 days as scheduled.  Please follow-up with neurology next Thursday as well as pain management on 08/23/2020 as scheduled.

## 2020-09-01 ENCOUNTER — OFFICE VISIT (OUTPATIENT)
Dept: FAMILY MEDICINE | Facility: CLINIC | Age: 65
End: 2020-09-01
Payer: MEDICARE

## 2020-09-01 VITALS
RESPIRATION RATE: 20 BRPM | OXYGEN SATURATION: 96 % | BODY MASS INDEX: 19.54 KG/M2 | DIASTOLIC BLOOD PRESSURE: 72 MMHG | WEIGHT: 136.5 LBS | HEIGHT: 70 IN | SYSTOLIC BLOOD PRESSURE: 115 MMHG | TEMPERATURE: 98 F | HEART RATE: 80 BPM

## 2020-09-01 DIAGNOSIS — Z09 HOSPITAL DISCHARGE FOLLOW-UP: Primary | ICD-10-CM

## 2020-09-01 DIAGNOSIS — I10 ESSENTIAL HYPERTENSION: ICD-10-CM

## 2020-09-01 DIAGNOSIS — G62.9 NEUROPATHY: ICD-10-CM

## 2020-09-01 DIAGNOSIS — F41.1 GAD (GENERALIZED ANXIETY DISORDER): ICD-10-CM

## 2020-09-01 DIAGNOSIS — E87.1 HYPONATREMIA: ICD-10-CM

## 2020-09-01 DIAGNOSIS — R91.1 LUNG NODULE: ICD-10-CM

## 2020-09-01 DIAGNOSIS — F51.01 PRIMARY INSOMNIA: ICD-10-CM

## 2020-09-01 PROCEDURE — 99215 OFFICE O/P EST HI 40 MIN: CPT | Mod: PBBFAC,PN | Performed by: NURSE PRACTITIONER

## 2020-09-01 PROCEDURE — 99999 PR PBB SHADOW E&M-EST. PATIENT-LVL V: ICD-10-PCS | Mod: PBBFAC,,, | Performed by: NURSE PRACTITIONER

## 2020-09-01 PROCEDURE — 99999 PR PBB SHADOW E&M-EST. PATIENT-LVL V: CPT | Mod: PBBFAC,,, | Performed by: NURSE PRACTITIONER

## 2020-09-01 PROCEDURE — 99214 PR OFFICE/OUTPT VISIT, EST, LEVL IV, 30-39 MIN: ICD-10-PCS | Mod: S$GLB,,, | Performed by: NURSE PRACTITIONER

## 2020-09-01 PROCEDURE — 99214 OFFICE O/P EST MOD 30 MIN: CPT | Mod: S$GLB,,, | Performed by: NURSE PRACTITIONER

## 2020-09-01 NOTE — PROGRESS NOTES
"Subjective:       Patient ID: Ricky Cole is a 65 y.o. male.    Chief Complaint: Follow-up (HOSPITAL F/U)    Mr. Harvey Cole is a 65 year old male who presents to the clinic for hospital follow up. Today he states " I am doing so good." Reports limiting his water intake to 2 bottles daily. Reports that his tremors, anxiety, and sleep are improved by taking xanax as ordered. UDS reviewed. UTD. Reports anxiety improving. Reports upcoming appt with Dr Carpio.   In regards to the patient's hypertension, patient denies chest pain/sob, and has been compliant with the medication regimen. hypertension improved and well controlled. Goal of <130/80. Meds and labs as per orders. He has stopped taking mobic, per MD request. Reports sleep well improved. Reports he is walking. Denies any pain              Follow-up  Pertinent negatives include no abdominal pain, arthralgias, chest pain, congestion, coughing, diaphoresis, fatigue, fever, headaches, myalgias, nausea, numbness, sore throat, vomiting or weakness.     Review of Systems   Constitutional: Negative for activity change, diaphoresis, fatigue and fever.   HENT: Negative for congestion and sore throat.    Eyes: Negative for pain.   Respiratory: Negative for cough, chest tightness, shortness of breath and wheezing.    Cardiovascular: Negative for chest pain and palpitations.   Gastrointestinal: Negative for abdominal pain, nausea and vomiting.   Musculoskeletal: Negative for arthralgias and myalgias.   Skin: Negative for color change.   Neurological: Positive for tremors. Negative for dizziness, syncope, weakness, numbness and headaches.   Psychiatric/Behavioral: Negative for dysphoric mood and sleep disturbance. The patient is not nervous/anxious.    All other systems reviewed and are negative.        Reviewed family, medical, surgical, and social history.    Objective:      /72 (BP Location: Left arm, Patient Position: Sitting, BP Method: Medium (Automatic))   " "Pulse 80   Temp 98.3 °F (36.8 °C) (Tympanic)   Resp 20   Ht 5' 10" (1.778 m)   Wt 61.9 kg (136 lb 8 oz)   SpO2 96%   BMI 19.59 kg/m²   Physical Exam  Vitals signs and nursing note reviewed.   Constitutional:       General: He is not in acute distress.     Appearance: He is well-developed and normal weight. He is not ill-appearing, toxic-appearing or diaphoretic.   HENT:      Head: Normocephalic and atraumatic.      Nose: Nose normal. No congestion or rhinorrhea.      Mouth/Throat:      Mouth: Mucous membranes are moist.      Pharynx: Oropharynx is clear. No oropharyngeal exudate or posterior oropharyngeal erythema.   Eyes:      General: No scleral icterus.        Right eye: No discharge.         Left eye: No discharge.      Conjunctiva/sclera: Conjunctivae normal.      Pupils: Pupils are equal, round, and reactive to light.   Neck:      Musculoskeletal: Normal range of motion and neck supple. No neck rigidity or muscular tenderness.      Thyroid: No thyromegaly.      Vascular: No carotid bruit or JVD.      Trachea: No tracheal deviation.   Cardiovascular:      Rate and Rhythm: Normal rate and regular rhythm.      Pulses: Normal pulses.      Heart sounds: Normal heart sounds.   Pulmonary:      Effort: Pulmonary effort is normal. No respiratory distress.      Breath sounds: Normal breath sounds. No wheezing.   Abdominal:      General: Abdomen is flat. Bowel sounds are normal.      Palpations: Abdomen is soft.   Musculoskeletal: Normal range of motion.         General: No deformity.   Lymphadenopathy:      Cervical: No cervical adenopathy.   Skin:     General: Skin is warm and dry.      Capillary Refill: Capillary refill takes less than 2 seconds.      Coloration: Skin is not jaundiced or pale.      Findings: No erythema or rash.   Neurological:      General: No focal deficit present.      Mental Status: He is alert and oriented to person, place, and time. Mental status is at baseline.      Motor: Weakness: " generalized weakness.      Deep Tendon Reflexes: Reflexes normal.   Psychiatric:         Mood and Affect: Mood normal.         Behavior: Behavior normal.         Thought Content: Thought content normal.         Judgment: Judgment normal.         Assessment:       1. Hospital discharge follow-up    2. Hyponatremia    3. Lung nodule    4. Neuropathy    5. Primary insomnia    6. CHANTELLE (generalized anxiety disorder)    7. Essential hypertension        Plan:       Hospital discharge follow-up    Hyponatremia  -     Ambulatory referral/consult to Nephrology; Future; Expected date: 09/08/2020    Lung nodule  -     CT Chest Without Contrast; Future; Expected date: 03/01/2021    Neuropathy    Primary insomnia    CHANTELLE (generalized anxiety disorder)    Essential hypertension        PLAN:  - Discussed with patient the plan of care  - Lung nodule reviewed  -  Reviewed  - Refer nephrology for low sodium evaluation  - Limit water intake as ordered  - Medications reviewed. Medication side effects discussed. Patient has no questions or concerns at this time. Informed patient to notify me regarding any concerns.   - Continue monitoring BP  - continue exercise as ordered  - F/U with Pain management as ordered  - Informed patient to please notify me with any questions or concerns at anytime  - Follow up ordered for 4 weeks        Risks, benefits, and side effects were discussed with the patient. All questions were answered to the fullest satisfaction of the patient, and pt verbalized understanding and agreement to treatment plan. Pt was to call with any new or worsening symptoms, or present to the ER.

## 2020-09-02 ENCOUNTER — LAB VISIT (OUTPATIENT)
Dept: LAB | Facility: HOSPITAL | Age: 65
End: 2020-09-02
Attending: PSYCHIATRY & NEUROLOGY
Payer: MEDICARE

## 2020-09-02 DIAGNOSIS — I25.10 CORONARY ATHEROSCLEROSIS OF NATIVE CORONARY ARTERY: Primary | ICD-10-CM

## 2020-09-02 LAB
ANION GAP SERPL CALC-SCNC: 10 MMOL/L (ref 8–16)
BUN SERPL-MCNC: 8 MG/DL (ref 8–23)
CALCIUM SERPL-MCNC: 8.6 MG/DL (ref 8.7–10.5)
CHLORIDE SERPL-SCNC: 94 MMOL/L (ref 95–110)
CO2 SERPL-SCNC: 25 MMOL/L (ref 23–29)
CREAT SERPL-MCNC: 1 MG/DL (ref 0.5–1.4)
EST. GFR  (AFRICAN AMERICAN): >60 ML/MIN/1.73 M^2
EST. GFR  (NON AFRICAN AMERICAN): >60 ML/MIN/1.73 M^2
GLUCOSE SERPL-MCNC: 115 MG/DL (ref 70–110)
POTASSIUM SERPL-SCNC: 4 MMOL/L (ref 3.5–5.1)
SODIUM SERPL-SCNC: 129 MMOL/L (ref 136–145)

## 2020-09-02 PROCEDURE — 36415 COLL VENOUS BLD VENIPUNCTURE: CPT

## 2020-09-02 PROCEDURE — 80048 BASIC METABOLIC PNL TOTAL CA: CPT

## 2020-09-10 ENCOUNTER — LAB VISIT (OUTPATIENT)
Dept: LAB | Facility: HOSPITAL | Age: 65
End: 2020-09-10
Attending: FAMILY MEDICINE
Payer: MEDICARE

## 2020-09-10 DIAGNOSIS — I25.10 CORONARY ATHEROSCLEROSIS OF NATIVE CORONARY ARTERY: ICD-10-CM

## 2020-09-10 DIAGNOSIS — E87.1 HYPOSMOLALITY SYNDROME: Primary | ICD-10-CM

## 2020-09-10 LAB
ANION GAP SERPL CALC-SCNC: 12 MMOL/L (ref 8–16)
BUN SERPL-MCNC: 6 MG/DL (ref 8–23)
CALCIUM SERPL-MCNC: 8.7 MG/DL (ref 8.7–10.5)
CHLORIDE SERPL-SCNC: 86 MMOL/L (ref 95–110)
CO2 SERPL-SCNC: 27 MMOL/L (ref 23–29)
CREAT SERPL-MCNC: 0.8 MG/DL (ref 0.5–1.4)
EST. GFR  (AFRICAN AMERICAN): >60 ML/MIN/1.73 M^2
EST. GFR  (NON AFRICAN AMERICAN): >60 ML/MIN/1.73 M^2
GLUCOSE SERPL-MCNC: 161 MG/DL (ref 70–110)
POTASSIUM SERPL-SCNC: 3.9 MMOL/L (ref 3.5–5.1)
SODIUM SERPL-SCNC: 125 MMOL/L (ref 136–145)

## 2020-09-10 PROCEDURE — 80048 BASIC METABOLIC PNL TOTAL CA: CPT

## 2020-09-10 PROCEDURE — 36415 COLL VENOUS BLD VENIPUNCTURE: CPT

## 2020-09-15 PROCEDURE — G0179 PR HOME HEALTH MD RECERTIFICATION: ICD-10-PCS | Mod: ,,, | Performed by: FAMILY MEDICINE

## 2020-09-15 PROCEDURE — G0179 MD RECERTIFICATION HHA PT: HCPCS | Mod: ,,, | Performed by: FAMILY MEDICINE

## 2020-09-16 ENCOUNTER — DOCUMENT SCAN (OUTPATIENT)
Dept: HOME HEALTH SERVICES | Facility: HOSPITAL | Age: 65
End: 2020-09-16
Payer: MEDICARE

## 2020-09-16 PROCEDURE — 99499 UNLISTED E&M SERVICE: CPT | Mod: ,,, | Performed by: FAMILY MEDICINE

## 2020-09-16 PROCEDURE — 99499 NO LOS: ICD-10-PCS | Mod: ,,, | Performed by: FAMILY MEDICINE

## 2020-09-17 ENCOUNTER — PATIENT OUTREACH (OUTPATIENT)
Dept: ADMINISTRATIVE | Facility: HOSPITAL | Age: 65
End: 2020-09-17

## 2020-09-17 ENCOUNTER — LAB VISIT (OUTPATIENT)
Dept: LAB | Facility: HOSPITAL | Age: 65
End: 2020-09-17
Attending: PSYCHIATRY & NEUROLOGY
Payer: MEDICARE

## 2020-09-17 DIAGNOSIS — I25.2 OLD MYOCARDIAL INFARCTION: ICD-10-CM

## 2020-09-17 DIAGNOSIS — I25.10 CORONARY ATHEROSCLEROSIS OF NATIVE CORONARY ARTERY: Primary | ICD-10-CM

## 2020-09-17 DIAGNOSIS — I73.9 PERIPHERAL VASCULAR DISEASE, UNSPECIFIED: ICD-10-CM

## 2020-09-17 LAB
ANION GAP SERPL CALC-SCNC: 13 MMOL/L (ref 8–16)
BUN SERPL-MCNC: 6 MG/DL (ref 8–23)
CALCIUM SERPL-MCNC: 8.5 MG/DL (ref 8.7–10.5)
CHLORIDE SERPL-SCNC: 89 MMOL/L (ref 95–110)
CO2 SERPL-SCNC: 24 MMOL/L (ref 23–29)
CREAT SERPL-MCNC: 0.7 MG/DL (ref 0.5–1.4)
EST. GFR  (AFRICAN AMERICAN): >60 ML/MIN/1.73 M^2
EST. GFR  (NON AFRICAN AMERICAN): >60 ML/MIN/1.73 M^2
GLUCOSE SERPL-MCNC: 140 MG/DL (ref 70–110)
POTASSIUM SERPL-SCNC: 3.7 MMOL/L (ref 3.5–5.1)
SODIUM SERPL-SCNC: 126 MMOL/L (ref 136–145)

## 2020-09-17 PROCEDURE — 80048 BASIC METABOLIC PNL TOTAL CA: CPT

## 2020-09-17 PROCEDURE — 36415 COLL VENOUS BLD VENIPUNCTURE: CPT

## 2020-09-20 ENCOUNTER — HOSPITAL ENCOUNTER (EMERGENCY)
Facility: HOSPITAL | Age: 65
Discharge: HOME | End: 2020-09-20
Attending: EMERGENCY MEDICINE
Payer: MEDICARE

## 2020-09-20 VITALS
HEART RATE: 67 BPM | BODY MASS INDEX: 19.76 KG/M2 | OXYGEN SATURATION: 97 % | TEMPERATURE: 98 F | DIASTOLIC BLOOD PRESSURE: 99 MMHG | SYSTOLIC BLOOD PRESSURE: 192 MMHG | RESPIRATION RATE: 17 BRPM | WEIGHT: 138 LBS | HEIGHT: 70 IN

## 2020-09-20 DIAGNOSIS — R53.1 GENERALIZED WEAKNESS: ICD-10-CM

## 2020-09-20 LAB
ALBUMIN SERPL BCP-MCNC: 4.1 G/DL (ref 3.5–5.2)
ALP SERPL-CCNC: 68 U/L (ref 55–135)
ALT SERPL W/O P-5'-P-CCNC: 15 U/L (ref 10–44)
ANION GAP SERPL CALC-SCNC: 10 MMOL/L (ref 8–16)
APTT BLDCRRT: 27.9 SEC (ref 21–32)
AST SERPL-CCNC: 18 U/L (ref 10–40)
BASOPHILS # BLD AUTO: 0.04 K/UL (ref 0–0.2)
BASOPHILS NFR BLD: 0.7 % (ref 0–1.9)
BILIRUB SERPL-MCNC: 0.9 MG/DL (ref 0.1–1)
BILIRUB UR QL STRIP: NEGATIVE
BUN SERPL-MCNC: 5 MG/DL (ref 8–23)
CALCIUM SERPL-MCNC: 8.6 MG/DL (ref 8.7–10.5)
CHLORIDE SERPL-SCNC: 93 MMOL/L (ref 95–110)
CLARITY UR: CLEAR
CO2 SERPL-SCNC: 29 MMOL/L (ref 23–29)
COLOR UR: YELLOW
CREAT SERPL-MCNC: 0.8 MG/DL (ref 0.5–1.4)
DIFFERENTIAL METHOD: ABNORMAL
EOSINOPHIL # BLD AUTO: 0.1 K/UL (ref 0–0.5)
EOSINOPHIL NFR BLD: 2 % (ref 0–8)
ERYTHROCYTE [DISTWIDTH] IN BLOOD BY AUTOMATED COUNT: 12.7 % (ref 11.5–14.5)
EST. GFR  (AFRICAN AMERICAN): >60 ML/MIN/1.73 M^2
EST. GFR  (NON AFRICAN AMERICAN): >60 ML/MIN/1.73 M^2
GLUCOSE SERPL-MCNC: 88 MG/DL (ref 70–110)
GLUCOSE UR QL STRIP: NEGATIVE
HCT VFR BLD AUTO: 36.9 % (ref 40–54)
HGB BLD-MCNC: 12.4 G/DL (ref 14–18)
HGB UR QL STRIP: NEGATIVE
IMM GRANULOCYTES # BLD AUTO: 0.01 K/UL (ref 0–0.04)
IMM GRANULOCYTES NFR BLD AUTO: 0.2 % (ref 0–0.5)
INR PPP: 1 (ref 0.8–1.2)
KETONES UR QL STRIP: NEGATIVE
LEUKOCYTE ESTERASE UR QL STRIP: NEGATIVE
LYMPHOCYTES # BLD AUTO: 1.2 K/UL (ref 1–4.8)
LYMPHOCYTES NFR BLD: 21.5 % (ref 18–48)
MAGNESIUM SERPL-MCNC: 1.9 MG/DL (ref 1.6–2.6)
MCH RBC QN AUTO: 32.3 PG (ref 27–31)
MCHC RBC AUTO-ENTMCNC: 33.6 G/DL (ref 32–36)
MCV RBC AUTO: 96 FL (ref 82–98)
MONOCYTES # BLD AUTO: 0.6 K/UL (ref 0.3–1)
MONOCYTES NFR BLD: 11.2 % (ref 4–15)
NEUTROPHILS # BLD AUTO: 3.6 K/UL (ref 1.8–7.7)
NEUTROPHILS NFR BLD: 64.4 % (ref 38–73)
NITRITE UR QL STRIP: NEGATIVE
NRBC BLD-RTO: 0 /100 WBC
PH UR STRIP: 8 [PH] (ref 5–8)
PLATELET # BLD AUTO: 244 K/UL (ref 150–350)
PMV BLD AUTO: 9.3 FL (ref 9.2–12.9)
POTASSIUM SERPL-SCNC: 3.6 MMOL/L (ref 3.5–5.1)
PROT SERPL-MCNC: 7.4 G/DL (ref 6–8.4)
PROT UR QL STRIP: NEGATIVE
PROTHROMBIN TIME: 10.4 SEC (ref 9–12.5)
RBC # BLD AUTO: 3.84 M/UL (ref 4.6–6.2)
SARS-COV-2 RDRP RESP QL NAA+PROBE: NEGATIVE
SODIUM SERPL-SCNC: 132 MMOL/L (ref 136–145)
SP GR UR STRIP: 1.02 (ref 1–1.03)
TROPONIN I SERPL DL<=0.01 NG/ML-MCNC: <0.01 NG/ML (ref 0.02–0.5)
URN SPEC COLLECT METH UR: NORMAL
UROBILINOGEN UR STRIP-ACNC: 1 EU/DL
WBC # BLD AUTO: 5.53 K/UL (ref 3.9–12.7)

## 2020-09-20 PROCEDURE — U0002 COVID-19 LAB TEST NON-CDC: HCPCS

## 2020-09-20 PROCEDURE — 81003 URINALYSIS AUTO W/O SCOPE: CPT

## 2020-09-20 PROCEDURE — 94760 N-INVAS EAR/PLS OXIMETRY 1: CPT

## 2020-09-20 PROCEDURE — 99285 EMERGENCY DEPT VISIT HI MDM: CPT | Mod: 25

## 2020-09-20 PROCEDURE — 25000003 PHARM REV CODE 250: Performed by: EMERGENCY MEDICINE

## 2020-09-20 PROCEDURE — 93005 ELECTROCARDIOGRAM TRACING: CPT

## 2020-09-20 PROCEDURE — 71045 XR CHEST AP PORTABLE: ICD-10-PCS | Mod: 26,,, | Performed by: RADIOLOGY

## 2020-09-20 PROCEDURE — 96360 HYDRATION IV INFUSION INIT: CPT

## 2020-09-20 PROCEDURE — 83735 ASSAY OF MAGNESIUM: CPT

## 2020-09-20 PROCEDURE — 84484 ASSAY OF TROPONIN QUANT: CPT

## 2020-09-20 PROCEDURE — 85610 PROTHROMBIN TIME: CPT

## 2020-09-20 PROCEDURE — 80053 COMPREHEN METABOLIC PANEL: CPT

## 2020-09-20 PROCEDURE — 85025 COMPLETE CBC W/AUTO DIFF WBC: CPT

## 2020-09-20 PROCEDURE — 85730 THROMBOPLASTIN TIME PARTIAL: CPT

## 2020-09-20 PROCEDURE — 71045 X-RAY EXAM CHEST 1 VIEW: CPT | Mod: TC,FY

## 2020-09-20 PROCEDURE — 71045 X-RAY EXAM CHEST 1 VIEW: CPT | Mod: 26,,, | Performed by: RADIOLOGY

## 2020-09-20 RX ORDER — CLONIDINE HYDROCHLORIDE 0.1 MG/1
0.2 TABLET ORAL
Status: COMPLETED | OUTPATIENT
Start: 2020-09-20 | End: 2020-09-20

## 2020-09-20 RX ORDER — OXYCODONE AND ACETAMINOPHEN 5; 325 MG/1; MG/1
1 TABLET ORAL
Status: COMPLETED | OUTPATIENT
Start: 2020-09-20 | End: 2020-09-20

## 2020-09-20 RX ORDER — MECLIZINE HCL 12.5 MG 12.5 MG/1
12.5 TABLET ORAL
Status: COMPLETED | OUTPATIENT
Start: 2020-09-20 | End: 2020-09-20

## 2020-09-20 RX ADMIN — SODIUM CHLORIDE 1000 ML: 9 INJECTION, SOLUTION INTRAVENOUS at 06:09

## 2020-09-20 RX ADMIN — CLONIDINE HYDROCHLORIDE 0.2 MG: 0.1 TABLET ORAL at 07:09

## 2020-09-20 RX ADMIN — MECLIZINE 12.5 MG: 12.5 TABLET ORAL at 05:09

## 2020-09-20 RX ADMIN — OXYCODONE HYDROCHLORIDE AND ACETAMINOPHEN 1 TABLET: 5; 325 TABLET ORAL at 07:09

## 2020-09-20 NOTE — ED PROVIDER NOTES
Encounter Date: 9/20/2020       History     Chief Complaint   Patient presents with    Weakness     Patient complaining of weakness, vertigo, headache and cough.    Headache    Cough    Dizziness     65-year-old with past medical history significant for anxiety, chronic back pain, COPD, depression, hypertension, nephrolithiasis, CAD with MI, CVA 2017, and multiple hospitalizations for symptomatic hyponatremia presents to the ED as a walk-in for evaluation of generalized weakness, dizziness, intermittent nonradiating aching headache and nonproductive cough x2 days.  Denies fever, chills.  Denies chest pain, dyspnea, palpitations, diaphoresis, edema, weight gain.  Denies abdominal pain, nausea, vomiting, diarrhea, constipation.  Denies melena, hematochezia, hemoptysis, hematemesis, hematuria.        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, including shoulder     Cancer 2005    right eye    Colon polyps     COPD (chronic obstructive pulmonary disease)     Depression     Hypertension     Inguinal hernia     left    Kidney stone     MI (myocardial infarction)     Smoker, trying to quit again, 1 cig last a week, started age 17, quit for 42 years, restarted 2018 7/24/2019    Stroke     last stroke 2017    Tremor     Ulcer      Past Surgical History:   Procedure Laterality Date    ANGIOPLASTY      cardiac stent    CARDIAC CATHETERIZATION      COLECTOMY      EYE SURGERY      FINGER SURGERY Left     left index finger    HERNIA REPAIR      HIP ARTHROPLASTY Right 12/20/2019    Procedure: ARTHROPLASTY, HIP, Rector, pegboard, 1st assist;  Surgeon: Kody Patel MD;  Location: [facility];  Service: Orthopedics;  Laterality:  Right;    OLECRANON BURSECTOMY Right 2018    Procedure: BURSECTOMY, OLECRANON;  Surgeon: Atul Cooper DO;  Location: [facility] OR;  Service: Orthopedics;  Laterality: Right;  Excision Olecranon Bursa Right Elbow    POLYPECTOMY      REPAIR OF TRICEPS TENDON Right 2018    Procedure: REPAIR, TENDON, TRICEPS;  Surgeon: Atul Cooper DO;  Location: [facility] OR;  Service: Orthopedics;  Laterality: Right;    SURGICAL REMOVAL OF BONE SPUR Right 2018    Procedure: EXCISION, BONE SPUR;  Surgeon: Atul Cooper DO;  Location: [facility] OR;  Service: Orthopedics;  Laterality: Right;  Excision Olecranon Bone Spur Right Elbow     Family History   Problem Relation Age of Onset    Heart disease Mother     Heart disease Father     Cancer Father     Dementia Brother     Heart disease Brother      Social History     Tobacco Use    Smoking status: Former Smoker     Years: 15.00     Types: Cigarettes     Quit date: 2018     Years since quittin.3    Smokeless tobacco: Current User     Types: Chew    Tobacco comment: Pt trying to quit   Substance Use Topics    Alcohol use: Yes     Alcohol/week: 2.0 - 3.0 standard drinks     Types: 2 - 3 Cans of beer per week     Comment: occasionally beer    Drug use: No     Review of Systems   Constitutional: Negative for appetite change, chills, diaphoresis, fatigue and fever.   HENT: Negative for congestion, ear pain, rhinorrhea, sinus pressure, sinus pain, sore throat and tinnitus.    Eyes: Negative for photophobia and visual disturbance.   Respiratory: Positive for cough. Negative for chest tightness, shortness of breath and wheezing.    Cardiovascular: Negative for chest pain, palpitations and leg swelling.   Gastrointestinal: Negative for abdominal pain, constipation, diarrhea, nausea and vomiting.   Endocrine: Negative for cold intolerance, heat intolerance, polydipsia, polyphagia and polyuria.   Genitourinary: Negative for decreased urine volume, difficulty  urinating, dysuria, flank pain, frequency, hematuria and urgency.   Musculoskeletal: Negative for arthralgias, back pain, gait problem, joint swelling, myalgias, neck pain and neck stiffness.   Skin: Negative for color change, pallor, rash and wound.   Allergic/Immunologic: Negative for immunocompromised state.   Neurological: Positive for dizziness, weakness and headaches. Negative for syncope, light-headedness and numbness.   Hematological: Negative for adenopathy. Does not bruise/bleed easily.   Psychiatric/Behavioral: Negative for decreased concentration, dysphoric mood and sleep disturbance. The patient is not nervous/anxious.    All other systems reviewed and are negative.      Physical Exam     Initial Vitals [09/20/20 1650]   BP Pulse Resp Temp SpO2   (!) 185/95 72 20 98.1 °F (36.7 °C) 99 %      MAP       --         Physical Exam    Nursing note and vitals reviewed.  Constitutional: He appears well-developed and well-nourished. He is not diaphoretic. No distress.   HENT:   Head: Normocephalic and atraumatic.   Right Ear: External ear normal.   Left Ear: External ear normal.   Nose: Nose normal.   Mouth/Throat: Oropharynx is clear and moist.   Eyes: Conjunctivae are normal. Pupils are equal, round, and reactive to light. No scleral icterus.   Neck: Normal range of motion. Neck supple. No JVD present.   Cardiovascular: Normal rate, regular rhythm, normal heart sounds and intact distal pulses.   Pulmonary/Chest: Breath sounds normal. No respiratory distress. He has no wheezes. He has no rhonchi. He has no rales. He exhibits no tenderness.   Abdominal: Soft. Bowel sounds are normal. He exhibits no distension. There is no abdominal tenderness. There is no rebound and no guarding.   Musculoskeletal: Normal range of motion. No tenderness or edema.   Lymphadenopathy:     He has no cervical adenopathy.   Neurological: He is alert and oriented to person, place, and time. GCS score is 15. GCS eye subscore is 4. GCS  verbal subscore is 5. GCS motor subscore is 6.   Skin: Skin is warm and dry. Capillary refill takes less than 2 seconds. No rash and no abscess noted. No erythema. No pallor.   Psychiatric: He has a normal mood and affect. His behavior is normal. Judgment and thought content normal.         ED Course   Procedures  Labs Reviewed   CBC W/ AUTO DIFFERENTIAL - Abnormal; Notable for the following components:       Result Value    RBC 3.84 (*)     Hemoglobin 12.4 (*)     Hematocrit 36.9 (*)     Mean Corpuscular Hemoglobin 32.3 (*)     All other components within normal limits   COMPREHENSIVE METABOLIC PANEL - Abnormal; Notable for the following components:    Sodium 132 (*)     Chloride 93 (*)     BUN, Bld 5 (*)     Calcium 8.6 (*)     All other components within normal limits   TROPONIN I - Abnormal; Notable for the following components:    Troponin I <0.01 (*)     All other components within normal limits   MAGNESIUM   URINALYSIS, REFLEX TO URINE CULTURE    Narrative:     Specimen Source->Urine   PROTIME-INR   APTT   SARS-COV-2 RNA AMPLIFICATION, QUAL        ECG Results          EKG 12-lead (Final result)  Result time 09/21/20 09:03:20    Final result by Interface, Lab In [facility] (09/21/20 09:03:20)                 Narrative:    Test Reason : R53.1,    Vent. Rate : 065 BPM     Atrial Rate : 065 BPM     P-R Int : 138 ms          QRS Dur : 088 ms      QT Int : 432 ms       P-R-T Axes : 087 090 075 degrees     QTc Int : 449 ms    Normal sinus rhythm  Possible Left atrial enlargement  Rightward axis  Borderline Abnormal ECG  When compared with ECG of 25-AUG-2020 07:17,  No significant change was found  Confirmed by Dale Hutton MD ([id number]) on 9/21/2020 9:03:00 AM    Referred By: [email or web address]   SELF           Confirmed By:Dale Hutton MD                            Imaging Results          X-Ray Chest AP Portable (Final result)  Result time 09/21/20 07:51:35    Final result by Westley Brooke MD (09/21/20 07:51:35)                  Impression:      No acute abnormality.      Electronically signed by: Westley Brooke  Date:    09/21/2020  Time:    07:51             Narrative:    EXAMINATION:  XR CHEST AP PORTABLE    CLINICAL HISTORY:  . Weakness    TECHNIQUE:  Single frontal portable view of the chest was performed.    COMPARISON:  08/24/2020 and 07/08/2020.    FINDINGS:  Support devices: None    The lungs are clear, with normal appearance of pulmonary vasculature and no pleural effusion or pneumothorax.    The cardiac silhouette is normal in size. The hilar and mediastinal contours are unremarkable.    Bones are intact.                                 Medical Decision Making:   Differential Diagnosis:   Acute cerebrovascular accident, transient ischemic attack, hypoglycemia, syncope, near syncope, acute renal failure, acute myocardial infarction, coronavirus, pneumonia, sepsis, severe sepsis, septic shock, acute intracranial hemorrhage, increased intracranial pressure, orthostatic hypotension, seizure disorder, electrolyte imbalance, hyponatremia  ED Management:  As we discussed, it is important that you return to the ER for any new concerns or symptoms, worsening of your existing symptoms, if you do not completely improve, or if you are unable to be seen by your primary care provider.    Some medical conditions are difficult to diagnose and may not be identified during an ER visit. Today, we did not find a medical condition that required inpatient admission, but please remember that medical conditions can change, and we cannot predict how you will be feeling tomorrow or the next day, so if you have any worsening or new symptoms, you should not hesitate to return to the emergency department for reevaluation.     Be sure to follow up with your primary care doctor for a recheck and to review any labs/imaging that were performed today.  If you do not have a primary care doctor, you may contact the one listed on your discharge paperwork  or you may also call the [facility] Appointment Desk at [phone] to schedule an appointment with one.       All medications have side effects.  We have done our best to select a medication for you that will treat your health problem and have the least amount of side effects.  If at any time while or after you take this medication you develops new symptoms or have any concerns, it is important you stop taking the medication and call your primary care provider or return to the emergency department for evaluation.    Do not drive or operate heavy machinery for 24 hours if you have received any pain medications, sedatives or mood altering drugs during your ER visit.                   ED Course as of Sep 26 1320   Sun Sep 20, 2020   1647 C/o gen weakness, headache, dizziness, cough, abd pain, diarrhea, bilateral ear pain & L sided neck pain radiating down L side -- has chronic hyponatremia    I have performed the rapid medical exam (RME) portion of the medical screening exam (MSE) & have determined that this patient requires further evaluation and/or testing to determine if an emergent medical condition exists     [DH]      ED Course User Index  [DH] Rj Matta NP            Clinical Impression:       ICD-10-CM ICD-9-CM   1. Generalized weakness  R53.1 780.79   2. Generalized weakness  R53.1 780.79                      Disposition:   Disposition: Discharged  Condition: Stable     ED Disposition Condition    Discharge Stable        ED Prescriptions     None        Follow-up Information     Follow up With Specialties Details Why Contact Info    Hiram Leija MD Family Medicine In 1 day  [address]  [place] [address]  [phone]      [facility] - ED Emergency Medicine  As needed, If symptoms worsen [address] [facility] [address] [phone]    Hiram Leija MD Family Medicine In 1 day  [address]  [place]  [address]  [phone]      [facility] - ED Emergency Medicine  As needed, If symptoms worsen [address] [facility] [address] [phone]                                       Britni Saunders MD  09/26/20 9735

## 2020-09-20 NOTE — ED PROVIDER NOTES
Encounter Date: 9/20/2020       History     Chief Complaint   Patient presents with    Weakness     Patient complaining of weakness, vertigo, headache and cough.    Headache    Cough    Dizziness     Patient care assumed at shift change from Dr. Saunders.  Patient had presented complaining of generalized weakness, headache, cough, and occasional dizziness.  He has had most of the symptoms in the past, and states that most of his complaints today are chronic and have been present for at least 4 months.  He states that over the last few days the headache and the cough had become worse.  Denies fevers or chills at home.  Patient is usually seen here complaining of generalized weakness and has had chronically low sodium levels.  When I assumed care, most of the patient's lab work was done, and his sodium is actually good.  No elevated white blood cell count.  He also reported that his dizziness had resolved after taking meclizine here.        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, including shoulder     Cancer 2005    right eye    Colon polyps     COPD (chronic obstructive pulmonary disease)     Depression     Hypertension     Inguinal hernia     left    Kidney stone     MI (myocardial infarction)     Smoker, trying to quit again, 1 cig last a week, started age 17, quit for 42 years, restarted 2018 7/24/2019    Stroke     last stroke 2017    Tremor     Ulcer      Past Surgical History:   Procedure Laterality Date    ANGIOPLASTY      cardiac stent    CARDIAC CATHETERIZATION      COLECTOMY      EYE SURGERY      FINGER SURGERY Left     left index finger    HERNIA REPAIR      HIP ARTHROPLASTY Right 12/20/2019    Procedure:  ARTHROPLASTY, HIP, Lindy, pegboard, 1st assist;  Surgeon: Kody Patel MD;  Location: [facility] OR;  Service: Orthopedics;  Laterality: Right;    OLECRANON BURSECTOMY Right 2018    Procedure: BURSECTOMY, OLECRANON;  Surgeon: Atul Cooper DO;  Location: [facility] OR;  Service: Orthopedics;  Laterality: Right;  Excision Olecranon Bursa Right Elbow    POLYPECTOMY      REPAIR OF TRICEPS TENDON Right 2018    Procedure: REPAIR, TENDON, TRICEPS;  Surgeon: Atul Cooper DO;  Location: [facility] OR;  Service: Orthopedics;  Laterality: Right;    SURGICAL REMOVAL OF BONE SPUR Right 2018    Procedure: EXCISION, BONE SPUR;  Surgeon: Atul Cooper DO;  Location: [facility] OR;  Service: Orthopedics;  Laterality: Right;  Excision Olecranon Bone Spur Right Elbow     Family History   Problem Relation Age of Onset    Heart disease Mother     Heart disease Father     Cancer Father     Dementia Brother     Heart disease Brother      Social History     Tobacco Use    Smoking status: Former Smoker     Years: 15.00     Types: Cigarettes     Quit date: 2018     Years since quittin.2    Smokeless tobacco: Current User     Types: Chew    Tobacco comment: Pt trying to quit   Substance Use Topics    Alcohol use: Yes     Alcohol/week: 2.0 - 3.0 standard drinks     Types: 2 - 3 Cans of beer per week     Comment: occasionally beer    Drug use: No     Review of Systems   Constitutional: Negative for chills and fever.   HENT: Negative for congestion, rhinorrhea and sore throat.    Eyes: Negative for photophobia and visual disturbance.   Respiratory: Positive for cough. Negative for chest tightness, shortness of breath and wheezing.    Cardiovascular: Negative for chest pain and palpitations.   Gastrointestinal: Negative for abdominal pain, nausea and vomiting.   Endocrine: Negative for polydipsia and polyuria.   Genitourinary: Negative for difficulty urinating, dysuria, frequency and hematuria.    Musculoskeletal: Positive for back pain (Chronic back pain with radiation to the left leg).   Skin: Negative for pallor and rash.   Neurological: Positive for dizziness and weakness. Negative for syncope, facial asymmetry, speech difficulty, light-headedness and numbness.   Psychiatric/Behavioral: Negative for confusion, decreased concentration and dysphoric mood. The patient is not nervous/anxious.        Physical Exam     Initial Vitals [09/20/20 1650]   BP Pulse Resp Temp SpO2   (!) 185/95 72 20 98.1 °F (36.7 °C) 99 %      MAP       --         Physical Exam    Nursing note and vitals reviewed.  Constitutional: He appears well-developed and well-nourished. No distress.   HENT:   Head: Normocephalic and atraumatic.   Nose: Nose normal.   Mouth/Throat: Oropharynx is clear and moist. No oropharyngeal exudate.   Eyes: EOM are normal. Pupils are equal, round, and reactive to light. Right eye exhibits no discharge. Left eye exhibits no discharge. No scleral icterus.   Neck: Normal range of motion. Neck supple. No thyromegaly present.   Cardiovascular: Normal rate, regular rhythm, normal heart sounds and intact distal pulses.   No murmur heard.  Pulmonary/Chest: Breath sounds normal. No stridor. No respiratory distress. He has no wheezes. He has no rhonchi.   Abdominal: Soft. He exhibits no distension. There is no abdominal tenderness.   Musculoskeletal: Normal range of motion. No tenderness or edema.   Neurological: He is alert and oriented to person, place, and time. He has normal strength and normal reflexes. He displays normal reflexes. No cranial nerve deficit or sensory deficit. GCS score is 15. GCS eye subscore is 4. GCS verbal subscore is 5. GCS motor subscore is 6.   Skin: Skin is warm and dry. Capillary refill takes less than 2 seconds. No rash noted. No erythema.   Psychiatric: He has a normal mood and affect. His behavior is normal.         ED Course   Procedures  Labs Reviewed   CBC W/ AUTO DIFFERENTIAL -  Abnormal; Notable for the following components:       Result Value    RBC 3.84 (*)     Hemoglobin 12.4 (*)     Hematocrit 36.9 (*)     Mean Corpuscular Hemoglobin 32.3 (*)     All other components within normal limits   COMPREHENSIVE METABOLIC PANEL - Abnormal; Notable for the following components:    Sodium 132 (*)     Chloride 93 (*)     BUN, Bld 5 (*)     Calcium 8.6 (*)     All other components within normal limits   TROPONIN I - Abnormal; Notable for the following components:    Troponin I <0.01 (*)     All other components within normal limits   MAGNESIUM   URINALYSIS, REFLEX TO URINE CULTURE    Narrative:     Specimen Source->Urine   PROTIME-INR   APTT   SARS-COV-2 RNA AMPLIFICATION, QUAL     EKG Readings: (Independently Interpreted)   EKG, personally reviewed by me, shows normal sinus rhythm at 65 beats per minute.  MA interval 138, .  Possible left atrial enlargement and rightward axis, but no ST elevations or depressions or T-wave changes.       Imaging Results          X-Ray Chest AP Portable (In process)                  Medical Decision Making:   Differential Diagnosis:   Chronic vertigo, chronic lumbar pain with left leg sciatica, chronic hyponatremia, acute on chronic generalized weakness, dizziness, nonproductive cough, abdominal pain  ED Management:  Patient's workup was complete except for COVID test when I assumed care.  His sodium level is actually quite good for him.  COVID 19 is negative.  I spoke to the patient about the findings.  He denies any further dizziness but complained of headache and chronic left lower back pain and left leg pain.  At that time his blood pressure was elevated as well.  Patient states he has been compliant with his blood pressure medications.  Patient is also about to start a pain management program, and has an appointment to see a pain management doctor in about a week.  Here in the emergency department, he had been given meclizine for his dizziness with good  result.  I ordered 1 Percocet tablet, and clonidine 0.2 mg by mouth.  After taking these medications he did report relief.  I do not believe he needs further workup or hospitalization at this time.  He will follow-up with Dr. Leija in the morning, and he will also talk to his neurologist about his headaches and chronic back and leg pain.  Return here as needed or if worse in any way.                   ED Course as of Sep 20 1919   Sun Sep 20, 2020   1647 C/o gen weakness, headache, dizziness, cough, abd pain, diarrhea, bilateral ear pain & L sided neck pain radiating down L side -- has chronic hyponatremia    I have performed the rapid medical exam (RME) portion of the medical screening exam (MSE) & have determined that this patient requires further evaluation and/or testing to determine if an emergent medical condition exists     [DH]      ED Course User Index  [DH] Rj Matta NP            Clinical Impression:       ICD-10-CM ICD-9-CM   1. Generalized weakness  R53.1 780.79   2. Generalized weakness  R53.1 780.79                          ED Disposition Condition    Discharge Stable        ED Prescriptions     None        Follow-up Information     Follow up With Specialties Details Why Contact Info    Hiram Leija MD Family Medicine In 1 day  [address]  [facility] [address]  [phone]      [facility] - ED Emergency Medicine  As needed, If symptoms worsen [address] [facility] [address] [phone]    Hiram Leija MD Family Medicine In 1 day  [address]  [facility] [address]  [phone]      [facility] - ED Emergency Medicine  As needed, If symptoms worsen [address] [facility] [id number] [phone]                                       Garo Ospina MD  09/20/20 8419

## 2020-09-21 ENCOUNTER — TELEPHONE (OUTPATIENT)
Dept: FAMILY MEDICINE | Facility: CLINIC | Age: 65
End: 2020-09-21

## 2020-09-21 DIAGNOSIS — F41.1 GAD (GENERALIZED ANXIETY DISORDER): ICD-10-CM

## 2020-09-21 RX ORDER — ALPRAZOLAM 1 MG/1
TABLET ORAL
Qty: 120 TABLET | Refills: 0 | Status: SHIPPED | OUTPATIENT
Start: 2020-09-21 | End: 2020-10-19

## 2020-09-21 RX ORDER — HYDRALAZINE HYDROCHLORIDE 50 MG/1
50 TABLET, FILM COATED ORAL 2 TIMES DAILY
Qty: 180 TABLET | Refills: 3 | Status: ON HOLD | OUTPATIENT
Start: 2020-09-21 | End: 2020-11-02 | Stop reason: SDUPTHER

## 2020-09-21 NOTE — DISCHARGE INSTRUCTIONS
Continue your previously prescribed medications and treatments in keep your appointment with the pain management doctor.  Also call Dr. Leija in the morning and call your neurologist within the next 2-3 days.  Return here if worse in any way.

## 2020-09-21 NOTE — TELEPHONE ENCOUNTER
Pt contacted. Issue resolved.              ----- Message from Isabel Luis sent at 9/21/2020  2:14 PM CDT -----  Contact: Patient  Type: Needs Medical Advice  Who Called: Patient  Best Call Back Number: [phone] (home)   Additional Information: The patient said to call [facility] so that he can get his Rx  he said he needs them today and wants to know why the pharm is holding them he said he is out of his BP meds and he needs them please advise.

## 2020-09-21 NOTE — TELEPHONE ENCOUNTER
----- Message from Magnolia Bridges sent at 9/21/2020 11:14 AM CDT -----  Type: Needs Medical Advice  Who Called:  pt  Symptoms (please be specific):    How long has patient had these symptoms:    Pharmacy name and phone #:    Best Call Back Number:   Additional Information: pt requesting a call back from nurse to discuss medical problems/ medication and pain. Please contact pt

## 2020-09-21 NOTE — TELEPHONE ENCOUNTER
Pt requesting refills on these two meds. Has only been getting a 30 day supply from pharmacy. Also requesting Rx to treat decreased sodium level. Please advise.

## 2020-09-22 ENCOUNTER — EXTERNAL HOME HEALTH (OUTPATIENT)
Dept: HOME HEALTH SERVICES | Facility: HOSPITAL | Age: 65
End: 2020-09-22
Payer: MEDICARE

## 2020-09-23 ENCOUNTER — OFFICE VISIT (OUTPATIENT)
Dept: PAIN MEDICINE | Facility: CLINIC | Age: 65
End: 2020-09-23
Payer: MEDICARE

## 2020-09-23 ENCOUNTER — PATIENT OUTREACH (OUTPATIENT)
Dept: ADMINISTRATIVE | Facility: OTHER | Age: 65
End: 2020-09-23

## 2020-09-23 ENCOUNTER — DOCUMENT SCAN (OUTPATIENT)
Dept: HOME HEALTH SERVICES | Facility: HOSPITAL | Age: 65
End: 2020-09-23
Payer: MEDICARE

## 2020-09-23 VITALS
BODY MASS INDEX: 19.76 KG/M2 | SYSTOLIC BLOOD PRESSURE: 150 MMHG | RESPIRATION RATE: 17 BRPM | OXYGEN SATURATION: 97 % | HEART RATE: 67 BPM | TEMPERATURE: 97 F | DIASTOLIC BLOOD PRESSURE: 87 MMHG | WEIGHT: 138 LBS | HEIGHT: 70 IN

## 2020-09-23 DIAGNOSIS — M54.9 DORSALGIA, UNSPECIFIED: ICD-10-CM

## 2020-09-23 DIAGNOSIS — M51.36 DDD (DEGENERATIVE DISC DISEASE), LUMBAR: Primary | ICD-10-CM

## 2020-09-23 DIAGNOSIS — G62.9 NEUROPATHY: ICD-10-CM

## 2020-09-23 PROCEDURE — 3079F DIAST BP 80-89 MM HG: CPT | Mod: CPTII,S$GLB,, | Performed by: ANESTHESIOLOGY

## 2020-09-23 PROCEDURE — 3288F PR FALLS RISK ASSESSMENT DOCUMENTED: ICD-10-PCS | Mod: CPTII,S$GLB,, | Performed by: ANESTHESIOLOGY

## 2020-09-23 PROCEDURE — 3008F BODY MASS INDEX DOCD: CPT | Mod: CPTII,S$GLB,, | Performed by: ANESTHESIOLOGY

## 2020-09-23 PROCEDURE — 1100F PR PT FALLS ASSESS DOC 2+ FALLS/FALL W/INJURY/YR: ICD-10-PCS | Mod: CPTII,S$GLB,, | Performed by: ANESTHESIOLOGY

## 2020-09-23 PROCEDURE — 3077F SYST BP >= 140 MM HG: CPT | Mod: CPTII,S$GLB,, | Performed by: ANESTHESIOLOGY

## 2020-09-23 PROCEDURE — 3288F FALL RISK ASSESSMENT DOCD: CPT | Mod: CPTII,S$GLB,, | Performed by: ANESTHESIOLOGY

## 2020-09-23 PROCEDURE — 1159F MED LIST DOCD IN RCRD: CPT | Mod: S$GLB,,, | Performed by: ANESTHESIOLOGY

## 2020-09-23 PROCEDURE — 99204 OFFICE O/P NEW MOD 45 MIN: CPT | Mod: S$GLB,,, | Performed by: ANESTHESIOLOGY

## 2020-09-23 PROCEDURE — 1125F PR PAIN SEVERITY QUANTIFIED, PAIN PRESENT: ICD-10-PCS | Mod: S$GLB,,, | Performed by: ANESTHESIOLOGY

## 2020-09-23 PROCEDURE — 3008F PR BODY MASS INDEX (BMI) DOCUMENTED: ICD-10-PCS | Mod: CPTII,S$GLB,, | Performed by: ANESTHESIOLOGY

## 2020-09-23 PROCEDURE — 99999 PR PBB SHADOW E&M-EST. PATIENT-LVL IV: CPT | Mod: PBBFAC,,, | Performed by: ANESTHESIOLOGY

## 2020-09-23 PROCEDURE — 99999 PR PBB SHADOW E&M-EST. PATIENT-LVL IV: ICD-10-PCS | Mod: PBBFAC,,, | Performed by: ANESTHESIOLOGY

## 2020-09-23 PROCEDURE — 3079F PR MOST RECENT DIASTOLIC BLOOD PRESSURE 80-89 MM HG: ICD-10-PCS | Mod: CPTII,S$GLB,, | Performed by: ANESTHESIOLOGY

## 2020-09-23 PROCEDURE — 3077F PR MOST RECENT SYSTOLIC BLOOD PRESSURE >= 140 MM HG: ICD-10-PCS | Mod: CPTII,S$GLB,, | Performed by: ANESTHESIOLOGY

## 2020-09-23 PROCEDURE — 1100F PTFALLS ASSESS-DOCD GE2>/YR: CPT | Mod: CPTII,S$GLB,, | Performed by: ANESTHESIOLOGY

## 2020-09-23 PROCEDURE — 1159F PR MEDICATION LIST DOCUMENTED IN MEDICAL RECORD: ICD-10-PCS | Mod: S$GLB,,, | Performed by: ANESTHESIOLOGY

## 2020-09-23 PROCEDURE — 99204 PR OFFICE/OUTPT VISIT, NEW, LEVL IV, 45-59 MIN: ICD-10-PCS | Mod: S$GLB,,, | Performed by: ANESTHESIOLOGY

## 2020-09-23 PROCEDURE — 1125F AMNT PAIN NOTED PAIN PRSNT: CPT | Mod: S$GLB,,, | Performed by: ANESTHESIOLOGY

## 2020-09-23 RX ORDER — PREGABALIN 75 MG/1
75 CAPSULE ORAL 2 TIMES DAILY
Qty: 60 CAPSULE | Refills: 0 | Status: SHIPPED | OUTPATIENT
Start: 2020-09-23 | End: 2020-12-31 | Stop reason: SDUPTHER

## 2020-09-23 NOTE — LETTER
September 23, 2020      Hiram Leija MD  [address]  [place] [address]           [facility]  [address] [facility] [place] [address]  Phone: [phone]  Fax: [phone]          Patient: Ricky Cole   MR Number: [id number]   YOB: 1955   Date of Visit: 9/23/2020       Dear Dr. Hiram Leija:    Thank you for referring Ricky Cole to me for evaluation. Attached you will find relevant portions of my assessment and plan of care.    If you have questions, please do not hesitate to call me. I look forward to following Ricky Cole along with you.    Sincerely,    Husam Barron MD    Enclosure  CC:  No Recipients    If you would like to receive this communication electronically, please contact [email or web address] or [phone] to request more information on [employer] Link access.    For providers and/or their staff who would like to refer a patient to [facility], please contact us through our one-stop-shop provider referral line, [facility], at [phone].    If you feel you have received this communication in error or would no longer like to receive these types of communications, please e-mail [email or web address]

## 2020-09-23 NOTE — PROGRESS NOTES
"  Referring Physician: Hiram Leija MD    PCP: Hiram Leija MD    CC: left hip and back pain    HPI:   Ricky Cole is a 65 y.o. male with PMH significant for hx of MI s/p PCI, hx of CVA, active tobacco use, hx of right hip arthroplasty, HTN, and anxiety on Xanax presents as a referral for the evaluation of left hip and back pain. The patient reports that his pain began approximately 4 months ago after no inciting incident or trauma. The patient localizes his pain to his lower back and left hip. The patient reports of radiation down his BLE to his toes and "all the way up to my head". The patient describes his pain as a constant, "stinging" and sharp type of pain. The patient reports of numbness on the entire left side of his body. The patient reports that his pain is a 10/10. Patient denies of any urinary/fecal incontinence or saddle anesthesia. The patient reports of chronic weakness secondary to his CVA and also attributes his numbness to his prior CVA.     Aggravating factors: movement    Mitigating factors: Norco; roxicodone    Relevant Surgeries: no    Interventional Therapies: yes; previously saw Pain Management in [place], [place] - last saw 3 years ago. Previously prescribed roxicodone 20 mg PO QID. The patient reports that he was discharged from his prior pain management because the patient received opioids after he was discharged from a hospital admission which resulted in him getting discharged from his Pain Management provider. Had 1 back injection every month for 6 months - no benefit per discussion with the patient. Denies of prior RFAs    : Reviewed and consistent with medication use as prescribed.      Non-pharmacologic Treatment:     · Physical Therapy: yes; last did post-op for his hip         Pain Medications:         · Currently taking: Xanax 1 mg PO QID    · Has tried in the past:    · Opioids: yes; previously on roxicodone 20 mg per discussion with the patient; was also given " fentanyl patches in the past as well  · NSAIDS: yes; reports that he had to stop taking Mobic secondary to his sodium levels  · Tylenol: no  · Muscle relaxants: no  · TCAs: no  · SNRIs: no  · Anticonvulsants: yes; gabapentin - no benefit   · topical creams: yes    Anticoagulation: no    ROS:  Review of Systems   Constitutional: Negative for chills and fever.   HENT: Negative for tinnitus.    Eyes: Negative for visual disturbance.   Respiratory: Negative for shortness of breath.    Cardiovascular: Negative for chest pain.   Gastrointestinal: Negative for nausea and vomiting.   Genitourinary: Negative for difficulty urinating.   Musculoskeletal: Positive for arthralgias and back pain.   Skin: Negative for rash.   Allergic/Immunologic: Negative for immunocompromised state.   Neurological: Positive for weakness and numbness. Negative for syncope.   Hematological: Does not bruise/bleed easily.   Psychiatric/Behavioral: Negative for suicidal ideas.        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, including shoulder     Cancer 2005    right eye    Colon polyps     COPD (chronic obstructive pulmonary disease)     Depression     Hypertension     Inguinal hernia     left    Kidney stone     MI (myocardial infarction)     Smoker, trying to quit again, 1 cig last a week, started age 17, quit for 42 years, restarted 2018 7/24/2019    Stroke     last stroke 2017    Tremor     Ulcer      Past Surgical History:   Procedure Laterality Date    ANGIOPLASTY      cardiac stent    CARDIAC CATHETERIZATION      COLECTOMY      EYE SURGERY      FINGER SURGERY Left     left index finger    HERNIA REPAIR      HIP ARTHROPLASTY Right 12/20/2019    Procedure: ARTHROPLASTY, HIP, [place], pegboard, 1st assist;  Surgeon: Kody Patel MD;  Location: [facility];  Service: Orthopedics;  Laterality: Right;    OLECRANON BURSECTOMY Right 11/6/2018    Procedure:  BURSECTOMY, OLECRANON;  Surgeon: Atul Cooper DO;  Location: [facility];  Service: Orthopedics;  Laterality: Right;  Excision Olecranon Bursa Right Elbow    POLYPECTOMY      REPAIR OF TRICEPS TENDON Right 2018    Procedure: REPAIR, TENDON, TRICEPS;  Surgeon: Atul Cooper DO;  Location: [facility];  Service: Orthopedics;  Laterality: Right;    SURGICAL REMOVAL OF BONE SPUR Right 2018    Procedure: EXCISION, BONE SPUR;  Surgeon: Atul Cooper DO;  Location: [facility];  Service: Orthopedics;  Laterality: Right;  Excision Olecranon Bone Spur Right Elbow     Family History   Problem Relation Age of Onset    Heart disease Mother     Heart disease Father     Cancer Father     Dementia Brother     Heart disease Brother      Social History     Socioeconomic History    Marital status:      Spouse name: Not on file    Number of children: Not on file    Years of education: Not on file    Highest education level: Not on file   Occupational History    Not on file   Social Needs    Financial resource strain: Not on file    Food insecurity     Worry: Not on file     Inability: Not on file    Transportation needs     Medical: Not on file     Non-medical: Not on file   Tobacco Use    Smoking status: Former Smoker     Years: 15.00     Types: Cigarettes     Quit date: 2018     Years since quittin.2    Smokeless tobacco: Current User     Types: Chew    Tobacco comment: Pt trying to quit   Substance and Sexual Activity    Alcohol use: Yes     Alcohol/week: 2.0 - 3.0 standard drinks     Types: 2 - 3 Cans of beer per week     Comment: occasionally beer    Drug use: No    Sexual activity: Not Currently   Lifestyle    Physical activity     Days per week: Not on file     Minutes per session: Not on file    Stress: Not on file   Relationships    Social connections     Talks on phone: Not on file     Gets together: Not on file     Attends Orthodoxy service: Not on file     Active member of  "club or organization: Not on file     Attends meetings of clubs or organizations: Not on file     Relationship status: Not on file   Other Topics Concern    Not on file   Social History Narrative    Not on file         Allergies: See med card    Vitals:    09/23/20 1458   BP: (!) 150/87   Pulse: 67   Resp: 17   Temp: 97 °F (36.1 °C)   TempSrc: Temporal   SpO2: 97%   Weight: 62.6 kg (138 lb)   Height: 5' 10" (1.778 m)   PainSc: 10-Worst pain ever         Physical exam:  Physical Exam   Constitutional: He is oriented to person, place, and time and well-developed, well-nourished, and in no distress.   HENT:   Head: Normocephalic and atraumatic.   Eyes: Conjunctivae and EOM are normal. Right eye exhibits no discharge. Left eye exhibits no discharge.   Cardiovascular: Normal rate.   Pulmonary/Chest: Effort normal and breath sounds normal. No respiratory distress.   Abdominal: Soft.   Neurological: He is alert and oriented to person, place, and time.   Skin: Skin is warm and dry. No rash noted. He is not diaphoretic.   Psychiatric: Mood, memory, affect and judgment normal.   Nursing note and vitals reviewed.       UPPER EXTREMITIES: Normal alignment, normal range of motion, no atrophy, no skin changes,  hair growth and nail growth normal and equal bilaterally. No swelling, no tenderness.    LOWER EXTREMITIES:  Normal alignment, normal range of motion, no atrophy, no skin changes,  hair growth and nail growth normal and equal bilaterally. No swelling, no tenderness.    LUMBAR SPINE  Lumbar spine: ROM is limited with flexion extension and oblique extension with increased pain with passive ROM.     ((--)) Supine straight leg raise    ((+)) Facet loading   Internal and external rotation of the hip causes no increased pain on either side.  Myofascial exam: No tenderness to palpation across lumbar paraspinous muscles.    ((--)) TTP at the SI joint  MATHEW's test: reports that he was told to not cross his legs  One leg stand: " unable to perform secondary to fall risk    ((--)) Distraction test    CRANIAL NERVES:  II:  PERRL bilaterally,   III,IV,VI: EOMI.    V:  Facial sensation equal bilaterally  VII:  Facial motor function normal.  VIII:  Hearing equal to finger rub bilaterally  IX/X: Gag normal, palate symmetric  XI:  Shoulder shrug equal, head turn equal  XII:  Tongue midline without fasciculations      MOTOR: Tone and bulk: normal bilateral upper and lower Strength: normal   Delt Bi Tri WE WF     R 5 5 5 5 5 5   L 5 5 5 5 5 5     IP ADD ABD Quad TA Gas HAM  R 5 5 5 5 5 5 5  L 5 5 5 5 5 5 5    SENSATION: Light touch and pinprick intact bilaterally  REFLEXES: normal, symmetric, nonbrisk.  Toes down, no clonus. Negative newberry's sign bilaterally.  GAIT: normal rise, base, steps, and arm swing.        Imaging: Last MRI was > 4-5 years ago per patient.   X-ray left hip (7/7/2020):  No acute fracture or dislocation.  No significant soft tissue swelling.     Femoral head is normally positioned within the native acetabulum.  Mild glenohumeral degenerative osteoarthrosis with subchondral sclerosis and small osteophyte formation.  The joint space is preserved.  Previous total right hip arthroplasty.     Pubic symphysis is intact.  SI joints are intact.  Bilateral pubic rami are intact.     There is bone demineralization.    X-ray right knee (12/18/2019):  There is no evidence of fracture subluxation about the right knee.  Vascular calcifications are noted.    Assessment: Ricky Cole is a 65 y.o. male with PMH significant for hx of MI s/p PCI, hx of CVA, active tobacco use, hx of right hip arthroplasty, HTN, and anxiety on Xanax presents as a referral for the evaluation of left hip and back pain. No recent dedicated spine imaging to review. Patient was previously on high dose opioids from a pain management provider in [place], [place] approximately 3-4 years ago. Patient reports that he is unable to tolerate NSAIDS secondary to his chronic  hyponatremia and hx of GI discomfort with NSAIDS. Treatment plan outlined below.     Plan:   - Ordered MRI Lumbar Spine without contrast for further evaluation  - Prescribed Lyrica 75 mg PO BID for pain. Plan to titrate up in the future as tolerated  - We had an extensive conversation about chronic opioid use for pain management. I explained to the patient that I do not recommend long term opioid therapy. Patient counseled about the side effects of long term use of opioids including but not limited to: dependence, tolerance, addiction, respiratory depression, somnolence, and immune/endocrine dysfunction. Specifically for this patient, I explained that the risks of concurrent opioid and benzodiazepine usage did not outweigh the benefits. The patient expressed understanding.   - I have stressed the importance of physical activity and a home exercise plan to help with chronic pain and improve health.  - RTC s/p imaging to discuss results and interventions as appropriate    Thank you for referring this interesting patient, and I look forward to continuing to collaborate in his care.    Husam Barron MD  Pain Management

## 2020-09-23 NOTE — PROGRESS NOTES
Health Maintenance Due   Topic Date Due    TETANUS VACCINE  01/07/1972    Shingles Vaccine (1 of 2) 01/07/2004    Pneumococcal Vaccine (65+ Low/Medium Risk) (1 of 2 - PCV13) 01/07/2019    Influenza Vaccine (1) 08/01/2020     Updates were requested from care everywhere.  Chart was reviewed for overdue Proactive [facility] Encounters ([facility]) topics (CRS, Breast Cancer Screening, Eye exam)  Health Maintenance has been updated.  LINKS immunization registry triggered.  Immunizations were not able to be reconciled. Patient not found in LINKS.

## 2020-09-24 ENCOUNTER — LAB VISIT (OUTPATIENT)
Dept: LAB | Facility: HOSPITAL | Age: 65
End: 2020-09-24
Attending: PSYCHIATRY & NEUROLOGY
Payer: MEDICARE

## 2020-09-24 DIAGNOSIS — I25.10 CORONARY ATHEROSCLEROSIS OF NATIVE CORONARY ARTERY: Primary | ICD-10-CM

## 2020-09-24 LAB
ANION GAP SERPL CALC-SCNC: 12 MMOL/L (ref 8–16)
BUN SERPL-MCNC: 6 MG/DL (ref 8–23)
CALCIUM SERPL-MCNC: 8.7 MG/DL (ref 8.7–10.5)
CHLORIDE SERPL-SCNC: 89 MMOL/L (ref 95–110)
CO2 SERPL-SCNC: 26 MMOL/L (ref 23–29)
CREAT SERPL-MCNC: 0.8 MG/DL (ref 0.5–1.4)
EST. GFR  (AFRICAN AMERICAN): >60 ML/MIN/1.73 M^2
EST. GFR  (NON AFRICAN AMERICAN): >60 ML/MIN/1.73 M^2
GLUCOSE SERPL-MCNC: 132 MG/DL (ref 70–110)
POTASSIUM SERPL-SCNC: 3.5 MMOL/L (ref 3.5–5.1)
SODIUM SERPL-SCNC: 127 MMOL/L (ref 136–145)

## 2020-09-24 PROCEDURE — 80048 BASIC METABOLIC PNL TOTAL CA: CPT

## 2020-09-24 PROCEDURE — 36415 COLL VENOUS BLD VENIPUNCTURE: CPT

## 2020-09-29 ENCOUNTER — HOSPITAL ENCOUNTER (OUTPATIENT)
Dept: RADIOLOGY | Facility: HOSPITAL | Age: 65
Discharge: HOME | End: 2020-09-29
Attending: ANESTHESIOLOGY
Payer: MEDICARE

## 2020-09-29 DIAGNOSIS — M54.9 DORSALGIA, UNSPECIFIED: ICD-10-CM

## 2020-09-29 PROCEDURE — 72148 MRI LUMBAR SPINE W/O DYE: CPT | Mod: TC

## 2020-09-29 PROCEDURE — 72148 MRI LUMBAR SPINE W/O DYE: CPT | Mod: 26,,, | Performed by: RADIOLOGY

## 2020-09-29 PROCEDURE — 72148 MRI LUMBAR SPINE WITHOUT CONTRAST: ICD-10-PCS | Mod: 26,,, | Performed by: RADIOLOGY

## 2020-10-01 ENCOUNTER — OFFICE VISIT (OUTPATIENT)
Dept: FAMILY MEDICINE | Facility: CLINIC | Age: 65
End: 2020-10-01
Payer: MEDICARE

## 2020-10-01 VITALS
SYSTOLIC BLOOD PRESSURE: 103 MMHG | HEIGHT: 70 IN | DIASTOLIC BLOOD PRESSURE: 62 MMHG | OXYGEN SATURATION: 96 % | TEMPERATURE: 98 F | HEART RATE: 71 BPM | BODY MASS INDEX: 19.61 KG/M2 | WEIGHT: 137 LBS | RESPIRATION RATE: 18 BRPM

## 2020-10-01 DIAGNOSIS — E87.1 HYPONATREMIA: Primary | ICD-10-CM

## 2020-10-01 DIAGNOSIS — Z02.83 ENCOUNTER FOR DRUG SCREENING: ICD-10-CM

## 2020-10-01 DIAGNOSIS — F41.1 GAD (GENERALIZED ANXIETY DISORDER): ICD-10-CM

## 2020-10-01 PROCEDURE — 3008F PR BODY MASS INDEX (BMI) DOCUMENTED: ICD-10-PCS | Mod: CPTII,S$GLB,, | Performed by: NURSE PRACTITIONER

## 2020-10-01 PROCEDURE — 80307 DRUG TEST PRSMV CHEM ANLYZR: CPT

## 2020-10-01 PROCEDURE — 1101F PR PT FALLS ASSESS DOC 0-1 FALLS W/OUT INJ PAST YR: ICD-10-PCS | Mod: CPTII,S$GLB,, | Performed by: NURSE PRACTITIONER

## 2020-10-01 PROCEDURE — 3078F DIAST BP <80 MM HG: CPT | Mod: CPTII,S$GLB,, | Performed by: NURSE PRACTITIONER

## 2020-10-01 PROCEDURE — 3008F BODY MASS INDEX DOCD: CPT | Mod: CPTII,S$GLB,, | Performed by: NURSE PRACTITIONER

## 2020-10-01 PROCEDURE — 3074F PR MOST RECENT SYSTOLIC BLOOD PRESSURE < 130 MM HG: ICD-10-PCS | Mod: CPTII,S$GLB,, | Performed by: NURSE PRACTITIONER

## 2020-10-01 PROCEDURE — 3074F SYST BP LT 130 MM HG: CPT | Mod: CPTII,S$GLB,, | Performed by: NURSE PRACTITIONER

## 2020-10-01 PROCEDURE — 99214 PR OFFICE/OUTPT VISIT, EST, LEVL IV, 30-39 MIN: ICD-10-PCS | Mod: S$GLB,,, | Performed by: NURSE PRACTITIONER

## 2020-10-01 PROCEDURE — 99214 OFFICE O/P EST MOD 30 MIN: CPT | Mod: S$GLB,,, | Performed by: NURSE PRACTITIONER

## 2020-10-01 PROCEDURE — 1101F PT FALLS ASSESS-DOCD LE1/YR: CPT | Mod: CPTII,S$GLB,, | Performed by: NURSE PRACTITIONER

## 2020-10-01 PROCEDURE — 1159F MED LIST DOCD IN RCRD: CPT | Mod: S$GLB,,, | Performed by: NURSE PRACTITIONER

## 2020-10-01 PROCEDURE — 1126F PR PAIN SEVERITY QUANTIFIED, NO PAIN PRESENT: ICD-10-PCS | Mod: S$GLB,,, | Performed by: NURSE PRACTITIONER

## 2020-10-01 PROCEDURE — 99999 PR PBB SHADOW E&M-EST. PATIENT-LVL III: ICD-10-PCS | Mod: PBBFAC,,, | Performed by: NURSE PRACTITIONER

## 2020-10-01 PROCEDURE — 1126F AMNT PAIN NOTED NONE PRSNT: CPT | Mod: S$GLB,,, | Performed by: NURSE PRACTITIONER

## 2020-10-01 PROCEDURE — 3078F PR MOST RECENT DIASTOLIC BLOOD PRESSURE < 80 MM HG: ICD-10-PCS | Mod: CPTII,S$GLB,, | Performed by: NURSE PRACTITIONER

## 2020-10-01 PROCEDURE — 1159F PR MEDICATION LIST DOCUMENTED IN MEDICAL RECORD: ICD-10-PCS | Mod: S$GLB,,, | Performed by: NURSE PRACTITIONER

## 2020-10-01 PROCEDURE — 99999 PR PBB SHADOW E&M-EST. PATIENT-LVL III: CPT | Mod: PBBFAC,,, | Performed by: NURSE PRACTITIONER

## 2020-10-01 NOTE — PROGRESS NOTES
"Subjective:       Patient ID: Ricky Cole is a 65 y.o. male.    Chief Complaint: Follow-up (1 month)    Mr. Harvey Cole is a 65 year old male who presents to the clinic today for f/u. After seeing me last, he was seen in the ER for weakness. As noted before, he has a hx of hyponatremia. He states he drinks 2 bottles of water, 2 gatorades, and sweet tea all throughout the day. Reports that his tremors, anxiety, and sleep are improved by taking xanax as ordered. UDS reviewed. UTD. Reports anxiety improving.   In regards to the patient's hypertension, patient denies chest pain/sob, and has been compliant with the medication regimen. hypertension improved and well controlled. Goal of <130/80. Meds and labs as per orders. He has stopped taking mobic, per MD request. Reports sleep well improved. Reports he is walking. Denies any pain    Review of Systems   Constitutional: Negative for activity change, diaphoresis, fatigue and fever.   HENT: Negative for congestion and sore throat.    Eyes: Negative for pain.   Respiratory: Negative for cough, chest tightness, shortness of breath and wheezing.    Cardiovascular: Negative for chest pain and palpitations.   Gastrointestinal: Negative for abdominal pain, nausea and vomiting.   Musculoskeletal: Negative for arthralgias and myalgias.   Skin: Negative for color change.   Neurological: Positive for tremors. Negative for dizziness, syncope, weakness, numbness and headaches.   Psychiatric/Behavioral: Negative for dysphoric mood and sleep disturbance. The patient is not nervous/anxious.    All other systems reviewed and are negative.        Reviewed family, medical, surgical, and social history.    Objective:      /62 (BP Location: Left arm, Patient Position: Sitting, BP Method: Medium (Automatic))   Pulse 71   Temp 98.1 °F (36.7 °C) (Tympanic)   Resp 18   Ht 5' 10" (1.778 m)   Wt 62.1 kg (137 lb)   SpO2 96%   BMI 19.66 kg/m²   Physical Exam  Vitals signs and " nursing note reviewed.   Constitutional:       General: He is not in acute distress.     Appearance: He is well-developed and normal weight. He is not ill-appearing, toxic-appearing or diaphoretic.   HENT:      Head: Normocephalic and atraumatic.      Nose: Nose normal. No congestion or rhinorrhea.      Mouth/Throat:      Mouth: Mucous membranes are moist.      Pharynx: Oropharynx is clear. No oropharyngeal exudate or posterior oropharyngeal erythema.   Eyes:      General: No scleral icterus.        Right eye: No discharge.         Left eye: No discharge.      Conjunctiva/sclera: Conjunctivae normal.      Pupils: Pupils are equal, round, and reactive to light.   Neck:      Musculoskeletal: Normal range of motion and neck supple. No neck rigidity or muscular tenderness.      Thyroid: No thyromegaly.      Vascular: No carotid bruit or JVD.      Trachea: No tracheal deviation.   Cardiovascular:      Rate and Rhythm: Normal rate and regular rhythm.      Pulses: Normal pulses.      Heart sounds: Normal heart sounds.   Pulmonary:      Effort: Pulmonary effort is normal. No respiratory distress.      Breath sounds: Normal breath sounds. No wheezing.   Abdominal:      General: Abdomen is flat. Bowel sounds are normal.      Palpations: Abdomen is soft.   Musculoskeletal: Normal range of motion.         General: No deformity.   Lymphadenopathy:      Cervical: No cervical adenopathy.   Skin:     General: Skin is warm and dry.      Capillary Refill: Capillary refill takes less than 2 seconds.      Coloration: Skin is not jaundiced or pale.      Findings: No erythema or rash.   Neurological:      General: No focal deficit present.      Mental Status: He is alert and oriented to person, place, and time. Mental status is at baseline.      Motor: Weakness: generalized weakness.      Deep Tendon Reflexes: Reflexes normal.   Psychiatric:         Mood and Affect: Mood normal.         Behavior: Behavior normal.         Thought Content:  Thought content normal.         Judgment: Judgment normal.         Assessment:       1. Hyponatremia    2. CHANTELLE (generalized anxiety disorder)    3. Encounter for drug screening        Plan:       Hyponatremia  -     sodium chloride 1 gram tablet; Take 1 tablet (1 g total) by mouth 3 (three) times daily.  Dispense: 90 tablet; Refill: 2  -     Comprehensive metabolic panel; Future; Expected date: 10/07/2020    CHANTELLE (generalized anxiety disorder)  -     [facility] Clinic Drug Screen    Encounter for drug screening  -     Pain Clinic Drug Screen        PLAN:  - Discussed with patient the plan of care  - Limit to 800 mL daily  - UDS collected  - Sodium tablets reviewed  - Medications reviewed. Medication side effects discussed. Patient has no questions or concerns at this time. Informed patient to notify me regarding any concerns.   - Informed patient to please notify me with any questions or concerns at anytime  - Follow up ordered for 4 weeks        Risks, benefits, and side effects were discussed with the patient. All questions were answered to the fullest satisfaction of the patient, and pt verbalized understanding and agreement to treatment plan. Pt was to call with any new or worsening symptoms, or present to the ER.

## 2020-10-06 DIAGNOSIS — E87.1 HYPONATREMIA: ICD-10-CM

## 2020-10-06 NOTE — TELEPHONE ENCOUNTER
Pt requests med be sent to pharmacy listed. Was sent to a different pharmacy. Thanks.                  ----- Message from Sophia Macdonald sent at 10/6/2020 11:52 AM CDT -----  Contact: self  Type: Needs Medical Advice  Who Called:  patient     Pharmacy name and phone #:    [facility] [address] [place] [id number]  Phone: [phone] Fax: [phone]  Best Call Back Number: [phone] (home)     Additional Information: patient states pharmacy did not received patient prescription for sodium chloride 1 gram tablet, please resend but to [facility]         Nov 19 2017

## 2020-10-08 ENCOUNTER — LAB VISIT (OUTPATIENT)
Dept: LAB | Facility: HOSPITAL | Age: 65
End: 2020-10-08
Attending: FAMILY MEDICINE
Payer: MEDICARE

## 2020-10-08 DIAGNOSIS — I25.10 CORONARY ATHEROSCLEROSIS OF NATIVE CORONARY ARTERY: Primary | ICD-10-CM

## 2020-10-08 LAB
6MAM UR QL: NOT DETECTED
7AMINOCLONAZEPAM UR QL: NOT DETECTED
A-OH ALPRAZ UR QL: PRESENT
ALPRAZ UR QL: PRESENT
AMPHET UR QL SCN: NOT DETECTED
ANION GAP SERPL CALC-SCNC: 9 MMOL/L (ref 8–16)
ANNOTATION COMMENT IMP: NORMAL
ANNOTATION COMMENT IMP: NORMAL
BARBITURATES UR QL: NOT DETECTED
BUN SERPL-MCNC: 9 MG/DL (ref 8–23)
BUPRENORPHINE UR QL: NOT DETECTED
BZE UR QL: NOT DETECTED
CALCIUM SERPL-MCNC: 8.8 MG/DL (ref 8.7–10.5)
CARBOXYTHC UR QL: NOT DETECTED
CARISOPRODOL UR QL: NOT DETECTED
CHLORIDE SERPL-SCNC: 98 MMOL/L (ref 95–110)
CLONAZEPAM UR QL: NOT DETECTED
CO2 SERPL-SCNC: 26 MMOL/L (ref 23–29)
CODEINE UR QL: NOT DETECTED
CREAT SERPL-MCNC: 0.7 MG/DL (ref 0.5–1.4)
CREAT UR-MCNC: 70.3 MG/DL (ref 20–400)
DIAZEPAM UR QL: NOT DETECTED
EST. GFR  (AFRICAN AMERICAN): >60 ML/MIN/1.73 M^2
EST. GFR  (NON AFRICAN AMERICAN): >60 ML/MIN/1.73 M^2
ETHYL GLUCURONIDE UR QL: PRESENT
FENTANYL UR QL: NOT DETECTED
GLUCOSE SERPL-MCNC: 97 MG/DL (ref 70–110)
HYDROCODONE UR QL: NOT DETECTED
HYDROMORPHONE UR QL: NOT DETECTED
LORAZEPAM UR QL: NOT DETECTED
MDA UR QL: NOT DETECTED
MDEA UR QL: NOT DETECTED
MDMA UR QL: NOT DETECTED
ME-PHENIDATE UR QL: NOT DETECTED
MEPERIDINE UR QL: NOT DETECTED
METHADONE UR QL: NOT DETECTED
METHAMPHET UR QL: NOT DETECTED
MIDAZOLAM UR QL SCN: NOT DETECTED
MORPHINE UR QL: NOT DETECTED
NORBUPRENORPHINE UR QL CFM: NOT DETECTED
NORDIAZEPAM UR QL: NOT DETECTED
NORFENTANYL UR QL: NOT DETECTED
NORHYDROCODONE UR QL CFM: NOT DETECTED
NOROXYCODONE UR QL CFM: NOT DETECTED
NOROXYMORPHONE: NOT DETECTED
OXAZEPAM UR QL: NOT DETECTED
OXYCODONE UR QL: NOT DETECTED
OXYMORPHONE UR QL: NOT DETECTED
PATHOLOGY STUDY: NORMAL
PCP UR QL: NOT DETECTED
PHENTERMINE UR QL: NOT DETECTED
POTASSIUM SERPL-SCNC: 3.6 MMOL/L (ref 3.5–5.1)
PROPOXYPH UR QL: NOT DETECTED
SERVICE CMNT-IMP: NORMAL
SODIUM SERPL-SCNC: 133 MMOL/L (ref 136–145)
TAPENTADOL UR QL SCN: NOT DETECTED
TAPENTADOL-O-SULF: NOT DETECTED
TEMAZEPAM UR QL: NOT DETECTED
TRAMADOL UR QL: NOT DETECTED
ZOLPIDEM UR QL: NOT DETECTED

## 2020-10-08 PROCEDURE — 80048 BASIC METABOLIC PNL TOTAL CA: CPT

## 2020-10-08 PROCEDURE — 36415 COLL VENOUS BLD VENIPUNCTURE: CPT

## 2020-10-14 ENCOUNTER — HOSPITAL ENCOUNTER (EMERGENCY)
Facility: HOSPITAL | Age: 65
Discharge: HOME | End: 2020-10-14
Attending: EMERGENCY MEDICINE
Payer: MEDICARE

## 2020-10-14 VITALS
TEMPERATURE: 98 F | BODY MASS INDEX: 19.33 KG/M2 | RESPIRATION RATE: 20 BRPM | OXYGEN SATURATION: 99 % | SYSTOLIC BLOOD PRESSURE: 179 MMHG | HEART RATE: 61 BPM | WEIGHT: 135 LBS | DIASTOLIC BLOOD PRESSURE: 102 MMHG | HEIGHT: 70 IN

## 2020-10-14 DIAGNOSIS — E87.1 CHRONIC HYPONATREMIA: ICD-10-CM

## 2020-10-14 DIAGNOSIS — M25.559 HIP PAIN: Primary | ICD-10-CM

## 2020-10-14 DIAGNOSIS — R29.90 STROKE-LIKE SYMPTOMS: ICD-10-CM

## 2020-10-14 LAB
ALBUMIN SERPL BCP-MCNC: 4.4 G/DL (ref 3.5–5.2)
ALP SERPL-CCNC: 80 U/L (ref 55–135)
ALT SERPL W/O P-5'-P-CCNC: 19 U/L (ref 10–44)
ANION GAP SERPL CALC-SCNC: 10 MMOL/L (ref 8–16)
APTT BLDCRRT: 29.1 SEC (ref 21–32)
AST SERPL-CCNC: 23 U/L (ref 10–40)
BASOPHILS # BLD AUTO: 0.04 K/UL (ref 0–0.2)
BASOPHILS NFR BLD: 0.6 % (ref 0–1.9)
BILIRUB SERPL-MCNC: 1.1 MG/DL (ref 0.1–1)
BILIRUB UR QL STRIP: NEGATIVE
BUN SERPL-MCNC: 8 MG/DL (ref 8–23)
CALCIUM SERPL-MCNC: 8.5 MG/DL (ref 8.7–10.5)
CHLORIDE SERPL-SCNC: 89 MMOL/L (ref 95–110)
CLARITY UR: CLEAR
CO2 SERPL-SCNC: 28 MMOL/L (ref 23–29)
COLOR UR: YELLOW
CREAT SERPL-MCNC: 0.9 MG/DL (ref 0.5–1.4)
DIFFERENTIAL METHOD: ABNORMAL
EOSINOPHIL # BLD AUTO: 0.1 K/UL (ref 0–0.5)
EOSINOPHIL NFR BLD: 1.2 % (ref 0–8)
ERYTHROCYTE [DISTWIDTH] IN BLOOD BY AUTOMATED COUNT: 12.7 % (ref 11.5–14.5)
EST. GFR  (AFRICAN AMERICAN): >60 ML/MIN/1.73 M^2
EST. GFR  (NON AFRICAN AMERICAN): >60 ML/MIN/1.73 M^2
GLUCOSE SERPL-MCNC: 64 MG/DL (ref 70–110)
GLUCOSE UR QL STRIP: NEGATIVE
HCT VFR BLD AUTO: 38 % (ref 40–54)
HGB BLD-MCNC: 12.9 G/DL (ref 14–18)
HGB UR QL STRIP: NEGATIVE
IMM GRANULOCYTES # BLD AUTO: 0.02 K/UL (ref 0–0.04)
IMM GRANULOCYTES NFR BLD AUTO: 0.3 % (ref 0–0.5)
INR PPP: 1 (ref 0.8–1.2)
KETONES UR QL STRIP: NEGATIVE
LEUKOCYTE ESTERASE UR QL STRIP: NEGATIVE
LYMPHOCYTES # BLD AUTO: 1.3 K/UL (ref 1–4.8)
LYMPHOCYTES NFR BLD: 19.9 % (ref 18–48)
MCH RBC QN AUTO: 33.1 PG (ref 27–31)
MCHC RBC AUTO-ENTMCNC: 33.9 G/DL (ref 32–36)
MCV RBC AUTO: 97 FL (ref 82–98)
MONOCYTES # BLD AUTO: 0.7 K/UL (ref 0.3–1)
MONOCYTES NFR BLD: 10.5 % (ref 4–15)
NEUTROPHILS # BLD AUTO: 4.4 K/UL (ref 1.8–7.7)
NEUTROPHILS NFR BLD: 67.5 % (ref 38–73)
NITRITE UR QL STRIP: NEGATIVE
NRBC BLD-RTO: 0 /100 WBC
PH UR STRIP: 7 [PH] (ref 5–8)
PLATELET # BLD AUTO: 254 K/UL (ref 150–350)
PMV BLD AUTO: 9.5 FL (ref 9.2–12.9)
POCT GLUCOSE: 73 MG/DL (ref 70–110)
POTASSIUM SERPL-SCNC: 3.7 MMOL/L (ref 3.5–5.1)
PROT SERPL-MCNC: 8.2 G/DL (ref 6–8.4)
PROT UR QL STRIP: NEGATIVE
PROTHROMBIN TIME: 10.1 SEC (ref 9–12.5)
RBC # BLD AUTO: 3.9 M/UL (ref 4.6–6.2)
SARS-COV-2 RDRP RESP QL NAA+PROBE: NEGATIVE
SODIUM SERPL-SCNC: 127 MMOL/L (ref 136–145)
SP GR UR STRIP: 1.01 (ref 1–1.03)
TROPONIN I SERPL DL<=0.01 NG/ML-MCNC: 0.01 NG/ML (ref 0.02–0.5)
URN SPEC COLLECT METH UR: NORMAL
UROBILINOGEN UR STRIP-ACNC: NEGATIVE EU/DL
WBC # BLD AUTO: 6.58 K/UL (ref 3.9–12.7)

## 2020-10-14 PROCEDURE — 70450 CT HEAD/BRAIN W/O DYE: CPT | Mod: 26,,, | Performed by: RADIOLOGY

## 2020-10-14 PROCEDURE — 70450 CT HEAD/BRAIN W/O DYE: CPT | Mod: TC

## 2020-10-14 PROCEDURE — 82962 GLUCOSE BLOOD TEST: CPT

## 2020-10-14 PROCEDURE — 80053 COMPREHEN METABOLIC PANEL: CPT

## 2020-10-14 PROCEDURE — U0002 COVID-19 LAB TEST NON-CDC: HCPCS

## 2020-10-14 PROCEDURE — 71045 X-RAY EXAM CHEST 1 VIEW: CPT | Mod: TC,FY

## 2020-10-14 PROCEDURE — 71045 XR CHEST AP PORTABLE: ICD-10-PCS | Mod: 26,,, | Performed by: RADIOLOGY

## 2020-10-14 PROCEDURE — 99285 EMERGENCY DEPT VISIT HI MDM: CPT | Mod: 25

## 2020-10-14 PROCEDURE — 70450 CT HEAD WITHOUT CONTRAST: ICD-10-PCS | Mod: 26,,, | Performed by: RADIOLOGY

## 2020-10-14 PROCEDURE — 84484 ASSAY OF TROPONIN QUANT: CPT

## 2020-10-14 PROCEDURE — 85025 COMPLETE CBC W/AUTO DIFF WBC: CPT

## 2020-10-14 PROCEDURE — 93005 ELECTROCARDIOGRAM TRACING: CPT

## 2020-10-14 PROCEDURE — 85610 PROTHROMBIN TIME: CPT

## 2020-10-14 PROCEDURE — 85730 THROMBOPLASTIN TIME PARTIAL: CPT

## 2020-10-14 PROCEDURE — 81003 URINALYSIS AUTO W/O SCOPE: CPT

## 2020-10-14 PROCEDURE — 71045 X-RAY EXAM CHEST 1 VIEW: CPT | Mod: 26,,, | Performed by: RADIOLOGY

## 2020-10-15 ENCOUNTER — OFFICE VISIT (OUTPATIENT)
Dept: FAMILY MEDICINE | Facility: CLINIC | Age: 65
End: 2020-10-15
Payer: MEDICARE

## 2020-10-15 VITALS
HEIGHT: 70 IN | RESPIRATION RATE: 15 BRPM | OXYGEN SATURATION: 98 % | HEART RATE: 63 BPM | BODY MASS INDEX: 20.16 KG/M2 | WEIGHT: 140.81 LBS | SYSTOLIC BLOOD PRESSURE: 130 MMHG | DIASTOLIC BLOOD PRESSURE: 80 MMHG | TEMPERATURE: 98 F

## 2020-10-15 DIAGNOSIS — F41.1 GAD (GENERALIZED ANXIETY DISORDER): ICD-10-CM

## 2020-10-15 DIAGNOSIS — E87.1 HYPONATREMIA: Primary | ICD-10-CM

## 2020-10-15 DIAGNOSIS — G62.9 NEUROPATHY: ICD-10-CM

## 2020-10-15 DIAGNOSIS — I10 ESSENTIAL HYPERTENSION: ICD-10-CM

## 2020-10-15 PROCEDURE — 99214 OFFICE O/P EST MOD 30 MIN: CPT | Mod: S$GLB,,, | Performed by: NURSE PRACTITIONER

## 2020-10-15 PROCEDURE — 1125F AMNT PAIN NOTED PAIN PRSNT: CPT | Mod: S$GLB,,, | Performed by: NURSE PRACTITIONER

## 2020-10-15 PROCEDURE — 1159F MED LIST DOCD IN RCRD: CPT | Mod: S$GLB,,, | Performed by: NURSE PRACTITIONER

## 2020-10-15 PROCEDURE — 99999 PR PBB SHADOW E&M-EST. PATIENT-LVL IV: CPT | Mod: PBBFAC,,, | Performed by: NURSE PRACTITIONER

## 2020-10-15 PROCEDURE — 99214 PR OFFICE/OUTPT VISIT, EST, LEVL IV, 30-39 MIN: ICD-10-PCS | Mod: S$GLB,,, | Performed by: NURSE PRACTITIONER

## 2020-10-15 PROCEDURE — 1101F PR PT FALLS ASSESS DOC 0-1 FALLS W/OUT INJ PAST YR: ICD-10-PCS | Mod: CPTII,S$GLB,, | Performed by: NURSE PRACTITIONER

## 2020-10-15 PROCEDURE — 1125F PR PAIN SEVERITY QUANTIFIED, PAIN PRESENT: ICD-10-PCS | Mod: S$GLB,,, | Performed by: NURSE PRACTITIONER

## 2020-10-15 PROCEDURE — 3008F BODY MASS INDEX DOCD: CPT | Mod: CPTII,S$GLB,, | Performed by: NURSE PRACTITIONER

## 2020-10-15 PROCEDURE — 99999 PR PBB SHADOW E&M-EST. PATIENT-LVL IV: ICD-10-PCS | Mod: PBBFAC,,, | Performed by: NURSE PRACTITIONER

## 2020-10-15 PROCEDURE — 3079F DIAST BP 80-89 MM HG: CPT | Mod: CPTII,S$GLB,, | Performed by: NURSE PRACTITIONER

## 2020-10-15 PROCEDURE — 3075F SYST BP GE 130 - 139MM HG: CPT | Mod: CPTII,S$GLB,, | Performed by: NURSE PRACTITIONER

## 2020-10-15 PROCEDURE — 1101F PT FALLS ASSESS-DOCD LE1/YR: CPT | Mod: CPTII,S$GLB,, | Performed by: NURSE PRACTITIONER

## 2020-10-15 PROCEDURE — 3008F PR BODY MASS INDEX (BMI) DOCUMENTED: ICD-10-PCS | Mod: CPTII,S$GLB,, | Performed by: NURSE PRACTITIONER

## 2020-10-15 PROCEDURE — 3079F PR MOST RECENT DIASTOLIC BLOOD PRESSURE 80-89 MM HG: ICD-10-PCS | Mod: CPTII,S$GLB,, | Performed by: NURSE PRACTITIONER

## 2020-10-15 PROCEDURE — 3075F PR MOST RECENT SYSTOLIC BLOOD PRESS GE 130-139MM HG: ICD-10-PCS | Mod: CPTII,S$GLB,, | Performed by: NURSE PRACTITIONER

## 2020-10-15 PROCEDURE — 1159F PR MEDICATION LIST DOCUMENTED IN MEDICAL RECORD: ICD-10-PCS | Mod: S$GLB,,, | Performed by: NURSE PRACTITIONER

## 2020-10-15 NOTE — PROGRESS NOTES
"Subjective:       Patient ID: Ricky Cole is a 65 y.o. male.    Chief Complaint: Follow-up (er last night 10/14/2020 low, feels stinging in left side )    Mr. Harvey Cole is a 65 year old male who presents to the clinic today for follow up. Reports he went to the ER last night. Reports he did not see a doctor but reports his sugar was low. He is taking sodium tablets TID but reports causing his blood pressure to be elevated. Last sodium was 127. Reports sugar was low yesterday. Reports ER gave him donut and sent him home. Denies cp sob n/v/d  Reports he drinks water, gatorade and orange juice. A glass of tea sometimes. Not counting his ML.  Reports he continues to take his xanax as ordered. Compliant. No signs of abuse.  reviewed. Anxiety improved with medication.     Follow-up  Pertinent negatives include no abdominal pain, arthralgias, chest pain, congestion, coughing, diaphoresis, fatigue, fever, headaches, myalgias, nausea, numbness, sore throat, vomiting or weakness.     Review of Systems   Constitutional: Negative for activity change, diaphoresis, fatigue and fever.   HENT: Negative for congestion and sore throat.    Eyes: Negative for pain.   Respiratory: Negative for cough, chest tightness, shortness of breath and wheezing.    Cardiovascular: Negative for chest pain and palpitations.   Gastrointestinal: Negative for abdominal pain, nausea and vomiting.   Musculoskeletal: Negative for arthralgias and myalgias.   Skin: Negative for color change.   Neurological: Positive for tremors. Negative for dizziness, syncope, weakness, numbness and headaches.   Psychiatric/Behavioral: Negative for dysphoric mood and sleep disturbance. The patient is not nervous/anxious.    All other systems reviewed and are negative.        Reviewed family, medical, surgical, and social history.    Objective:      /80 Comment: home nurse visit reading  Pulse 63   Temp 97.5 °F (36.4 °C)   Resp 15   Ht 5' 10" (1.778 m)  "  Wt 63.9 kg (140 lb 12.8 oz)   SpO2 98%   BMI 20.20 kg/m²   Physical Exam  Vitals signs and nursing note reviewed.   Constitutional:       General: He is not in acute distress.     Appearance: He is well-developed and normal weight. He is not ill-appearing, toxic-appearing or diaphoretic.   HENT:      Head: Normocephalic and atraumatic.      Nose: Nose normal. No congestion or rhinorrhea.      Mouth/Throat:      Mouth: Mucous membranes are moist.      Pharynx: Oropharynx is clear. No oropharyngeal exudate or posterior oropharyngeal erythema.   Eyes:      General: No scleral icterus.        Right eye: No discharge.         Left eye: No discharge.      Conjunctiva/sclera: Conjunctivae normal.      Pupils: Pupils are equal, round, and reactive to light.   Neck:      Musculoskeletal: Normal range of motion and neck supple. No neck rigidity or muscular tenderness.      Thyroid: No thyromegaly.      Vascular: No carotid bruit or JVD.      Trachea: No tracheal deviation.   Cardiovascular:      Rate and Rhythm: Normal rate and regular rhythm.      Pulses: Normal pulses.      Heart sounds: Normal heart sounds.   Pulmonary:      Effort: Pulmonary effort is normal. No respiratory distress.      Breath sounds: Normal breath sounds. No wheezing.   Abdominal:      General: Abdomen is flat. Bowel sounds are normal.      Palpations: Abdomen is soft.   Musculoskeletal: Normal range of motion.         General: No deformity.   Lymphadenopathy:      Cervical: No cervical adenopathy.   Skin:     General: Skin is warm and dry.      Capillary Refill: Capillary refill takes less than 2 seconds.      Coloration: Skin is not jaundiced or pale.      Findings: No erythema or rash.   Neurological:      General: No focal deficit present.      Mental Status: He is alert and oriented to person, place, and time. Mental status is at baseline.      Motor: Weakness: generalized weakness.      Deep Tendon Reflexes: Reflexes normal.   Psychiatric:          Mood and Affect: Mood normal.         Behavior: Behavior normal.         Thought Content: Thought content normal.         Judgment: Judgment normal.         Assessment:       1. Hyponatremia    2. Essential hypertension    3. Neuropathy    4. CHANTELLE (generalized anxiety disorder)        Plan:       Hyponatremia  -     Ambulatory referral/consult to Nephrology; Future; Expected date: 10/22/2020    Essential hypertension    Neuropathy    CHANTELLE (generalized anxiety disorder)        PLAN:  - Discussed with patient the plan of care  -  Limit to 800 mL daily  - Medications reviewed. Medication side effects discussed. Patient has no questions or concerns at this time. Informed patient to notify me regarding any concerns.   - Continue monitoring BP   - Informed patient to please notify me with any questions or concerns at anytime  - Follow up ordered for 4 weeks        Risks, benefits, and side effects were discussed with the patient. All questions were answered to the fullest satisfaction of the patient, and pt verbalized understanding and agreement to treatment plan. Pt was to call with any new or worsening symptoms, or present to the ER.

## 2020-10-15 NOTE — ED PROVIDER NOTES
Encounter Date: 10/14/2020       History     Chief Complaint   Patient presents with    Blurred Vision     Patient complaining of blurry vision and left hip pain    Hip Pain     65-year-old male with past medical history significant for anxiety, chronic back pain, COPD, depression, hypertension, nephrolithiasis, CAD with MI, CVA 2017, recurrent admissions for hyponatremia presents to the ED for evaluation of intermittent blurred vision, generalized weakness, and left hip pain that began this morning.  Denies focal weakness, headache, confusion, gait abnormality, numbness, tingling, aphasia, facial asymmetry, incontinence.        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, including shoulder     Cancer 2005    right eye    Colon polyps     COPD (chronic obstructive pulmonary disease)     Depression     Hypertension     Inguinal hernia     left    Kidney stone     MI (myocardial infarction)     Smoker, trying to quit again, 1 cig last a week, started age 17, quit for 42 years, restarted 2018 7/24/2019    Stroke     last stroke 2017    Tremor     Ulcer      Past Surgical History:   Procedure Laterality Date    ANGIOPLASTY      cardiac stent    CARDIAC CATHETERIZATION      COLECTOMY      EYE SURGERY      FINGER SURGERY Left     left index finger    HERNIA REPAIR      HIP ARTHROPLASTY Right 12/20/2019    Procedure: ARTHROPLASTY, HIP, [place], pegboard, 1st assist;  Surgeon: Kody Patel MD;  Location: [facility] OR;  Service: Orthopedics;  Laterality: Right;    OLECRANON BURSECTOMY Right 11/6/2018    Procedure: BURSECTOMY, OLECRANON;  Surgeon: Atul Cooper DO;  Location: [facility] OR;  Service: Orthopedics;  Laterality:  Right;  Excision Olecranon Bursa Right Elbow    POLYPECTOMY      REPAIR OF TRICEPS TENDON Right 2018    Procedure: REPAIR, TENDON, TRICEPS;  Surgeon: Atul Cooper DO;  Location: [facility] OR;  Service: Orthopedics;  Laterality: Right;    SURGICAL REMOVAL OF BONE SPUR Right 2018    Procedure: EXCISION, BONE SPUR;  Surgeon: Atul Cooper DO;  Location: [facility] OR;  Service: Orthopedics;  Laterality: Right;  Excision Olecranon Bone Spur Right Elbow     Family History   Problem Relation Age of Onset    Heart disease Mother     Heart disease Father     Cancer Father     Dementia Brother     Heart disease Brother      Social History     Tobacco Use    Smoking status: Former Smoker     Years: 15.00     Types: Cigarettes     Quit date: 2018     Years since quittin.3    Smokeless tobacco: Current User     Types: Chew    Tobacco comment: Pt trying to quit   Substance Use Topics    Alcohol use: Yes     Alcohol/week: 2.0 - 3.0 standard drinks     Types: 2 - 3 Cans of beer per week     Comment: occasionally beer    Drug use: No     Review of Systems   Constitutional: Negative for appetite change, chills, diaphoresis, fatigue and fever.   HENT: Negative for congestion, ear pain, rhinorrhea, sinus pressure, sinus pain, sore throat and tinnitus.    Eyes: Positive for visual disturbance. Negative for photophobia.   Respiratory: Negative for cough, chest tightness, shortness of breath and wheezing.    Cardiovascular: Negative for chest pain, palpitations and leg swelling.   Gastrointestinal: Negative for abdominal pain, constipation, diarrhea, nausea and vomiting.   Endocrine: Negative for cold intolerance, heat intolerance, polydipsia, polyphagia and polyuria.   Genitourinary: Negative for decreased urine volume, difficulty urinating, dysuria, flank pain, frequency, hematuria and urgency.   Musculoskeletal: Positive for arthralgias. Negative for back pain, gait problem, joint swelling, myalgias, neck  pain and neck stiffness.   Skin: Negative for color change, pallor, rash and wound.   Allergic/Immunologic: Negative for immunocompromised state.   Neurological: Positive for weakness. Negative for dizziness, syncope, light-headedness, numbness and headaches.   Hematological: Negative for adenopathy. Does not bruise/bleed easily.   Psychiatric/Behavioral: Negative for decreased concentration, dysphoric mood and sleep disturbance. The patient is not nervous/anxious.    All other systems reviewed and are negative.      Physical Exam     Initial Vitals [10/14/20 1931]   BP Pulse Resp Temp SpO2   (!) 179/102 61 20 98.1 °F (36.7 °C) 99 %      MAP       --         Physical Exam    Nursing note and vitals reviewed.  Constitutional: He appears well-developed and well-nourished. He is not diaphoretic. No distress.   HENT:   Head: Normocephalic and atraumatic.   Right Ear: External ear normal.   Left Ear: External ear normal.   Nose: Nose normal.   Mouth/Throat: Oropharynx is clear and moist.   Eyes: Conjunctivae are normal. Pupils are equal, round, and reactive to light. No scleral icterus.   Neck: Normal range of motion. Neck supple. No JVD present.   Cardiovascular: Normal rate, regular rhythm, normal heart sounds and intact distal pulses.   Pulmonary/Chest: Breath sounds normal. No respiratory distress. He has no wheezes. He has no rhonchi. He has no rales. He exhibits no tenderness.   Abdominal: Soft. Bowel sounds are normal. He exhibits no distension. There is no abdominal tenderness. There is no rebound and no guarding.   Musculoskeletal: Normal range of motion. No tenderness or edema.   Lymphadenopathy:     He has no cervical adenopathy.   Neurological: He is alert and oriented to person, place, and time. He has normal strength and normal reflexes. He displays normal reflexes. No cranial nerve deficit or sensory deficit. GCS score is 15. GCS eye subscore is 4. GCS verbal subscore is 5. GCS motor subscore is 6.   Skin:  Skin is warm and dry. Capillary refill takes less than 2 seconds. No rash and no abscess noted. No erythema. No pallor.   Psychiatric: He has a normal mood and affect. His behavior is normal. Judgment and thought content normal.         ED Course   Procedures  Labs Reviewed   CBC W/ AUTO DIFFERENTIAL - Abnormal; Notable for the following components:       Result Value    RBC 3.90 (*)     Hemoglobin 12.9 (*)     Hematocrit 38.0 (*)     Mean Corpuscular Hemoglobin 33.1 (*)     All other components within normal limits   COMPREHENSIVE METABOLIC PANEL - Abnormal; Notable for the following components:    Sodium 127 (*)     Chloride 89 (*)     Glucose 64 (*)     Calcium 8.5 (*)     Total Bilirubin 1.1 (*)     All other components within normal limits   TROPONIN I - Abnormal; Notable for the following components:    Troponin I 0.01 (*)     All other components within normal limits   SARS-COV-2 RNA AMPLIFICATION, QUAL   APTT   PROTIME-INR   URINALYSIS, REFLEX TO URINE CULTURE    Narrative:     Specimen Source->Urine   POCT GLUCOSE     EKG Readings: (Independently Interpreted)   Initial Reading: No STEMI. Rhythm: Normal Sinus Rhythm. Heart Rate: 60. Ectopy: No Ectopy. Conduction: Normal. ST Segments: Normal ST Segments. T Waves: Normal. Axis: Normal. Clinical Impression: Normal Sinus Rhythm       Imaging Results          X-Ray Chest AP Portable (Final result)  Result time 10/14/20 19:11:48    Final result by Westley Etienne MD (10/14/20 19:11:48)                 Impression:      Negative chest.  No significant change.      Electronically signed by: Westley Etienne MD  Date:    10/14/2020  Time:    19:11             Narrative:    EXAMINATION:  XR CHEST AP PORTABLE    CLINICAL HISTORY:  Suspected Covid-19 Virus Infection;    TECHNIQUE:  Single frontal view of the chest was performed.    COMPARISON:  09/20/2020    FINDINGS:  The cardiomediastinal silhouette is within normal limits.  The lungs are well expanded without  consolidation or pleural effusion.                               CT Head Without Contrast (Final result)  Result time 10/14/20 19:05:23    Final result by Westley Etienne MD (10/14/20 19:05:23)                 Impression:      No acute intracranial process.  No significant change.      Electronically signed by: Westley Etienne MD  Date:    10/14/2020  Time:    19:05             Narrative:    EXAMINATION:  CT HEAD WITHOUT CONTRAST    CLINICAL HISTORY:  Neuro deficit, acute, stroke suspected;    TECHNIQUE:  Low dose axial images were obtained through the head.  Coronal and sagittal reformations were also performed. Contrast was not administered.    COMPARISON:  08/29/2020    FINDINGS:  Gray-white differentiation is well maintained.  There is no intracranial hemorrhage.  There is no mass or midline shift.  The ventricles are nondilated.  The calvarium is intact.                              X-Rays:   Independently Interpreted Readings:   Chest X-Ray: No acute disease seen, no consolidation, atelectasis or PTX.   Head CT: No hemorrhage.  No skull fracture.  No acute stroke.     Medical Decision Making:   Differential Diagnosis:   Acute cerebrovascular accident, transient ischemic attack, hypoglycemia, syncope, near syncope, acute renal failure, acute myocardial infarction, coronavirus, pneumonia, sepsis, severe sepsis, septic shock, acute intracranial hemorrhage, increased intracranial pressure, orthostatic hypotension, seizure disorder, hypoxic brain injury  ED Management:  Reassessment at the time of disposition demonstrates that the pt is in no acute distress.  He has remained hemodynamically stable throughout the entire ED visit and is without objective evidence for acute process requiring urgent intervention or hospitalization.  The pt is stable for discharge, counseling is provided as documented below, discussed symptomatic treatment and specific conditions for return.    Additional MDM:     NIH Stroke  Scale:   Interval = baseline (upon arrival/admit)  Level of consciousness = 0 - alert  LOC questions = 0 - answers both correctly  LOC commands = 0 - performs both correctly  Best gaze = 0 - normal  Visual = 0 - no visual loss  Facial palsy = 0 - normal  Motor left arm =  0 - no drift  Motor right arm =  0 - no drift  Motor left leg = 0 - no drift  Motor right leg =  0 - no drift  Limb ataxia = 0 - absent  Sensory = 0 - normal  Best language = 0 - no aphasia  Dysarthria = 0 - normal articulation  Extinction and inattention = 0 - no neglect  NIH Stroke Scale Total = 0                          Clinical Impression:       ICD-10-CM ICD-9-CM   1. Hip pain  M25.559 719.45   2. Stroke-like symptoms  R29.90 781.99   3. Chronic hyponatremia  E87.1 276.1                      Disposition:   Disposition: Discharged  Condition: Stable     ED Disposition Condition    Discharge Stable        ED Prescriptions     None        Follow-up Information     Follow up With Specialties Details Why Contact Info    Hiram Leija MD [facility] Medicine Go in 1 day  [address]  [facility] [address]  [phone]                                         Britni Saunders MD  10/19/20 9584

## 2020-11-01 ENCOUNTER — HOSPITAL ENCOUNTER (INPATIENT)
Facility: HOSPITAL | Age: 65
LOS: 1 days | Discharge: HOME | DRG: 641 | End: 2020-11-02
Attending: EMERGENCY MEDICINE
Payer: MEDICARE

## 2020-11-01 DIAGNOSIS — E86.0 DEHYDRATION: ICD-10-CM

## 2020-11-01 DIAGNOSIS — R07.9 CHEST PAIN: ICD-10-CM

## 2020-11-01 DIAGNOSIS — R41.82 ALTERED MENTAL STATUS: ICD-10-CM

## 2020-11-01 DIAGNOSIS — D64.9 ANEMIA, UNSPECIFIED TYPE: ICD-10-CM

## 2020-11-01 DIAGNOSIS — E87.1 ACUTE HYPONATREMIA: Primary | ICD-10-CM

## 2020-11-01 LAB
ALBUMIN SERPL BCP-MCNC: 4.1 G/DL (ref 3.5–5.2)
ALP SERPL-CCNC: 74 U/L (ref 55–135)
ALT SERPL W/O P-5'-P-CCNC: 17 U/L (ref 10–44)
AMPHET+METHAMPHET UR QL: NEGATIVE
ANION GAP SERPL CALC-SCNC: 12 MMOL/L (ref 8–16)
APTT BLDCRRT: 30.8 SEC (ref 21–32)
AST SERPL-CCNC: 25 U/L (ref 10–40)
BARBITURATES UR QL SCN>200 NG/ML: NEGATIVE
BASOPHILS # BLD AUTO: 0.03 K/UL (ref 0–0.2)
BASOPHILS NFR BLD: 0.5 % (ref 0–1.9)
BENZODIAZ UR QL SCN>200 NG/ML: NORMAL
BILIRUB SERPL-MCNC: 1.2 MG/DL (ref 0.1–1)
BILIRUB UR QL STRIP: NEGATIVE
BNP SERPL-MCNC: 80 PG/ML (ref 0–99)
BUN SERPL-MCNC: 7 MG/DL (ref 8–23)
BZE UR QL SCN: NEGATIVE
CALCIUM SERPL-MCNC: 8.8 MG/DL (ref 8.7–10.5)
CANNABINOIDS UR QL SCN: NEGATIVE
CHLORIDE SERPL-SCNC: 88 MMOL/L (ref 95–110)
CLARITY UR: CLEAR
CO2 SERPL-SCNC: 22 MMOL/L (ref 23–29)
COLOR UR: YELLOW
CREAT SERPL-MCNC: 0.6 MG/DL (ref 0.5–1.4)
CREAT UR-MCNC: 28.5 MG/DL (ref 23–375)
DIFFERENTIAL METHOD: ABNORMAL
EOSINOPHIL # BLD AUTO: 0.1 K/UL (ref 0–0.5)
EOSINOPHIL NFR BLD: 0.9 % (ref 0–8)
ERYTHROCYTE [DISTWIDTH] IN BLOOD BY AUTOMATED COUNT: 11.9 % (ref 11.5–14.5)
EST. GFR  (AFRICAN AMERICAN): >60 ML/MIN/1.73 M^2
EST. GFR  (NON AFRICAN AMERICAN): >60 ML/MIN/1.73 M^2
ETHANOL SERPL-MCNC: 6 MG/DL
GLUCOSE SERPL-MCNC: 110 MG/DL (ref 70–110)
GLUCOSE UR QL STRIP: NEGATIVE
HCT VFR BLD AUTO: 36.3 % (ref 40–54)
HGB BLD-MCNC: 12.9 G/DL (ref 14–18)
HGB UR QL STRIP: NEGATIVE
IMM GRANULOCYTES # BLD AUTO: 0.02 K/UL (ref 0–0.04)
IMM GRANULOCYTES NFR BLD AUTO: 0.3 % (ref 0–0.5)
INR PPP: 1.1 (ref 0.8–1.2)
KETONES UR QL STRIP: ABNORMAL
LACTATE SERPL-SCNC: 1.1 MMOL/L (ref 0.5–2.2)
LEUKOCYTE ESTERASE UR QL STRIP: NEGATIVE
LYMPHOCYTES # BLD AUTO: 1.2 K/UL (ref 1–4.8)
LYMPHOCYTES NFR BLD: 18.8 % (ref 18–48)
MCH RBC QN AUTO: 33.2 PG (ref 27–31)
MCHC RBC AUTO-ENTMCNC: 35.5 G/DL (ref 32–36)
MCV RBC AUTO: 94 FL (ref 82–98)
MONOCYTES # BLD AUTO: 0.5 K/UL (ref 0.3–1)
MONOCYTES NFR BLD: 8.3 % (ref 4–15)
NEUTROPHILS # BLD AUTO: 4.6 K/UL (ref 1.8–7.7)
NEUTROPHILS NFR BLD: 71.2 % (ref 38–73)
NITRITE UR QL STRIP: NEGATIVE
NRBC BLD-RTO: 0 /100 WBC
OPIATES UR QL SCN: NEGATIVE
PCP UR QL SCN>25 NG/ML: NEGATIVE
PH UR STRIP: 8 [PH] (ref 5–8)
PLATELET # BLD AUTO: 249 K/UL (ref 150–350)
PMV BLD AUTO: 9.3 FL (ref 9.2–12.9)
POTASSIUM SERPL-SCNC: 3.9 MMOL/L (ref 3.5–5.1)
PROT SERPL-MCNC: 7.6 G/DL (ref 6–8.4)
PROT UR QL STRIP: NEGATIVE
PROTHROMBIN TIME: 10.9 SEC (ref 9–12.5)
RBC # BLD AUTO: 3.88 M/UL (ref 4.6–6.2)
SARS-COV-2 RDRP RESP QL NAA+PROBE: NEGATIVE
SODIUM SERPL-SCNC: 122 MMOL/L (ref 136–145)
SODIUM UR-SCNC: 61 MMOL/L (ref 20–250)
SP GR UR STRIP: 1.01 (ref 1–1.03)
TOXICOLOGY INFORMATION: NORMAL
TROPONIN I SERPL DL<=0.01 NG/ML-MCNC: 0.03 NG/ML (ref 0.02–0.5)
TSH SERPL DL<=0.005 MIU/L-ACNC: 1.04 UIU/ML (ref 0.34–5.6)
URN SPEC COLLECT METH UR: ABNORMAL
UROBILINOGEN UR STRIP-ACNC: 1 EU/DL
WBC # BLD AUTO: 6.48 K/UL (ref 3.9–12.7)

## 2020-11-01 PROCEDURE — 70450 CT HEAD/BRAIN W/O DYE: CPT | Mod: TC

## 2020-11-01 PROCEDURE — 80053 COMPREHEN METABOLIC PANEL: CPT

## 2020-11-01 PROCEDURE — 84443 ASSAY THYROID STIM HORMONE: CPT

## 2020-11-01 PROCEDURE — 70450 CT HEAD/BRAIN W/O DYE: CPT | Mod: 26,,, | Performed by: RADIOLOGY

## 2020-11-01 PROCEDURE — 83605 ASSAY OF LACTIC ACID: CPT

## 2020-11-01 PROCEDURE — 80307 DRUG TEST PRSMV CHEM ANLYZR: CPT

## 2020-11-01 PROCEDURE — 83935 ASSAY OF URINE OSMOLALITY: CPT

## 2020-11-01 PROCEDURE — 70450 CT HEAD WITHOUT CONTRAST: ICD-10-PCS | Mod: 26,,, | Performed by: RADIOLOGY

## 2020-11-01 PROCEDURE — 99292 CRITICAL CARE ADDL 30 MIN: CPT

## 2020-11-01 PROCEDURE — U0002 COVID-19 LAB TEST NON-CDC: HCPCS

## 2020-11-01 PROCEDURE — 25000003 PHARM REV CODE 250: Performed by: EMERGENCY MEDICINE

## 2020-11-01 PROCEDURE — 93005 ELECTROCARDIOGRAM TRACING: CPT

## 2020-11-01 PROCEDURE — 71045 X-RAY EXAM CHEST 1 VIEW: CPT | Mod: TC,FY

## 2020-11-01 PROCEDURE — 63600175 PHARM REV CODE 636 W HCPCS: Performed by: FAMILY MEDICINE

## 2020-11-01 PROCEDURE — 80320 DRUG SCREEN QUANTALCOHOLS: CPT

## 2020-11-01 PROCEDURE — 71045 XR CHEST AP PORTABLE: ICD-10-PCS | Mod: 26,,, | Performed by: RADIOLOGY

## 2020-11-01 PROCEDURE — 83880 ASSAY OF NATRIURETIC PEPTIDE: CPT

## 2020-11-01 PROCEDURE — 84484 ASSAY OF TROPONIN QUANT: CPT

## 2020-11-01 PROCEDURE — 85610 PROTHROMBIN TIME: CPT

## 2020-11-01 PROCEDURE — 20000000 HC ICU ROOM

## 2020-11-01 PROCEDURE — 25000003 PHARM REV CODE 250: Performed by: FAMILY MEDICINE

## 2020-11-01 PROCEDURE — 85025 COMPLETE CBC W/AUTO DIFF WBC: CPT

## 2020-11-01 PROCEDURE — 96360 HYDRATION IV INFUSION INIT: CPT

## 2020-11-01 PROCEDURE — 71045 X-RAY EXAM CHEST 1 VIEW: CPT | Mod: 26,,, | Performed by: RADIOLOGY

## 2020-11-01 PROCEDURE — 81003 URINALYSIS AUTO W/O SCOPE: CPT | Mod: 59

## 2020-11-01 PROCEDURE — 99291 CRITICAL CARE FIRST HOUR: CPT | Mod: 25

## 2020-11-01 PROCEDURE — 84300 ASSAY OF URINE SODIUM: CPT

## 2020-11-01 PROCEDURE — 85730 THROMBOPLASTIN TIME PARTIAL: CPT

## 2020-11-01 RX ORDER — CARVEDILOL 3.12 MG/1
6.25 TABLET ORAL 2 TIMES DAILY
Status: DISCONTINUED | OUTPATIENT
Start: 2020-11-01 | End: 2020-11-02 | Stop reason: HOSPADM

## 2020-11-01 RX ORDER — PREGABALIN 25 MG/1
75 CAPSULE ORAL 2 TIMES DAILY
Status: DISCONTINUED | OUTPATIENT
Start: 2020-11-01 | End: 2020-11-02 | Stop reason: HOSPADM

## 2020-11-01 RX ORDER — MELOXICAM 7.5 MG/1
15 TABLET ORAL DAILY PRN
Status: DISCONTINUED | OUTPATIENT
Start: 2020-11-01 | End: 2020-11-02 | Stop reason: HOSPADM

## 2020-11-01 RX ORDER — GLUCAGON 1 MG
1 KIT INJECTION
Status: DISCONTINUED | OUTPATIENT
Start: 2020-11-01 | End: 2020-11-02 | Stop reason: HOSPADM

## 2020-11-01 RX ORDER — TRAZODONE HYDROCHLORIDE 50 MG/1
50 TABLET ORAL NIGHTLY
Status: DISCONTINUED | OUTPATIENT
Start: 2020-11-01 | End: 2020-11-02 | Stop reason: HOSPADM

## 2020-11-01 RX ORDER — PRIMIDONE 50 MG/1
50 TABLET ORAL 4 TIMES DAILY
Status: DISCONTINUED | OUTPATIENT
Start: 2020-11-01 | End: 2020-11-02 | Stop reason: HOSPADM

## 2020-11-01 RX ORDER — SODIUM CHLORIDE 9 MG/ML
1000 INJECTION, SOLUTION INTRAVENOUS
Status: COMPLETED | OUTPATIENT
Start: 2020-11-01 | End: 2020-11-01

## 2020-11-01 RX ORDER — SODIUM CHLORIDE 0.9 % (FLUSH) 0.9 %
10 SYRINGE (ML) INJECTION
Status: DISCONTINUED | OUTPATIENT
Start: 2020-11-01 | End: 2020-11-02 | Stop reason: HOSPADM

## 2020-11-01 RX ORDER — NITROGLYCERIN 0.4 MG/1
0.4 TABLET SUBLINGUAL EVERY 5 MIN PRN
Status: DISCONTINUED | OUTPATIENT
Start: 2020-11-01 | End: 2020-11-02 | Stop reason: HOSPADM

## 2020-11-01 RX ORDER — ENOXAPARIN SODIUM 100 MG/ML
40 INJECTION SUBCUTANEOUS EVERY 24 HOURS
Status: DISCONTINUED | OUTPATIENT
Start: 2020-11-01 | End: 2020-11-02 | Stop reason: HOSPADM

## 2020-11-01 RX ORDER — FAMOTIDINE 20 MG/1
20 TABLET, FILM COATED ORAL 2 TIMES DAILY
Status: DISCONTINUED | OUTPATIENT
Start: 2020-11-01 | End: 2020-11-02 | Stop reason: HOSPADM

## 2020-11-01 RX ORDER — ALPRAZOLAM 0.25 MG/1
1 TABLET ORAL 4 TIMES DAILY PRN
Status: DISCONTINUED | OUTPATIENT
Start: 2020-11-01 | End: 2020-11-02 | Stop reason: HOSPADM

## 2020-11-01 RX ORDER — FUROSEMIDE 10 MG/ML
20 INJECTION INTRAMUSCULAR; INTRAVENOUS ONCE
Status: COMPLETED | OUTPATIENT
Start: 2020-11-01 | End: 2020-11-01

## 2020-11-01 RX ADMIN — TRAZODONE HYDROCHLORIDE 50 MG: 50 TABLET ORAL at 08:11

## 2020-11-01 RX ADMIN — Medication 1 G: at 08:11

## 2020-11-01 RX ADMIN — PREGABALIN 75 MG: 25 CAPSULE ORAL at 08:11

## 2020-11-01 RX ADMIN — ENOXAPARIN SODIUM 40 MG: 100 INJECTION SUBCUTANEOUS at 05:11

## 2020-11-01 RX ADMIN — FUROSEMIDE 20 MG: 10 INJECTION, SOLUTION INTRAMUSCULAR; INTRAVENOUS at 04:11

## 2020-11-01 RX ADMIN — PRIMIDONE 50 MG: 50 TABLET ORAL at 04:11

## 2020-11-01 RX ADMIN — PRIMIDONE 50 MG: 50 TABLET ORAL at 08:11

## 2020-11-01 RX ADMIN — SODIUM CHLORIDE 1000 ML: 9 INJECTION, SOLUTION INTRAVENOUS at 12:11

## 2020-11-01 RX ADMIN — CARVEDILOL 6.25 MG: 3.12 TABLET, FILM COATED ORAL at 08:11

## 2020-11-01 RX ADMIN — FAMOTIDINE 20 MG: 20 TABLET ORAL at 08:11

## 2020-11-01 RX ADMIN — ALPRAZOLAM 1 MG: 0.25 TABLET ORAL at 04:11

## 2020-11-01 NOTE — SUBJECTIVE & OBJECTIVE
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, including shoulder     Cancer 2005    right eye    Colon polyps     COPD (chronic obstructive pulmonary disease)     Depression     Hypertension     Inguinal hernia     left    Kidney stone     MI (myocardial infarction)     Smoker, trying to quit again, 1 cig last a week, started age 17, quit for 42 years, restarted 2018 7/24/2019    Stroke     last stroke 2017    Tremor     Ulcer        Past Surgical History:   Procedure Laterality Date    ANGIOPLASTY      cardiac stent    CARDIAC CATHETERIZATION      COLECTOMY      EYE SURGERY      FINGER SURGERY Left     left index finger    HERNIA REPAIR      HIP ARTHROPLASTY Right 12/20/2019    Procedure: ARTHROPLASTY, HIP, [place], pegboard, 1st assist;  Surgeon: Kody Patel MD;  Location: [facility] OR;  Service: Orthopedics;  Laterality: Right;    OLECRANON BURSECTOMY Right 11/6/2018    Procedure: BURSECTOMY, OLECRANON;  Surgeon: Atul Cooper DO;  Location: [facility] OR;  Service: Orthopedics;  Laterality: Right;  Excision Olecranon Bursa Right Elbow    POLYPECTOMY      REPAIR OF TRICEPS TENDON Right 11/6/2018    Procedure: REPAIR, TENDON, TRICEPS;  Surgeon: Atul Cooper DO;  Location: [facility] OR;  Service: Orthopedics;  Laterality: Right;    SURGICAL REMOVAL OF BONE SPUR Right 11/6/2018    Procedure: EXCISION, BONE SPUR;  Surgeon: Atul Cooper DO;  Location: [facility] OR;  Service: Orthopedics;  Laterality: Right;  Excision Olecranon Bone Spur Right Elbow       Review of patient's allergies indicates:   Allergen Reactions    Codeine Other (See Comments)     hyper    Nsaids (non-steroidal anti-inflammatory drug) Other (See Comments)     Says eats holes in his stomach       No current facility-administered medications on file prior to encounter.      Current Outpatient Medications on File Prior to Encounter   Medication Sig     ALPRAZolam (XANAX) 1 MG tablet TAKE ONE (1) TABLET FOUR (4) TIMES A DAY AS NEEDED FOR ANXIETY     carvediloL (COREG) 6.25 MG tablet Take 1 tablet (6.25 mg total) by mouth 2 (two) times daily.    hydrALAZINE (APRESOLINE) 50 MG tablet Take 1 tablet (50 mg total) by mouth 2 (two) times a day.    meloxicam (MOBIC) 15 MG tablet TAKE ONE (1) TABLET ONCE DAILY WITH FOOD AS NEEDED FOR INFLAMMATION AND PAIN     pantoprazole (PROTONIX) 40 MG tablet Take 1 tablet (40 mg total) by mouth once daily.    primidone (MYSOLINE) 50 MG Tab Take 1 tablet (50 mg total) by mouth 4 (four) times daily.    sodium chloride 1 gram tablet Take 1 tablet (1 g total) by mouth 3 (three) times daily.    traZODone (DESYREL) 50 MG tablet Take 1 tablet (50 mg total) by mouth every evening.    vitamin renal formula, B-complex-vitamin c-folic acid, (NEPHROCAPS) 1 mg Cap Take 1 capsule by mouth once daily.    nitroGLYCERIN (NITROSTAT) 0.3 MG SL tablet Place 1 tablet (0.3 mg total) under the tongue every 5 (five) minutes as needed for Chest pain.    pregabalin (LYRICA) 75 MG capsule Take 1 capsule (75 mg total) by mouth 2 (two) times daily.     Family History     Problem Relation (Age of Onset)    Cancer Father    Dementia Brother    Heart disease Mother, Father, Brother        Tobacco Use    Smoking status: Former Smoker     Years: 15.00     Types: Cigarettes     Quit date: 2018     Years since quittin.4    Smokeless tobacco: Current User     Types: Chew    Tobacco comment: Pt trying to quit   Substance and Sexual Activity    Alcohol use: Yes     Alcohol/week: 2.0 - 3.0 standard drinks     Types: 2 - 3 Cans of beer per week     Comment: occasionally beer    Drug use: No    Sexual activity: Not Currently     Review of Systems   Constitutional: Negative for chills and fever.   HENT: Negative for congestion and rhinorrhea.    Eyes: Negative for discharge.   Respiratory: Negative for chest tightness and shortness of breath.     Cardiovascular: Negative for chest pain.   Gastrointestinal: Negative for abdominal distention, abdominal pain, blood in stool, nausea and vomiting.   Musculoskeletal: Positive for arthralgias, back pain and neck pain.   Skin: Negative for rash.   Neurological: Positive for tremors, weakness and headaches. Negative for seizures, facial asymmetry and numbness.   Psychiatric/Behavioral: Positive for confusion.     Objective:     Vital Signs (Most Recent):  Temp: 98.3 °F (36.8 °C) (11/01/20 1430)  Pulse: 68 (11/01/20 1430)  Resp: 20 (11/01/20 1430)  BP: (!) 163/82 (11/01/20 1430)  SpO2: 99 % (11/01/20 1430) Vital Signs (24h Range):  Temp:  [98 °F (36.7 °C)-98.3 °F (36.8 °C)] 98.3 °F (36.8 °C)  Pulse:  [68-80] 68  Resp:  [11-25] 20  SpO2:  [98 %-99 %] 99 %  BP: (149-175)/(75-98) 163/82     Weight: 63.5 kg (139 lb 15.9 oz)  Body mass index is 20.09 kg/m².    Physical Exam  Constitutional:       Appearance: He is well-developed.   HENT:      Head: Normocephalic and atraumatic.      Nose: Nose normal.      Mouth/Throat:      Mouth: Mucous membranes are moist.   Neck:      Musculoskeletal: Neck supple.   Cardiovascular:      Rate and Rhythm: Normal rate and regular rhythm.      Heart sounds: Normal heart sounds. No murmur. No friction rub. No gallop.    Pulmonary:      Effort: Pulmonary effort is normal.      Breath sounds: Normal breath sounds.   Chest:      Chest wall: No tenderness.   Abdominal:      General: Abdomen is flat. Bowel sounds are normal. There is no distension.      Tenderness: There is no abdominal tenderness.   Musculoskeletal: Normal range of motion.   Skin:     General: Skin is warm.   Neurological:      General: No focal deficit present.      Mental Status: He is alert and oriented to person, place, and time.   Psychiatric:         Mood and Affect: Mood is anxious.         Behavior: Behavior normal.             Significant Labs:   A1C:   Recent Labs   Lab 07/06/20  1813 08/24/20  1814   HGBA1C 5.3 5.4      ABGs: No results for input(s): PH, PCO2, HCO3, POCSATURATED, BE, TOTALHB, COHB, METHB, O2HB, POCFIO2 in the last 48 hours.  CBC:   Recent Labs   Lab 11/01/20  1145   WBC 6.48   HGB 12.9*   HCT 36.3*        CMP:   Recent Labs   Lab 11/01/20  1145   *   K 3.9   CL 88*   CO2 22*      BUN 7*   CREATININE 0.6   CALCIUM 8.8   PROT 7.6   ALBUMIN 4.1   BILITOT 1.2*   ALKPHOS 74   AST 25   ALT 17   ANIONGAP 12   EGFRNONAA >60.0     Cardiac Markers:   Recent Labs   Lab 11/01/20  1145   BNP 80     Coagulation:   Recent Labs   Lab 11/01/20  1145   INR 1.1   APTT 30.8     Lactic Acid:   Recent Labs   Lab 11/01/20  1146   LACTATE 1.1     Lipase: No results for input(s): LIPASE in the last 48 hours.  Magnesium: No results for input(s): MG in the last 48 hours.  TSH:   Recent Labs   Lab 11/01/20  1145   TSH 1.045     Urine Culture: No results for input(s): LABURIN in the last 48 hours.  Urine Studies:   Recent Labs   Lab 11/01/20  1207   COLORU Yellow   APPEARANCEUA Clear   PHUR 8.0   SPECGRAV 1.015   PROTEINUA Negative   GLUCUA Negative   KETONESU Trace*   BILIRUBINUA Negative   OCCULTUA Negative   NITRITE Negative   UROBILINOGEN 1.0   LEUKOCYTESUR Negative       Significant Imaging: I have reviewed all pertinent imaging results/findings within the past 24 hours.

## 2020-11-01 NOTE — ED NOTES
Patient remains  on continuous cardiac monitor, automatic blood pressure cuff and continuous pulse oximeter.

## 2020-11-01 NOTE — ED TRIAGE NOTES
Patient arrives to ER via EMS with reported altered mental status . Patient has presented prior with similar episodes and found to be profoundly hyponatremic
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
## 2020-11-01 NOTE — H&P
[facility] - ICU Hospital Medicine  History & Physical    Patient Name: Ricky Cole  MRN: [id number]  Admission Date: 11/1/2020  Attending Physician: Whitney Varner MD  Primary Care Provider: Hiram Leija MD         Patient information was obtained from patient and ER records.     Subjective:     Principal Problem:Acute hyponatremia    Chief Complaint:   Chief Complaint   Patient presents with    Altered Mental Status        HPI: Ricky Cole is a 64 y/o F with PMH of anxiety, chronic back pain, COPD, depression, hypertension, nephrolithiasis, CAD with MI, CVA 2017, multiple admission for hyponatremia presents to the ED with few hours history of generalized weakness, with associated confusion, headache, aches, tingling sensation on the LE. He denies fever, chills,  urinary incontinence, numbness. 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, including shoulder     Cancer 2005    right eye    Colon polyps     COPD (chronic obstructive pulmonary disease)     Depression     Hypertension     Inguinal hernia     left    Kidney stone     MI (myocardial infarction)     Smoker, trying to quit again, 1 cig last a week, started age 17, quit for 42 years, restarted 2018 7/24/2019    Stroke     last stroke 2017    Tremor     Ulcer        Past Surgical History:   Procedure Laterality Date    ANGIOPLASTY      cardiac stent    CARDIAC CATHETERIZATION      COLECTOMY      EYE SURGERY      FINGER SURGERY Left     left index finger    HERNIA REPAIR      HIP ARTHROPLASTY Right 12/20/2019    Procedure: ARTHROPLASTY, HIP, [place], pegboard, 1st assist;  Surgeon: Kody Patel MD;  Location: [facility] OR;  Service: Orthopedics;  Laterality: Right;    OLECRANON BURSECTOMY Right 11/6/2018    Procedure: BURSECTOMY, OLECRANON;  Surgeon: Atul Cooper DO;  Location: [facility] OR;  Service:  Orthopedics;  Laterality: Right;  Excision Olecranon Bursa Right Elbow    POLYPECTOMY      REPAIR OF TRICEPS TENDON Right 11/6/2018    Procedure: REPAIR, TENDON, TRICEPS;  Surgeon: Atul Cooper DO;  Location: [facility] OR;  Service: Orthopedics;  Laterality: Right;    SURGICAL REMOVAL OF BONE SPUR Right 11/6/2018    Procedure: EXCISION, BONE SPUR;  Surgeon: Atul Cooper DO;  Location: [facility] OR;  Service: Orthopedics;  Laterality: Right;  Excision Olecranon Bone Spur Right Elbow       Review of patient's allergies indicates:   Allergen Reactions    Codeine Other (See Comments)     hyper    Nsaids (non-steroidal anti-inflammatory drug) Other (See Comments)     Says eats holes in his stomach       No current facility-administered medications on file prior to encounter.      Current Outpatient Medications on File Prior to Encounter   Medication Sig    ALPRAZolam (XANAX) 1 MG tablet TAKE ONE (1) TABLET FOUR (4) TIMES A DAY AS NEEDED FOR ANXIETY     carvediloL (COREG) 6.25 MG tablet Take 1 tablet (6.25 mg total) by mouth 2 (two) times daily.    hydrALAZINE (APRESOLINE) 50 MG tablet Take 1 tablet (50 mg total) by mouth 2 (two) times a day.    meloxicam (MOBIC) 15 MG tablet TAKE ONE (1) TABLET ONCE DAILY WITH FOOD AS NEEDED FOR INFLAMMATION AND PAIN     pantoprazole (PROTONIX) 40 MG tablet Take 1 tablet (40 mg total) by mouth once daily.    primidone (MYSOLINE) 50 MG Tab Take 1 tablet (50 mg total) by mouth 4 (four) times daily.    sodium chloride 1 gram tablet Take 1 tablet (1 g total) by mouth 3 (three) times daily.    traZODone (DESYREL) 50 MG tablet Take 1 tablet (50 mg total) by mouth every evening.    vitamin renal formula, B-complex-vitamin c-folic acid, (NEPHROCAPS) 1 mg Cap Take 1 capsule by mouth once daily.    nitroGLYCERIN (NITROSTAT) 0.3 MG SL tablet Place 1 tablet (0.3 mg total) under the tongue every 5 (five) minutes as needed for Chest pain.    pregabalin (LYRICA) 75 MG capsule Take 1  capsule (75 mg total) by mouth 2 (two) times daily.     Family History     Problem Relation (Age of Onset)    Cancer Father    Dementia Brother    Heart disease Mother, Father, Brother        Tobacco Use    Smoking status: Former Smoker     Years: 15.00     Types: Cigarettes     Quit date: 2018     Years since quittin.4    Smokeless tobacco: Current User     Types: Chew    Tobacco comment: Pt trying to quit   Substance and Sexual Activity    Alcohol use: Yes     Alcohol/week: 2.0 - 3.0 standard drinks     Types: 2 - 3 Cans of beer per week     Comment: occasionally beer    Drug use: No    Sexual activity: Not Currently     Review of Systems   Constitutional: Negative for chills and fever.   HENT: Negative for congestion and rhinorrhea.    Eyes: Negative for discharge.   Respiratory: Negative for chest tightness and shortness of breath.    Cardiovascular: Negative for chest pain.   Gastrointestinal: Negative for abdominal distention, abdominal pain, blood in stool, nausea and vomiting.   Musculoskeletal: Positive for arthralgias, back pain and neck pain.   Skin: Negative for rash.   Neurological: Positive for tremors, weakness and headaches. Negative for seizures, facial asymmetry and numbness.   Psychiatric/Behavioral: Positive for confusion.     Objective:     Vital Signs (Most Recent):  Temp: 98.3 °F (36.8 °C) (20 143)  Pulse: 68 (20 143)  Resp: 20 (20)  BP: (!) 163/82 (20)  SpO2: 99 % (20) Vital Signs (24h Range):  Temp:  [98 °F (36.7 °C)-98.3 °F (36.8 °C)] 98.3 °F (36.8 °C)  Pulse:  [68-80] 68  Resp:  [11-25] 20  SpO2:  [98 %-99 %] 99 %  BP: (149-175)/(75-98) 163/82     Weight: 63.5 kg (139 lb 15.9 oz)  Body mass index is 20.09 kg/m².    Physical Exam  Constitutional:       Appearance: He is well-developed.   HENT:      Head: Normocephalic and atraumatic.      Nose: Nose normal.      Mouth/Throat:      Mouth: Mucous membranes are moist.   Neck:       Musculoskeletal: Neck supple.   Cardiovascular:      Rate and Rhythm: Normal rate and regular rhythm.      Heart sounds: Normal heart sounds. No murmur. No friction rub. No gallop.    Pulmonary:      Effort: Pulmonary effort is normal.      Breath sounds: Normal breath sounds.   Chest:      Chest wall: No tenderness.   Abdominal:      General: Abdomen is flat. Bowel sounds are normal. There is no distension.      Tenderness: There is no abdominal tenderness.   Musculoskeletal: Normal range of motion.   Skin:     General: Skin is warm.   Neurological:      General: No focal deficit present.      Mental Status: He is alert and oriented to person, place, and time.   Psychiatric:         Mood and Affect: Mood is anxious.         Behavior: Behavior normal.             Significant Labs:   A1C:   Recent Labs   Lab 07/06/20  1813 08/24/20  1814   HGBA1C 5.3 5.4     ABGs: No results for input(s): PH, PCO2, HCO3, POCSATURATED, BE, TOTALHB, COHB, METHB, O2HB, POCFIO2 in the last 48 hours.  CBC:   Recent Labs   Lab 11/01/20  1145   WBC 6.48   HGB 12.9*   HCT 36.3*        CMP:   Recent Labs   Lab 11/01/20  1145   *   K 3.9   CL 88*   CO2 22*      BUN 7*   CREATININE 0.6   CALCIUM 8.8   PROT 7.6   ALBUMIN 4.1   BILITOT 1.2*   ALKPHOS 74   AST 25   ALT 17   ANIONGAP 12   EGFRNONAA >60.0     Cardiac Markers:   Recent Labs   Lab 11/01/20  1145   BNP 80     Coagulation:   Recent Labs   Lab 11/01/20  1145   INR 1.1   APTT 30.8     Lactic Acid:   Recent Labs   Lab 11/01/20  1146   LACTATE 1.1     Lipase: No results for input(s): LIPASE in the last 48 hours.  Magnesium: No results for input(s): MG in the last 48 hours.  TSH:   Recent Labs   Lab 11/01/20  1145   TSH 1.045     Urine Culture: No results for input(s): LABURIN in the last 48 hours.  Urine Studies:   Recent Labs   Lab 11/01/20  1207   COLORU Yellow   APPEARANCEUA Clear   PHUR 8.0   SPECGRAV 1.015   PROTEINUA Negative   GLUCUA Negative   KETONESU Trace*    BILIRUBINUA Negative   OCCULTUA Negative   NITRITE Negative   UROBILINOGEN 1.0   LEUKOCYTESUR Negative       Significant Imaging: I have reviewed all pertinent imaging results/findings within the past 24 hours.    Assessment/Plan:     * Acute hyponatremia  Fluid restriction  Urine osmolality  Continue with home sodium chloride tablet      Left hip pain  Continue Mobic      Essential hypertension  Continue with hydralazine      Tremor  Continue with primidone and Coreg      Dementia associated with alcoholism without behavioral disturbance  Paresthesia of left arm and leg  Anxiety  Continue with Lyrica, Trazadone, Alprazolam    PAD (peripheral artery disease), mnoderate  History of MI (myocardial infarction) x2, 1987 and 2015  History of heart artery stent, one in 2009  Continue with Coreg      History of stroke x2, 2015, 2017  stable        VTE Risk Mitigation (From admission, onward)         Ordered     enoxaparin injection 40 mg  Every 24 hours      11/01/20 1531     IP VTE LOW RISK PATIENT  Once      11/01/20 1531                   Whitney Varner MD  Department of Hospital Medicine   [facility] [place]

## 2020-11-01 NOTE — ASSESSMENT & PLAN NOTE
History of MI (myocardial infarction) x2, 1987 and 2015  History of heart artery stent, one in 2009  Continue with Coreg

## 2020-11-02 VITALS
HEART RATE: 65 BPM | RESPIRATION RATE: 18 BRPM | SYSTOLIC BLOOD PRESSURE: 135 MMHG | HEIGHT: 70 IN | TEMPERATURE: 98 F | BODY MASS INDEX: 20.04 KG/M2 | DIASTOLIC BLOOD PRESSURE: 62 MMHG | OXYGEN SATURATION: 98 % | WEIGHT: 140 LBS

## 2020-11-02 LAB
ALBUMIN SERPL BCP-MCNC: 3.4 G/DL (ref 3.5–5.2)
ALP SERPL-CCNC: 62 U/L (ref 55–135)
ALT SERPL W/O P-5'-P-CCNC: 15 U/L (ref 10–44)
ANION GAP SERPL CALC-SCNC: 12 MMOL/L (ref 8–16)
AST SERPL-CCNC: 18 U/L (ref 10–40)
BASOPHILS # BLD AUTO: 0.03 K/UL (ref 0–0.2)
BASOPHILS NFR BLD: 0.5 % (ref 0–1.9)
BILIRUB SERPL-MCNC: 1 MG/DL (ref 0.1–1)
BUN SERPL-MCNC: 8 MG/DL (ref 8–23)
CALCIUM SERPL-MCNC: 8.4 MG/DL (ref 8.7–10.5)
CHLORIDE SERPL-SCNC: 93 MMOL/L (ref 95–110)
CO2 SERPL-SCNC: 26 MMOL/L (ref 23–29)
CREAT SERPL-MCNC: 0.8 MG/DL (ref 0.5–1.4)
DIFFERENTIAL METHOD: ABNORMAL
EOSINOPHIL # BLD AUTO: 0.1 K/UL (ref 0–0.5)
EOSINOPHIL NFR BLD: 1.3 % (ref 0–8)
ERYTHROCYTE [DISTWIDTH] IN BLOOD BY AUTOMATED COUNT: 12.1 % (ref 11.5–14.5)
EST. GFR  (AFRICAN AMERICAN): >60 ML/MIN/1.73 M^2
EST. GFR  (NON AFRICAN AMERICAN): >60 ML/MIN/1.73 M^2
GLUCOSE SERPL-MCNC: 92 MG/DL (ref 70–110)
HCT VFR BLD AUTO: 34.5 % (ref 40–54)
HGB BLD-MCNC: 12 G/DL (ref 14–18)
IMM GRANULOCYTES # BLD AUTO: 0.03 K/UL (ref 0–0.04)
IMM GRANULOCYTES NFR BLD AUTO: 0.5 % (ref 0–0.5)
LYMPHOCYTES # BLD AUTO: 1.2 K/UL (ref 1–4.8)
LYMPHOCYTES NFR BLD: 19.8 % (ref 18–48)
MAGNESIUM SERPL-MCNC: 1.9 MG/DL (ref 1.6–2.6)
MCH RBC QN AUTO: 33.3 PG (ref 27–31)
MCHC RBC AUTO-ENTMCNC: 34.8 G/DL (ref 32–36)
MCV RBC AUTO: 96 FL (ref 82–98)
MONOCYTES # BLD AUTO: 0.7 K/UL (ref 0.3–1)
MONOCYTES NFR BLD: 11.6 % (ref 4–15)
NEUTROPHILS # BLD AUTO: 4 K/UL (ref 1.8–7.7)
NEUTROPHILS NFR BLD: 66.3 % (ref 38–73)
NRBC BLD-RTO: 0 /100 WBC
PLATELET # BLD AUTO: 197 K/UL (ref 150–350)
PMV BLD AUTO: 9.7 FL (ref 9.2–12.9)
POTASSIUM SERPL-SCNC: 3.3 MMOL/L (ref 3.5–5.1)
PROT SERPL-MCNC: 6.7 G/DL (ref 6–8.4)
RBC # BLD AUTO: 3.6 M/UL (ref 4.6–6.2)
SODIUM SERPL-SCNC: 131 MMOL/L (ref 136–145)
WBC # BLD AUTO: 5.97 K/UL (ref 3.9–12.7)

## 2020-11-02 PROCEDURE — 80053 COMPREHEN METABOLIC PANEL: CPT

## 2020-11-02 PROCEDURE — 85025 COMPLETE CBC W/AUTO DIFF WBC: CPT

## 2020-11-02 PROCEDURE — 99239 HOSP IP/OBS DSCHRG MGMT >30: CPT | Mod: ,,, | Performed by: FAMILY MEDICINE

## 2020-11-02 PROCEDURE — 83735 ASSAY OF MAGNESIUM: CPT

## 2020-11-02 PROCEDURE — 25000003 PHARM REV CODE 250: Performed by: FAMILY MEDICINE

## 2020-11-02 PROCEDURE — 99239 PR HOSPITAL DISCHARGE DAY,>30 MIN: ICD-10-PCS | Mod: ,,, | Performed by: FAMILY MEDICINE

## 2020-11-02 RX ORDER — HYDRALAZINE HYDROCHLORIDE 50 MG/1
50 TABLET, FILM COATED ORAL 2 TIMES DAILY
Qty: 270 TABLET | Refills: 3 | Status: SHIPPED | OUTPATIENT
Start: 2020-11-02 | End: 2020-12-09 | Stop reason: SDUPTHER

## 2020-11-02 RX ADMIN — PREGABALIN 75 MG: 25 CAPSULE ORAL at 08:11

## 2020-11-02 RX ADMIN — FAMOTIDINE 20 MG: 20 TABLET ORAL at 08:11

## 2020-11-02 RX ADMIN — PRIMIDONE 50 MG: 50 TABLET ORAL at 08:11

## 2020-11-02 RX ADMIN — CARVEDILOL 6.25 MG: 3.12 TABLET, FILM COATED ORAL at 08:11

## 2020-11-02 RX ADMIN — Medication 1 G: at 08:11

## 2020-11-02 NOTE — NURSING
On assessment, pt. Resting well in bed. Pt. Alert and orientedx4. No acute events overnight. Sodium level trending up to normal. Call bell within reach. Will continue to closely monitor.

## 2020-11-02 NOTE — DISCHARGE SUMMARY
[facility] - ICU Hospital Medicine  Discharge Summary      Patient Name: Ricky Cole  MRN: [id number]  Admission Date: 11/1/2020  Hospital Length of Stay: 1 days  Discharge Date and Time:  11/02/2020 11:28 AM  Attending Physician: Vero Lui MD   Discharging Provider: Vero Lui MD  Primary Care Provider: Hiram Leija MD        HPI:   Ricky Cole is a 64 y/o F with PMH of anxiety, chronic back pain, COPD, depression, hypertension, nephrolithiasis, CAD with MI, CVA 2017, multiple admission for hyponatremia presents to the ED with few hours history of generalized weakness, with associated confusion, headache, aches, tingling sensation on the LE. He denies fever, chills,  urinary incontinence, numbness. In the ED Na was 122    * No surgery found *      Hospital Course:   Patient admitted. Fluid restricted. Sodium corrected. He remained stable.  Discharged home. Take salt tabs TID. Add hydralazine third dose is BP high.     Consults:   Consults (From admission, onward)        Status Ordering Provider     IP consult to case management  Once     Provider:  (Not yet assigned)    Acknowledged VERO LUI          Final Active Diagnoses:    Diagnosis Date Noted POA    PRINCIPAL PROBLEM:  Acute hyponatremia [E87.1] 11/01/2020 Yes    Left hip pain [M25.552] 07/07/2020 Yes    Paresthesia of left arm and leg [R20.2] 07/06/2020 Yes    Essential hypertension [I10] 12/21/2019 Yes    Tremor [R25.1] 12/21/2019 Yes    Dementia associated with alcoholism without behavioral disturbance [F10.27] 12/18/2019 Yes    History of heart artery stent, one in 2009 [Z95.5] 07/24/2019 Not Applicable    History of MI (myocardial infarction) x2, 1987 and 2015 [I25.2] 07/24/2019 Not Applicable    PAD (peripheral artery disease), mnoderate [I73.9] 07/24/2019 Yes    History of stroke x2, 2015, 2017 [Z86.73] 07/24/2019 Not Applicable      Problems Resolved During this Admission:       Discharged Condition: good    Disposition: Home or Self Care    Follow Up:  Follow-up Information     Hiram Leija MD.    Specialty: Family Medicine  Contact information:  [address] #[place] [address] [phone]                 Patient Instructions:      Diet Adult Regular     Notify your health care provider if you experience any of the following:  persistent nausea and vomiting or diarrhea     Notify your health care provider if you experience any of the following:  persistent dizziness, light-headedness, or visual disturbances     Notify your health care provider if you experience any of the following:  increased confusion or weakness     Activity as tolerated     Medications:  Reconciled Home Medications:      Medication List      CHANGE how you take these medications    hydrALAZINE 50 MG tablet  Commonly known as: APRESOLINE  Take 1 tablet (50 mg total) by mouth 2 (two) times a day. Take additional tablet at noon for BP >160/90  What changed: additional instructions        CONTINUE taking these medications    ALPRAZolam 1 MG tablet  Commonly known as: XANAX  TAKE ONE (1) TABLET FOUR (4) TIMES A DAY AS NEEDED FOR ANXIETY     carvediloL 6.25 MG tablet  Commonly known as: COREG  Take 1 tablet (6.25 mg total) by mouth 2 (two) times daily.     meloxicam 15 MG tablet  Commonly known as: MOBIC  TAKE ONE (1) TABLET ONCE DAILY WITH FOOD AS NEEDED FOR INFLAMMATION AND PAIN     nitroGLYCERIN 0.3 MG SL tablet  Commonly known as: NITROSTAT  Place 1 tablet (0.3 mg total) under the tongue every 5 (five) minutes as needed for Chest pain.     pantoprazole 40 MG tablet  Commonly known as: PROTONIX  Take 1 tablet (40 mg total) by mouth once daily.     pregabalin 75 MG capsule  Commonly known as: LYRICA  Take 1 capsule (75 mg total) by mouth 2 (two) times daily.     primidone 50 MG Tab  Commonly known as: MYSOLINE  Take 1 tablet (50 mg total) by mouth 4 (four) times daily.     sodium chloride 1 gram  tablet  Take 1 tablet (1 g total) by mouth 3 (three) times daily.     traZODone 50 MG tablet  Commonly known as: DESYREL  Take 1 tablet (50 mg total) by mouth every evening.     vitamin renal formula (B-complex-vitamin c-folic acid) 1 mg Cap  Commonly known as: NEPHROCAPS  Take 1 capsule by mouth once daily.            Significant Diagnostic Studies: Labs:   BMP:   Recent Labs   Lab 11/01/20  1145 11/02/20  0535    92   * 131*   K 3.9 3.3*   CL 88* 93*   CO2 22* 26   BUN 7* 8   CREATININE 0.6 0.8   CALCIUM 8.8 8.4*   MG  --  1.9   , CMP   Recent Labs   Lab 11/01/20  1145 11/02/20  0535   * 131*   K 3.9 3.3*   CL 88* 93*   CO2 22* 26    92   BUN 7* 8   CREATININE 0.6 0.8   CALCIUM 8.8 8.4*   PROT 7.6 6.7   ALBUMIN 4.1 3.4*   BILITOT 1.2* 1.0   ALKPHOS 74 62   AST 25 18   ALT 17 15   ANIONGAP 12 12   ESTGFRAFRICA >60.0 >60.0   EGFRNONAA >60.0 >60.0   , CBC   Recent Labs   Lab 11/01/20  1145 11/02/20  0535   WBC 6.48 5.97   HGB 12.9* 12.0*   HCT 36.3* 34.5*    197    and All labs within the past 24 hours have been reviewed    Pending Diagnostic Studies:     Procedure Component Value Units Date/Time    Osmolality, urine [[id number]] Collected: 11/01/20 1207    Order Status: Sent Lab Status: In process Updated: 11/01/20 1213    Specimen: Urine, Catheterized         Indwelling Lines/Drains at time of discharge:   Lines/Drains/Airways     None                 Time spent on the discharge of patient: 40 minutes  Patient was seen and examined on the date of discharge and determined to be suitable for discharge.         Vero Veal MD  Department of Hospital Medicine  [facility]

## 2020-11-02 NOTE — PLAN OF CARE
Problem: Fall Injury Risk  Goal: Absence of Fall and Fall-Related Injury  Outcome: Ongoing, Progressing     Problem: Adult Inpatient Plan of Care  Goal: Plan of Care Review  Outcome: Ongoing, Progressing  Goal: Patient-Specific Goal (Individualization)  Outcome: Ongoing, Progressing  Goal: Absence of Hospital-Acquired Illness or Injury  Outcome: Ongoing, Progressing  Goal: Optimal Comfort and Wellbeing  Outcome: Ongoing, Progressing  Goal: Readiness for Transition of Care  Outcome: Ongoing, Progressing     Problem: Skin Injury Risk Increased  Goal: Skin Health and Integrity  Outcome: Ongoing, Progressing

## 2020-11-03 LAB — OSMOLALITY UR: 221 MOSM/KG (ref 50–1200)

## 2020-11-16 DIAGNOSIS — F41.1 GAD (GENERALIZED ANXIETY DISORDER): ICD-10-CM

## 2020-11-16 RX ORDER — ALPRAZOLAM 1 MG/1
TABLET ORAL
Qty: 120 TABLET | Refills: 0 | Status: SHIPPED | OUTPATIENT
Start: 2020-11-16 | End: 2020-12-07

## 2020-11-16 NOTE — TELEPHONE ENCOUNTER
----- Message from Jocelyne Diana sent at 11/16/2020  8:33 AM CST -----  Regarding: Refill  Contact: Patient  Type:  RX Refill Request    Who Called:  Patient   Refill or New Rx:  Refill  RX Name and Strength:  XANAX 1 MG,carvediloL 6.25 MG and hydrALAZINE 50 MG     Is this a 30 day or 90 day RX:  30day  Preferred Pharmacy with phone number:    [facility] - [place], [place] - [address]  [facility] [address]  [place] [address]  Phone: [phone] Fax: [phone]     Local or Mail Order:  Local  Ordering Provider:  Dr. Heladio Reyes Call Back Number:  [phone] (home)   Patient is out of meds    Case number [id number]

## 2020-12-05 DIAGNOSIS — F41.1 GAD (GENERALIZED ANXIETY DISORDER): ICD-10-CM

## 2020-12-07 RX ORDER — ALPRAZOLAM 1 MG/1
TABLET ORAL
Qty: 120 TABLET | Refills: 0 | Status: SHIPPED | OUTPATIENT
Start: 2020-12-07 | End: 2020-12-09 | Stop reason: SDUPTHER

## 2020-12-09 ENCOUNTER — TELEPHONE (OUTPATIENT)
Dept: FAMILY MEDICINE | Facility: CLINIC | Age: 65
End: 2020-12-09

## 2020-12-09 ENCOUNTER — OFFICE VISIT (OUTPATIENT)
Dept: FAMILY MEDICINE | Facility: CLINIC | Age: 65
End: 2020-12-09
Payer: MEDICARE

## 2020-12-09 VITALS
RESPIRATION RATE: 14 BRPM | WEIGHT: 143.38 LBS | BODY MASS INDEX: 20.53 KG/M2 | OXYGEN SATURATION: 97 % | TEMPERATURE: 97 F | DIASTOLIC BLOOD PRESSURE: 91 MMHG | HEIGHT: 70 IN | HEART RATE: 63 BPM | SYSTOLIC BLOOD PRESSURE: 188 MMHG

## 2020-12-09 DIAGNOSIS — F41.1 GAD (GENERALIZED ANXIETY DISORDER): ICD-10-CM

## 2020-12-09 DIAGNOSIS — E87.1 HYPONATREMIA: Primary | ICD-10-CM

## 2020-12-09 PROCEDURE — 99999 PR PBB SHADOW E&M-EST. PATIENT-LVL III: ICD-10-PCS | Mod: PBBFAC,,, | Performed by: FAMILY MEDICINE

## 2020-12-09 PROCEDURE — 1125F AMNT PAIN NOTED PAIN PRSNT: CPT | Mod: S$GLB,,, | Performed by: FAMILY MEDICINE

## 2020-12-09 PROCEDURE — 3288F PR FALLS RISK ASSESSMENT DOCUMENTED: ICD-10-PCS | Mod: CPTII,S$GLB,, | Performed by: FAMILY MEDICINE

## 2020-12-09 PROCEDURE — 1159F MED LIST DOCD IN RCRD: CPT | Mod: S$GLB,,, | Performed by: FAMILY MEDICINE

## 2020-12-09 PROCEDURE — 3288F FALL RISK ASSESSMENT DOCD: CPT | Mod: CPTII,S$GLB,, | Performed by: FAMILY MEDICINE

## 2020-12-09 PROCEDURE — 1101F PT FALLS ASSESS-DOCD LE1/YR: CPT | Mod: CPTII,S$GLB,, | Performed by: FAMILY MEDICINE

## 2020-12-09 PROCEDURE — 3008F PR BODY MASS INDEX (BMI) DOCUMENTED: ICD-10-PCS | Mod: CPTII,S$GLB,, | Performed by: FAMILY MEDICINE

## 2020-12-09 PROCEDURE — 99214 OFFICE O/P EST MOD 30 MIN: CPT | Mod: S$GLB,,, | Performed by: FAMILY MEDICINE

## 2020-12-09 PROCEDURE — 99999 PR PBB SHADOW E&M-EST. PATIENT-LVL III: CPT | Mod: PBBFAC,,, | Performed by: FAMILY MEDICINE

## 2020-12-09 PROCEDURE — 1159F PR MEDICATION LIST DOCUMENTED IN MEDICAL RECORD: ICD-10-PCS | Mod: S$GLB,,, | Performed by: FAMILY MEDICINE

## 2020-12-09 PROCEDURE — 1101F PR PT FALLS ASSESS DOC 0-1 FALLS W/OUT INJ PAST YR: ICD-10-PCS | Mod: CPTII,S$GLB,, | Performed by: FAMILY MEDICINE

## 2020-12-09 PROCEDURE — 1125F PR PAIN SEVERITY QUANTIFIED, PAIN PRESENT: ICD-10-PCS | Mod: S$GLB,,, | Performed by: FAMILY MEDICINE

## 2020-12-09 PROCEDURE — 3080F PR MOST RECENT DIASTOLIC BLOOD PRESSURE >= 90 MM HG: ICD-10-PCS | Mod: CPTII,S$GLB,, | Performed by: FAMILY MEDICINE

## 2020-12-09 PROCEDURE — 3008F BODY MASS INDEX DOCD: CPT | Mod: CPTII,S$GLB,, | Performed by: FAMILY MEDICINE

## 2020-12-09 PROCEDURE — 3077F SYST BP >= 140 MM HG: CPT | Mod: CPTII,S$GLB,, | Performed by: FAMILY MEDICINE

## 2020-12-09 PROCEDURE — 3077F PR MOST RECENT SYSTOLIC BLOOD PRESSURE >= 140 MM HG: ICD-10-PCS | Mod: CPTII,S$GLB,, | Performed by: FAMILY MEDICINE

## 2020-12-09 PROCEDURE — 3080F DIAST BP >= 90 MM HG: CPT | Mod: CPTII,S$GLB,, | Performed by: FAMILY MEDICINE

## 2020-12-09 PROCEDURE — 99214 PR OFFICE/OUTPT VISIT, EST, LEVL IV, 30-39 MIN: ICD-10-PCS | Mod: S$GLB,,, | Performed by: FAMILY MEDICINE

## 2020-12-09 RX ORDER — ALPRAZOLAM 1 MG/1
TABLET ORAL
Qty: 120 TABLET | Refills: 2 | Status: SHIPPED | OUTPATIENT
Start: 2020-12-09 | End: 2021-01-03

## 2020-12-09 RX ORDER — SODIUM CHLORIDE 1 G/1
1000 TABLET ORAL 3 TIMES DAILY
COMMUNITY
Start: 2020-11-30 | End: 2020-12-09 | Stop reason: SDUPTHER

## 2020-12-09 RX ORDER — SODIUM CHLORIDE 1 G/1
1000 TABLET ORAL 3 TIMES DAILY
Qty: 90 TABLET | Refills: 3 | Status: SHIPPED | OUTPATIENT
Start: 2020-12-09 | End: 2021-06-03

## 2020-12-09 RX ORDER — CARVEDILOL 6.25 MG/1
6.25 TABLET ORAL 2 TIMES DAILY
Qty: 180 TABLET | Refills: 3 | Status: SHIPPED | OUTPATIENT
Start: 2020-12-09 | End: 2021-02-11 | Stop reason: SDUPTHER

## 2020-12-09 RX ORDER — ALPRAZOLAM 1 MG/1
TABLET ORAL
Qty: 120 TABLET | Refills: 2 | Status: SHIPPED | OUTPATIENT
Start: 2020-12-09 | End: 2020-12-09 | Stop reason: SDUPTHER

## 2020-12-09 RX ORDER — HYDROCHLOROTHIAZIDE 12.5 MG/1
CAPSULE ORAL
COMMUNITY
Start: 2020-12-06

## 2020-12-09 RX ORDER — SODIUM CHLORIDE 1 G/1
1000 TABLET ORAL 3 TIMES DAILY
Qty: 90 TABLET | Refills: 3 | Status: SHIPPED | OUTPATIENT
Start: 2020-12-09 | End: 2020-12-09 | Stop reason: SDUPTHER

## 2020-12-09 RX ORDER — HYDRALAZINE HYDROCHLORIDE 50 MG/1
50 TABLET, FILM COATED ORAL 2 TIMES DAILY
Qty: 270 TABLET | Refills: 3 | Status: SHIPPED | OUTPATIENT
Start: 2020-12-09

## 2020-12-09 RX ORDER — CARVEDILOL 6.25 MG/1
6.25 TABLET ORAL 2 TIMES DAILY
Qty: 180 TABLET | Refills: 3 | Status: SHIPPED | OUTPATIENT
Start: 2020-12-09 | End: 2020-12-09 | Stop reason: SDUPTHER

## 2020-12-09 RX ORDER — HYDRALAZINE HYDROCHLORIDE 50 MG/1
50 TABLET, FILM COATED ORAL 2 TIMES DAILY
Qty: 270 TABLET | Refills: 3 | Status: SHIPPED | OUTPATIENT
Start: 2020-12-09 | End: 2020-12-09 | Stop reason: SDUPTHER

## 2020-12-09 NOTE — TELEPHONE ENCOUNTER
----- Message from Cary Gooden sent at 12/9/2020  4:40 PM CST -----  Regarding: Advice  Contact: pt  Type: Needs Medical Advice  Who Called:  pt  Symptoms (please be specific):  na/  How long has patient had these symptoms:  na/    Pharmacy name and phone #:    [facility] [address] [facility], [place] - [address] [address]  Phone: [phone] Fax: [phone]    Best Call Back Number: [phone] (home)     Additional Information: needs dr to call [facility] pharmacy xanax and hydrlocycine saying that need to speak with dr. Please call pt also

## 2020-12-09 NOTE — PROGRESS NOTES
"Subjective:       Patient ID: Ricky Cole is a 65 y.o. male.    Chief Complaint: Follow-up    Pt states that anxiety is well controlled  No issues/side effects  Compliant with treatment regimen    Review of Systems   Constitutional: Negative for activity change, diaphoresis, fatigue and fever.   HENT: Negative for congestion and sore throat.    Eyes: Negative for pain.   Respiratory: Negative for cough, chest tightness, shortness of breath and wheezing.    Cardiovascular: Negative for chest pain and palpitations.   Gastrointestinal: Negative for abdominal pain, nausea and vomiting.   Musculoskeletal: Negative for arthralgias and myalgias.   Skin: Negative for color change.   Neurological: Positive for tremors. Negative for dizziness, syncope, weakness, numbness and headaches.   Psychiatric/Behavioral: Negative for dysphoric mood and sleep disturbance. The patient is not nervous/anxious.    All other systems reviewed and are negative.        Reviewed family, medical, surgical, and social history.    Objective:      BP (!) 188/91   Pulse 63   Temp 97.1 °F (36.2 °C) (Temporal)   Resp 14   Ht 5' 10" (1.778 m)   Wt 65 kg (143 lb 6.4 oz)   SpO2 97%   BMI 20.58 kg/m²   Physical Exam  Vitals signs and nursing note reviewed.   Constitutional:       General: He is not in acute distress.     Appearance: He is well-developed and normal weight. He is not ill-appearing, toxic-appearing or diaphoretic.   HENT:      Head: Normocephalic and atraumatic.      Nose: Nose normal. No congestion or rhinorrhea.      Mouth/Throat:      Mouth: Mucous membranes are moist.      Pharynx: Oropharynx is clear. No oropharyngeal exudate or posterior oropharyngeal erythema.   Eyes:      General: No scleral icterus.        Right eye: No discharge.         Left eye: No discharge.      Conjunctiva/sclera: Conjunctivae normal.      Pupils: Pupils are equal, round, and reactive to light.   Neck:      Musculoskeletal: Normal range of motion " and neck supple. No neck rigidity or muscular tenderness.      Thyroid: No thyromegaly.      Vascular: No carotid bruit or JVD.      Trachea: No tracheal deviation.   Cardiovascular:      Rate and Rhythm: Normal rate and regular rhythm.      Pulses: Normal pulses.      Heart sounds: Normal heart sounds.   Pulmonary:      Effort: Pulmonary effort is normal. No respiratory distress.      Breath sounds: Normal breath sounds. No wheezing.   Abdominal:      General: Abdomen is flat. Bowel sounds are normal.      Palpations: Abdomen is soft.   Musculoskeletal: Normal range of motion.         General: No deformity.   Lymphadenopathy:      Cervical: No cervical adenopathy.   Skin:     General: Skin is warm and dry.      Capillary Refill: Capillary refill takes less than 2 seconds.      Coloration: Skin is not jaundiced or pale.      Findings: No erythema or rash.   Neurological:      General: No focal deficit present.      Mental Status: He is alert and oriented to person, place, and time. Mental status is at baseline.      Motor: Weakness: generalized weakness.      Deep Tendon Reflexes: Reflexes normal.   Psychiatric:         Mood and Affect: Mood normal.         Behavior: Behavior normal.         Thought Content: Thought content normal.         Judgment: Judgment normal.         Assessment:       1. Hyponatremia    2. CHANTELLE (generalized anxiety disorder)        Plan:       Hyponatremia  -     Discontinue: sodium chloride 1,000 mg TbSO tablet tbso; Take 1 tablet (1,000 mg total) by mouth 3 (three) times daily.  Dispense: 90 tablet; Refill: 3  -     Ambulatory referral/consult to Nephrology; Future; Expected date: 12/16/2020  -     sodium chloride 1,000 mg TbSO tablet tbso; Take 1 tablet (1,000 mg total) by mouth 3 (three) times daily.  Dispense: 90 tablet; Refill: 3    CHANTELLE (generalized anxiety disorder)  -     Discontinue: ALPRAZolam (XANAX) 1 MG tablet; Take one po qid prn anxiety  Dispense: 120 tablet; Refill: 2  -      ALPRAZolam (XANAX) 1 MG tablet; Take one po qid prn anxiety  Dispense: 120 tablet; Refill: 2    Other orders  -     Discontinue: carvediloL (COREG) 6.25 MG tablet; Take 1 tablet (6.25 mg total) by mouth 2 (two) times daily.  Dispense: 180 tablet; Refill: 3  -     Discontinue: hydrALAZINE (APRESOLINE) 50 MG tablet; Take 1 tablet (50 mg total) by mouth 2 (two) times a day. Take additional tablet at noon for BP >160/90  Dispense: 270 tablet; Refill: 3  -     carvediloL (COREG) 6.25 MG tablet; Take 1 tablet (6.25 mg total) by mouth 2 (two) times daily.  Dispense: 180 tablet; Refill: 3  -     hydrALAZINE (APRESOLINE) 50 MG tablet; Take 1 tablet (50 mg total) by mouth 2 (two) times a day. Take additional tablet at noon for BP >160/90  Dispense: 270 tablet; Refill: 3            Risks, benefits, and side effects were discussed with the patient. All questions were answered to the fullest satisfaction of the patient, and pt verbalized understanding and agreement to treatment plan. Pt was to call with any new or worsening symptoms, or present to the ER.

## 2020-12-09 NOTE — TELEPHONE ENCOUNTER
Spoke with associate at [facility] pharmacy. Pt xanax and hydralazine prescription can't be filled until 12/16/20. Pt notified. Pt expresses understanding.

## 2020-12-15 ENCOUNTER — HOSPITAL ENCOUNTER (EMERGENCY)
Facility: HOSPITAL | Age: 65
Discharge: HOME | End: 2020-12-15
Attending: EMERGENCY MEDICINE
Payer: MEDICARE

## 2020-12-15 VITALS
OXYGEN SATURATION: 97 % | HEIGHT: 70 IN | BODY MASS INDEX: 20.04 KG/M2 | SYSTOLIC BLOOD PRESSURE: 171 MMHG | WEIGHT: 140 LBS | HEART RATE: 61 BPM | TEMPERATURE: 98 F | DIASTOLIC BLOOD PRESSURE: 96 MMHG | RESPIRATION RATE: 18 BRPM

## 2020-12-15 DIAGNOSIS — R53.1 GENERALIZED WEAKNESS: ICD-10-CM

## 2020-12-15 DIAGNOSIS — R20.2 PARESTHESIAS: Primary | ICD-10-CM

## 2020-12-15 DIAGNOSIS — R91.8 LEFT PULMONARY INFILTRATE ON CXR: ICD-10-CM

## 2020-12-15 LAB
ALBUMIN SERPL BCP-MCNC: 4.1 G/DL (ref 3.5–5.2)
ALP SERPL-CCNC: 64 U/L (ref 55–135)
ALT SERPL W/O P-5'-P-CCNC: 15 U/L (ref 10–44)
AMPHET+METHAMPHET UR QL: NEGATIVE
ANION GAP SERPL CALC-SCNC: 9 MMOL/L (ref 8–16)
AST SERPL-CCNC: 20 U/L (ref 10–40)
BARBITURATES UR QL SCN>200 NG/ML: NEGATIVE
BASOPHILS # BLD AUTO: 0.05 K/UL (ref 0–0.2)
BASOPHILS NFR BLD: 1 % (ref 0–1.9)
BENZODIAZ UR QL SCN>200 NG/ML: NORMAL
BILIRUB SERPL-MCNC: 0.9 MG/DL (ref 0.1–1)
BILIRUB UR QL STRIP: NEGATIVE
BUN SERPL-MCNC: 8 MG/DL (ref 8–23)
BZE UR QL SCN: NEGATIVE
CALCIUM SERPL-MCNC: 9 MG/DL (ref 8.7–10.5)
CANNABINOIDS UR QL SCN: NEGATIVE
CHLORIDE SERPL-SCNC: 95 MMOL/L (ref 95–110)
CLARITY UR: CLEAR
CO2 SERPL-SCNC: 27 MMOL/L (ref 23–29)
COLOR UR: YELLOW
CREAT SERPL-MCNC: 0.8 MG/DL (ref 0.5–1.4)
CREAT UR-MCNC: 30.4 MG/DL (ref 23–375)
DIFFERENTIAL METHOD: ABNORMAL
EOSINOPHIL # BLD AUTO: 0.1 K/UL (ref 0–0.5)
EOSINOPHIL NFR BLD: 1 % (ref 0–8)
ERYTHROCYTE [DISTWIDTH] IN BLOOD BY AUTOMATED COUNT: 11.8 % (ref 11.5–14.5)
EST. GFR  (AFRICAN AMERICAN): >60 ML/MIN/1.73 M^2
EST. GFR  (NON AFRICAN AMERICAN): >60 ML/MIN/1.73 M^2
ESTIMATED AVG GLUCOSE: 103 MG/DL (ref 68–131)
GLUCOSE SERPL-MCNC: 139 MG/DL (ref 70–110)
GLUCOSE UR QL STRIP: NEGATIVE
HBA1C MFR BLD HPLC: 5.2 % (ref 4.5–6.2)
HCT VFR BLD AUTO: 37.6 % (ref 40–54)
HGB BLD-MCNC: 12.7 G/DL (ref 14–18)
HGB UR QL STRIP: NEGATIVE
IMM GRANULOCYTES # BLD AUTO: 0.01 K/UL (ref 0–0.04)
IMM GRANULOCYTES NFR BLD AUTO: 0.2 % (ref 0–0.5)
INFLUENZA A, MOLECULAR: NEGATIVE
INFLUENZA B, MOLECULAR: NEGATIVE
KETONES UR QL STRIP: NEGATIVE
LEUKOCYTE ESTERASE UR QL STRIP: NEGATIVE
LIPASE SERPL-CCNC: 33 U/L (ref 4–60)
LYMPHOCYTES # BLD AUTO: 1.6 K/UL (ref 1–4.8)
LYMPHOCYTES NFR BLD: 30.8 % (ref 18–48)
MAGNESIUM SERPL-MCNC: 2 MG/DL (ref 1.6–2.6)
MCH RBC QN AUTO: 32.2 PG (ref 27–31)
MCHC RBC AUTO-ENTMCNC: 33.8 G/DL (ref 32–36)
MCV RBC AUTO: 95 FL (ref 82–98)
MONOCYTES # BLD AUTO: 0.6 K/UL (ref 0.3–1)
MONOCYTES NFR BLD: 11.2 % (ref 4–15)
NEUTROPHILS # BLD AUTO: 2.8 K/UL (ref 1.8–7.7)
NEUTROPHILS NFR BLD: 55.8 % (ref 38–73)
NITRITE UR QL STRIP: NEGATIVE
NRBC BLD-RTO: 0 /100 WBC
OPIATES UR QL SCN: NEGATIVE
PCP UR QL SCN>25 NG/ML: NEGATIVE
PH UR STRIP: 7 [PH] (ref 5–8)
PLATELET # BLD AUTO: 229 K/UL (ref 150–350)
PMV BLD AUTO: 9.9 FL (ref 9.2–12.9)
POTASSIUM SERPL-SCNC: 3.7 MMOL/L (ref 3.5–5.1)
PROT SERPL-MCNC: 7.7 G/DL (ref 6–8.4)
PROT UR QL STRIP: NEGATIVE
RBC # BLD AUTO: 3.94 M/UL (ref 4.6–6.2)
SODIUM SERPL-SCNC: 131 MMOL/L (ref 136–145)
SP GR UR STRIP: 1.02 (ref 1–1.03)
SPECIMEN SOURCE: NORMAL
TOXICOLOGY INFORMATION: NORMAL
TSH SERPL DL<=0.005 MIU/L-ACNC: 0.97 UIU/ML (ref 0.34–5.6)
URN SPEC COLLECT METH UR: NORMAL
UROBILINOGEN UR STRIP-ACNC: NEGATIVE EU/DL
WBC # BLD AUTO: 5.09 K/UL (ref 3.9–12.7)

## 2020-12-15 PROCEDURE — 71045 X-RAY EXAM CHEST 1 VIEW: CPT | Mod: TC,FY

## 2020-12-15 PROCEDURE — 80307 DRUG TEST PRSMV CHEM ANLYZR: CPT

## 2020-12-15 PROCEDURE — 25000003 PHARM REV CODE 250: Performed by: FAMILY MEDICINE

## 2020-12-15 PROCEDURE — 85025 COMPLETE CBC W/AUTO DIFF WBC: CPT

## 2020-12-15 PROCEDURE — 99284 EMERGENCY DEPT VISIT MOD MDM: CPT | Mod: 25

## 2020-12-15 PROCEDURE — 81003 URINALYSIS AUTO W/O SCOPE: CPT | Mod: 59

## 2020-12-15 PROCEDURE — 71045 X-RAY EXAM CHEST 1 VIEW: CPT | Mod: 26,,, | Performed by: RADIOLOGY

## 2020-12-15 PROCEDURE — 83036 HEMOGLOBIN GLYCOSYLATED A1C: CPT

## 2020-12-15 PROCEDURE — 84443 ASSAY THYROID STIM HORMONE: CPT

## 2020-12-15 PROCEDURE — 87502 INFLUENZA DNA AMP PROBE: CPT

## 2020-12-15 PROCEDURE — 71045 XR CHEST AP PORTABLE: ICD-10-PCS | Mod: 26,,, | Performed by: RADIOLOGY

## 2020-12-15 PROCEDURE — 63700000 PHARM REV CODE 250 ALT 637 W/O HCPCS: Performed by: FAMILY MEDICINE

## 2020-12-15 PROCEDURE — 96361 HYDRATE IV INFUSION ADD-ON: CPT

## 2020-12-15 PROCEDURE — 36415 COLL VENOUS BLD VENIPUNCTURE: CPT

## 2020-12-15 PROCEDURE — 83735 ASSAY OF MAGNESIUM: CPT

## 2020-12-15 PROCEDURE — 80053 COMPREHEN METABOLIC PANEL: CPT

## 2020-12-15 PROCEDURE — 83690 ASSAY OF LIPASE: CPT

## 2020-12-15 PROCEDURE — 96360 HYDRATION IV INFUSION INIT: CPT

## 2020-12-15 RX ORDER — CARVEDILOL 3.12 MG/1
6.25 TABLET ORAL
Status: COMPLETED | OUTPATIENT
Start: 2020-12-15 | End: 2020-12-15

## 2020-12-15 RX ORDER — HYDROCODONE BITARTRATE AND ACETAMINOPHEN 10; 325 MG/1; MG/1
1 TABLET ORAL
Status: COMPLETED | OUTPATIENT
Start: 2020-12-15 | End: 2020-12-15

## 2020-12-15 RX ORDER — AZITHROMYCIN 250 MG/1
250 TABLET, FILM COATED ORAL DAILY
Qty: 4 TABLET | Refills: 0 | Status: SHIPPED | OUTPATIENT
Start: 2020-12-16 | End: 2020-12-19

## 2020-12-15 RX ORDER — HYDRALAZINE HYDROCHLORIDE 25 MG/1
50 TABLET, FILM COATED ORAL
Status: COMPLETED | OUTPATIENT
Start: 2020-12-15 | End: 2020-12-15

## 2020-12-15 RX ORDER — PREGABALIN 25 MG/1
75 CAPSULE ORAL
Status: COMPLETED | OUTPATIENT
Start: 2020-12-15 | End: 2020-12-15

## 2020-12-15 RX ORDER — AZITHROMYCIN 250 MG/1
500 TABLET, FILM COATED ORAL
Status: COMPLETED | OUTPATIENT
Start: 2020-12-15 | End: 2020-12-15

## 2020-12-15 RX ADMIN — CARVEDILOL 6.25 MG: 3.12 TABLET, FILM COATED ORAL at 06:12

## 2020-12-15 RX ADMIN — HYDRALAZINE HYDROCHLORIDE 50 MG: 25 TABLET, FILM COATED ORAL at 06:12

## 2020-12-15 RX ADMIN — SODIUM CHLORIDE TAB 1 GM 1 G: 1 TAB at 06:12

## 2020-12-15 RX ADMIN — PREGABALIN 75 MG: 25 CAPSULE ORAL at 07:12

## 2020-12-15 RX ADMIN — HYDROCODONE BITARTRATE AND ACETAMINOPHEN 1 TABLET: 10; 325 TABLET ORAL at 09:12

## 2020-12-15 RX ADMIN — SODIUM CHLORIDE 1000 ML: 0.9 INJECTION, SOLUTION INTRAVENOUS at 06:12

## 2020-12-15 RX ADMIN — AZITHROMYCIN MONOHYDRATE 500 MG: 250 TABLET ORAL at 07:12

## 2020-12-16 NOTE — ED PROVIDER NOTES
Encounter Date: 12/15/2020       History     Chief Complaint   Patient presents with    Weakness     Weakness, Hx of low sodium.     Pt comes to our Er c/o  Generalized weakness and diffuse paresthesias.  He states that symptoms started this morning.  The patient complains of some mild shortness of breath.  He denies any persistent cough.  He claimed to hot and cold episodes but has not measured any temperatures.  He denies any contact with any sick individuals.  Does have some mild abdominal diffusely described as soreness.  He denies any chest pains.  He claims to have some mild shortness of breath with denies any wheezing or stridor.  Patient arrived with 99% oxygen saturations on room air with no distress.  Patient has a chronic history of hyponatremia.  He takes 1000 mg of sodium chloride 3 times a day.  He is being referred to a specialist by his primary care physician, Dr. Leija.  Patient states that the symptoms do tend to exacerbate when his sodium gets low.  His average sodium levels in the upper 120s to the lower 130s.  He has had to have admissions for hyponatremia below 120s in the past on 2 different occasions by his report.  Patient states he is not taking his sodium tab or his blood pressure medications this evening.  Looking at patient's home medications, does appear that he had been on Lyrica 75 mg b.i.d. in addition to what has been described above.        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, including shoulder     Cancer 2005    right eye    Colon polyps     COPD (chronic obstructive pulmonary disease)     Depression     Hypertension     Inguinal hernia     left    Kidney stone     MI (myocardial infarction)      Smoker, trying to quit again, 1 cig last a week, started age 17, quit for 42 years, restarted 2018 2019    Stroke     last stroke 2017    Tremor     Ulcer      Past Surgical History:   Procedure Laterality Date    ANGIOPLASTY      cardiac stent    CARDIAC CATHETERIZATION      COLECTOMY      EYE SURGERY      FINGER SURGERY Left     left index finger    HERNIA REPAIR      HIP ARTHROPLASTY Right 2019    Procedure: ARTHROPLASTY, HIP, [place], pegboard, 1st assist;  Surgeon: Kody Patel MD;  Location: [facility] OR;  Service: Orthopedics;  Laterality: Right;    OLECRANON BURSECTOMY Right 2018    Procedure: BURSECTOMY, OLECRANON;  Surgeon: Atul Cooper DO;  Location: [facility] OR;  Service: Orthopedics;  Laterality: Right;  Excision Olecranon Bursa Right Elbow    POLYPECTOMY      REPAIR OF TRICEPS TENDON Right 2018    Procedure: REPAIR, TENDON, TRICEPS;  Surgeon: Atul Cooper DO;  Location: [facility] OR;  Service: Orthopedics;  Laterality: Right;    SURGICAL REMOVAL OF BONE SPUR Right 2018    Procedure: EXCISION, BONE SPUR;  Surgeon: Atul Cooper DO;  Location: [facility] OR;  Service: Orthopedics;  Laterality: Right;  Excision Olecranon Bone Spur Right Elbow     Family History   Problem Relation Age of Onset    Heart disease Mother     Heart disease Father     Cancer Father     Dementia Brother     Heart disease Brother      Social History     Tobacco Use    Smoking status: Former Smoker     Years: 15.00     Types: Cigarettes     Quit date: 2018     Years since quittin.5    Smokeless tobacco: Current User     Types: Chew    Tobacco comment: Pt trying to quit   Substance Use Topics    Alcohol use: Yes     Alcohol/week: 2.0 - 3.0 standard drinks     Types: 2 - 3 Cans of beer per week     Comment: occasionally beer    Drug use: No     Review of Systems   Constitutional: Positive for fatigue. Negative for chills and diaphoresis.   HENT: Negative for sinus  pain, sneezing, sore throat, trouble swallowing and voice change.    Eyes: Negative for visual disturbance.   Respiratory: Positive for shortness of breath. Negative for cough, choking, chest tightness, wheezing and stridor.    Cardiovascular: Negative for chest pain, palpitations and leg swelling.   Gastrointestinal: Negative for abdominal distention, constipation, diarrhea and vomiting.        Very mild diffuse of abdominal pain worsens epigastric area.  No guarding or rebound.  Bowel sounds within normal limits.  No distension.   Genitourinary: Negative for dysuria.   Musculoskeletal: Negative for arthralgias, joint swelling and myalgias.   Skin: Negative for color change, pallor, rash and wound.   Neurological: Positive for weakness. Negative for dizziness, tremors, seizures, syncope, light-headedness, numbness and headaches.        Patient complains of paresthesias diffusely but worsen his legs bilaterally   Hematological: Negative for adenopathy. Does not bruise/bleed easily.   Psychiatric/Behavioral: Negative for agitation, behavioral problems and confusion.       Physical Exam     Initial Vitals [12/15/20 1644]   BP Pulse Resp Temp SpO2   (!) 151/93 68 20 98.1 °F (36.7 °C) 99 %      MAP       --         Physical Exam    Nursing note and vitals reviewed.  Constitutional: He appears well-developed and well-nourished. No distress.   HENT:   Head: Normocephalic and atraumatic.   Nose: Nose normal.   Mouth/Throat: No oropharyngeal exudate.   Eyes: Conjunctivae and EOM are normal. Right eye exhibits no discharge. Left eye exhibits no discharge. No scleral icterus.   Neck: Normal range of motion. Neck supple. No thyromegaly present. No tracheal deviation present.   Cardiovascular: Normal rate, regular rhythm and normal heart sounds.   No murmur heard.  Pulmonary/Chest: Breath sounds normal. No respiratory distress. He has no wheezes. He has no rhonchi. He has no rales. He exhibits no tenderness.   Abdominal: Soft.  Bowel sounds are normal. He exhibits no distension and no mass. There is abdominal tenderness. There is no rebound and no guarding.   Mild epigastric tenderness.  Described as sore   Musculoskeletal: Normal range of motion. Tenderness present. No edema.      Comments: Patient claims diffuse tenderness to his lower extremity secondary to paresthesias.   Lymphadenopathy:     He has no cervical adenopathy.   Neurological: He is alert and oriented to person, place, and time. He has normal strength. A sensory deficit is present. No cranial nerve deficit. GCS score is 15. GCS eye subscore is 4. GCS verbal subscore is 5. GCS motor subscore is 6.   Patient claims paresthesias to both lower extremities currently.  Strength appears to be 5/5 throughout.  No cranial nerve deficits.   Skin: Skin is warm and dry. Capillary refill takes less than 2 seconds. No rash and no abscess noted. No erythema. No pallor.   Psychiatric: He has a normal mood and affect. His behavior is normal. Judgment and thought content normal.         ED Course   Procedures  Labs Reviewed   CBC W/ AUTO DIFFERENTIAL - Abnormal; Notable for the following components:       Result Value    RBC 3.94 (*)     Hemoglobin 12.7 (*)     Hematocrit 37.6 (*)     MCH 32.2 (*)     All other components within normal limits   COMPREHENSIVE METABOLIC PANEL - Abnormal; Notable for the following components:    Sodium 131 (*)     Glucose 139 (*)     All other components within normal limits   INFLUENZA A & B BY MOLECULAR   TSH   URINALYSIS, REFLEX TO URINE CULTURE    Narrative:     Specimen Source->Urine   DRUG SCREEN PANEL, URINE EMERGENCY    Narrative:     Specimen Source->Urine   TSH   MAGNESIUM   HEMOGLOBIN A1C   HEMOGLOBIN A1C   MAGNESIUM   LIPASE   LIPASE          Imaging Results           X-Ray Chest AP Portable (Final result)  Result time 12/15/20 17:10:40    Final result by Westley Brooke MD (12/15/20 17:10:40)                 Impression:      Questionable subtle  poorly defined infiltrate of the left lower lobe.  This finding should be confirmed with dedicated PA and lateral views of the chest.    This report was flagged in Epic as abnormal.      Electronically signed by: Westley Brooke  Date:    12/15/2020  Time:    17:10             Narrative:    EXAMINATION:  XR CHEST AP PORTABLE    CLINICAL HISTORY:  . Weakness    TECHNIQUE:  Single frontal portable view of the chest was performed.    COMPARISON:  11/01/2020, 10/14/2020 and 08/24/2020.    FINDINGS:  There is a questionable poorly defined subtle 3 cm infiltrate of the left lower lobe.  The right lung is clear.  No significant pleural effusion.    Heart size is normal.  Mediastinal contours unremarkable.  Trachea midline.    Bony thorax intact.                              X-Rays:   Independently Interpreted Readings:   Other Readings:  Questionable left lower lobe infiltrate in early stages of development.                                Clinical Impression:       ICD-10-CM ICD-9-CM   1. Paresthesias  R20.2 782.0   2. Generalized weakness  R53.1 780.79   3. Left pulmonary infiltrate on CXR  R91.8 793.19                      Disposition:   Disposition: Discharged  Condition: Stable     ED Disposition Condition    Discharge Stable        ED Prescriptions     None        Follow-up Information    None                                      Jonah Leal MD  12/15/20 7624

## 2020-12-16 NOTE — DISCHARGE INSTRUCTIONS
Please follow-up with your primary care physician as well as her pain management doctor to address your paresthesias.  Adjustment of Lyrica or change in medication may be required.

## 2020-12-18 ENCOUNTER — HOSPITAL ENCOUNTER (EMERGENCY)
Facility: HOSPITAL | Age: 65
Discharge: HOME | End: 2020-12-18
Attending: FAMILY MEDICINE
Payer: MEDICARE

## 2020-12-18 VITALS
OXYGEN SATURATION: 100 % | TEMPERATURE: 98 F | RESPIRATION RATE: 16 BRPM | HEART RATE: 67 BPM | HEIGHT: 70 IN | BODY MASS INDEX: 20.04 KG/M2 | WEIGHT: 140 LBS | SYSTOLIC BLOOD PRESSURE: 146 MMHG | DIASTOLIC BLOOD PRESSURE: 75 MMHG

## 2020-12-18 DIAGNOSIS — F10.20 ALCOHOLISM: ICD-10-CM

## 2020-12-18 DIAGNOSIS — F03.90 DEMENTIA WITHOUT BEHAVIORAL DISTURBANCE, UNSPECIFIED DEMENTIA TYPE: Primary | ICD-10-CM

## 2020-12-18 DIAGNOSIS — E87.1 HYPONATREMIA: ICD-10-CM

## 2020-12-18 DIAGNOSIS — R53.1 WEAKNESS: ICD-10-CM

## 2020-12-18 LAB
ALBUMIN SERPL BCP-MCNC: 4.1 G/DL (ref 3.5–5.2)
ALP SERPL-CCNC: 64 U/L (ref 55–135)
ALT SERPL W/O P-5'-P-CCNC: 14 U/L (ref 10–44)
AMPHET+METHAMPHET UR QL: NEGATIVE
ANION GAP SERPL CALC-SCNC: 6 MMOL/L (ref 8–16)
APAP SERPL-MCNC: <10 UG/ML (ref 10–20)
AST SERPL-CCNC: 18 U/L (ref 10–40)
BACTERIA #/AREA URNS HPF: ABNORMAL /HPF
BARBITURATES UR QL SCN>200 NG/ML: NEGATIVE
BASOPHILS # BLD AUTO: 0.04 K/UL (ref 0–0.2)
BASOPHILS NFR BLD: 0.7 % (ref 0–1.9)
BENZODIAZ UR QL SCN>200 NG/ML: NORMAL
BILIRUB SERPL-MCNC: 1.1 MG/DL (ref 0.1–1)
BILIRUB UR QL STRIP: NEGATIVE
BUN SERPL-MCNC: 7 MG/DL (ref 8–23)
BZE UR QL SCN: NEGATIVE
CALCIUM SERPL-MCNC: 8.6 MG/DL (ref 8.7–10.5)
CANNABINOIDS UR QL SCN: NEGATIVE
CHLORIDE SERPL-SCNC: 92 MMOL/L (ref 95–110)
CLARITY UR: CLEAR
CO2 SERPL-SCNC: 26 MMOL/L (ref 23–29)
COLOR UR: YELLOW
CREAT SERPL-MCNC: 0.7 MG/DL (ref 0.5–1.4)
CREAT UR-MCNC: 34.7 MG/DL (ref 23–375)
DIFFERENTIAL METHOD: ABNORMAL
EOSINOPHIL # BLD AUTO: 0.1 K/UL (ref 0–0.5)
EOSINOPHIL NFR BLD: 1.8 % (ref 0–8)
ERYTHROCYTE [DISTWIDTH] IN BLOOD BY AUTOMATED COUNT: 11.6 % (ref 11.5–14.5)
EST. GFR  (AFRICAN AMERICAN): >60 ML/MIN/1.73 M^2
EST. GFR  (NON AFRICAN AMERICAN): >60 ML/MIN/1.73 M^2
ETHANOL SERPL-MCNC: <5 MG/DL
GLUCOSE SERPL-MCNC: 110 MG/DL (ref 70–110)
GLUCOSE UR QL STRIP: NEGATIVE
HCT VFR BLD AUTO: 36.5 % (ref 40–54)
HGB BLD-MCNC: 12.9 G/DL (ref 14–18)
HGB UR QL STRIP: NEGATIVE
IMM GRANULOCYTES # BLD AUTO: 0.02 K/UL (ref 0–0.04)
IMM GRANULOCYTES NFR BLD AUTO: 0.3 % (ref 0–0.5)
KETONES UR QL STRIP: NEGATIVE
LEUKOCYTE ESTERASE UR QL STRIP: ABNORMAL
LYMPHOCYTES # BLD AUTO: 1.8 K/UL (ref 1–4.8)
LYMPHOCYTES NFR BLD: 30.6 % (ref 18–48)
MCH RBC QN AUTO: 32.4 PG (ref 27–31)
MCHC RBC AUTO-ENTMCNC: 35.3 G/DL (ref 32–36)
MCV RBC AUTO: 92 FL (ref 82–98)
MICROSCOPIC COMMENT: ABNORMAL
MONOCYTES # BLD AUTO: 0.8 K/UL (ref 0.3–1)
MONOCYTES NFR BLD: 12.5 % (ref 4–15)
NEUTROPHILS # BLD AUTO: 3.3 K/UL (ref 1.8–7.7)
NEUTROPHILS NFR BLD: 54.1 % (ref 38–73)
NITRITE UR QL STRIP: NEGATIVE
NRBC BLD-RTO: 0 /100 WBC
OPIATES UR QL SCN: NEGATIVE
PCP UR QL SCN>25 NG/ML: NEGATIVE
PH UR STRIP: 8 [PH] (ref 5–8)
PLATELET # BLD AUTO: 261 K/UL (ref 150–350)
PMV BLD AUTO: 9.5 FL (ref 9.2–12.9)
POTASSIUM SERPL-SCNC: 3.6 MMOL/L (ref 3.5–5.1)
PROT SERPL-MCNC: 7.6 G/DL (ref 6–8.4)
PROT UR QL STRIP: NEGATIVE
RBC # BLD AUTO: 3.98 M/UL (ref 4.6–6.2)
RBC #/AREA URNS HPF: 2 /HPF (ref 0–4)
SALICYLATES SERPL-MCNC: <4 MG/DL (ref 15–30)
SODIUM SERPL-SCNC: 124 MMOL/L (ref 136–145)
SP GR UR STRIP: 1.01 (ref 1–1.03)
SQUAMOUS #/AREA URNS HPF: 1 /HPF
TOXICOLOGY INFORMATION: NORMAL
URN SPEC COLLECT METH UR: ABNORMAL
UROBILINOGEN UR STRIP-ACNC: 1 EU/DL
WBC # BLD AUTO: 6.02 K/UL (ref 3.9–12.7)
WBC #/AREA URNS HPF: 6 /HPF (ref 0–5)

## 2020-12-18 PROCEDURE — 81000 URINALYSIS NONAUTO W/SCOPE: CPT | Mod: 59

## 2020-12-18 PROCEDURE — 96374 THER/PROPH/DIAG INJ IV PUSH: CPT

## 2020-12-18 PROCEDURE — 71045 XR CHEST AP PORTABLE: ICD-10-PCS | Mod: 26,,, | Performed by: RADIOLOGY

## 2020-12-18 PROCEDURE — 85025 COMPLETE CBC W/AUTO DIFF WBC: CPT

## 2020-12-18 PROCEDURE — 99285 EMERGENCY DEPT VISIT HI MDM: CPT | Mod: 25

## 2020-12-18 PROCEDURE — 96361 HYDRATE IV INFUSION ADD-ON: CPT

## 2020-12-18 PROCEDURE — 80307 DRUG TEST PRSMV CHEM ANLYZR: CPT

## 2020-12-18 PROCEDURE — 71045 X-RAY EXAM CHEST 1 VIEW: CPT | Mod: 26,,, | Performed by: RADIOLOGY

## 2020-12-18 PROCEDURE — 80053 COMPREHEN METABOLIC PANEL: CPT

## 2020-12-18 PROCEDURE — 71045 X-RAY EXAM CHEST 1 VIEW: CPT | Mod: TC,FY

## 2020-12-18 PROCEDURE — 25000003 PHARM REV CODE 250: Performed by: FAMILY MEDICINE

## 2020-12-18 PROCEDURE — 80329 ANALGESICS NON-OPIOID 1 OR 2: CPT

## 2020-12-18 PROCEDURE — 80320 DRUG SCREEN QUANTALCOHOLS: CPT

## 2020-12-18 PROCEDURE — 93005 ELECTROCARDIOGRAM TRACING: CPT

## 2020-12-18 PROCEDURE — 63600175 PHARM REV CODE 636 W HCPCS: Performed by: FAMILY MEDICINE

## 2020-12-18 RX ORDER — KETOROLAC TROMETHAMINE 30 MG/ML
30 INJECTION, SOLUTION INTRAMUSCULAR; INTRAVENOUS
Status: COMPLETED | OUTPATIENT
Start: 2020-12-18 | End: 2020-12-18

## 2020-12-18 RX ORDER — NITROFURANTOIN 25; 75 MG/1; MG/1
100 CAPSULE ORAL
Status: COMPLETED | OUTPATIENT
Start: 2020-12-18 | End: 2020-12-18

## 2020-12-18 RX ADMIN — NITROFURANTOIN (MONOHYDRATE/MACROCRYSTALS) 100 MG: 75; 25 CAPSULE ORAL at 11:12

## 2020-12-18 RX ADMIN — KETOROLAC TROMETHAMINE 30 MG: 30 INJECTION, SOLUTION INTRAMUSCULAR at 10:12

## 2020-12-18 RX ADMIN — SODIUM CHLORIDE 500 ML: 0.9 INJECTION, SOLUTION INTRAVENOUS at 08:12

## 2020-12-18 RX ADMIN — SODIUM CHLORIDE 500 ML: 0.9 INJECTION, SOLUTION INTRAVENOUS at 10:12

## 2020-12-19 NOTE — DISCHARGE INSTRUCTIONS
Avoid all alcohol drinking and decreased your intake of water , follow-up with your primary care if symptoms change or worsen see MD or return to the ER

## 2020-12-19 NOTE — ED NOTES
Called number in chart for pt son (Michael) and daughter (Ana), neither number is working. Pt gave me another number for both kids, Michael [phone], Ana [phone], no answer on either phone.

## 2020-12-19 NOTE — ED PROVIDER NOTES
Encounter Date: 12/18/2020       History     Chief Complaint   Patient presents with    Generalized Body Aches     generalized body aches was seen for the same 3 days ago no better     65-year-old male presents complaining of general body aches the patient does admit to resuming alcohol last night I know I shouldn't  done it, no history of nausea vomiting shortness of breath patient takes Xanax twice daily for his chronic anxiety and has had a history of recurring hyponatremia        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, including shoulder     Cancer 2005    right eye    Colon polyps     COPD (chronic obstructive pulmonary disease)     Depression     Hypertension     Inguinal hernia     left    Kidney stone     MI (myocardial infarction)     Smoker, trying to quit again, 1 cig last a week, started age 17, quit for 42 years, restarted 2018 7/24/2019    Stroke     last stroke 2017    Tremor     Ulcer      Past Surgical History:   Procedure Laterality Date    ANGIOPLASTY      cardiac stent    CARDIAC CATHETERIZATION      COLECTOMY      EYE SURGERY      FINGER SURGERY Left     left index finger    HERNIA REPAIR      HIP ARTHROPLASTY Right 12/20/2019    Procedure: ARTHROPLASTY, HIP, [place], pegboard, 1st assist;  Surgeon: Kody Patel MD;  Location: [facility] OR;  Service: Orthopedics;  Laterality: Right;    OLECRANON BURSECTOMY Right 11/6/2018    Procedure: BURSECTOMY, OLECRANON;  Surgeon: Atul Cooper DO;  Location: [facility] OR;  Service: Orthopedics;  Laterality: Right;  Excision Olecranon Bursa Right Elbow    POLYPECTOMY      REPAIR OF TRICEPS TENDON Right 11/6/2018    Procedure: REPAIR, TENDON, TRICEPS;   Surgeon: Atul Cooper DO;  Location: [facility] OR;  Service: Orthopedics;  Laterality: Right;    SURGICAL REMOVAL OF BONE SPUR Right 2018    Procedure: EXCISION, BONE SPUR;  Surgeon: Atul Cooper DO;  Location: [facility] OR;  Service: Orthopedics;  Laterality: Right;  Excision Olecranon Bone Spur Right Elbow     Family History   Problem Relation Age of Onset    Heart disease Mother     Heart disease Father     Cancer Father     Dementia Brother     Heart disease Brother      Social History     Tobacco Use    Smoking status: Former Smoker     Years: 15.00     Types: Cigarettes     Quit date: 2018     Years since quittin.5    Smokeless tobacco: Current User     Types: Chew    Tobacco comment: Pt trying to quit   Substance Use Topics    Alcohol use: Yes     Alcohol/week: 2.0 - 3.0 standard drinks     Types: 2 - 3 Cans of beer per week     Comment: occasionally beer    Drug use: No     Review of Systems   Constitutional: Negative for fever.   HENT: Negative for sore throat.    Respiratory: Negative for shortness of breath.    Cardiovascular: Negative for chest pain.   Gastrointestinal: Negative for nausea.   Genitourinary: Negative for dysuria.   Musculoskeletal: Negative for back pain.   Skin: Negative for rash.   Neurological: Negative for weakness.   Hematological: Does not bruise/bleed easily.       Physical Exam     Initial Vitals [20]   BP Pulse Resp Temp SpO2   (!) 167/87 64 20 97.7 °F (36.5 °C) 98 %      MAP       --         Physical Exam    Nursing note and vitals reviewed.  Constitutional: He is not diaphoretic. No distress.   Patient is very thin   HENT:   Head: Normocephalic and atraumatic.   Right Ear: External ear normal.   Left Ear: External ear normal.   Nose: Nose normal.   Mouth/Throat: Oropharynx is clear and moist. No oropharyngeal exudate.   Eyes: EOM are normal.   Neck: Normal range of motion. Neck supple. No tracheal deviation present.   Cardiovascular: Normal  rate and regular rhythm.   No murmur heard.  Pulmonary/Chest: Breath sounds normal. No stridor. No respiratory distress. He has no rales.   Abdominal: Soft. He exhibits no distension and no mass. There is no abdominal tenderness. There is no rebound.   Musculoskeletal: Normal range of motion. No edema.   Lymphadenopathy:     He has no cervical adenopathy.   Neurological: He is alert and oriented to person, place, and time. He has normal strength.   Skin: Skin is warm and dry. Capillary refill takes less than 2 seconds. No pallor.   Psychiatric: He has a normal mood and affect.         ED Course   Procedures  Labs Reviewed   CBC W/ AUTO DIFFERENTIAL - Abnormal; Notable for the following components:       Result Value    RBC 3.98 (*)     Hemoglobin 12.9 (*)     Hematocrit 36.5 (*)     MCH 32.4 (*)     All other components within normal limits   COMPREHENSIVE METABOLIC PANEL - Abnormal; Notable for the following components:    Sodium 124 (*)     Chloride 92 (*)     BUN 7 (*)     Calcium 8.6 (*)     Total Bilirubin 1.1 (*)     Anion Gap 6 (*)     All other components within normal limits   SALICYLATE LEVEL - Abnormal; Notable for the following components:    Salicylate Lvl <4.0 (*)     All other components within normal limits   URINALYSIS, REFLEX TO URINE CULTURE - Abnormal; Notable for the following components:    Leukocytes, UA Trace (*)     All other components within normal limits    Narrative:     Specimen Source->Urine   URINALYSIS MICROSCOPIC - Abnormal; Notable for the following components:    WBC, UA 6 (*)     Bacteria Few (*)     All other components within normal limits    Narrative:     Specimen Source->Urine   DRUG SCREEN PANEL, URINE EMERGENCY    Narrative:     Specimen Source->Urine   ALCOHOL,MEDICAL (ETHANOL)   ACETAMINOPHEN LEVEL          Imaging Results          X-Ray Chest AP Portable (In process)                                              Clinical Impression:       ICD-10-CM ICD-9-CM   1. Dementia  without behavioral disturbance, unspecified dementia type  F03.90 294.20   2. Weakness  R53.1 780.79   3. Alcoholism  F10.20 303.90   4. Hyponatremia  E87.1 276.1                          ED Disposition Condition    Discharge Stable        ED Prescriptions     None        Follow-up Information    None                                      Shawn Rizo MD  12/19/20 0559       Shawn Rizo MD  12/25/20 0333

## 2020-12-19 NOTE — ED NOTES
Allergies reviewed. Pt states he takes NSAIDS 'every now and then' but was instructed to not take them by a previous doctor due to side effects of stomach ulcers. Pt educated and understands effects of long term side effects of using NSAIDS daily.

## 2020-12-21 ENCOUNTER — TELEPHONE (OUTPATIENT)
Dept: FAMILY MEDICINE | Facility: CLINIC | Age: 65
End: 2020-12-21

## 2020-12-21 NOTE — TELEPHONE ENCOUNTER
----- Message from Marita Johnson sent at 12/21/2020  2:13 PM CST -----  Contact: pt  Type: Needs Medical Advice  Who Called:  patient   Symptoms (please be specific):  complaints with his sodium levels   How long has patient had these symptoms:  for the past 6 months   Pharmacy name and phone #:    Best Call Back Number: #[phone]  Additional Information: Patient states he has called Patient relations several times and no one will call him back.

## 2020-12-21 NOTE — TELEPHONE ENCOUNTER
----- Message from Jodi Cabrera, Patient Care Assistant sent at 12/21/2020  2:23 PM CST -----  Regarding: appointment  Contact: pt  Type: Needs Medical Advice  Who Called:  pt   Best Call Back Number: [phone] (home)   Additional Information: pt states he would like a callback regarding an appointment for his sodium. Thanks!

## 2020-12-21 NOTE — TELEPHONE ENCOUNTER
----- Message from Tennille Paige MA sent at 12/21/2020  4:49 PM CST -----  Type:  Patient Returning Call    Who Called:  Ricky  Who Left Message for Patient:  unknown  Does the patient know what this is regarding?:  unknown  Best Call Back Number:  [phone]  Additional Information:

## 2020-12-22 ENCOUNTER — HOSPITAL ENCOUNTER (EMERGENCY)
Facility: HOSPITAL | Age: 65
Discharge: HOME | End: 2020-12-22
Attending: FAMILY MEDICINE
Payer: MEDICARE

## 2020-12-22 ENCOUNTER — NURSE TRIAGE (OUTPATIENT)
Dept: ADMINISTRATIVE | Facility: CLINIC | Age: 65
End: 2020-12-22

## 2020-12-22 VITALS
DIASTOLIC BLOOD PRESSURE: 91 MMHG | HEART RATE: 61 BPM | HEIGHT: 70 IN | SYSTOLIC BLOOD PRESSURE: 174 MMHG | RESPIRATION RATE: 12 BRPM | WEIGHT: 140 LBS | BODY MASS INDEX: 20.04 KG/M2 | OXYGEN SATURATION: 96 % | TEMPERATURE: 98 F

## 2020-12-22 DIAGNOSIS — R53.1 WEAKNESS: Primary | ICD-10-CM

## 2020-12-22 DIAGNOSIS — R53.83 FATIGUE: ICD-10-CM

## 2020-12-22 LAB
ALBUMIN SERPL BCP-MCNC: 4 G/DL (ref 3.5–5.2)
ALP SERPL-CCNC: 55 U/L (ref 55–135)
ALT SERPL W/O P-5'-P-CCNC: 17 U/L (ref 10–44)
AMPHET+METHAMPHET UR QL: NEGATIVE
ANION GAP SERPL CALC-SCNC: 8 MMOL/L (ref 8–16)
APAP SERPL-MCNC: <10 UG/ML (ref 10–20)
AST SERPL-CCNC: 20 U/L (ref 10–40)
BARBITURATES UR QL SCN>200 NG/ML: NEGATIVE
BASOPHILS # BLD AUTO: 0.04 K/UL (ref 0–0.2)
BASOPHILS NFR BLD: 0.7 % (ref 0–1.9)
BENZODIAZ UR QL SCN>200 NG/ML: NORMAL
BILIRUB SERPL-MCNC: 0.8 MG/DL (ref 0.1–1)
BILIRUB UR QL STRIP: NEGATIVE
BUN SERPL-MCNC: 10 MG/DL (ref 8–23)
BZE UR QL SCN: NEGATIVE
CALCIUM SERPL-MCNC: 8.7 MG/DL (ref 8.7–10.5)
CANNABINOIDS UR QL SCN: NEGATIVE
CHLORIDE SERPL-SCNC: 94 MMOL/L (ref 95–110)
CLARITY UR: CLEAR
CO2 SERPL-SCNC: 27 MMOL/L (ref 23–29)
COLOR UR: YELLOW
CREAT SERPL-MCNC: 0.7 MG/DL (ref 0.5–1.4)
CREAT UR-MCNC: 71.9 MG/DL (ref 23–375)
DIFFERENTIAL METHOD: ABNORMAL
EOSINOPHIL # BLD AUTO: 0.1 K/UL (ref 0–0.5)
EOSINOPHIL NFR BLD: 1 % (ref 0–8)
ERYTHROCYTE [DISTWIDTH] IN BLOOD BY AUTOMATED COUNT: 11.7 % (ref 11.5–14.5)
EST. GFR  (AFRICAN AMERICAN): >60 ML/MIN/1.73 M^2
EST. GFR  (NON AFRICAN AMERICAN): >60 ML/MIN/1.73 M^2
ETHANOL SERPL-MCNC: <5 MG/DL
GLUCOSE SERPL-MCNC: 72 MG/DL (ref 70–110)
GLUCOSE UR QL STRIP: ABNORMAL
HCT VFR BLD AUTO: 35.8 % (ref 40–54)
HGB BLD-MCNC: 12 G/DL (ref 14–18)
HGB UR QL STRIP: NEGATIVE
IMM GRANULOCYTES # BLD AUTO: 0.01 K/UL (ref 0–0.04)
IMM GRANULOCYTES NFR BLD AUTO: 0.2 % (ref 0–0.5)
KETONES UR QL STRIP: NEGATIVE
LEUKOCYTE ESTERASE UR QL STRIP: NEGATIVE
LYMPHOCYTES # BLD AUTO: 1.3 K/UL (ref 1–4.8)
LYMPHOCYTES NFR BLD: 23.1 % (ref 18–48)
MCH RBC QN AUTO: 32 PG (ref 27–31)
MCHC RBC AUTO-ENTMCNC: 33.5 G/DL (ref 32–36)
MCV RBC AUTO: 96 FL (ref 82–98)
MONOCYTES # BLD AUTO: 0.6 K/UL (ref 0.3–1)
MONOCYTES NFR BLD: 11.1 % (ref 4–15)
NEUTROPHILS # BLD AUTO: 3.7 K/UL (ref 1.8–7.7)
NEUTROPHILS NFR BLD: 63.9 % (ref 38–73)
NITRITE UR QL STRIP: NEGATIVE
NRBC BLD-RTO: 0 /100 WBC
OPIATES UR QL SCN: NEGATIVE
PCP UR QL SCN>25 NG/ML: NEGATIVE
PH UR STRIP: 6 [PH] (ref 5–8)
PLATELET # BLD AUTO: 234 K/UL (ref 150–350)
PMV BLD AUTO: 9.6 FL (ref 9.2–12.9)
POTASSIUM SERPL-SCNC: 3.6 MMOL/L (ref 3.5–5.1)
PROT SERPL-MCNC: 7.3 G/DL (ref 6–8.4)
PROT UR QL STRIP: NEGATIVE
RBC # BLD AUTO: 3.75 M/UL (ref 4.6–6.2)
SALICYLATES SERPL-MCNC: <4 MG/DL (ref 15–30)
SARS-COV-2 RDRP RESP QL NAA+PROBE: NEGATIVE
SODIUM SERPL-SCNC: 129 MMOL/L (ref 136–145)
SP GR UR STRIP: 1.02 (ref 1–1.03)
TOXICOLOGY INFORMATION: NORMAL
URN SPEC COLLECT METH UR: ABNORMAL
UROBILINOGEN UR STRIP-ACNC: NEGATIVE EU/DL
WBC # BLD AUTO: 5.75 K/UL (ref 3.9–12.7)

## 2020-12-22 PROCEDURE — 80320 DRUG SCREEN QUANTALCOHOLS: CPT

## 2020-12-22 PROCEDURE — 93010 ELECTROCARDIOGRAM REPORT: CPT | Mod: ,,, | Performed by: INTERNAL MEDICINE

## 2020-12-22 PROCEDURE — 96360 HYDRATION IV INFUSION INIT: CPT

## 2020-12-22 PROCEDURE — 85025 COMPLETE CBC W/AUTO DIFF WBC: CPT

## 2020-12-22 PROCEDURE — 80307 DRUG TEST PRSMV CHEM ANLYZR: CPT

## 2020-12-22 PROCEDURE — 80329 ANALGESICS NON-OPIOID 1 OR 2: CPT

## 2020-12-22 PROCEDURE — U0002 COVID-19 LAB TEST NON-CDC: HCPCS

## 2020-12-22 PROCEDURE — 93005 ELECTROCARDIOGRAM TRACING: CPT

## 2020-12-22 PROCEDURE — 93010 EKG 12-LEAD: ICD-10-PCS | Mod: ,,, | Performed by: INTERNAL MEDICINE

## 2020-12-22 PROCEDURE — 99284 EMERGENCY DEPT VISIT MOD MDM: CPT | Mod: 25

## 2020-12-22 PROCEDURE — 96361 HYDRATE IV INFUSION ADD-ON: CPT

## 2020-12-22 PROCEDURE — 81003 URINALYSIS AUTO W/O SCOPE: CPT | Mod: 59

## 2020-12-22 PROCEDURE — 80053 COMPREHEN METABOLIC PANEL: CPT

## 2020-12-22 PROCEDURE — 25000003 PHARM REV CODE 250: Performed by: FAMILY MEDICINE

## 2020-12-22 RX ADMIN — SODIUM CHLORIDE 500 ML: 0.9 INJECTION, SOLUTION INTRAVENOUS at 01:12

## 2020-12-22 NOTE — ED TRIAGE NOTES
Pt here with c/o generalized weakness. Hx of low sodium. Son aggressive with registration on arrival to ed. Wants this pt admitted

## 2020-12-22 NOTE — TELEPHONE ENCOUNTER
Cg stated pt can not breathe. Bp 197/83. Chest pain per cg. Cg stated home health was discontinue and they keep sending pt home from ER. Cg is requesting that pt be admitted. Care advice recommend pt go to Er. Cg and pt verbalized understanding.  Reason for Disposition   Systolic BP >= 160 OR Diastolic >= 100, and any cardiac or neurologic symptoms (e.g., chest pain, difficulty breathing, unsteady gait, blurred vision)    Additional Information   Negative: Sounds like a life-threatening emergency to the triager   Negative: Pregnant > 20 weeks or postpartum (< 6 weeks after delivery) and new hand or face swelling   Negative: Pregnant > 20 weeks and BP > 140/90    Protocols used: HIGH BLOOD PRESSURE-A-[place]

## 2020-12-22 NOTE — ED PROVIDER NOTES
Encounter Date: 12/22/2020       History     Chief Complaint   Patient presents with    generalized weakness     65-year-old male presents per EMS complaining of weakness and anxiety patient was seen here less than 1 week ago for similar symptoms it which time the evaluation was unremarkable other than some moderate hyponatremia, apparently the patient's son is concerned about medical test done or lack of them here at the ED        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, including shoulder     Cancer 2005    right eye    Colon polyps     COPD (chronic obstructive pulmonary disease)     Depression     Hypertension     Inguinal hernia     left    Kidney stone     MI (myocardial infarction)     Smoker, trying to quit again, 1 cig last a week, started age 17, quit for 42 years, restarted 2018 7/24/2019    Stroke     last stroke 2017    Tremor     Ulcer      Past Surgical History:   Procedure Laterality Date    ANGIOPLASTY      cardiac stent    CARDIAC CATHETERIZATION      COLECTOMY      EYE SURGERY      FINGER SURGERY Left     left index finger    HERNIA REPAIR      HIP ARTHROPLASTY Right 12/20/2019    Procedure: ARTHROPLASTY, HIP, [place], pegboard, 1st assist;  Surgeon: Kody Patel MD;  Location: [facility] OR;  Service: Orthopedics;  Laterality: Right;    OLECRANON BURSECTOMY Right 11/6/2018    Procedure: BURSECTOMY, OLECRANON;  Surgeon: Atul Cooper DO;  Location: [facility] OR;  Service: Orthopedics;  Laterality: Right;  Excision Olecranon Bursa Right Elbow    POLYPECTOMY      REPAIR OF TRICEPS TENDON Right 11/6/2018    Procedure: REPAIR, TENDON, TRICEPS;  Surgeon: Atul Cooper DO;  Location: [facility] OR;   Service: Orthopedics;  Laterality: Right;    SURGICAL REMOVAL OF BONE SPUR Right 2018    Procedure: EXCISION, BONE SPUR;  Surgeon: Atul Cooper DO;  Location: [facility] OR;  Service: Orthopedics;  Laterality: Right;  Excision Olecranon Bone Spur Right Elbow     Family History   Problem Relation Age of Onset    Heart disease Mother     Heart disease Father     Cancer Father     Dementia Brother     Heart disease Brother      Social History     Tobacco Use    Smoking status: Former Smoker     Years: 15.00     Types: Cigarettes     Quit date: 2018     Years since quittin.5    Smokeless tobacco: Current User     Types: Chew    Tobacco comment: Pt trying to quit   Substance Use Topics    Alcohol use: Yes     Alcohol/week: 2.0 - 3.0 standard drinks     Types: 2 - 3 Cans of beer per week     Comment: occasionally beer    Drug use: No     Review of Systems   Constitutional: Positive for appetite change and fatigue. Negative for fever.   HENT: Negative for sore throat.    Respiratory: Negative for shortness of breath.    Cardiovascular: Negative for chest pain.   Gastrointestinal: Negative for nausea.   Genitourinary: Negative for dysuria.   Musculoskeletal: Negative for back pain.   Skin: Negative for rash.   Neurological: Positive for dizziness and weakness.   Hematological: Does not bruise/bleed easily.   Psychiatric/Behavioral: Positive for agitation. Negative for decreased concentration, dysphoric mood and hallucinations. The patient is nervous/anxious.        Physical Exam     Initial Vitals   BP Pulse Resp Temp SpO2   20 1238 20 1230 20 1230 20 1230 20 1230   (!) 180/95 68 18 97.7 °F (36.5 °C) 98 %      MAP       --                Physical Exam    Nursing note and vitals reviewed.  Constitutional: He is not diaphoretic. No distress.   Patient is very thin   HENT:   Head: Normocephalic and atraumatic.   Right Ear: External ear normal.   Left Ear: External ear  normal.   Nose: Nose normal.   Mouth/Throat: Oropharynx is clear and moist. No oropharyngeal exudate.   Eyes: EOM are normal.   Neck: Normal range of motion. Neck supple. No tracheal deviation present.   Cardiovascular: Normal rate and regular rhythm.   No murmur heard.  Pulmonary/Chest: Breath sounds normal. No stridor. No respiratory distress. He has no rales.   Abdominal: Soft. He exhibits no distension and no mass. There is no abdominal tenderness. There is no rebound.   Musculoskeletal: Normal range of motion. No edema.   Lymphadenopathy:     He has no cervical adenopathy.   Neurological: He is alert and oriented to person, place, and time. He has normal strength.   Skin: Skin is warm and dry. Capillary refill takes less than 2 seconds. No pallor.   Psychiatric: He has a normal mood and affect.         ED Course   Procedures  Labs Reviewed   CBC W/ AUTO DIFFERENTIAL - Abnormal; Notable for the following components:       Result Value    RBC 3.75 (*)     Hemoglobin 12.0 (*)     Hematocrit 35.8 (*)     MCH 32.0 (*)     All other components within normal limits   COMPREHENSIVE METABOLIC PANEL - Abnormal; Notable for the following components:    Sodium 129 (*)     Chloride 94 (*)     All other components within normal limits   URINALYSIS, REFLEX TO URINE CULTURE - Abnormal; Notable for the following components:    Glucose, UA Trace (*)     All other components within normal limits    Narrative:     Specimen Source->Urine   SALICYLATE LEVEL - Abnormal; Notable for the following components:    Salicylate Lvl <4.0 (*)     All other components within normal limits   SARS-COV-2 RNA AMPLIFICATION, QUAL   DRUG SCREEN PANEL, URINE EMERGENCY    Narrative:     Specimen Source->Urine   ACETAMINOPHEN LEVEL   ALCOHOL,MEDICAL (ETHANOL)   SALICYLATE LEVEL   ACETAMINOPHEN LEVEL   ALCOHOL,MEDICAL (ETHANOL)        ECG Results          EKG 12-lead (Final result)  Result time 12/23/20 09:07:55    Final result by Interface, Lab In  [facility] (12/23/20 09:07:55)                 Narrative:    Test Reason : R53.83,    Vent. Rate : 063 BPM     Atrial Rate : 063 BPM     P-R Int : 142 ms          QRS Dur : 092 ms      QT Int : 444 ms       P-R-T Axes : 068 082 072 degrees     QTc Int : 454 ms    Program found technically poor ECG  Normal sinus rhythm  Possible Left atrial enlargement  Borderline Abnormal ECG  When compared with ECG of 18-DEC-2020 20:14,  No significant change was found  Confirmed by Dale Galloway MD ([id number]) on 12/23/2020 9:07:48 AM    Referred By: [email or web address]   SELF           Confirmed By:Dale Galloway MD                            Imaging Results    None          Medical Decision Making:   I had a long discussion with the patient his son, this was assisted by our ER director Ally and discussed the various ways in the emergency department can help him out and things that the ED cannot do for him such as arrange home health care or assist the patient in his activities of daily living, patient and his son are not interested in a change in his basic living situation or in a involvement of assisted living or skilled nursing, finally we discussed the fact that patient has had a cognitive decline for whatever reason and that this process can progress and affect his medical decision-making, it seemed that the patient and his son had a better idea as to what the emergency department at [facility] can offer for him, offers were made to possibly change his primary care home if he is not currently satisfied with such.                             Clinical Impression:       ICD-10-CM ICD-9-CM   1. Weakness  R53.1 780.79   2. Fatigue  R53.83 780.79                          ED Disposition Condition    Discharge Stable        ED Prescriptions     None        Follow-up Information    None                                      Shawn Rizo MD  12/23/20 2003       Shawn Rizo MD  12/25/20 0334       Shawn Rizo MD  01/05/21 1526

## 2020-12-23 ENCOUNTER — CLINICAL SUPPORT (OUTPATIENT)
Dept: FAMILY MEDICINE | Facility: CLINIC | Age: 65
End: 2020-12-23
Payer: MEDICARE

## 2020-12-23 VITALS — DIASTOLIC BLOOD PRESSURE: 90 MMHG | SYSTOLIC BLOOD PRESSURE: 164 MMHG

## 2020-12-23 PROCEDURE — 99999 PR PBB SHADOW E&M-EST. PATIENT-LVL II: CPT | Mod: PBBFAC,,,

## 2020-12-23 PROCEDURE — 99999 PR PBB SHADOW E&M-EST. PATIENT-LVL II: ICD-10-PCS | Mod: PBBFAC,,,

## 2020-12-23 NOTE — PROGRESS NOTES
Pt presents to clinic for BP check post ER visit.   BP elevated- pt states he will continue to monitor BP at home until f/u appt.

## 2020-12-29 ENCOUNTER — HOSPITAL ENCOUNTER (EMERGENCY)
Facility: HOSPITAL | Age: 65
Discharge: HOME | End: 2020-12-29
Attending: EMERGENCY MEDICINE
Payer: MEDICARE

## 2020-12-29 VITALS
HEIGHT: 70 IN | HEART RATE: 64 BPM | BODY MASS INDEX: 20.04 KG/M2 | OXYGEN SATURATION: 98 % | WEIGHT: 140 LBS | SYSTOLIC BLOOD PRESSURE: 160 MMHG | RESPIRATION RATE: 15 BRPM | TEMPERATURE: 98 F | DIASTOLIC BLOOD PRESSURE: 83 MMHG

## 2020-12-29 DIAGNOSIS — R06.2 WHEEZING: ICD-10-CM

## 2020-12-29 DIAGNOSIS — F51.01 PRIMARY INSOMNIA: ICD-10-CM

## 2020-12-29 DIAGNOSIS — R20.2 PARESTHESIA OF LEFT ARM AND LEG: ICD-10-CM

## 2020-12-29 DIAGNOSIS — R07.9 CHEST PAIN: Primary | ICD-10-CM

## 2020-12-29 LAB
ANION GAP SERPL CALC-SCNC: 7 MMOL/L (ref 8–16)
BASOPHILS # BLD AUTO: 0.06 K/UL (ref 0–0.2)
BASOPHILS NFR BLD: 1.1 % (ref 0–1.9)
BUN SERPL-MCNC: 8 MG/DL (ref 8–23)
CALCIUM SERPL-MCNC: 8.9 MG/DL (ref 8.7–10.5)
CHLORIDE SERPL-SCNC: 91 MMOL/L (ref 95–110)
CO2 SERPL-SCNC: 27 MMOL/L (ref 23–29)
CREAT SERPL-MCNC: 0.8 MG/DL (ref 0.5–1.4)
DIFFERENTIAL METHOD: ABNORMAL
EOSINOPHIL # BLD AUTO: 0.1 K/UL (ref 0–0.5)
EOSINOPHIL NFR BLD: 1.3 % (ref 0–8)
ERYTHROCYTE [DISTWIDTH] IN BLOOD BY AUTOMATED COUNT: 11.5 % (ref 11.5–14.5)
EST. GFR  (AFRICAN AMERICAN): >60 ML/MIN/1.73 M^2
EST. GFR  (NON AFRICAN AMERICAN): >60 ML/MIN/1.73 M^2
GLUCOSE SERPL-MCNC: 99 MG/DL (ref 70–110)
HCT VFR BLD AUTO: 37.4 % (ref 40–54)
HGB BLD-MCNC: 12.7 G/DL (ref 14–18)
IMM GRANULOCYTES # BLD AUTO: 0.01 K/UL (ref 0–0.04)
IMM GRANULOCYTES NFR BLD AUTO: 0.2 % (ref 0–0.5)
LYMPHOCYTES # BLD AUTO: 1.4 K/UL (ref 1–4.8)
LYMPHOCYTES NFR BLD: 26 % (ref 18–48)
MCH RBC QN AUTO: 31.9 PG (ref 27–31)
MCHC RBC AUTO-ENTMCNC: 34 G/DL (ref 32–36)
MCV RBC AUTO: 94 FL (ref 82–98)
MONOCYTES # BLD AUTO: 0.6 K/UL (ref 0.3–1)
MONOCYTES NFR BLD: 11.6 % (ref 4–15)
NEUTROPHILS # BLD AUTO: 3.2 K/UL (ref 1.8–7.7)
NEUTROPHILS NFR BLD: 59.8 % (ref 38–73)
NRBC BLD-RTO: 0 /100 WBC
PLATELET # BLD AUTO: 233 K/UL (ref 150–350)
PMV BLD AUTO: 9.6 FL (ref 9.2–12.9)
POTASSIUM SERPL-SCNC: 3.9 MMOL/L (ref 3.5–5.1)
RBC # BLD AUTO: 3.98 M/UL (ref 4.6–6.2)
SODIUM SERPL-SCNC: 125 MMOL/L (ref 136–145)
TROPONIN I SERPL DL<=0.01 NG/ML-MCNC: <0.01 NG/ML (ref 0.02–0.5)
WBC # BLD AUTO: 5.42 K/UL (ref 3.9–12.7)

## 2020-12-29 PROCEDURE — 99284 EMERGENCY DEPT VISIT MOD MDM: CPT | Mod: 25

## 2020-12-29 PROCEDURE — 85025 COMPLETE CBC W/AUTO DIFF WBC: CPT

## 2020-12-29 PROCEDURE — 25000003 PHARM REV CODE 250: Performed by: EMERGENCY MEDICINE

## 2020-12-29 PROCEDURE — 84484 ASSAY OF TROPONIN QUANT: CPT

## 2020-12-29 PROCEDURE — 80048 BASIC METABOLIC PNL TOTAL CA: CPT

## 2020-12-29 PROCEDURE — 71045 X-RAY EXAM CHEST 1 VIEW: CPT | Mod: 26,,, | Performed by: RADIOLOGY

## 2020-12-29 PROCEDURE — 71045 XR CHEST AP PORTABLE: ICD-10-PCS | Mod: 26,,, | Performed by: RADIOLOGY

## 2020-12-29 PROCEDURE — 71045 X-RAY EXAM CHEST 1 VIEW: CPT | Mod: TC,FY

## 2020-12-29 RX ORDER — ACETAMINOPHEN 500 MG
1000 TABLET ORAL
Status: COMPLETED | OUTPATIENT
Start: 2020-12-29 | End: 2020-12-29

## 2020-12-29 RX ORDER — TRAZODONE HYDROCHLORIDE 50 MG/1
50 TABLET ORAL 2 TIMES DAILY
Qty: 60 TABLET | Refills: 0 | Status: SHIPPED | OUTPATIENT
Start: 2020-12-29 | End: 2021-01-28

## 2020-12-29 RX ORDER — ALBUTEROL SULFATE 90 UG/1
1-2 AEROSOL, METERED RESPIRATORY (INHALATION) EVERY 6 HOURS PRN
Qty: 8 G | Refills: 0 | Status: SHIPPED | OUTPATIENT
Start: 2020-12-29 | End: 2021-03-11 | Stop reason: SDUPTHER

## 2020-12-29 RX ORDER — TRAZODONE HYDROCHLORIDE 50 MG/1
50 TABLET ORAL 2 TIMES DAILY
Qty: 60 TABLET | Refills: 0 | Status: SHIPPED | OUTPATIENT
Start: 2020-12-29 | End: 2020-12-29 | Stop reason: SDUPTHER

## 2020-12-29 RX ORDER — ALBUTEROL SULFATE 90 UG/1
1-2 AEROSOL, METERED RESPIRATORY (INHALATION) EVERY 6 HOURS PRN
Qty: 8 G | Refills: 0 | Status: SHIPPED | OUTPATIENT
Start: 2020-12-29 | End: 2020-12-29 | Stop reason: SDUPTHER

## 2020-12-29 RX ADMIN — ACETAMINOPHEN 1000 MG: 500 TABLET ORAL at 03:12

## 2020-12-29 NOTE — ED TRIAGE NOTES
"Pt to ER via POV, c/o "My chest is hurting today, started about an hour ago right on the left side. I have had a heart attack in the past. My weakness on my left side seems to be getting worse again, I did have a stroke in the past."     Dr. Rockwell at bedside assessing patient.   "

## 2020-12-29 NOTE — ED PROVIDER NOTES
"Encounter Date: 12/29/2020       History     Chief Complaint   Patient presents with    Chest Pain     Anterior L sided x1 hour    Extremity Weakness     Hx Stroke, states "my left side feels weak hard to walk move everything"       Patient presents the ER for evaluation of chest pain.  Patient states he started having substernal pain approximately 1 hr ago.  The pain is sharp and constant.  He fails week.  Patient says he has had shortness of breath all day.  No nausea.  No diaphoresis.  Patient has a prior history of heart trouble.  He has had an MI and a stent placed.  He is not followed by a cardiologist at this time.        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, including shoulder     Cancer 2005    right eye    Colon polyps     COPD (chronic obstructive pulmonary disease)     Depression     Hypertension     Inguinal hernia     left    Kidney stone     MI (myocardial infarction)     Smoker, trying to quit again, 1 cig last a week, started age 17, quit for 42 years, restarted 2018 7/24/2019    Stroke     last stroke 2017    Tremor     Ulcer      Past Surgical History:   Procedure Laterality Date    ANGIOPLASTY      cardiac stent    CARDIAC CATHETERIZATION      COLECTOMY      EYE SURGERY      FINGER SURGERY Left     left index finger    HERNIA REPAIR      HIP ARTHROPLASTY Right 12/20/2019    Procedure: ARTHROPLASTY, HIP, [place], pegboard, 1st assist;  Surgeon: Kody Patel MD;  Location: [facility] OR;  Service: Orthopedics;  Laterality: Right;    OLECRANON BURSECTOMY Right 11/6/2018    Procedure: BURSECTOMY, OLECRANON;  Surgeon: Atul Cooper DO;  Location: [facility] OR;  Service: Orthopedics;  Laterality: " Right;  Excision Olecranon Bursa Right Elbow    POLYPECTOMY      REPAIR OF TRICEPS TENDON Right 2018    Procedure: REPAIR, TENDON, TRICEPS;  Surgeon: Atul Cooper DO;  Location: [facility] OR;  Service: Orthopedics;  Laterality: Right;    SURGICAL REMOVAL OF BONE SPUR Right 2018    Procedure: EXCISION, BONE SPUR;  Surgeon: Atul Cooper DO;  Location: [facility] OR;  Service: Orthopedics;  Laterality: Right;  Excision Olecranon Bone Spur Right Elbow     Family History   Problem Relation Age of Onset    Heart disease Mother     Heart disease Father     Cancer Father     Dementia Brother     Heart disease Brother      Social History     Tobacco Use    Smoking status: Former Smoker     Years: 15.00     Types: Cigarettes     Quit date: 2018     Years since quittin.5    Smokeless tobacco: Current User     Types: Chew    Tobacco comment: Pt trying to quit   Substance Use Topics    Alcohol use: Yes     Alcohol/week: 2.0 - 3.0 standard drinks     Types: 2 - 3 Cans of beer per week     Comment: occasionally beer    Drug use: No     Review of Systems   Constitutional: Negative for fever.   HENT: Negative for sore throat.    Respiratory: Negative for shortness of breath.    Cardiovascular: Positive for chest pain.   Gastrointestinal: Negative for diarrhea, nausea and vomiting.   All other systems reviewed and are negative.      Physical Exam     Initial Vitals [20 1413]   BP Pulse Resp Temp SpO2   (!) 204/105 (!) 58 18 98.4 °F (36.9 °C) 100 %      MAP       --         Physical Exam    Nursing note and vitals reviewed.  Constitutional: He appears well-developed and well-nourished. No distress.   HENT:   Head: Normocephalic and atraumatic.   Right Ear: External ear normal.   Left Ear: External ear normal.   Nose: Nose normal.   Mouth/Throat: Oropharynx is clear and moist.   Eyes: Conjunctivae and EOM are normal. Pupils are equal, round, and reactive to light.   Neck: Normal range of motion. Neck  supple.   Cardiovascular: Normal rate, regular rhythm and normal heart sounds.   Pulmonary/Chest: Breath sounds normal. He exhibits tenderness.   Mild pressure on the chest wall recreates the pain of his chief complaint.   Abdominal: Soft. He exhibits no distension. There is no abdominal tenderness.   Musculoskeletal: Normal range of motion.   Neurological: He is alert. No cranial nerve deficit.   Skin: Skin is warm and dry.   Psychiatric:   Patient has an anxious mood with an appropriate affect.         ED Course   Procedures  Labs Reviewed   CBC W/ AUTO DIFFERENTIAL - Abnormal; Notable for the following components:       Result Value    RBC 3.98 (*)     Hemoglobin 12.7 (*)     Hematocrit 37.4 (*)     MCH 31.9 (*)     All other components within normal limits   BASIC METABOLIC PANEL - Abnormal; Notable for the following components:    Sodium 125 (*)     Chloride 91 (*)     Anion Gap 7 (*)     All other components within normal limits   TROPONIN I - Abnormal; Notable for the following components:    Troponin I <0.01 (*)     All other components within normal limits     EKG Readings: (Independently Interpreted)     Sinus rhythm, rate of 57, no ST elevation, normal intervals       Imaging Results          X-Ray Chest AP Portable (Final result)  Result time 12/29/20 14:43:41    Final result by Westley Brooke MD (12/29/20 14:43:41)                 Impression:      No acute abnormality.      Electronically signed by: Westley Brooke  Date:    12/29/2020  Time:    14:43             Narrative:    EXAMINATION:  XR CHEST AP PORTABLE    CLINICAL HISTORY:  . Chest pain, unspecified    TECHNIQUE:  Single frontal portable view of the chest was performed.    COMPARISON:  12/18/2020.    FINDINGS:  Support devices: None    Stable small granuloma at the left lung base.The lungs are otherwise clear, with normal appearance of pulmonary vasculature and no pleural effusion or pneumothorax.    The cardiac silhouette is normal in size.  The hilar and mediastinal contours are unremarkable.    Bones are intact.                                 Medical Decision Making:   Initial Assessment:     Patient presents with complaints of chest pain that is atypical for cardiac pain.  Workup in the ER is reassuring with an unremarkable EKG and unremarkable troponin.  He has a history of psychogenic water drinking and is sodium is 125.  I believe this is high enough that we do not need to admit him.  I have discussed this case with the [facility] hospitalist.  The patient asked for refill of his trazodone as he is run out of it prematurely; he started taking it twice a day to deal with the paresthesias in his face.  I will give him a 1 month supply of twice a day but I have asked him to discuss this with his primary care provider.  He indicates understanding.  Return to the ER as needed.                             Clinical Impression:       ICD-10-CM ICD-9-CM   1. Chest pain  R07.9 786.50   2. Primary insomnia  F51.01 307.42   3. Wheezing  R06.2 786.07   4. Paresthesia of left arm and leg  R20.2 782.0                          ED Disposition Condition    Discharge Stable        ED Prescriptions     Medication Sig Dispense Start Date End Date Auth. Provider    albuterol (PROVENTIL/VENTOLIN HFA) 90 mcg/actuation inhaler Inhale 1-2 puffs into the lungs every 6 (six) hours as needed for Wheezing. Rescue 8 g 12/29/2020  Daniele Rockwell MD    traZODone (DESYREL) 50 MG tablet Take 1 tablet (50 mg total) by mouth 2 (two) times a day. 60 tablet 12/29/2020 1/28/2021 Daniele Rockwell MD        Follow-up Information     Follow up With Specialties Details Why Contact Info    Hiram Leija MD Family Medicine In 1 week  [address]  [place] [address]  [phone]      [facility] - ED Emergency Medicine  [facility] symptoms worsen [address] [facility] [address] [facility] [address] [phone]                                       Daniele  MD Luli  12/29/20 4624

## 2020-12-31 DIAGNOSIS — M51.36 DDD (DEGENERATIVE DISC DISEASE), LUMBAR: ICD-10-CM

## 2021-01-03 RX ORDER — PREGABALIN 75 MG/1
75 CAPSULE ORAL 2 TIMES DAILY
Qty: 60 CAPSULE | Refills: 2 | Status: SHIPPED | OUTPATIENT
Start: 2021-01-03 | End: 2021-01-11 | Stop reason: SDUPTHER

## 2021-01-08 ENCOUNTER — TELEPHONE (OUTPATIENT)
Dept: FAMILY MEDICINE | Facility: CLINIC | Age: 66
End: 2021-01-08

## 2021-01-08 DIAGNOSIS — E87.1 HYPONATREMIA: ICD-10-CM

## 2021-01-08 DIAGNOSIS — F41.1 GAD (GENERALIZED ANXIETY DISORDER): ICD-10-CM

## 2021-01-08 DIAGNOSIS — F51.01 PRIMARY INSOMNIA: ICD-10-CM

## 2021-01-11 DIAGNOSIS — F41.1 GAD (GENERALIZED ANXIETY DISORDER): ICD-10-CM

## 2021-01-11 DIAGNOSIS — M51.36 DDD (DEGENERATIVE DISC DISEASE), LUMBAR: ICD-10-CM

## 2021-01-11 DIAGNOSIS — F51.01 PRIMARY INSOMNIA: ICD-10-CM

## 2021-01-11 RX ORDER — ALPRAZOLAM 1 MG/1
TABLET ORAL
Qty: 120 TABLET | Refills: 0 | Status: SHIPPED | OUTPATIENT
Start: 2021-01-11 | End: 2021-02-11 | Stop reason: SDUPTHER

## 2021-01-11 RX ORDER — TRAZODONE HYDROCHLORIDE 50 MG/1
50 TABLET ORAL NIGHTLY
Qty: 30 TABLET | Refills: 11 | Status: SHIPPED | OUTPATIENT
Start: 2021-01-11 | End: 2021-02-11 | Stop reason: SDUPTHER

## 2021-01-11 RX ORDER — PREGABALIN 75 MG/1
75 CAPSULE ORAL 2 TIMES DAILY
Qty: 60 CAPSULE | Refills: 2 | Status: SHIPPED | OUTPATIENT
Start: 2021-01-11 | End: 2021-03-03 | Stop reason: SDUPTHER

## 2021-01-12 ENCOUNTER — TELEPHONE (OUTPATIENT)
Dept: FAMILY MEDICINE | Facility: CLINIC | Age: 66
End: 2021-01-12

## 2021-01-13 ENCOUNTER — TELEPHONE (OUTPATIENT)
Dept: FAMILY MEDICINE | Facility: CLINIC | Age: 66
End: 2021-01-13

## 2021-01-13 ENCOUNTER — TELEPHONE (OUTPATIENT)
Dept: NEPHROLOGY | Facility: CLINIC | Age: 66
End: 2021-01-13

## 2021-01-27 ENCOUNTER — PATIENT OUTREACH (OUTPATIENT)
Dept: ADMINISTRATIVE | Facility: OTHER | Age: 66
End: 2021-01-27

## 2021-01-29 ENCOUNTER — TELEPHONE (OUTPATIENT)
Dept: NEPHROLOGY | Facility: CLINIC | Age: 66
End: 2021-01-29

## 2021-02-11 DIAGNOSIS — F51.01 PRIMARY INSOMNIA: ICD-10-CM

## 2021-02-11 DIAGNOSIS — F41.1 GAD (GENERALIZED ANXIETY DISORDER): ICD-10-CM

## 2021-02-11 RX ORDER — TRAZODONE HYDROCHLORIDE 50 MG/1
50 TABLET ORAL NIGHTLY
Qty: 30 TABLET | Refills: 11 | Status: SHIPPED | OUTPATIENT
Start: 2021-02-11 | End: 2021-03-02 | Stop reason: SDUPTHER

## 2021-02-11 RX ORDER — ALPRAZOLAM 1 MG/1
TABLET ORAL
Qty: 120 TABLET | Refills: 0 | Status: SHIPPED | OUTPATIENT
Start: 2021-02-11 | End: 2021-03-08 | Stop reason: SDUPTHER

## 2021-02-11 RX ORDER — CARVEDILOL 6.25 MG/1
6.25 TABLET ORAL 2 TIMES DAILY
Qty: 180 TABLET | Refills: 3 | Status: SHIPPED | OUTPATIENT
Start: 2021-02-11 | End: 2022-02-11

## 2021-02-22 ENCOUNTER — HOSPITAL ENCOUNTER (OUTPATIENT)
Dept: RADIOLOGY | Facility: HOSPITAL | Age: 66
Discharge: HOME | End: 2021-02-22
Attending: NURSE PRACTITIONER
Payer: MEDICARE

## 2021-02-22 DIAGNOSIS — R91.1 LUNG NODULE: ICD-10-CM

## 2021-02-22 PROCEDURE — 71250 CT THORAX DX C-: CPT | Mod: 26,,, | Performed by: RADIOLOGY

## 2021-02-22 PROCEDURE — 71250 CT CHEST WITHOUT CONTRAST: ICD-10-PCS | Mod: 26,,, | Performed by: RADIOLOGY

## 2021-02-22 PROCEDURE — 71250 CT THORAX DX C-: CPT | Mod: TC

## 2021-02-24 ENCOUNTER — TELEPHONE (OUTPATIENT)
Dept: FAMILY MEDICINE | Facility: CLINIC | Age: 66
End: 2021-02-24

## 2021-03-01 ENCOUNTER — OFFICE VISIT (OUTPATIENT)
Dept: FAMILY MEDICINE | Facility: CLINIC | Age: 66
End: 2021-03-01
Payer: MEDICARE

## 2021-03-01 VITALS
TEMPERATURE: 98 F | OXYGEN SATURATION: 96 % | HEIGHT: 70 IN | WEIGHT: 132 LBS | BODY MASS INDEX: 18.9 KG/M2 | DIASTOLIC BLOOD PRESSURE: 80 MMHG | RESPIRATION RATE: 18 BRPM | SYSTOLIC BLOOD PRESSURE: 150 MMHG | HEART RATE: 73 BPM

## 2021-03-01 DIAGNOSIS — I10 ESSENTIAL HYPERTENSION: ICD-10-CM

## 2021-03-01 DIAGNOSIS — E87.1 HYPONATREMIA: ICD-10-CM

## 2021-03-01 DIAGNOSIS — G89.29 CHRONIC BILATERAL LOW BACK PAIN WITHOUT SCIATICA: Primary | ICD-10-CM

## 2021-03-01 DIAGNOSIS — Z79.899 CHRONIC PRESCRIPTION BENZODIAZEPINE USE: ICD-10-CM

## 2021-03-01 DIAGNOSIS — F41.1 GAD (GENERALIZED ANXIETY DISORDER): ICD-10-CM

## 2021-03-01 DIAGNOSIS — M54.50 CHRONIC BILATERAL LOW BACK PAIN WITHOUT SCIATICA: Primary | ICD-10-CM

## 2021-03-01 DIAGNOSIS — I31.39 PERICARDIAL EFFUSION: ICD-10-CM

## 2021-03-01 DIAGNOSIS — G62.9 NEUROPATHY: ICD-10-CM

## 2021-03-01 DIAGNOSIS — F51.01 PRIMARY INSOMNIA: ICD-10-CM

## 2021-03-01 PROCEDURE — 3008F PR BODY MASS INDEX (BMI) DOCUMENTED: ICD-10-PCS | Mod: CPTII,S$GLB,, | Performed by: NURSE PRACTITIONER

## 2021-03-01 PROCEDURE — 3077F SYST BP >= 140 MM HG: CPT | Mod: CPTII,S$GLB,, | Performed by: NURSE PRACTITIONER

## 2021-03-01 PROCEDURE — 3288F PR FALLS RISK ASSESSMENT DOCUMENTED: ICD-10-PCS | Mod: CPTII,S$GLB,, | Performed by: NURSE PRACTITIONER

## 2021-03-01 PROCEDURE — 1159F PR MEDICATION LIST DOCUMENTED IN MEDICAL RECORD: ICD-10-PCS | Mod: S$GLB,,, | Performed by: NURSE PRACTITIONER

## 2021-03-01 PROCEDURE — 1126F AMNT PAIN NOTED NONE PRSNT: CPT | Mod: S$GLB,,, | Performed by: NURSE PRACTITIONER

## 2021-03-01 PROCEDURE — 1101F PT FALLS ASSESS-DOCD LE1/YR: CPT | Mod: CPTII,S$GLB,, | Performed by: NURSE PRACTITIONER

## 2021-03-01 PROCEDURE — 99999 PR PBB SHADOW E&M-EST. PATIENT-LVL V: ICD-10-PCS | Mod: PBBFAC,,, | Performed by: NURSE PRACTITIONER

## 2021-03-01 PROCEDURE — 1159F MED LIST DOCD IN RCRD: CPT | Mod: S$GLB,,, | Performed by: NURSE PRACTITIONER

## 2021-03-01 PROCEDURE — 3288F FALL RISK ASSESSMENT DOCD: CPT | Mod: CPTII,S$GLB,, | Performed by: NURSE PRACTITIONER

## 2021-03-01 PROCEDURE — 99999 PR PBB SHADOW E&M-EST. PATIENT-LVL V: CPT | Mod: PBBFAC,,, | Performed by: NURSE PRACTITIONER

## 2021-03-01 PROCEDURE — 1126F PR PAIN SEVERITY QUANTIFIED, NO PAIN PRESENT: ICD-10-PCS | Mod: S$GLB,,, | Performed by: NURSE PRACTITIONER

## 2021-03-01 PROCEDURE — 3008F BODY MASS INDEX DOCD: CPT | Mod: CPTII,S$GLB,, | Performed by: NURSE PRACTITIONER

## 2021-03-01 PROCEDURE — 80307 DRUG TEST PRSMV CHEM ANLYZR: CPT

## 2021-03-01 PROCEDURE — 3077F PR MOST RECENT SYSTOLIC BLOOD PRESSURE >= 140 MM HG: ICD-10-PCS | Mod: CPTII,S$GLB,, | Performed by: NURSE PRACTITIONER

## 2021-03-01 PROCEDURE — 99214 OFFICE O/P EST MOD 30 MIN: CPT | Mod: S$GLB,,, | Performed by: NURSE PRACTITIONER

## 2021-03-01 PROCEDURE — 3079F PR MOST RECENT DIASTOLIC BLOOD PRESSURE 80-89 MM HG: ICD-10-PCS | Mod: CPTII,S$GLB,, | Performed by: NURSE PRACTITIONER

## 2021-03-01 PROCEDURE — 1101F PR PT FALLS ASSESS DOC 0-1 FALLS W/OUT INJ PAST YR: ICD-10-PCS | Mod: CPTII,S$GLB,, | Performed by: NURSE PRACTITIONER

## 2021-03-01 PROCEDURE — 99214 PR OFFICE/OUTPT VISIT, EST, LEVL IV, 30-39 MIN: ICD-10-PCS | Mod: S$GLB,,, | Performed by: NURSE PRACTITIONER

## 2021-03-01 PROCEDURE — 3079F DIAST BP 80-89 MM HG: CPT | Mod: CPTII,S$GLB,, | Performed by: NURSE PRACTITIONER

## 2021-03-01 RX ORDER — MELOXICAM 15 MG/1
TABLET ORAL
Qty: 90 TABLET | Refills: 3 | Status: SHIPPED | OUTPATIENT
Start: 2021-03-01 | End: 2021-03-03 | Stop reason: SDUPTHER

## 2021-03-01 RX ORDER — SUCRALFATE 1 G/1
1 TABLET ORAL
COMMUNITY
Start: 2021-01-23 | End: 2021-03-11 | Stop reason: SDUPTHER

## 2021-03-02 DIAGNOSIS — M54.50 CHRONIC BILATERAL LOW BACK PAIN WITHOUT SCIATICA: ICD-10-CM

## 2021-03-02 DIAGNOSIS — F51.01 PRIMARY INSOMNIA: ICD-10-CM

## 2021-03-02 DIAGNOSIS — G89.29 CHRONIC BILATERAL LOW BACK PAIN WITHOUT SCIATICA: ICD-10-CM

## 2021-03-02 DIAGNOSIS — M51.36 DDD (DEGENERATIVE DISC DISEASE), LUMBAR: ICD-10-CM

## 2021-03-03 RX ORDER — TRAZODONE HYDROCHLORIDE 50 MG/1
50 TABLET ORAL NIGHTLY
Qty: 30 TABLET | Refills: 11 | Status: SHIPPED | OUTPATIENT
Start: 2021-03-03 | End: 2021-03-08 | Stop reason: SDUPTHER

## 2021-03-03 RX ORDER — MELOXICAM 15 MG/1
TABLET ORAL
Qty: 90 TABLET | Refills: 3 | Status: SHIPPED | OUTPATIENT
Start: 2021-03-03

## 2021-03-03 RX ORDER — PREGABALIN 75 MG/1
75 CAPSULE ORAL 2 TIMES DAILY
Qty: 60 CAPSULE | Refills: 2 | Status: SHIPPED | OUTPATIENT
Start: 2021-03-03 | End: 2021-03-10 | Stop reason: SDUPTHER

## 2021-03-03 RX ORDER — TRAZODONE HYDROCHLORIDE 50 MG/1
50 TABLET ORAL NIGHTLY
Qty: 30 TABLET | Refills: 11 | Status: SHIPPED | OUTPATIENT
Start: 2021-03-03 | End: 2021-03-03 | Stop reason: SDUPTHER

## 2021-03-04 ENCOUNTER — TELEPHONE (OUTPATIENT)
Dept: FAMILY MEDICINE | Facility: CLINIC | Age: 66
End: 2021-03-04

## 2021-03-08 ENCOUNTER — PATIENT OUTREACH (OUTPATIENT)
Dept: ADMINISTRATIVE | Facility: HOSPITAL | Age: 66
End: 2021-03-08

## 2021-03-08 DIAGNOSIS — F41.9 ANXIETY: ICD-10-CM

## 2021-03-08 DIAGNOSIS — F51.01 PRIMARY INSOMNIA: ICD-10-CM

## 2021-03-08 DIAGNOSIS — F41.1 GAD (GENERALIZED ANXIETY DISORDER): Primary | ICD-10-CM

## 2021-03-08 RX ORDER — ALPRAZOLAM 1 MG/1
TABLET ORAL
Qty: 120 TABLET | Refills: 0 | Status: SHIPPED | OUTPATIENT
Start: 2021-03-08 | End: 2021-03-10 | Stop reason: SDUPTHER

## 2021-03-08 RX ORDER — TRAZODONE HYDROCHLORIDE 50 MG/1
100 TABLET ORAL NIGHTLY
Qty: 60 TABLET | Refills: 11 | Status: SHIPPED | OUTPATIENT
Start: 2021-03-08 | End: 2022-03-08

## 2021-03-10 DIAGNOSIS — F41.1 GAD (GENERALIZED ANXIETY DISORDER): ICD-10-CM

## 2021-03-10 DIAGNOSIS — M51.36 DDD (DEGENERATIVE DISC DISEASE), LUMBAR: ICD-10-CM

## 2021-03-10 RX ORDER — ALPRAZOLAM 1 MG/1
TABLET ORAL
Qty: 120 TABLET | Refills: 0 | Status: SHIPPED | OUTPATIENT
Start: 2021-03-10 | End: 2021-05-07

## 2021-03-10 RX ORDER — PREGABALIN 75 MG/1
75 CAPSULE ORAL 2 TIMES DAILY
Qty: 60 CAPSULE | Refills: 2 | Status: SHIPPED | OUTPATIENT
Start: 2021-03-10 | End: 2021-06-03 | Stop reason: SDUPTHER

## 2021-03-11 DIAGNOSIS — K21.9 GASTROESOPHAGEAL REFLUX DISEASE: ICD-10-CM

## 2021-03-11 DIAGNOSIS — R06.2 WHEEZING: ICD-10-CM

## 2021-03-11 RX ORDER — PRIMIDONE 50 MG/1
50 TABLET ORAL 4 TIMES DAILY
Qty: 120 TABLET | Refills: 11 | Status: SHIPPED | OUTPATIENT
Start: 2021-03-11

## 2021-03-11 RX ORDER — PANTOPRAZOLE SODIUM 40 MG/1
40 TABLET, DELAYED RELEASE ORAL DAILY
Qty: 90 TABLET | Refills: 3 | Status: SHIPPED | OUTPATIENT
Start: 2021-03-11 | End: 2021-08-31 | Stop reason: SDUPTHER

## 2021-03-11 RX ORDER — SUCRALFATE 1 G/1
1 TABLET ORAL
Qty: 120 TABLET | Refills: 11 | Status: SHIPPED | OUTPATIENT
Start: 2021-03-11

## 2021-03-11 RX ORDER — ALBUTEROL SULFATE 90 UG/1
1-2 AEROSOL, METERED RESPIRATORY (INHALATION) EVERY 6 HOURS PRN
Qty: 8 G | Refills: 0 | Status: SHIPPED | OUTPATIENT
Start: 2021-03-11 | End: 2021-08-31

## 2021-03-18 LAB
6MAM UR QL: NOT DETECTED
7AMINOCLONAZEPAM UR QL: NOT DETECTED
A-OH ALPRAZ UR QL: PRESENT
ALPHA-OH-MIDAZOLAM: NOT DETECTED
ALPRAZ UR QL: PRESENT
AMPHET UR QL SCN: NOT DETECTED
ANNOTATION COMMENT IMP: NORMAL
ANNOTATION COMMENT IMP: NORMAL
BARBITURATES UR QL: NOT DETECTED
BUPRENORPHINE UR QL: NOT DETECTED
BZE UR QL: NOT DETECTED
CARBOXYTHC UR QL: NOT DETECTED
CARISOPRODOL UR QL: NOT DETECTED
CLONAZEPAM UR QL: NOT DETECTED
CODEINE UR QL: NOT DETECTED
CREAT UR-MCNC: 26.4 MG/DL (ref 20–400)
DIAZEPAM UR QL: NOT DETECTED
ETHYL GLUCURONIDE UR QL: PRESENT
FENTANYL UR QL: NOT DETECTED
GABAPENTIN: NOT DETECTED
HYDROCODONE UR QL: NOT DETECTED
HYDROMORPHONE UR QL: NOT DETECTED
LORAZEPAM UR QL: NOT DETECTED
MDA UR QL: NOT DETECTED
MDEA UR QL: NOT DETECTED
MDMA UR QL: NOT DETECTED
ME-PHENIDATE UR QL: NOT DETECTED
MEPERIDINE UR QL: NOT DETECTED
METHADONE UR QL: NOT DETECTED
METHAMPHET UR QL: NOT DETECTED
MIDAZOLAM UR QL SCN: NOT DETECTED
MORPHINE UR QL: NOT DETECTED
NALOXONE: NOT DETECTED
NORBUPRENORPHINE UR QL CFM: NOT DETECTED
NORDIAZEPAM UR QL: NOT DETECTED
NORFENTANYL UR QL: NOT DETECTED
NORHYDROCODONE UR QL CFM: NOT DETECTED
NOROXYCODONE UR QL CFM: NOT DETECTED
NOROXYMORPHONE: NOT DETECTED
OXAZEPAM UR QL: NOT DETECTED
OXYCODONE UR QL: NOT DETECTED
OXYMORPHONE UR QL: NOT DETECTED
PATHOLOGY STUDY: NORMAL
PCP UR QL: NOT DETECTED
PHENTERMINE UR QL: NOT DETECTED
PREGABALIN: PRESENT
PROPOXYPH UR QL: NORMAL
SERVICE CMNT-IMP: NORMAL
TAPENTADOL UR QL SCN: NOT DETECTED
TAPENTADOL-O-SULF: NOT DETECTED
TEMAZEPAM UR QL: NOT DETECTED
TRAMADOL UR QL: NOT DETECTED
ZOLPIDEM METABOLITE: NOT DETECTED
ZOLPIDEM UR QL: NOT DETECTED

## 2021-03-25 ENCOUNTER — TELEPHONE (OUTPATIENT)
Dept: FAMILY MEDICINE | Facility: CLINIC | Age: 66
End: 2021-03-25

## 2021-03-25 DIAGNOSIS — H91.90 HEARING LOSS, UNSPECIFIED HEARING LOSS TYPE, UNSPECIFIED LATERALITY: Primary | ICD-10-CM

## 2021-05-17 ENCOUNTER — TELEPHONE (OUTPATIENT)
Dept: FAMILY MEDICINE | Facility: CLINIC | Age: 66
End: 2021-05-17

## 2021-06-03 DIAGNOSIS — F41.1 GAD (GENERALIZED ANXIETY DISORDER): ICD-10-CM

## 2021-06-03 DIAGNOSIS — M51.36 DDD (DEGENERATIVE DISC DISEASE), LUMBAR: ICD-10-CM

## 2021-06-03 RX ORDER — PREGABALIN 75 MG/1
75 CAPSULE ORAL 2 TIMES DAILY
Qty: 60 CAPSULE | Refills: 2 | Status: SHIPPED | OUTPATIENT
Start: 2021-06-03 | End: 2021-10-26 | Stop reason: SDUPTHER

## 2021-06-03 RX ORDER — ALPRAZOLAM 1 MG/1
TABLET ORAL
Qty: 120 TABLET | Refills: 0 | Status: SHIPPED | OUTPATIENT
Start: 2021-06-03 | End: 2021-06-07

## 2021-06-15 ENCOUNTER — OFFICE VISIT (OUTPATIENT)
Dept: FAMILY MEDICINE | Facility: CLINIC | Age: 66
End: 2021-06-15
Payer: MEDICARE

## 2021-06-15 VITALS
WEIGHT: 130 LBS | HEIGHT: 70 IN | RESPIRATION RATE: 18 BRPM | HEART RATE: 72 BPM | OXYGEN SATURATION: 98 % | BODY MASS INDEX: 18.61 KG/M2 | SYSTOLIC BLOOD PRESSURE: 115 MMHG | DIASTOLIC BLOOD PRESSURE: 71 MMHG

## 2021-06-15 DIAGNOSIS — F41.1 GAD (GENERALIZED ANXIETY DISORDER): ICD-10-CM

## 2021-06-15 DIAGNOSIS — Z12.5 SCREENING FOR MALIGNANT NEOPLASM OF PROSTATE: ICD-10-CM

## 2021-06-15 DIAGNOSIS — Z79.899 CHRONIC PRESCRIPTION BENZODIAZEPINE USE: ICD-10-CM

## 2021-06-15 DIAGNOSIS — E87.1 HYPONATREMIA: ICD-10-CM

## 2021-06-15 DIAGNOSIS — R79.89 OTHER SPECIFIED ABNORMAL FINDINGS OF BLOOD CHEMISTRY: ICD-10-CM

## 2021-06-15 DIAGNOSIS — I63.9 CEREBROVASCULAR ACCIDENT (CVA), UNSPECIFIED MECHANISM: ICD-10-CM

## 2021-06-15 DIAGNOSIS — I25.2 HISTORY OF MI (MYOCARDIAL INFARCTION): ICD-10-CM

## 2021-06-15 DIAGNOSIS — G62.9 NEUROPATHY: ICD-10-CM

## 2021-06-15 DIAGNOSIS — E78.49 OTHER HYPERLIPIDEMIA: Primary | ICD-10-CM

## 2021-06-15 PROCEDURE — 99999 PR PBB SHADOW E&M-EST. PATIENT-LVL III: ICD-10-PCS | Mod: PBBFAC,,, | Performed by: NURSE PRACTITIONER

## 2021-06-15 PROCEDURE — 99214 PR OFFICE/OUTPT VISIT, EST, LEVL IV, 30-39 MIN: ICD-10-PCS | Mod: S$GLB,,, | Performed by: NURSE PRACTITIONER

## 2021-06-15 PROCEDURE — 99999 PR PBB SHADOW E&M-EST. PATIENT-LVL III: CPT | Mod: PBBFAC,,, | Performed by: NURSE PRACTITIONER

## 2021-06-15 PROCEDURE — 1101F PT FALLS ASSESS-DOCD LE1/YR: CPT | Mod: CPTII,S$GLB,, | Performed by: NURSE PRACTITIONER

## 2021-06-15 PROCEDURE — 3288F PR FALLS RISK ASSESSMENT DOCUMENTED: ICD-10-PCS | Mod: CPTII,S$GLB,, | Performed by: NURSE PRACTITIONER

## 2021-06-15 PROCEDURE — 3008F BODY MASS INDEX DOCD: CPT | Mod: CPTII,S$GLB,, | Performed by: NURSE PRACTITIONER

## 2021-06-15 PROCEDURE — 1101F PR PT FALLS ASSESS DOC 0-1 FALLS W/OUT INJ PAST YR: ICD-10-PCS | Mod: CPTII,S$GLB,, | Performed by: NURSE PRACTITIONER

## 2021-06-15 PROCEDURE — 3008F PR BODY MASS INDEX (BMI) DOCUMENTED: ICD-10-PCS | Mod: CPTII,S$GLB,, | Performed by: NURSE PRACTITIONER

## 2021-06-15 PROCEDURE — 1159F PR MEDICATION LIST DOCUMENTED IN MEDICAL RECORD: ICD-10-PCS | Mod: S$GLB,,, | Performed by: NURSE PRACTITIONER

## 2021-06-15 PROCEDURE — 1126F AMNT PAIN NOTED NONE PRSNT: CPT | Mod: S$GLB,,, | Performed by: NURSE PRACTITIONER

## 2021-06-15 PROCEDURE — 3288F FALL RISK ASSESSMENT DOCD: CPT | Mod: CPTII,S$GLB,, | Performed by: NURSE PRACTITIONER

## 2021-06-15 PROCEDURE — 80307 DRUG TEST PRSMV CHEM ANLYZR: CPT | Performed by: NURSE PRACTITIONER

## 2021-06-15 PROCEDURE — 1126F PR PAIN SEVERITY QUANTIFIED, NO PAIN PRESENT: ICD-10-PCS | Mod: S$GLB,,, | Performed by: NURSE PRACTITIONER

## 2021-06-15 PROCEDURE — 1159F MED LIST DOCD IN RCRD: CPT | Mod: S$GLB,,, | Performed by: NURSE PRACTITIONER

## 2021-06-15 PROCEDURE — 99214 OFFICE O/P EST MOD 30 MIN: CPT | Mod: S$GLB,,, | Performed by: NURSE PRACTITIONER

## 2021-06-15 RX ORDER — ATORVASTATIN CALCIUM 40 MG/1
40 TABLET, FILM COATED ORAL DAILY
Qty: 90 TABLET | Refills: 3 | Status: SHIPPED | OUTPATIENT
Start: 2021-06-15 | End: 2022-06-15

## 2021-06-22 LAB
6MAM UR QL: NOT DETECTED
7AMINOCLONAZEPAM UR QL: NOT DETECTED
A-OH ALPRAZ UR QL: PRESENT
ALPHA-OH-MIDAZOLAM: NOT DETECTED
ALPRAZ UR QL: PRESENT
AMPHET UR QL SCN: NOT DETECTED
ANNOTATION COMMENT IMP: NORMAL
ANNOTATION COMMENT IMP: NORMAL
BARBITURATES UR QL: NOT DETECTED
BUPRENORPHINE UR QL: NOT DETECTED
BZE UR QL: NOT DETECTED
CARBOXYTHC UR QL: NOT DETECTED
CARISOPRODOL UR QL: NOT DETECTED
CLONAZEPAM UR QL: NOT DETECTED
CODEINE UR QL: NOT DETECTED
CREAT UR-MCNC: 55.3 MG/DL (ref 20–400)
DIAZEPAM UR QL: NOT DETECTED
ETHYL GLUCURONIDE UR QL: PRESENT
FENTANYL UR QL: NOT DETECTED
GABAPENTIN: NOT DETECTED
HYDROCODONE UR QL: NOT DETECTED
HYDROMORPHONE UR QL: NOT DETECTED
LORAZEPAM UR QL: NOT DETECTED
MDA UR QL: NOT DETECTED
MDEA UR QL: NOT DETECTED
MDMA UR QL: NOT DETECTED
ME-PHENIDATE UR QL: NOT DETECTED
METHADONE UR QL: NOT DETECTED
METHAMPHET UR QL: NOT DETECTED
MIDAZOLAM UR QL SCN: NOT DETECTED
MORPHINE UR QL: NOT DETECTED
NALOXONE: NOT DETECTED
NORBUPRENORPHINE UR QL CFM: NOT DETECTED
NORDIAZEPAM UR QL: NOT DETECTED
NORFENTANYL UR QL: NOT DETECTED
NORHYDROCODONE UR QL CFM: NOT DETECTED
NORMEPERIDINE UR QL CFM: NOT DETECTED
NOROXYCODONE UR QL CFM: NOT DETECTED
NOROXYMORPHONE UR QL SCN: NOT DETECTED
OXAZEPAM UR QL: NOT DETECTED
OXYCODONE UR QL: NOT DETECTED
OXYMORPHONE UR QL: NOT DETECTED
PATHOLOGY STUDY: NORMAL
PCP UR QL: NOT DETECTED
PHENTERMINE UR QL: NOT DETECTED
PREGABALIN: PRESENT
SERVICE CMNT-IMP: NORMAL
TAPENTADOL UR QL SCN: NOT DETECTED
TAPENTADOL-O-SULF: NOT DETECTED
TEMAZEPAM UR QL: NOT DETECTED
TRAMADOL UR QL: NOT DETECTED
ZOLPIDEM METABOLITE: NOT DETECTED
ZOLPIDEM UR QL: NOT DETECTED

## 2021-07-12 ENCOUNTER — TELEPHONE (OUTPATIENT)
Dept: FAMILY MEDICINE | Facility: CLINIC | Age: 66
End: 2021-07-12

## 2021-08-23 ENCOUNTER — TELEPHONE (OUTPATIENT)
Dept: FAMILY MEDICINE | Facility: CLINIC | Age: 66
End: 2021-08-23

## 2021-08-24 ENCOUNTER — TELEPHONE (OUTPATIENT)
Dept: FAMILY MEDICINE | Facility: CLINIC | Age: 66
End: 2021-08-24

## 2021-08-26 DIAGNOSIS — F41.1 GAD (GENERALIZED ANXIETY DISORDER): ICD-10-CM

## 2021-08-26 RX ORDER — ALPRAZOLAM 1 MG/1
TABLET ORAL
Qty: 120 TABLET | Refills: 0 | Status: SHIPPED | OUTPATIENT
Start: 2021-08-26 | End: 2021-09-27 | Stop reason: SDUPTHER

## 2021-09-24 ENCOUNTER — TELEPHONE (OUTPATIENT)
Dept: FAMILY MEDICINE | Facility: CLINIC | Age: 66
End: 2021-09-24

## 2021-09-27 DIAGNOSIS — F41.1 GAD (GENERALIZED ANXIETY DISORDER): ICD-10-CM

## 2021-09-27 RX ORDER — ALPRAZOLAM 1 MG/1
TABLET ORAL
Qty: 120 TABLET | Refills: 0 | Status: SHIPPED | OUTPATIENT
Start: 2021-09-27 | End: 2021-10-26 | Stop reason: SDUPTHER

## 2021-09-27 RX ORDER — NITROGLYCERIN 0.3 MG/1
0.3 TABLET SUBLINGUAL EVERY 5 MIN PRN
Qty: 100 TABLET | Refills: 2 | Status: SHIPPED | OUTPATIENT
Start: 2021-09-27 | End: 2022-09-27

## 2021-10-26 DIAGNOSIS — F41.1 GAD (GENERALIZED ANXIETY DISORDER): ICD-10-CM

## 2021-10-26 DIAGNOSIS — M51.36 DDD (DEGENERATIVE DISC DISEASE), LUMBAR: ICD-10-CM

## 2021-10-27 DIAGNOSIS — M51.36 DDD (DEGENERATIVE DISC DISEASE), LUMBAR: ICD-10-CM

## 2021-10-27 DIAGNOSIS — F41.1 GAD (GENERALIZED ANXIETY DISORDER): ICD-10-CM

## 2021-10-27 RX ORDER — ALPRAZOLAM 1 MG/1
TABLET ORAL
Qty: 120 TABLET | Refills: 0 | Status: SHIPPED | OUTPATIENT
Start: 2021-10-27 | End: 2021-10-27 | Stop reason: SDUPTHER

## 2021-10-27 RX ORDER — ALPRAZOLAM 1 MG/1
TABLET ORAL
Qty: 120 TABLET | Refills: 0 | Status: SHIPPED | OUTPATIENT
Start: 2021-10-27 | End: 2021-11-24 | Stop reason: SDUPTHER

## 2021-10-27 RX ORDER — PREGABALIN 75 MG/1
75 CAPSULE ORAL 2 TIMES DAILY
Qty: 60 CAPSULE | Refills: 2 | Status: SHIPPED | OUTPATIENT
Start: 2021-10-27 | End: 2021-10-27 | Stop reason: SDUPTHER

## 2021-10-27 RX ORDER — PREGABALIN 75 MG/1
75 CAPSULE ORAL 2 TIMES DAILY
Qty: 60 CAPSULE | Refills: 2 | Status: SHIPPED | OUTPATIENT
Start: 2021-10-27 | End: 2021-11-24 | Stop reason: SDUPTHER

## 2021-11-24 DIAGNOSIS — K21.9 GASTROESOPHAGEAL REFLUX DISEASE: ICD-10-CM

## 2021-11-24 DIAGNOSIS — M51.36 DDD (DEGENERATIVE DISC DISEASE), LUMBAR: ICD-10-CM

## 2021-11-24 DIAGNOSIS — F41.1 GAD (GENERALIZED ANXIETY DISORDER): ICD-10-CM

## 2021-11-24 RX ORDER — ALPRAZOLAM 1 MG/1
TABLET ORAL
Qty: 120 TABLET | Refills: 0 | Status: SHIPPED | OUTPATIENT
Start: 2021-11-24 | End: 2021-12-21 | Stop reason: SDUPTHER

## 2021-11-24 RX ORDER — PREGABALIN 75 MG/1
75 CAPSULE ORAL 2 TIMES DAILY
Qty: 60 CAPSULE | Refills: 2 | Status: SHIPPED | OUTPATIENT
Start: 2021-11-24 | End: 2021-12-21 | Stop reason: SDUPTHER

## 2021-11-24 RX ORDER — PANTOPRAZOLE SODIUM 40 MG/1
40 TABLET, DELAYED RELEASE ORAL DAILY
Qty: 90 TABLET | Refills: 3 | Status: SHIPPED | OUTPATIENT
Start: 2021-11-24

## 2021-12-21 DIAGNOSIS — M51.36 DDD (DEGENERATIVE DISC DISEASE), LUMBAR: ICD-10-CM

## 2021-12-21 DIAGNOSIS — F41.1 GAD (GENERALIZED ANXIETY DISORDER): ICD-10-CM

## 2021-12-21 RX ORDER — PREGABALIN 75 MG/1
75 CAPSULE ORAL 2 TIMES DAILY
Qty: 60 CAPSULE | Refills: 2 | Status: SHIPPED | OUTPATIENT
Start: 2021-12-21 | End: 2021-12-22 | Stop reason: SDUPTHER

## 2021-12-21 RX ORDER — ALPRAZOLAM 1 MG/1
TABLET ORAL
Qty: 120 TABLET | Refills: 0 | Status: SHIPPED | OUTPATIENT
Start: 2021-12-21 | End: 2021-12-22 | Stop reason: SDUPTHER

## 2021-12-22 DIAGNOSIS — F41.1 GAD (GENERALIZED ANXIETY DISORDER): ICD-10-CM

## 2021-12-22 DIAGNOSIS — M51.36 DDD (DEGENERATIVE DISC DISEASE), LUMBAR: ICD-10-CM

## 2021-12-22 RX ORDER — ALPRAZOLAM 1 MG/1
TABLET ORAL
Qty: 120 TABLET | Refills: 0 | Status: SHIPPED | OUTPATIENT
Start: 2021-12-22

## 2021-12-22 RX ORDER — PREGABALIN 75 MG/1
75 CAPSULE ORAL 2 TIMES DAILY
Qty: 60 CAPSULE | Refills: 2 | Status: SHIPPED | OUTPATIENT
Start: 2021-12-22 | End: 2022-01-21

## 2021-12-30 RX ORDER — CALCIUM CITRATE/VITAMIN D3 200MG-6.25
TABLET ORAL
Qty: 200 STRIP | Refills: 11 | Status: SHIPPED | OUTPATIENT
Start: 2021-12-30

## 2022-08-24 DIAGNOSIS — I10 ESSENTIAL HYPERTENSION: ICD-10-CM

## 2023-07-18 NOTE — PROGRESS NOTES
"I called and spoke with the patients daughter with the patients permission.  Per the daughter her father is becoming increasingly anxious, frustrated and upset for no reason.  He is almost becoming destructive per [place].  When he has these bouts he removes himself from the family and \"cools off.\"  The patient is able to cut the grass and go swimming.  Physically he is doing well.  E[place]rett is requesting something for his anxiety.    " "Subjective:       Patient ID: Ricky Cole is a 63 y.o. male.    Chief Complaint: Follow-up; Gastroesophageal Reflux; and Medication Refill    Follow up    GERD worsening, has been off ppi for a few months, and has nocturnal awakenings.    Anxiety well controlled, compliant, no issues.      Review of Systems   Constitutional: Negative for activity change, appetite change, fatigue and fever.   HENT: Negative for congestion, dental problem, ear discharge, hearing loss, postnasal drip, sinus pain, sneezing and trouble swallowing.    Eyes: Negative for photophobia and discharge.   Respiratory: Negative for apnea, cough and shortness of breath.    Cardiovascular: Negative for chest pain.   Gastrointestinal: Positive for abdominal pain and nausea. Negative for abdominal distention, blood in stool, diarrhea and vomiting.   Endocrine: Negative for cold intolerance.   Genitourinary: Negative for difficulty urinating, flank pain, frequency, hematuria and testicular pain.   Musculoskeletal: Positive for arthralgias and back pain. Negative for myalgias.   Skin: Negative for color change.   Allergic/Immunologic: Negative for environmental allergies, food allergies and immunocompromised state.   Neurological: Negative for dizziness, syncope, numbness and headaches.   Hematological: Negative for adenopathy. Does not bruise/bleed easily.   Psychiatric/Behavioral: Negative for agitation, confusion, decreased concentration, hallucinations, self-injury and sleep disturbance.   All other systems reviewed and are negative.        Reviewed family, medical, surgical, and social history.    Objective:      BP (!) 147/94 (BP Location: Left arm, Patient Position: Sitting)   Pulse 80   Resp 20   Ht 5' 10.5" (1.791 m)   Wt 59.9 kg (132 lb)   SpO2 99%   BMI 18.67 kg/m²   Physical Exam   Constitutional: He is oriented to person, place, and time. He appears well-developed and well-nourished. No distress.   HENT:   Head: Normocephalic " and atraumatic.   Nose: Nose normal.   Mouth/Throat: Oropharynx is clear and moist. No oropharyngeal exudate.   Eyes: Conjunctivae and EOM are normal. Pupils are equal, round, and reactive to light.   Neck: Normal range of motion. No thyromegaly present.   Cardiovascular: Normal rate, regular rhythm, normal heart sounds and intact distal pulses. Exam reveals no gallop and no friction rub.   No murmur heard.  Pulmonary/Chest: Effort normal and breath sounds normal. No respiratory distress. He has no wheezes. He has no rales.   Abdominal: Soft. He exhibits no distension. There is tenderness (midepigastric). There is no guarding.   Musculoskeletal: Normal range of motion. He exhibits tenderness and deformity. He exhibits no edema.   Neurological: He is alert and oriented to person, place, and time. He displays normal reflexes. No cranial nerve deficit or sensory deficit. He exhibits normal muscle tone. Coordination normal.   Skin: Skin is warm and dry. No rash noted. He is not diaphoretic. No erythema. No pallor.   Psychiatric: He has a normal mood and affect. His behavior is normal. Judgment and thought content normal.   Nursing note and vitals reviewed.      Assessment:       1. Gastroesophageal reflux disease, esophagitis presence not specified    2. CHANTELLE (generalized anxiety disorder)        Plan:       Gastroesophageal reflux disease, esophagitis presence not specified  -     Discontinue: pantoprazole (PROTONIX) 40 MG tablet; Take 1 tablet (40 mg total) by mouth once daily.  Dispense: 30 tablet; Refill: 11  -     pantoprazole (PROTONIX) 40 MG tablet; Take 1 tablet (40 mg total) by mouth once daily.  Dispense: 30 tablet; Refill: 11  - Restart ppi, recheck at next visit.    CHANTELLE (generalized anxiety disorder)  -     ALPRAZolam (XANAX) 1 MG tablet; Take 1 tablet (1 mg total) by mouth 4 (four) times daily as needed for Anxiety.  Dispense: 120 tablet; Refill: 2            Risks, benefits, and side effects were discussed  with the patient. All questions were answered to the fullest satisfaction of the patient, and pt verbalized understanding and agreement to treatment plan. Pt was to call with any new or worsening symptoms, or present to the ER.

## 2024-04-27 NOTE — PT/OT/SLP PROGRESS
Physical Therapy Treatment    Patient Name:  Ricky Cole   MRN:  [id number]    Recommendations:     Discharge Recommendations:  rehabilitation facility   Discharge Equipment Recommendations: walker, rolling   Barriers to discharge: None    Assessment:     Ricky Cole is a 64 y.o. male admitted with a medical diagnosis of Hip fracture.  He presents with the following impairments/functional limitations:  weakness, impaired endurance, impaired functional mobilty, decreased ROM, decreased lower extremity function, decreased coordination, gait instability, orthopedic precautions. Pt agreeable to therapy and tolerated session well. Pt t/f to EOB with Min A with RLE, sit<>stand SBA, and gait of 40' CGA with RW.     Rehab Prognosis: Good; patient would benefit from acute skilled PT services to address these deficits and reach maximum level of function.    Recent Surgery: Procedure(s) (LRB):  ARTHROPLASTY, HIP, Lindy, pegboard, 1st assist (Right) 3 Days Post-Op    Plan:     During this hospitalization, patient to be seen BID to address the identified rehab impairments via gait training, therapeutic activities, therapeutic exercises and progress toward the following goals:    · Plan of Care Expires:  12/31/19    Subjective     Chief Complaint: Tired.   Patient/Family Comments/goals: To go to rehab to get stronger.   Pain/Comfort:  · Pain Rating 1: (None stated. )  · Location - Side 1: Right  · Location 1: hip      Objective:     Communicated with nurse Casery prior to session.  Patient found supine with telemetry, knee immobilizer upon PT entry to room.     General Precautions: Standard, fall   Orthopedic Precautions:RLE weight bearing as tolerated, RLE posterior precautions   Braces: Knee immobilizer     Functional Mobility:  · Bed Mobility:     · Rolling Left:  stand by assistance  · Rolling Right: stand by assistance  · Scooting: minimum assistance  · Supine to Sit: contact guard assistance  · Sit to Supine:  contact guard assistance  · Transfers:     · Sit to Stand:  stand by assistance with rolling walker  · Gait: 40' CGA with RW. No LOB. RLE post.precautions maintained with knee immobilizer on.       AM-PAC 6 CLICK MOBILITY          Therapeutic Activities and Exercises:   Pt educated on posterior hip precautions and exercises. APs, glut squeezes, hip abd  Performed hip abd/SLR AAROM x 10 reps supine       Patient left supine with all lines intact, call button in reach, bed alarm on and nurse Reilly notified..    GOALS:   Multidisciplinary Problems     Physical Therapy Goals        Problem: Physical Therapy Goal    Goal Priority Disciplines Outcome Goal Variances Interventions   Physical Therapy Goal     PT, PT/OT Ongoing, Progressing     Description:  Goals to be met by:      Patient will increase functional independence with mobility by performin). Supine to sit with Modified [place]  2). Sit to supine with Modified [place]  3). Sit to stand transfer with Modified [place]  4). Bed to chair transfer with Modified [place] using Rolling Walker  5). Gait  x  > 150 feet with Modified [place] using Rolling Walker.    6) Comprehends posterior hip precautions.                    Time Tracking:     PT Received On: 19  PT Start Time: 1245     PT Stop Time: 1255  PT Total Time (min): 10 min     Billable Minutes: Gait Training 10 minutes    Treatment Type: Treatment  PT/PTA: PTA     PTA Visit Number: 2     Guerita Greenberg, PTA  2019   [phone]

## 2024-10-31 NOTE — ED NOTES
Attempted to call report to ICU. PK Vaca, states that the nurse receiving that patient will call back in a few minutes.   
Patient placed on continuous cardiac monitor, automatic blood pressure cuff and continuous pulse oximeter.  
No

## 2025-07-09 NOTE — H&P (VIEW-ONLY)
Chief Complaint: Pain of the Right Elbow    Referring Provider: No referring provider defined for this encounter.    HPI:  Mr. Cole is a 63-year-old gentleman who presented today for evaluation of painful posterior swelling of his right elbow which has been present for approximately 2 weeks.  He stated, I can not stand rested on anything.  .  Resting elbow on a table or counter increases his pain.  He stated that nothing improves it. He has taken NSAIDs without help.  He has not worn a pad or splint.  He has not done physical therapy nor had injections. He denied fever or chills.    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      *  *  *  *  * POLYPECTOMY  RIGHT INGUINAL HERNIA REPAIR  HEMORRHOIDECTOMY  CARDIAC CATHETERIZATION WITH STENT PLACEMENT  EGD  COLONOSCOPY         Review of patient's allergies indicates:   Allergen Reactions    Codeine Other (See Comments)     hyper       Social History     Occupational History    Retired      Tobacco Use    Smoking status: Former Smoker     Years: 15.00     Types: Cigarettes     Last attempt to quit: 2018     Years since quittin.3    Smokeless tobacco: Current User     Types: Chew  Refill Routing Note   Medication(s) are not appropriate for processing by [facility] for the following reason(s):        Patient not seen by provider within 15 months  No active prescription written by provider    [facility] action(s):  Defer        Medication Therapy Plan: discontinued on 5/14/2025 by Francie Cazares DNP for the following reason: Patient no longer taking.      Appointments  past 12m or future 3m with PCP    Date Provider   Last Visit   10/26/2023 Freeman Valenzuela MD   Next Visit   Visit date not found Freeman Valenzuela MD   ED visits in past 90 days: 1        Note composed:1:32 PM 07/09/2025             Substance and Sexual Activity    Alcohol use: Yes     Comment: occasionally    Drug use: No    Sexual activity: Not on file      Family History   Problem Relation Age of Onset    Heart disease Mother     Heart disease Father     Cancer Father     Dementia Brother     Heart disease Brother        Previous Hospitalizations:  Denied previous hospitalizations.    ROS:   Review of Systems   Constitution: Negative for decreased appetite.   HENT: Positive for hearing loss.    Eyes: Negative for blurred vision.   Cardiovascular: Negative for chest pain.   Respiratory: Negative for cough.    Endocrine: Negative for polydipsia.   Hematologic/Lymphatic: Does not bruise/bleed easily.   Skin: Negative for itching.   Musculoskeletal: Negative for stiffness.   Gastrointestinal: Negative for constipation and diarrhea.   Genitourinary: Negative for flank pain.   Neurological: Negative for dizziness and headaches.   Psychiatric/Behavioral: Positive for depression.   Allergic/Immunologic: Negative for environmental allergies.           Objective:      Physical Exam:   General: AAOx3.  No acute distress  HEENT: Normocephalic, PEARLA EOMI, Poor Dentition  Neck: Supple, No JVD  Chest: Symetric, equal excursion on inspiration  Abdomen: Soft NTND  Vascular:  Pulses intact and equal bilaterally.  Capillary refill less than 3 seconds and equal bilaterally  Neurologic:  Pinprick and soft touch intact and equal bilaterally  Integment:  No ecchymosis, no errythema  Extremity:  Elbow:  Pronation/supination equal bilaterally 90/80 degrees. Extension/flexion equal bilaterally 0/128 degrees. Mild tenderness with motion right elbow.  Stable with radial/ulna stressing both elbows.  Posterior swelling right elbow.  Tender with palpation posterior swelling right elbow.  Temperature equal with palpation posterior bilateral elbows.  Tender with palpation proximal olecranon right elbow.  Radiography:  Personally reviewed  x-rays of the right  elbow completed on 10/26/2018 which showed a fracture of an olecranon bone spur, elbow arthritis, and posterior soft tissue swelling.      Assessment:       Impression:      1. Olecranon bursitis, right elbow    2. Olecranon bone spur fracture, right elbow, closed, initial encounter          Plan:       1.  Discussed physical examination and radiographic findings with the patient. Ricky understands that he has olecranon bursitis and of bone spur which is fractured.  He understands he could be treated multiple ways with observation versus aspiration and steroid injection, or he could have it excised and the bone spur removed.  He stated that he wanted surgery to remove the bursa and bone spur.  2.  Possible complications of surgery to include bleeding, infection, scarring, nerve/blood vessel/tendon damage, need for further surgery, failed surgery, failure to improve, possible persistent pain, possible recurrence, possible arthrofibrosis, and possible recurrence were discussed with the patient. The patient was permitted to ask questions and all concerns were addressed to his satisfaction.  3.  Tentatively schedule patient for surgery for 11/06/2018.  4.  Recommend the patient purchase elbow pads to protect his elbows and decrease the tenderness.  5.  Precautions given to the patient to avoid leaning on his elbows.  6.  All postoperative medications will be given to the patient on the date of surgery.  7.  Will hold off on referral to occupational therapy in case it is needed for postoperative use.  8.  Home exercises to include elbow flexion extension exercises were discussed with the patient.  9.  Continue with NSAIDs as tolerated and allowed by PCM.  10.  Follow up approximately 10-12 days postoperatively.

## (undated) DEVICE — DRAPE HIP TIBURON 87X115X134

## (undated) DEVICE — SUT VICRYL 3-0 27 SH

## (undated) DEVICE — NDL ECLIPSE SAFETY 18GX1-1/2IN

## (undated) DEVICE — KNEE IMMB UNV FOAM/MESH 20IN

## (undated) DEVICE — DRESSING AQUACEL AG 3.5X10IN

## (undated) DEVICE — SPONGE LAP 18X18 PREWASHED

## (undated) DEVICE — SEE MEDLINE ITEM 157166

## (undated) DEVICE — SEE MEDLINE ITEM 152530

## (undated) DEVICE — DRAPE INCISE IOBAN 2 23X33IN

## (undated) DEVICE — RETRIEVER SUTURE HEWSON DISP

## (undated) DEVICE — DRAPE INCISE IOBAN 2 23X17IN

## (undated) DEVICE — ELECTRODE REM PLYHSV RETURN 9

## (undated) DEVICE — GAUZE SPONGE 4X4 12PLY

## (undated) DEVICE — SPONGE/BRUSH SCRUB SURG PCMX

## (undated) DEVICE — SUT CTD VICRYL 2-0 VIL BR C

## (undated) DEVICE — STRAP OR TABLE 5IN X 72IN

## (undated) DEVICE — BLADE SURG CARBON STEEL #10

## (undated) DEVICE — SLEEVE SCD EXPRESS CALF MEDIUM

## (undated) DEVICE — GLOVE SURG ULTRA TOUCH 7.5

## (undated) DEVICE — SEE MEDLINE ITEM 157131

## (undated) DEVICE — SUT MONOCRYL 4-0 PS-2

## (undated) DEVICE — CANISTER SUCTION 3000CC

## (undated) DEVICE — TOGA FLYTE PEEL AWAY XLARGE

## (undated) DEVICE — BANDAGE ELASTIC 3IN ACE NS

## (undated) DEVICE — TUBE SUCTION YANKAUER HI CAP

## (undated) DEVICE — ELECTRODE CAUTER TIP 2.75IN

## (undated) DEVICE — SUT ETHILON 3-0 PS2 18 BLK

## (undated) DEVICE — SEE MEDLINE ITEM 107746

## (undated) DEVICE — GOWN TOGA SYS PEELWY ZIP 2 XL

## (undated) DEVICE — SUT STRATAFIX SPRL PS-2 3-0

## (undated) DEVICE — DRESSING TELFA PAD N ADH 2X3

## (undated) DEVICE — DRESSING N ADH OIL EMUL 3X3

## (undated) DEVICE — DURAPREP SURG SCRUB 26ML

## (undated) DEVICE — ADHESIVE DERMABOND ADVANCED

## (undated) DEVICE — SEE MEDLINE ITEM 146292

## (undated) DEVICE — SEE MEDLINE ITEM 156964

## (undated) DEVICE — Device

## (undated) DEVICE — NDL ELECTRODE E-Z CLEAN 2.75IN

## (undated) DEVICE — INTERPULSE SET

## (undated) DEVICE — SUT CTD VICRYL CT-1 27

## (undated) DEVICE — SYR 30CC LUER LOCK

## (undated) DEVICE — GLOVE SURG ULTRA TOUCH 9

## (undated) DEVICE — GLOVE SURG ULTRA TOUCH 8.5

## (undated) DEVICE — MANIFOLD 4 PORT

## (undated) DEVICE — PACK BASIC

## (undated) DEVICE — PAD CAST SPECIALIST STRL 4

## (undated) DEVICE — SUT X425H ETHIBOND 1-0

## (undated) DEVICE — SOL IRR NACL .9% 3000ML

## (undated) DEVICE — LINER SUCTION 3000CC

## (undated) DEVICE — DRAPE STERI-DRAPE 1000 17X11IN

## (undated) DEVICE — SEE MEDLINE ITEM 157117

## (undated) DEVICE — SEE MEDLINE ITEM 157216

## (undated) DEVICE — SEE MEDLINE ITEM 146298

## (undated) DEVICE — SOL 9P NACL IRR PIC IL

## (undated) DEVICE — SYS CLSR DERMABOND PRINEO 22CM

## (undated) DEVICE — NDL SURGEON MAYO #7 2/PK 72PKS

## (undated) DEVICE — SUT 2-0 VICRYL / CT-1

## (undated) DEVICE — UNDERGLOVES BIOGEL PI SIZE 8

## (undated) DEVICE — GLOVE SURG ULTRA TOUCH 8

## (undated) DEVICE — DRAPE STERI U-SHAPED 47X51IN

## (undated) DEVICE — SOL NACL IRR 1000ML BTL

## (undated) DEVICE — PACK CUSTOM UNIV BASIN SLI

## (undated) DEVICE — SHEET DRAPE FAN-FOLDED 3/4

## (undated) DEVICE — DRAPE PLASTIC U 60X72

## (undated) DEVICE — BLADE RECIP SINGLE SIDED

## (undated) DEVICE — BLADE #15 STERILE CARBON

## (undated) DEVICE — APPLICATOR CHLORAPREP ORN 26ML

## (undated) DEVICE — SEE MEDLINE ITEM 152522

## (undated) DEVICE — BLADE ELECTRO EXTENDED.

## (undated) DEVICE — GLOVE PI ULTRA TOUCH G SURGEON